# Patient Record
Sex: FEMALE | Race: WHITE | Employment: OTHER | URBAN - METROPOLITAN AREA
[De-identification: names, ages, dates, MRNs, and addresses within clinical notes are randomized per-mention and may not be internally consistent; named-entity substitution may affect disease eponyms.]

---

## 2020-12-21 ENCOUNTER — TRANSFERRED RECORDS (OUTPATIENT)
Dept: HEALTH INFORMATION MANAGEMENT | Facility: CLINIC | Age: 75
End: 2020-12-21

## 2020-12-27 ENCOUNTER — TRANSFERRED RECORDS (OUTPATIENT)
Dept: HEALTH INFORMATION MANAGEMENT | Facility: CLINIC | Age: 75
End: 2020-12-27

## 2020-12-28 LAB — EJECTION FRACTION: 61.2 %

## 2021-01-13 ENCOUNTER — TRANSFERRED RECORDS (OUTPATIENT)
Dept: HEALTH INFORMATION MANAGEMENT | Facility: CLINIC | Age: 76
End: 2021-01-13

## 2021-02-15 ENCOUNTER — OFFICE VISIT (OUTPATIENT)
Dept: FAMILY MEDICINE | Facility: CLINIC | Age: 76
End: 2021-02-15
Payer: COMMERCIAL

## 2021-02-15 VITALS
OXYGEN SATURATION: 97 % | WEIGHT: 138 LBS | SYSTOLIC BLOOD PRESSURE: 138 MMHG | TEMPERATURE: 97.6 F | HEIGHT: 64 IN | HEART RATE: 60 BPM | DIASTOLIC BLOOD PRESSURE: 62 MMHG | BODY MASS INDEX: 23.56 KG/M2 | RESPIRATION RATE: 14 BRPM

## 2021-02-15 DIAGNOSIS — E03.9 ACQUIRED HYPOTHYROIDISM: ICD-10-CM

## 2021-02-15 DIAGNOSIS — K21.00 GASTROESOPHAGEAL REFLUX DISEASE WITH ESOPHAGITIS WITHOUT HEMORRHAGE: ICD-10-CM

## 2021-02-15 DIAGNOSIS — H40.003 GLAUCOMA SUSPECT, BILATERAL: ICD-10-CM

## 2021-02-15 DIAGNOSIS — I21.4 NON-STEMI (NON-ST ELEVATED MYOCARDIAL INFARCTION) (H): ICD-10-CM

## 2021-02-15 DIAGNOSIS — E11.00 TYPE 2 DIABETES MELLITUS WITH HYPEROSMOLARITY WITHOUT COMA, WITHOUT LONG-TERM CURRENT USE OF INSULIN (H): ICD-10-CM

## 2021-02-15 DIAGNOSIS — Z76.89 ENCOUNTER TO ESTABLISH CARE: Primary | ICD-10-CM

## 2021-02-15 DIAGNOSIS — I10 HYPERTENSION, UNSPECIFIED TYPE: ICD-10-CM

## 2021-02-15 DIAGNOSIS — E78.5 HYPERLIPIDEMIA LDL GOAL <100: ICD-10-CM

## 2021-02-15 PROCEDURE — 99215 OFFICE O/P EST HI 40 MIN: CPT | Performed by: NURSE PRACTITIONER

## 2021-02-15 RX ORDER — OMEPRAZOLE 10 MG/1
20 CAPSULE, DELAYED RELEASE ORAL DAILY
Qty: 180 CAPSULE | Refills: 3 | Status: SHIPPED | OUTPATIENT
Start: 2021-02-15 | End: 2021-05-16

## 2021-02-15 RX ORDER — ETHACRYNIC ACID 25 MG/1
25 TABLET ORAL DAILY
Qty: 90 TABLET | Refills: 0 | Status: SHIPPED | OUTPATIENT
Start: 2021-02-15 | End: 2021-03-16

## 2021-02-15 RX ORDER — LANCETS
EACH MISCELLANEOUS
Qty: 1 EACH | Refills: 3 | Status: SHIPPED | OUTPATIENT
Start: 2021-02-15 | End: 2022-02-01

## 2021-02-15 RX ORDER — LATANOPROST 50 UG/ML
1 SOLUTION/ DROPS OPHTHALMIC DAILY
Status: CANCELLED | OUTPATIENT
Start: 2021-02-15

## 2021-02-15 RX ORDER — ETHACRYNIC ACID 25 MG/1
25 TABLET ORAL
COMMUNITY
End: 2021-02-15

## 2021-02-15 RX ORDER — CLOPIDOGREL BISULFATE 75 MG/1
75 TABLET ORAL DAILY
COMMUNITY
Start: 2021-02-09 | End: 2021-02-15

## 2021-02-15 RX ORDER — LATANOPROST 50 UG/ML
1 SOLUTION/ DROPS OPHTHALMIC DAILY
COMMUNITY

## 2021-02-15 RX ORDER — CARVEDILOL 6.25 MG/1
6.25 TABLET ORAL 2 TIMES DAILY
COMMUNITY
Start: 2021-02-09 | End: 2021-02-15

## 2021-02-15 RX ORDER — LEVOTHYROXINE SODIUM 75 UG/1
75 TABLET ORAL DAILY
COMMUNITY
End: 2021-02-15

## 2021-02-15 RX ORDER — ATORVASTATIN CALCIUM 80 MG/1
80 TABLET, FILM COATED ORAL DAILY
COMMUNITY
Start: 2021-02-09 | End: 2021-02-15

## 2021-02-15 RX ORDER — LOSARTAN POTASSIUM 100 MG/1
100 TABLET ORAL DAILY
COMMUNITY
End: 2021-02-15

## 2021-02-15 RX ORDER — CARVEDILOL 6.25 MG/1
6.25 TABLET ORAL 2 TIMES DAILY
Qty: 180 TABLET | Refills: 0 | Status: SHIPPED | OUTPATIENT
Start: 2021-02-15 | End: 2021-05-17

## 2021-02-15 RX ORDER — LEVOTHYROXINE SODIUM 75 UG/1
75 TABLET ORAL DAILY
Qty: 90 TABLET | Refills: 0 | Status: SHIPPED | OUTPATIENT
Start: 2021-02-15 | End: 2021-05-17

## 2021-02-15 RX ORDER — ATORVASTATIN CALCIUM 80 MG/1
80 TABLET, FILM COATED ORAL DAILY
Qty: 90 TABLET | Refills: 0 | Status: SHIPPED | OUTPATIENT
Start: 2021-02-15 | End: 2021-05-17

## 2021-02-15 RX ORDER — LOSARTAN POTASSIUM 100 MG/1
100 TABLET ORAL DAILY
Qty: 90 TABLET | Refills: 0 | Status: SHIPPED | OUTPATIENT
Start: 2021-02-15 | End: 2021-04-16

## 2021-02-15 RX ORDER — NITROGLYCERIN 0.4 MG/1
0.4 TABLET SUBLINGUAL PRN
COMMUNITY
Start: 2021-02-09

## 2021-02-15 RX ORDER — TIMOLOL 2.56 MG/ML
1 SOLUTION/ DROPS OPHTHALMIC DAILY
COMMUNITY

## 2021-02-15 RX ORDER — OMEPRAZOLE 10 MG/1
20 CAPSULE, DELAYED RELEASE ORAL DAILY
COMMUNITY
End: 2021-02-15

## 2021-02-15 RX ORDER — GLUCOSAMINE HCL/CHONDROITIN SU 500-400 MG
CAPSULE ORAL
Qty: 100 EACH | Refills: 3 | Status: SHIPPED | OUTPATIENT
Start: 2021-02-15

## 2021-02-15 RX ORDER — CLOPIDOGREL BISULFATE 75 MG/1
75 TABLET ORAL DAILY
Qty: 90 TABLET | Refills: 1 | Status: SHIPPED | OUTPATIENT
Start: 2021-02-15 | End: 2021-05-17

## 2021-02-15 SDOH — HEALTH STABILITY: MENTAL HEALTH: HOW OFTEN DO YOU HAVE 6 OR MORE DRINKS ON ONE OCCASION?: NEVER

## 2021-02-15 SDOH — HEALTH STABILITY: MENTAL HEALTH: HOW OFTEN DO YOU HAVE A DRINK CONTAINING ALCOHOL?: NEVER

## 2021-02-15 SDOH — HEALTH STABILITY: MENTAL HEALTH: HOW MANY STANDARD DRINKS CONTAINING ALCOHOL DO YOU HAVE ON A TYPICAL DAY?: NOT ASKED

## 2021-02-15 ASSESSMENT — PAIN SCALES - GENERAL: PAINLEVEL: MILD PAIN (3)

## 2021-02-15 ASSESSMENT — MIFFLIN-ST. JEOR: SCORE: 1110.96

## 2021-02-15 NOTE — PATIENT INSTRUCTIONS
- Today we will get your medication refilled.     - Cardiology referral has been placed.     - I will follow up with you in 2-3 months once we have all your records.     Jamie Howard CNP

## 2021-02-15 NOTE — PROGRESS NOTES
Assessment & Plan     Encounter to establish care  - Patient is new to the area. She moved to [place] from the [facility]. 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 All meds need to be refilled today. We discussed health history, health maintenance needs, health history, medication history, and priorities in terms of priorities of care.      Non-STEMI (non-ST elevated myocardial infarction) (H)  - Stable no new pain, fatigue, or SOB will continue with current plan of care and referral to cardiology was placed.   - ethacrynic acid (EDECRIN) 25 MG tablet; Take 1 tablet (25 mg) by mouth daily  - clopidogrel (PLAVIX) 75 MG tablet; Take 1 tablet (75 mg) by mouth daily  - carvedilol (COREG) 6.25 MG tablet; Take 1 tablet (6.25 mg) by mouth 2 times daily  - CARDIOLOGY EVAL ADULT REFERRAL; Future    Hyperlipidemia LDL goal <100  - No new symptoms of concerns, labs stable, reviewed.   - atorvastatin (LIPITOR) 80 MG tablet; Take 1 tablet (80 mg) by mouth daily    Hypertension, unspecified type  - Blood pressure in range, will continue with current plan of care.   - losartan (COZAAR) 100 MG tablet; Take 1 tablet (100 mg) by mouth daily    Type 2 diabetes mellitus with hyperosmolarity without coma, without long-term current use of insulin (H)  - Stable, will monitor with labs.   - blood glucose (NO BRAND SPECIFIED) test strip; Use to test blood sugar 1 times daily or as directed. To accompany: Blood Glucose Monitor Brands: per insurance.  - blood glucose calibration (NO BRAND SPECIFIED) solution; Use to calibrate blood glucose monitor as needed as directed. To accompany: Blood Glucose Monitor Brands: per insurance.  - thin (NO BRAND SPECIFIED) lancets; Use to test blood sugar 1 times daily or as directed. To accompany: Blood Glucose Monitor Brands: per insurance.  - alcohol swab  "prep pads; Use to swab area of injection/shana as directed  - metFORMIN (GLUCOPHAGE) 500 MG tablet; Take 1 tablet (500 mg) by mouth 2 times daily (with meals)    Gastroesophageal reflux disease with esophagitis without hemorrhage  - Stable, will continue with a current plan of care.   - omeprazole (PRILOSEC) 10 MG DR capsule; Take 2 capsules (20 mg) by mouth daily    Acquired hypothyroidism  - Labs stable, will continue with current plan of care.   - levothyroxine (SYNTHROID/LEVOTHROID) 75 MCG tablet; Take 1 tablet (75 mcg) by mouth daily  - TSH with free T4 reflex; Future    Glaucoma suspect, bilateral  - Follows regularly with Ophthalmologists.         45 minutes spent on the date of the encounter doing chart review, review of outside records, interpretation of tests, patient visit and documentation            Return in about 3 months (around 5/15/2021) for Routine Visit, Lab Work.    Jamie Howard NP  [facility] YUE Enciso is a 75 year old who presents for the following health issues     HPI       New Patient/Transfer of Care      Review of Systems   Constitutional, HEENT, cardiovascular, pulmonary, gi and gu systems are negative, except as otherwise noted.      Objective    /62   Pulse 60   Temp 97.6  F (36.4  C) (Temporal)   Resp 14   Ht 1.626 m (5' 4\")   Wt 62.6 kg (138 lb)   SpO2 97%   BMI 23.69 kg/m    Body mass index is 23.69 kg/m .  Physical Exam   GENERAL: healthy, alert and no distress  EYES: Eyes grossly normal to inspection, PERRL and conjunctivae and sclerae normal  NECK: no adenopathy, no asymmetry, masses, or scars and thyroid normal to palpation  RESP: lungs clear to auscultation - no rales, rhonchi or wheezes  CV: regular rate and rhythm, normal S1 S2, no S3 or S4, no murmur, click or rub, no peripheral edema and peripheral pulses strong  ABDOMEN: soft, nontender  MS: no gross musculoskeletal defects noted, no edema    Labs and imaging " reviewed in EPIC

## 2021-02-16 ENCOUNTER — HOSPITAL ENCOUNTER (OUTPATIENT)
Dept: CARDIAC REHAB | Facility: CLINIC | Age: 76
End: 2021-02-16
Payer: COMMERCIAL

## 2021-02-16 PROCEDURE — 93798 PHYS/QHP OP CAR RHAB W/ECG: CPT

## 2021-02-16 PROCEDURE — 93797 PHYS/QHP OP CAR RHAB WO ECG: CPT | Mod: 59

## 2021-02-19 ENCOUNTER — HOSPITAL ENCOUNTER (OUTPATIENT)
Dept: CARDIAC REHAB | Facility: CLINIC | Age: 76
End: 2021-02-19
Attending: INTERNAL MEDICINE
Payer: COMMERCIAL

## 2021-02-19 PROCEDURE — 93798 PHYS/QHP OP CAR RHAB W/ECG: CPT

## 2021-02-22 ENCOUNTER — HOSPITAL ENCOUNTER (OUTPATIENT)
Dept: CARDIAC REHAB | Facility: CLINIC | Age: 76
End: 2021-02-22
Attending: INTERNAL MEDICINE
Payer: COMMERCIAL

## 2021-02-22 PROCEDURE — 93798 PHYS/QHP OP CAR RHAB W/ECG: CPT | Performed by: REHABILITATION PRACTITIONER

## 2021-02-23 ENCOUNTER — OFFICE VISIT (OUTPATIENT)
Dept: CARDIOLOGY | Facility: CLINIC | Age: 76
End: 2021-02-23
Attending: NURSE PRACTITIONER
Payer: COMMERCIAL

## 2021-02-23 VITALS
OXYGEN SATURATION: 100 % | HEIGHT: 64 IN | WEIGHT: 139.9 LBS | SYSTOLIC BLOOD PRESSURE: 120 MMHG | BODY MASS INDEX: 23.88 KG/M2 | HEART RATE: 54 BPM | DIASTOLIC BLOOD PRESSURE: 66 MMHG

## 2021-02-23 DIAGNOSIS — I10 BENIGN ESSENTIAL HYPERTENSION: Primary | ICD-10-CM

## 2021-02-23 DIAGNOSIS — I21.02 ST ELEVATION MYOCARDIAL INFARCTION INVOLVING LEFT ANTERIOR DESCENDING (LAD) CORONARY ARTERY (H): ICD-10-CM

## 2021-02-23 DIAGNOSIS — E11.9 TYPE 2 DIABETES MELLITUS WITHOUT COMPLICATION, WITHOUT LONG-TERM CURRENT USE OF INSULIN (H): ICD-10-CM

## 2021-02-23 DIAGNOSIS — I25.10 CORONARY ARTERY DISEASE INVOLVING NATIVE CORONARY ARTERY OF NATIVE HEART WITHOUT ANGINA PECTORIS: ICD-10-CM

## 2021-02-23 DIAGNOSIS — E78.00 HYPERCHOLESTEROLEMIA: ICD-10-CM

## 2021-02-23 PROCEDURE — 99204 OFFICE O/P NEW MOD 45 MIN: CPT | Performed by: INTERNAL MEDICINE

## 2021-02-23 ASSESSMENT — MIFFLIN-ST. JEOR: SCORE: 1119.58

## 2021-02-23 NOTE — PROGRESS NOTES
Service Date: 02/23/2021      HISTORY OF PRESENT ILLNESS:  I had the opportunity to see Ms. Zaria Gaona in Cardiology Clinic today at [facility] in [place] for consultation regarding a recent myocardial infarction and stenting procedure done in [place].        Ms. Gaona is a 75-year-old woman who was living in [place], [place] with her  on 12/26 when she began experiencing indigestion type symptoms.  She took some Tums and went to bed and the discomfort seemed to improve.  However, she woke up shortly after that with more intense discomfort and went to the emergency room in [place], [place].  She was diagnosed with an acute anterior ST elevation myocardial infarction, but her chest discomfort seemed to resolve with nitroglycerin.  She was transferred by ambulance to [facility] in [place].  She tells me that she arrived in the early morning hours and waited until late in the day to see a doctor and then was taken rapidly to the Cardiac Catheterization Laboratory that evening.        She was found to have a thrombotic occlusion of the mid LAD immediately after a small diagonal vessel.  The diagonal had a 60%-70% stenosis.  The LAD was stented with a drug-eluting stent.  She also had a 90% stenosis in the proximal right coronary artery which was stented and 30% distal left main stenosis and 60% mid left circumflex stenosis.      Her echocardiogram the following day on 12/28/2020 demonstrated mild distal anteroseptal and apical hypokinesis with normal left ventricular ejection fraction of 61%.  She had no significant valvular disease.      She left the hospital feeling well and returned home to pack up her house and belongings and moved to [place].  She and her , who has dementia, moved here to be closer to family.  She saw Cardiology in [place] in January before moving here and she seemed to be stable and doing well.      She started cardiac rehab here in  [place] and is feeling well without chest pain or shortness of breath.  She has no lightheadedness, palpitations or syncope.      She seems to be tolerating her medications well.  She does have cardiac risk factors including hypertension, dyslipidemia and type 2 diabetes.  She wonders why her hypertension was not treated more aggressively before this event.      PHYSICAL EXAMINATION:  On examination today, her blood pressure was 120/66, heart rate 54 and weight 139 pounds.  Her lungs are clear.  Heart rhythm is regular.  She has no cardiac murmurs or carotid bruits.      IMPRESSIONS:  Ms. Zaria Gaona is a 75-year-old woman with hypertension, type 2 diabetes and dyslipidemia, who recently suffered an anterior wall ST elevation myocardial infarction, treated somewhat urgently in [place] with stenting of her mid LAD where she had a thrombotic occlusion.  Interestingly, the laboratory studies indicate a troponin of 12 prior to her stenting procedure.  Her right coronary artery was also stented at that time.      She is on excellent medication program now.  She is on ethacrynic acid rather than furosemide, probably because of sulfa allergy, although she tells me she never took any other diuretic.  She is doing well with [facility] so I do not see any reason to change that at this point.      Her last hemoglobin A1c was 7.9 in December.  She has a recheck in 3 months with her primary care physician here.  If her hemoglobin A1c is still elevated above 7, she would benefit from additional medication, like an SGLT2 inhibitor (Jardiance) that would help provide secondary prevention for future cardiac events.      I will plan to see her back again in Cardiology Clinic in 6 months to reevaluate her echocardiogram and symptoms.      cc:      Jamie Howard, CNP    [address] [address]         CANDIDA CLAIRE MD, FACC             D: 02/23/2021   T:  2021   MT: [id number]      Name:     CHANG WILKERSON   MRN:      [id number]        Account:      [id number]   :      1945           Service Date: 2021      Document: [id number]

## 2021-02-23 NOTE — LETTER
2/23/2021    Jamie Howard, NP  [address] [place] [address]    RE: Zaria Gaona       Dear Colleague,    I had the pleasure of seeing Zaria Gaona in the [facility].    HPI and Plan:   See dictation    Orders Placed This Encounter   Procedures     Basic metabolic panel     Lipid Profile     ALT     Hemoglobin A1c     Follow-Up with Cardiologist     Echocardiogram Complete       No orders of the defined types were placed in this encounter.      There are no discontinued medications.      Encounter Diagnoses   Name Primary?     Benign essential hypertension Yes     Hypercholesterolemia      Type 2 diabetes mellitus without complication, without long-term current use of insulin (H)      ST elevation myocardial infarction involving left anterior descending (LAD) coronary artery (H)      Coronary artery disease involving native coronary artery of native heart without angina pectoris        CURRENT MEDICATIONS:  Current Outpatient Medications   Medication Sig Dispense Refill     alcohol swab prep pads Use to swab area of injection/shana as directed 100 each 3     atorvastatin (LIPITOR) 80 MG tablet Take 1 tablet (80 mg) by mouth daily 90 tablet 0     blood glucose (NO BRAND SPECIFIED) test strip Use to test blood sugar 1 times daily or as directed. To accompany: Blood Glucose Monitor Brands: per insurance. 100 strip 6     blood glucose calibration (NO BRAND SPECIFIED) solution Use to calibrate blood glucose monitor as needed as directed. To accompany: Blood Glucose Monitor Brands: per insurance. 1 Bottle 3     carvedilol (COREG) 6.25 MG tablet Take 1 tablet (6.25 mg) by mouth 2 times daily 180 tablet 0     clopidogrel (PLAVIX) 75 MG tablet Take 1 tablet (75 mg) by mouth daily 90 tablet 1     ethacrynic acid (EDECRIN) 25 MG tablet Take 1 tablet (25 mg) by mouth daily 90 tablet 0     latanoprost (XALATAN) 0.005 % ophthalmic solution  Inject 1 drop into the eye daily       levothyroxine (SYNTHROID/LEVOTHROID) 75 MCG tablet Take 1 tablet (75 mcg) by mouth daily 90 tablet 0     losartan (COZAAR) 100 MG tablet Take 1 tablet (100 mg) by mouth daily 90 tablet 0     metFORMIN (GLUCOPHAGE) 500 MG tablet Take 1 tablet (500 mg) by mouth 2 times daily (with meals) 180 tablet 3     omeprazole (PRILOSEC) 10 MG DR capsule Take 2 capsules (20 mg) by mouth daily 180 capsule 3     thin (NO BRAND SPECIFIED) lancets Use to test blood sugar 1 times daily or as directed. To accompany: Blood Glucose Monitor Brands: per insurance. 1 each 3     timolol hemihydrate (BETIMOL) 0.25 % ophthalmic solution Inject 1 drop into the eye daily       nitroGLYcerin (NITROSTAT) 0.4 MG sublingual tablet Place 0.4 mg under the tongue as needed         ALLERGIES     Allergies   Allergen Reactions     Ampicillin Other (See Comments), Hives and Itching     Lisinopril Cough     Sulfamethoxazole W/Trimethoprim Other (See Comments), Hives and Itching       PAST MEDICAL HISTORY:  No past medical history on file.    PAST SURGICAL HISTORY:  No past surgical history on file.    FAMILY HISTORY:  No family history on file.    SOCIAL HISTORY:  Social History     Socioeconomic History     Marital status:      Spouse name: Not on file     Number of children: Not on file     Years of education: Not on file     Highest education level: Not on file   Occupational History     Not on file   Social Needs     Financial resource strain: Not on file     Food insecurity     Worry: Not on file     Inability: Not on file     Transportation needs     Medical: Not on file     Non-medical: Not on file   Tobacco Use     Smoking status: Never Smoker     Smokeless tobacco: Never Used   Substance and Sexual Activity     Alcohol use: Yes     Frequency: Never     Binge frequency: Never     Comment: occ     Drug use: Never     Sexual activity: Yes     Partners: Male     Birth control/protection: Female Surgical  "  Lifestyle     Physical activity     Days per week: Not on file     Minutes per session: Not on file     Stress: Not on file   Relationships     Social connections     Talks on phone: Not on file     Gets together: Not on file     Attends [employer] service: Not on file     Active member of club or organization: Not on file     Attends meetings of clubs or organizations: Not on file     Relationship status: Not on file     Intimate partner violence     Fear of current or ex partner: Not on file     Emotionally abused: Not on file     Physically abused: Not on file     Forced sexual activity: Not on file   Other Topics Concern     Not on file   Social History Narrative     Not on file       Review of Systems:  Skin:  Negative       Eyes:  Positive for glasses    ENT:  Negative      Respiratory:  Negative       Cardiovascular:  Negative for;palpitations;edema;lightheadedness;dizziness Positive for;chest pain    Gastroenterology:        Genitourinary:  Negative      Musculoskeletal:  Negative      Neurologic:  Negative      Psychiatric:  Positive for sleep disturbances    Heme/Lymph/Imm:  Positive for allergies    Endocrine:  Positive for diabetes      Physical Exam:  Vitals: /66 (BP Location: Right arm, Patient Position: Sitting, Cuff Size: Adult Regular)   Pulse 54   Ht 1.626 m (5' 4\")   Wt 63.5 kg (139 lb 14.4 oz)   SpO2 100%   BMI 24.01 kg/m      Constitutional:  cooperative, alert and oriented, well developed, well nourished, in no acute distress        Skin:  warm and dry to the touch, no apparent skin lesions or masses noted          Head:  normocephalic, no masses or lesions        Eyes:  pupils equal and round, conjunctivae and lids unremarkable, sclera white, no xanthalasma, EOMS intact, no nystagmus        Lymph:No Cervical lymphadenopathy present     ENT:  no pallor or cyanosis, dentition good        Neck:  carotid pulses are full and equal bilaterally, JVP normal, no carotid bruit    "     Respiratory:  normal breath sounds, clear to auscultation, normal A-P diameter, normal symmetry, normal respiratory excursion, no use of accessory muscles         Cardiac: regular rhythm, normal S1/S2, no S3 or S4, apical impulse not displaced, no murmurs, gallops or rubs                                                         GI:  abdomen soft;BS normoactive        Extremities and Muscular Skeletal:  no deformities, clubbing, cyanosis, erythema observed;no edema              Neurological:  no gross motor deficits;affect appropriate        Psych:  oriented to time, person and place        CC  Jamie Howard NP  [address] [place] [address]                  Thank you for allowing me to participate in the care of your patient.      Sincerely,     CANDIDA CLAIRE MD     [facility]  cc:   Jamie Howard NP  [address] [place] [address]

## 2021-02-23 NOTE — PROGRESS NOTES
HPI and Plan:   See dictation    Orders Placed This Encounter   Procedures     Basic metabolic panel     Lipid Profile     ALT     Hemoglobin A1c     Follow-Up with Cardiologist     Echocardiogram Complete       No orders of the defined types were placed in this encounter.      There are no discontinued medications.      Encounter Diagnoses   Name Primary?     Benign essential hypertension Yes     Hypercholesterolemia      Type 2 diabetes mellitus without complication, without long-term current use of insulin (H)      ST elevation myocardial infarction involving left anterior descending (LAD) coronary artery (H)      Coronary artery disease involving native coronary artery of native heart without angina pectoris        CURRENT MEDICATIONS:  Current Outpatient Medications   Medication Sig Dispense Refill     alcohol swab prep pads Use to swab area of injection/shana as directed 100 each 3     atorvastatin (LIPITOR) 80 MG tablet Take 1 tablet (80 mg) by mouth daily 90 tablet 0     blood glucose (NO BRAND SPECIFIED) test strip Use to test blood sugar 1 times daily or as directed. To accompany: Blood Glucose Monitor Brands: per insurance. 100 strip 6     blood glucose calibration (NO BRAND SPECIFIED) solution Use to calibrate blood glucose monitor as needed as directed. To accompany: Blood Glucose Monitor Brands: per insurance. 1 Bottle 3     carvedilol (COREG) 6.25 MG tablet Take 1 tablet (6.25 mg) by mouth 2 times daily 180 tablet 0     clopidogrel (PLAVIX) 75 MG tablet Take 1 tablet (75 mg) by mouth daily 90 tablet 1     ethacrynic acid (EDECRIN) 25 MG tablet Take 1 tablet (25 mg) by mouth daily 90 tablet 0     latanoprost (XALATAN) 0.005 % ophthalmic solution Inject 1 drop into the eye daily       levothyroxine (SYNTHROID/LEVOTHROID) 75 MCG tablet Take 1 tablet (75 mcg) by mouth daily 90 tablet 0     losartan (COZAAR) 100 MG tablet Take 1 tablet (100 mg) by mouth daily 90 tablet 0     metFORMIN (GLUCOPHAGE) 500 MG  tablet Take 1 tablet (500 mg) by mouth 2 times daily (with meals) 180 tablet 3     omeprazole (PRILOSEC) 10 MG DR capsule Take 2 capsules (20 mg) by mouth daily 180 capsule 3     thin (NO BRAND SPECIFIED) lancets Use to test blood sugar 1 times daily or as directed. To accompany: Blood Glucose Monitor Brands: per insurance. 1 each 3     timolol hemihydrate (BETIMOL) 0.25 % ophthalmic solution Inject 1 drop into the eye daily       nitroGLYcerin (NITROSTAT) 0.4 MG sublingual tablet Place 0.4 mg under the tongue as needed         ALLERGIES     Allergies   Allergen Reactions     Ampicillin Other (See Comments), Hives and Itching     Lisinopril Cough     Sulfamethoxazole W/Trimethoprim Other (See Comments), Hives and Itching       PAST MEDICAL HISTORY:  No past medical history on file.    PAST SURGICAL HISTORY:  No past surgical history on file.    FAMILY HISTORY:  No family history on file.    SOCIAL HISTORY:  Social History     Socioeconomic History     Marital status:      Spouse name: Not on file     Number of children: Not on file     Years of education: Not on file     Highest education level: Not on file   Occupational History     Not on file   Social Needs     Financial resource strain: Not on file     Food insecurity     Worry: Not on file     Inability: Not on file     Transportation needs     Medical: Not on file     Non-medical: Not on file   Tobacco Use     Smoking status: Never Smoker     Smokeless tobacco: Never Used   Substance and Sexual Activity     Alcohol use: Yes     Frequency: Never     Binge frequency: Never     Comment: occ     Drug use: Never     Sexual activity: Yes     Partners: Male     Birth control/protection: Female Surgical   Lifestyle     Physical activity     Days per week: Not on file     Minutes per session: Not on file     Stress: Not on file   Relationships     Social connections     Talks on phone: Not on file     Gets together: Not on file     Attends Religion service: Not  "on file     Active member of club or organization: Not on file     Attends meetings of clubs or organizations: Not on file     Relationship status: Not on file     Intimate partner violence     Fear of current or ex partner: Not on file     Emotionally abused: Not on file     Physically abused: Not on file     Forced sexual activity: Not on file   Other Topics Concern     Not on file   Social History Narrative     Not on file       Review of Systems:  Skin:  Negative       Eyes:  Positive for glasses    ENT:  Negative      Respiratory:  Negative       Cardiovascular:  Negative for;palpitations;edema;lightheadedness;dizziness Positive for;chest pain    Gastroenterology:        Genitourinary:  Negative      Musculoskeletal:  Negative      Neurologic:  Negative      Psychiatric:  Positive for sleep disturbances    Heme/Lymph/Imm:  Positive for allergies    Endocrine:  Positive for diabetes      Physical Exam:  Vitals: /66 (BP Location: Right arm, Patient Position: Sitting, Cuff Size: Adult Regular)   Pulse 54   Ht 1.626 m (5' 4\")   Wt 63.5 kg (139 lb 14.4 oz)   SpO2 100%   BMI 24.01 kg/m      Constitutional:  cooperative, alert and oriented, well developed, well nourished, in no acute distress        Skin:  warm and dry to the touch, no apparent skin lesions or masses noted          Head:  normocephalic, no masses or lesions        Eyes:  pupils equal and round, conjunctivae and lids unremarkable, sclera white, no xanthalasma, EOMS intact, no nystagmus        Lymph:No Cervical lymphadenopathy present     ENT:  no pallor or cyanosis, dentition good        Neck:  carotid pulses are full and equal bilaterally, JVP normal, no carotid bruit        Respiratory:  normal breath sounds, clear to auscultation, normal A-P diameter, normal symmetry, normal respiratory excursion, no use of accessory muscles         Cardiac: regular rhythm, normal S1/S2, no S3 or S4, apical impulse not displaced, no murmurs, gallops or " rubs                                                         GI:  abdomen soft;BS normoactive        Extremities and Muscular Skeletal:  no deformities, clubbing, cyanosis, erythema observed;no edema              Neurological:  no gross motor deficits;affect appropriate        Psych:  oriented to time, person and place          Jamie Howard, NP  [address] [place] [address]

## 2021-02-23 NOTE — LETTER
2/23/2021      Jamie Howard, GERMAN  [address] [place] [address]      RE: Zaria JOSEPH Jimenez       Dear Colleague,    I had the pleasure of seeing Zaria Gaona in the [facility].    Service Date: 02/23/2021      HISTORY OF PRESENT ILLNESS:  I had the opportunity to see Ms. Zaria Gaona in Cardiology Clinic today at [facility] in [place] for consultation regarding a recent myocardial infarction and stenting procedure done in [place].        Ms. Gaona is a 75-year-old woman who was living in [place], [place] with her  on 12/26 when she began experiencing indigestion type symptoms.  She took some Tums and went to bed and the discomfort seemed to improve.  However, she woke up shortly after that with more intense discomfort and went to the emergency room in [place], [place].  She was diagnosed with an acute anterior ST elevation myocardial infarction, but her chest discomfort seemed to resolve with nitroglycerin.  She was transferred by ambulance to [facility] in [place].  She tells me that she arrived in the early morning hours and waited until late in the day to see a doctor and then was taken rapidly to the Cardiac Catheterization Laboratory that evening.        She was found to have a thrombotic occlusion of the mid LAD immediately after a small diagonal vessel.  The diagonal had a 60%-70% stenosis.  The LAD was stented with a drug-eluting stent.  She also had a 90% stenosis in the proximal right coronary artery which was stented and 30% distal left main stenosis and 60% mid left circumflex stenosis.      Her echocardiogram the following day on 12/28/2020 demonstrated mild distal anteroseptal and apical hypokinesis with normal left ventricular ejection fraction of 61%.  She had no significant valvular disease.      She left the hospital feeling well and returned home to pack up her house and  belongings and moved to [place].  She and her , who has dementia, moved here to be closer to family.  She saw Cardiology in [place] in January before moving here and she seemed to be stable and doing well.      She started cardiac rehab here in [place] and is feeling well without chest pain or shortness of breath.  She has no lightheadedness, palpitations or syncope.      She seems to be tolerating her medications well.  She does have cardiac risk factors including hypertension, dyslipidemia and type 2 diabetes.  She wonders why her hypertension was not treated more aggressively before this event.      PHYSICAL EXAMINATION:  On examination today, her blood pressure was 120/66, heart rate 54 and weight 139 pounds.  Her lungs are clear.  Heart rhythm is regular.  She has no cardiac murmurs or carotid bruits.      IMPRESSIONS:  Ms. Zaria Gaona is a 75-year-old woman with hypertension, type 2 diabetes and dyslipidemia, who recently suffered an anterior wall ST elevation myocardial infarction, treated somewhat urgently in [place] with stenting of her mid LAD where she had a thrombotic occlusion.  Interestingly, the laboratory studies indicate a troponin of 12 prior to her stenting procedure.  Her right coronary artery was also stented at that time.      She is on excellent medication program now.  She is on ethacrynic acid rather than furosemide, probably because of sulfa allergy, although she tells me she never took any other diuretic.  She is doing well with [facility] so I do not see any reason to change that at this point.      Her last hemoglobin A1c was 7.9 in December.  She has a recheck in 3 months with her primary care physician here.  If her hemoglobin A1c is still elevated above 7, she would benefit from additional medication, like an SGLT2 inhibitor (Jardiance) that would help provide secondary prevention for future cardiac events.      I will plan to see her back again  in Cardiology Clinic in 6 months to reevaluate her echocardiogram and symptoms.      cc:      Jamie Howard, CNP    [address] [address]         CANDIDA CLAIRE MD, [facility]             D: 2021   T: 2021   MT: KOKO      Name:     CHANG WILKERSON   MRN:      [id number]        Account:      [id number]   :      1945           Service Date: 2021      Document: [id number]          Outpatient Encounter Medications as of 2021   Medication Sig Dispense Refill     alcohol swab prep pads Use to swab area of injection/shana as directed 100 each 3     atorvastatin (LIPITOR) 80 MG tablet Take 1 tablet (80 mg) by mouth daily 90 tablet 0     blood glucose (NO BRAND SPECIFIED) test strip Use to test blood sugar 1 times daily or as directed. To accompany: Blood Glucose Monitor Brands: per insurance. 100 strip 6     blood glucose calibration (NO BRAND SPECIFIED) solution Use to calibrate blood glucose monitor as needed as directed. To accompany: Blood Glucose Monitor Brands: per insurance. 1 Bottle 3     carvedilol (COREG) 6.25 MG tablet Take 1 tablet (6.25 mg) by mouth 2 times daily 180 tablet 0     clopidogrel (PLAVIX) 75 MG tablet Take 1 tablet (75 mg) by mouth daily 90 tablet 1     ethacrynic acid (EDECRIN) 25 MG tablet Take 1 tablet (25 mg) by mouth daily 90 tablet 0     latanoprost (XALATAN) 0.005 % ophthalmic solution Inject 1 drop into the eye daily       levothyroxine (SYNTHROID/LEVOTHROID) 75 MCG tablet Take 1 tablet (75 mcg) by mouth daily 90 tablet 0     losartan (COZAAR) 100 MG tablet Take 1 tablet (100 mg) by mouth daily 90 tablet 0     metFORMIN (GLUCOPHAGE) 500 MG tablet Take 1 tablet (500 mg) by mouth 2 times daily (with meals) 180 tablet 3     omeprazole (PRILOSEC) 10 MG DR capsule Take 2 capsules (20 mg) by mouth daily 180 capsule 3     thin (NO BRAND SPECIFIED) lancets Use to test blood sugar 1 times daily or as directed. To  accompany: Blood Glucose Monitor Brands: per insurance. 1 each 3     timolol hemihydrate (BETIMOL) 0.25 % ophthalmic solution Inject 1 drop into the eye daily       nitroGLYcerin (NITROSTAT) 0.4 MG sublingual tablet Place 0.4 mg under the tongue as needed       No facility-administered encounter medications on file as of 2/23/2021.        Again, thank you for allowing me to participate in the care of your patient.      Sincerely,    CANDIDA CLAIRE MD     [facility]

## 2021-02-24 ENCOUNTER — HOSPITAL ENCOUNTER (OUTPATIENT)
Dept: CARDIAC REHAB | Facility: CLINIC | Age: 76
End: 2021-02-24
Attending: INTERNAL MEDICINE
Payer: COMMERCIAL

## 2021-02-24 PROCEDURE — 93798 PHYS/QHP OP CAR RHAB W/ECG: CPT | Performed by: REHABILITATION PRACTITIONER

## 2021-02-26 ENCOUNTER — HOSPITAL ENCOUNTER (OUTPATIENT)
Dept: CARDIAC REHAB | Facility: CLINIC | Age: 76
End: 2021-02-26
Attending: INTERNAL MEDICINE
Payer: COMMERCIAL

## 2021-02-26 PROCEDURE — 93798 PHYS/QHP OP CAR RHAB W/ECG: CPT

## 2021-03-01 ENCOUNTER — HOSPITAL ENCOUNTER (OUTPATIENT)
Dept: CARDIAC REHAB | Facility: CLINIC | Age: 76
End: 2021-03-01
Attending: INTERNAL MEDICINE
Payer: COMMERCIAL

## 2021-03-01 PROCEDURE — 93798 PHYS/QHP OP CAR RHAB W/ECG: CPT | Performed by: REHABILITATION PRACTITIONER

## 2021-03-03 ENCOUNTER — HOSPITAL ENCOUNTER (OUTPATIENT)
Dept: CARDIAC REHAB | Facility: CLINIC | Age: 76
End: 2021-03-03
Attending: INTERNAL MEDICINE
Payer: COMMERCIAL

## 2021-03-03 PROCEDURE — 93798 PHYS/QHP OP CAR RHAB W/ECG: CPT | Performed by: REHABILITATION PRACTITIONER

## 2021-03-05 ENCOUNTER — HOSPITAL ENCOUNTER (OUTPATIENT)
Dept: CARDIAC REHAB | Facility: CLINIC | Age: 76
End: 2021-03-05
Attending: INTERNAL MEDICINE
Payer: COMMERCIAL

## 2021-03-05 PROCEDURE — 93798 PHYS/QHP OP CAR RHAB W/ECG: CPT

## 2021-03-08 ENCOUNTER — HOSPITAL ENCOUNTER (OUTPATIENT)
Dept: CARDIAC REHAB | Facility: CLINIC | Age: 76
End: 2021-03-08
Attending: INTERNAL MEDICINE
Payer: COMMERCIAL

## 2021-03-08 PROCEDURE — 93798 PHYS/QHP OP CAR RHAB W/ECG: CPT | Performed by: REHABILITATION PRACTITIONER

## 2021-03-10 ENCOUNTER — HOSPITAL ENCOUNTER (OUTPATIENT)
Dept: CARDIAC REHAB | Facility: CLINIC | Age: 76
End: 2021-03-10
Attending: INTERNAL MEDICINE
Payer: COMMERCIAL

## 2021-03-10 PROCEDURE — 93798 PHYS/QHP OP CAR RHAB W/ECG: CPT

## 2021-03-12 ENCOUNTER — TRANSFERRED RECORDS (OUTPATIENT)
Dept: HEALTH INFORMATION MANAGEMENT | Facility: CLINIC | Age: 76
End: 2021-03-12

## 2021-03-12 ENCOUNTER — HOSPITAL ENCOUNTER (OUTPATIENT)
Dept: CARDIAC REHAB | Facility: CLINIC | Age: 76
End: 2021-03-12
Attending: INTERNAL MEDICINE
Payer: COMMERCIAL

## 2021-03-12 LAB — RETINOPATHY: POSITIVE

## 2021-03-12 PROCEDURE — 999N000109 HC STATISTIC OP CR VISIT

## 2021-03-12 PROCEDURE — 93798 PHYS/QHP OP CAR RHAB W/ECG: CPT

## 2021-03-15 ENCOUNTER — HOSPITAL ENCOUNTER (OUTPATIENT)
Dept: CARDIAC REHAB | Facility: CLINIC | Age: 76
End: 2021-03-15
Attending: INTERNAL MEDICINE
Payer: COMMERCIAL

## 2021-03-15 ENCOUNTER — TELEPHONE (OUTPATIENT)
Dept: FAMILY MEDICINE | Facility: CLINIC | Age: 76
End: 2021-03-15

## 2021-03-15 DIAGNOSIS — I21.4 NON-STEMI (NON-ST ELEVATED MYOCARDIAL INFARCTION) (H): ICD-10-CM

## 2021-03-15 PROCEDURE — 93798 PHYS/QHP OP CAR RHAB W/ECG: CPT | Performed by: REHABILITATION PRACTITIONER

## 2021-03-15 NOTE — TELEPHONE ENCOUNTER
Ethacrynic      Last Written Prescription Date:  2/15/2021  Last Fill Quantity: 90,   # refills: 0  Last Office Visit: 2/15/2021  Future Office visit:    Next 5 appointments (look out 90 days)    Mar 26, 2021  2:20 PM  Office Visit with Jamie Howard NP  [facility] ([facility] ) [address] [place] [id number]  [phone]   May 17, 2021 10:00 AM  Office Visit with Jamie Howard NP  [facility] ([facility] ) [address] [place] [id number]  [phone]           Routing refill request to provider for review/approval because:  Drug not on the [facility], [facility] or [facility] refill protocol or controlled substance

## 2021-03-15 NOTE — TELEPHONE ENCOUNTER
Patient stating she recently completed a home colon cancer screening test that the results came back positive for blood and was instructed to see her provider for follow up on the results. Patient is scheduled with her provider on Friday, March 26th at 2:20 pm. Denise Wong LPN

## 2021-03-16 RX ORDER — ETHACRYNIC ACID 25 MG/1
TABLET ORAL
Qty: 90 TABLET | Refills: 0 | Status: SHIPPED | OUTPATIENT
Start: 2021-03-16 | End: 2021-05-17

## 2021-03-17 ENCOUNTER — HOSPITAL ENCOUNTER (OUTPATIENT)
Dept: CARDIAC REHAB | Facility: CLINIC | Age: 76
End: 2021-03-17
Attending: INTERNAL MEDICINE
Payer: COMMERCIAL

## 2021-03-17 PROCEDURE — 93797 PHYS/QHP OP CAR RHAB WO ECG: CPT | Performed by: REHABILITATION PRACTITIONER

## 2021-03-17 PROCEDURE — 93798 PHYS/QHP OP CAR RHAB W/ECG: CPT | Performed by: REHABILITATION PRACTITIONER

## 2021-03-19 ENCOUNTER — HOSPITAL ENCOUNTER (OUTPATIENT)
Dept: CARDIAC REHAB | Facility: CLINIC | Age: 76
End: 2021-03-19
Attending: INTERNAL MEDICINE
Payer: COMMERCIAL

## 2021-03-19 PROCEDURE — 93798 PHYS/QHP OP CAR RHAB W/ECG: CPT

## 2021-03-22 ENCOUNTER — HOSPITAL ENCOUNTER (OUTPATIENT)
Dept: CARDIAC REHAB | Facility: CLINIC | Age: 76
End: 2021-03-22
Attending: INTERNAL MEDICINE
Payer: COMMERCIAL

## 2021-03-22 PROCEDURE — 93798 PHYS/QHP OP CAR RHAB W/ECG: CPT | Performed by: REHABILITATION PRACTITIONER

## 2021-03-24 ENCOUNTER — HOSPITAL ENCOUNTER (OUTPATIENT)
Dept: CARDIAC REHAB | Facility: CLINIC | Age: 76
End: 2021-03-24
Attending: INTERNAL MEDICINE
Payer: COMMERCIAL

## 2021-03-24 PROCEDURE — 93798 PHYS/QHP OP CAR RHAB W/ECG: CPT

## 2021-03-26 ENCOUNTER — HOSPITAL ENCOUNTER (OUTPATIENT)
Dept: CARDIAC REHAB | Facility: CLINIC | Age: 76
End: 2021-03-26
Attending: INTERNAL MEDICINE
Payer: COMMERCIAL

## 2021-03-26 PROCEDURE — 93798 PHYS/QHP OP CAR RHAB W/ECG: CPT

## 2021-03-29 ENCOUNTER — HOSPITAL ENCOUNTER (OUTPATIENT)
Dept: CARDIAC REHAB | Facility: CLINIC | Age: 76
End: 2021-03-29
Attending: INTERNAL MEDICINE
Payer: COMMERCIAL

## 2021-03-29 PROCEDURE — 93798 PHYS/QHP OP CAR RHAB W/ECG: CPT | Performed by: REHABILITATION PRACTITIONER

## 2021-03-31 ENCOUNTER — HOSPITAL ENCOUNTER (OUTPATIENT)
Dept: CARDIAC REHAB | Facility: CLINIC | Age: 76
End: 2021-03-31
Attending: INTERNAL MEDICINE
Payer: COMMERCIAL

## 2021-03-31 PROCEDURE — 93798 PHYS/QHP OP CAR RHAB W/ECG: CPT | Performed by: REHABILITATION PRACTITIONER

## 2021-04-02 ENCOUNTER — HOSPITAL ENCOUNTER (OUTPATIENT)
Dept: CARDIAC REHAB | Facility: CLINIC | Age: 76
End: 2021-04-02
Attending: INTERNAL MEDICINE
Payer: COMMERCIAL

## 2021-04-02 PROCEDURE — 93798 PHYS/QHP OP CAR RHAB W/ECG: CPT

## 2021-04-05 ENCOUNTER — HOSPITAL ENCOUNTER (OUTPATIENT)
Dept: CARDIAC REHAB | Facility: CLINIC | Age: 76
End: 2021-04-05
Attending: INTERNAL MEDICINE
Payer: COMMERCIAL

## 2021-04-05 ENCOUNTER — OFFICE VISIT (OUTPATIENT)
Dept: FAMILY MEDICINE | Facility: CLINIC | Age: 76
End: 2021-04-05
Payer: COMMERCIAL

## 2021-04-05 VITALS
WEIGHT: 138.06 LBS | HEART RATE: 61 BPM | TEMPERATURE: 97.8 F | DIASTOLIC BLOOD PRESSURE: 80 MMHG | RESPIRATION RATE: 18 BRPM | OXYGEN SATURATION: 98 % | BODY MASS INDEX: 23.7 KG/M2 | SYSTOLIC BLOOD PRESSURE: 122 MMHG

## 2021-04-05 DIAGNOSIS — E78.5 HYPERLIPIDEMIA LDL GOAL <100: ICD-10-CM

## 2021-04-05 DIAGNOSIS — E08.42 DIABETES MELLITUS DUE TO UNDERLYING CONDITION WITH DIABETIC POLYNEUROPATHY, WITHOUT LONG-TERM CURRENT USE OF INSULIN (H): ICD-10-CM

## 2021-04-05 DIAGNOSIS — E11.00 TYPE 2 DIABETES MELLITUS WITH HYPEROSMOLARITY WITHOUT COMA, WITHOUT LONG-TERM CURRENT USE OF INSULIN (H): Primary | ICD-10-CM

## 2021-04-05 DIAGNOSIS — Z78.0 POST-MENOPAUSE: ICD-10-CM

## 2021-04-05 DIAGNOSIS — K92.1 BLOOD IN STOOL: ICD-10-CM

## 2021-04-05 DIAGNOSIS — R19.5 POSITIVE FIT (FECAL IMMUNOCHEMICAL TEST): ICD-10-CM

## 2021-04-05 PROCEDURE — 99207 ZZC ROUTINE FOOT CARE BY A PHYSICIAN OF A DIABETIC PATIENT WITH DIABETICC SENSORY: CPT | Performed by: NURSE PRACTITIONER

## 2021-04-05 PROCEDURE — 93798 PHYS/QHP OP CAR RHAB W/ECG: CPT | Performed by: REHABILITATION PRACTITIONER

## 2021-04-05 PROCEDURE — 99214 OFFICE O/P EST MOD 30 MIN: CPT | Performed by: NURSE PRACTITIONER

## 2021-04-05 RX ORDER — ACETAMINOPHEN 325 MG/1
650 TABLET ORAL
COMMUNITY
Start: 2020-12-30

## 2021-04-05 ASSESSMENT — PAIN SCALES - GENERAL: PAINLEVEL: NO PAIN (0)

## 2021-04-05 NOTE — PROGRESS NOTES
Assessment & Plan     (E11.00) Type 2 diabetes mellitus with hyperosmolarity without coma, without long-term current use of insulin (H)  (primary encounter diagnosis)  Comment: Food exam completed, labs scheduled for this week.   Plan: **A1C FUTURE anytime, Comprehensive metabolic         panel (BMP + Alb, Alk Phos, ALT, AST, Total.         Bili, TP), Albumin Random Urine Quantitative         with Creat Ratio, ROUTINE FOOT CARE BY A         PHYSICIAN OF A DIABETIC PATIENT WITH DIABETIC         SENSORY            (K92.1) Blood in stool  Comment: Positive FIT results  Plan: She is due for colonoscopy, now with FIT test that is positive    (R19.5) Positive FIT (fecal immunochemical test)  Comment:Orders placed.   Plan: GASTROENTEROLOGY ADULT REF PROCEDURE ONLY      (E78.5) Hyperlipidemia LDL goal <100  Comment: Fasting labs this week.   Plan: Lipid panel reflex to direct LDL Fasting      (Z78.0) Post-menopause  Comment: Scheduled today  Plan: DX Hip/Pelvis/Spine      (E08.42) Diabetes mellitus due to underlying condition with diabetic polyneuropathy, without long-term current use of insulin (H)   Comment Diabetic foot exam: normal DP and PT pulses, no trophic changes or ulcerative lesions and normal sensory exam  Plan: ROUTINE FOOT CARE BY A PHYSICIAN OF A DIABETIC         PATIENT WITH DIABETIC SENSORY    Jamie Suyapa Howard NP  [facility]    Boris Enciso is a 75 year old who presents for the following health issues     HPI     Patient is here today to discuss a most recent positive stool test. No new symptoms of any kind but she is due for her colonoscopy, she would like positive FIT test follow up with colonoscopy for peace of mind.     New Patient/Transfer of Care    Review of Systems   Constitutional, HEENT, cardiovascular, pulmonary, gi and gu systems are negative, except as otherwise noted. Eating well and weight is stable. Just trying to get all records up dated now that  she has moved from [place]. Mammogram was just last fall.       Objective    /80   Pulse 61   Temp 97.8  F (36.6  C) (Temporal)   Resp 18   Wt 62.6 kg (138 lb 1 oz)   SpO2 98%   Breastfeeding No   BMI 23.70 kg/m    Body mass index is 23.7 kg/m .  Physical Exam   GENERAL: healthy, alert and no distress  RESP: Normal effort  CV: Regular rate  MS: no gross musculoskeletal defects noted, no edema  SKIN: no suspicious lesions or rashes  Diabetic foot exam: normal DP and PT pulses, no trophic changes or ulcerative lesions and normal sensory exam  NEURO: Normal strength and tone, mentation intact and speech normal    Labs and imaging reviewed, reviewed lab information the patient brought in from outside resources.

## 2021-04-07 ENCOUNTER — HOSPITAL ENCOUNTER (OUTPATIENT)
Dept: BONE DENSITY | Facility: CLINIC | Age: 76
Discharge: HOME | End: 2021-04-07
Attending: NURSE PRACTITIONER | Admitting: NURSE PRACTITIONER
Payer: COMMERCIAL

## 2021-04-07 ENCOUNTER — HOSPITAL ENCOUNTER (OUTPATIENT)
Dept: CARDIAC REHAB | Facility: CLINIC | Age: 76
End: 2021-04-07
Attending: INTERNAL MEDICINE
Payer: COMMERCIAL

## 2021-04-07 DIAGNOSIS — Z78.0 POST-MENOPAUSE: ICD-10-CM

## 2021-04-07 PROCEDURE — 93798 PHYS/QHP OP CAR RHAB W/ECG: CPT

## 2021-04-07 PROCEDURE — 77080 DXA BONE DENSITY AXIAL: CPT

## 2021-04-09 ENCOUNTER — TELEPHONE (OUTPATIENT)
Dept: FAMILY MEDICINE | Facility: CLINIC | Age: 76
End: 2021-04-09

## 2021-04-09 ENCOUNTER — HOSPITAL ENCOUNTER (OUTPATIENT)
Facility: CLINIC | Age: 76
End: 2021-04-09
Attending: SURGERY | Admitting: SURGERY
Payer: COMMERCIAL

## 2021-04-09 ENCOUNTER — HOSPITAL ENCOUNTER (OUTPATIENT)
Dept: CARDIAC REHAB | Facility: CLINIC | Age: 76
End: 2021-04-09
Attending: INTERNAL MEDICINE
Payer: COMMERCIAL

## 2021-04-09 DIAGNOSIS — Z11.59 ENCOUNTER FOR SCREENING FOR OTHER VIRAL DISEASES: ICD-10-CM

## 2021-04-09 DIAGNOSIS — E78.5 HYPERLIPIDEMIA LDL GOAL <100: ICD-10-CM

## 2021-04-09 DIAGNOSIS — E11.00 TYPE 2 DIABETES MELLITUS WITH HYPEROSMOLARITY WITHOUT COMA, WITHOUT LONG-TERM CURRENT USE OF INSULIN (H): ICD-10-CM

## 2021-04-09 LAB
ALBUMIN SERPL-MCNC: 3.8 G/DL (ref 3.4–5)
ALP SERPL-CCNC: 75 U/L (ref 40–150)
ALT SERPL W P-5'-P-CCNC: 22 U/L (ref 0–50)
ANION GAP SERPL CALCULATED.3IONS-SCNC: 7 MMOL/L (ref 3–14)
AST SERPL W P-5'-P-CCNC: 15 U/L (ref 0–45)
BILIRUB SERPL-MCNC: 0.5 MG/DL (ref 0.2–1.3)
BUN SERPL-MCNC: 19 MG/DL (ref 7–30)
CALCIUM SERPL-MCNC: 8.9 MG/DL (ref 8.5–10.1)
CHLORIDE SERPL-SCNC: 106 MMOL/L (ref 94–109)
CHOLEST SERPL-MCNC: 111 MG/DL
CO2 SERPL-SCNC: 26 MMOL/L (ref 20–32)
CREAT SERPL-MCNC: 0.85 MG/DL (ref 0.52–1.04)
CREAT UR-MCNC: 58 MG/DL
GFR SERPL CREATININE-BSD FRML MDRD: 67 ML/MIN/{1.73_M2}
GLUCOSE SERPL-MCNC: 127 MG/DL (ref 70–99)
HBA1C MFR BLD: 7.1 % (ref 0–5.6)
HDLC SERPL-MCNC: 43 MG/DL
LDLC SERPL CALC-MCNC: 44 MG/DL
MICROALBUMIN UR-MCNC: 41 MG/L
MICROALBUMIN/CREAT UR: 69.76 MG/G CR (ref 0–25)
NONHDLC SERPL-MCNC: 68 MG/DL
POTASSIUM SERPL-SCNC: 4.6 MMOL/L (ref 3.4–5.3)
PROT SERPL-MCNC: 7.3 G/DL (ref 6.8–8.8)
SODIUM SERPL-SCNC: 139 MMOL/L (ref 133–144)
TRIGL SERPL-MCNC: 120 MG/DL

## 2021-04-09 PROCEDURE — 80061 LIPID PANEL: CPT | Performed by: NURSE PRACTITIONER

## 2021-04-09 PROCEDURE — 80053 COMPREHEN METABOLIC PANEL: CPT | Performed by: NURSE PRACTITIONER

## 2021-04-09 PROCEDURE — 82043 UR ALBUMIN QUANTITATIVE: CPT | Performed by: NURSE PRACTITIONER

## 2021-04-09 PROCEDURE — 83036 HEMOGLOBIN GLYCOSYLATED A1C: CPT | Performed by: NURSE PRACTITIONER

## 2021-04-09 PROCEDURE — 36415 COLL VENOUS BLD VENIPUNCTURE: CPT | Performed by: NURSE PRACTITIONER

## 2021-04-09 PROCEDURE — 93798 PHYS/QHP OP CAR RHAB W/ECG: CPT

## 2021-04-09 NOTE — TELEPHONE ENCOUNTER
Date of procedure: 4/23/21  Colonoscopy  Surgeon: Dr. Horta  Prep:Miralax  Packet:Colonoscopy/EGD instructions mailed to patient's home address.   Date: 4/9/2021      Surgery Scheduler

## 2021-04-09 NOTE — TELEPHONE ENCOUNTER
Sent request for Cardiology to review, I would like Dr. Covarrubias to direct the management of the Plavix.     Jamie Howard CNP

## 2021-04-09 NOTE — LETTER

## 2021-04-12 ENCOUNTER — HOSPITAL ENCOUNTER (OUTPATIENT)
Dept: CARDIAC REHAB | Facility: CLINIC | Age: 76
End: 2021-04-12
Attending: INTERNAL MEDICINE
Payer: COMMERCIAL

## 2021-04-12 ENCOUNTER — TELEPHONE (OUTPATIENT)
Dept: FAMILY MEDICINE | Facility: CLINIC | Age: 76
End: 2021-04-12

## 2021-04-12 ENCOUNTER — CARE COORDINATION (OUTPATIENT)
Dept: CARDIOLOGY | Facility: CLINIC | Age: 76
End: 2021-04-12

## 2021-04-12 DIAGNOSIS — E11.00 TYPE 2 DIABETES MELLITUS WITH HYPEROSMOLARITY WITHOUT COMA, WITHOUT LONG-TERM CURRENT USE OF INSULIN (H): Primary | ICD-10-CM

## 2021-04-12 PROCEDURE — 93798 PHYS/QHP OP CAR RHAB W/ECG: CPT | Performed by: REHABILITATION PRACTITIONER

## 2021-04-12 NOTE — PROGRESS NOTES
Routed to Dr. Covarrubias for recommendations. Stephenie Barlow, RN on 4/12/2021 at 9:53 AM        ----- Message -----   From: Gavin Quijano EP   Sent: 4/12/2021   7:41 AM CDT   To: Artem Covarrubias MD, [facility] Heart Core Nurse   Subject: Plavix Question                                   Dr Mayorga. I received this message from one of our providers. Wondering if you could address this question. Thank you         [4/9 1:07 PM] Jamie Howard      Please have Dr. Covarrubias review Zaria Gaona: MRN [id number], she had drug eluding stents placed this last December for LAD Throbotic occlusion, and stenosis of the proximal right coronary. She is on Plavix. She had a positive FIT test and is scheduled with Dr. Valadez on 4/23 for Upper GI and Colonoscopy on 4/23. I would like Dr. Covarrubias to direct the management of the Plavix.   ?[4/9 1:08 PM] Jamie Howard      Thank you for your help. Jamie Howard AGNP, CNP

## 2021-04-12 NOTE — TELEPHONE ENCOUNTER
----- Message from Jamie Howard NP sent at 4/12/2021  2:06 PM CDT -----  Please call and let her know there is no evidence of low bone density. This is a good thing.     No need for further follow up.     Jamie Howard CNP

## 2021-04-12 NOTE — TELEPHONE ENCOUNTER
----- Message from Jamie Howard NP sent at 4/12/2021  2:13 PM CDT -----  Please call and let her know there is some protein in your urine. I want you to stay well hydrated and we will recheck this in 3 months. The cholesterol is in normal levels and well controlled. The Kidney function looks good as well, and your A1c which measures the diabetes is down.     Plan to follow up on the Albumin in 3 months.     Jamie Howard CNP

## 2021-04-12 NOTE — TELEPHONE ENCOUNTER
Spoke with patient and informed of results below. Patient understood no further questions or concerns at this time. Appointment made.   Rahel Machado MA

## 2021-04-12 NOTE — TELEPHONE ENCOUNTER
Patient called to reschedule, due to wanting a morning appt.  Changed colonoscopy to 5/6/2021 at 10:00am

## 2021-04-14 ENCOUNTER — HOSPITAL ENCOUNTER (OUTPATIENT)
Dept: CARDIAC REHAB | Facility: CLINIC | Age: 76
End: 2021-04-14
Attending: INTERNAL MEDICINE
Payer: COMMERCIAL

## 2021-04-14 ENCOUNTER — TELEPHONE (OUTPATIENT)
Dept: FAMILY MEDICINE | Facility: CLINIC | Age: 76
End: 2021-04-14

## 2021-04-14 PROCEDURE — 93798 PHYS/QHP OP CAR RHAB W/ECG: CPT

## 2021-04-14 NOTE — TELEPHONE ENCOUNTER
Spoke with patient and informed of note below. Patient understood. No further questions or concerns at this time.   Rahel Machado MA

## 2021-04-15 DIAGNOSIS — I10 HYPERTENSION, UNSPECIFIED TYPE: ICD-10-CM

## 2021-04-15 NOTE — TELEPHONE ENCOUNTER
Patient is asking for this now because [facility] mail [facility] takes 15 days to get and she know it takes 3 business days to get it approved.

## 2021-04-16 ENCOUNTER — HOSPITAL ENCOUNTER (OUTPATIENT)
Dept: CARDIAC REHAB | Facility: CLINIC | Age: 76
End: 2021-04-16
Attending: INTERNAL MEDICINE
Payer: COMMERCIAL

## 2021-04-16 ENCOUNTER — TELEPHONE (OUTPATIENT)
Dept: FAMILY MEDICINE | Facility: CLINIC | Age: 76
End: 2021-04-16

## 2021-04-16 PROCEDURE — 93798 PHYS/QHP OP CAR RHAB W/ECG: CPT

## 2021-04-16 RX ORDER — LOSARTAN POTASSIUM 100 MG/1
100 TABLET ORAL DAILY
Qty: 90 TABLET | Refills: 0 | Status: SHIPPED | OUTPATIENT
Start: 2021-04-16 | End: 2021-07-01

## 2021-04-16 NOTE — TELEPHONE ENCOUNTER
Patient spoke with the cardiologist they stated that her doing the colonoscopy she would be at high risk and recommended waiting until July for this procedure.  This is since she is started on Plavix.  She was just on the plavix since December.    Please call the patient and let her know what you would like her to do.   Eri MACK

## 2021-04-16 NOTE — TELEPHONE ENCOUNTER
Please set her up for a virtual visit to discuss.     I think we need to follow recommendation of Dr. Covarrubias.     I also want to discuss how we can  Monitor her prior to the procedure due to the positive FIT test.     Thank you,    Jamie Howard CNP

## 2021-04-16 NOTE — PROGRESS NOTES
"Routed to pt's PCP Jamie Howard. I also called the patient and reviewed Dr. Covarrubias's thoughts. Patient states her endoscopy procedures got rescheduled until May 6. She's diabetic and didn't want a 2pm procedure which she was scheduled for on 4/23, so she has time to discuss things further with her PCP and GI providers.     Patient will call her PCP and GI teams to discuss whether they'd like to do a \"look and see\" scope while on DAPT or post pone the procedures until about July 1 or later. I told her she still would have some elevated risk of in-stent thrombotic occlusion after 6 months vs one full year on DAPT, but it would be less than right now. She has some questions on risk with postponing the endoscopies regarding her positive FIT. I told her she will need to discuss that with her other providers. Stephenie Barlow RN on 4/16/2021 at 2:43 PM      Artem Covarrubias MD Charles, Danielle, RN; [facility] Team 7             She cannot safely stop her antiplatelet therapy at this time without a high risk of thrombotic occlusion of her LAD and RCA stents that were placed on 12/27/20. She can have the EGD and colonoscopy procedures while on aspirin and plavix but probably would not be able to have biopsy (if needed). The risk would decrease somewhat in June after 6 months from the time of stent implantation.     Artem Covarrubias        "

## 2021-04-19 ENCOUNTER — HOSPITAL ENCOUNTER (OUTPATIENT)
Dept: CARDIAC REHAB | Facility: CLINIC | Age: 76
End: 2021-04-19
Attending: INTERNAL MEDICINE
Payer: COMMERCIAL

## 2021-04-19 ENCOUNTER — TELEPHONE (OUTPATIENT)
Dept: FAMILY MEDICINE | Facility: CLINIC | Age: 76
End: 2021-04-19

## 2021-04-19 PROCEDURE — 93798 PHYS/QHP OP CAR RHAB W/ECG: CPT | Performed by: REHABILITATION PRACTITIONER

## 2021-04-19 NOTE — TELEPHONE ENCOUNTER
Called and LM for patient to call back. Please relay note below. When patient calls back. Please schedule a virtual appointment to discuss per TS okay to double book this week (double book around physical )     Rahel Machado MA

## 2021-04-19 NOTE — TELEPHONE ENCOUNTER
Reviewed recommendations from cardiology and GI with patient.     No new symptoms of concern. No blood in stool or dark stools.     I will have her get a CBC to check the hemoglobin so we can monitor, the fit test was positive.     Plan will be to hold off until July for colonoscopy as long as she is not symptomatic.     Patient verbalized understanding of plan of care.     Jamie Howard CNP

## 2021-04-20 DIAGNOSIS — E03.9 ACQUIRED HYPOTHYROIDISM: ICD-10-CM

## 2021-04-20 DIAGNOSIS — E11.00 TYPE 2 DIABETES MELLITUS WITH HYPEROSMOLARITY WITHOUT COMA, WITHOUT LONG-TERM CURRENT USE OF INSULIN (H): ICD-10-CM

## 2021-04-20 LAB
CREAT UR-MCNC: 31 MG/DL
MICROALBUMIN UR-MCNC: 31 MG/L
MICROALBUMIN/CREAT UR: 100.97 MG/G CR (ref 0–25)
TSH SERPL DL<=0.005 MIU/L-ACNC: 1.97 MU/L (ref 0.4–4)

## 2021-04-20 PROCEDURE — 36415 COLL VENOUS BLD VENIPUNCTURE: CPT | Performed by: NURSE PRACTITIONER

## 2021-04-20 PROCEDURE — 84443 ASSAY THYROID STIM HORMONE: CPT | Performed by: NURSE PRACTITIONER

## 2021-04-20 PROCEDURE — 82043 UR ALBUMIN QUANTITATIVE: CPT | Performed by: NURSE PRACTITIONER

## 2021-04-21 ENCOUNTER — HOSPITAL ENCOUNTER (OUTPATIENT)
Dept: CARDIAC REHAB | Facility: CLINIC | Age: 76
End: 2021-04-21
Attending: INTERNAL MEDICINE
Payer: COMMERCIAL

## 2021-04-21 ENCOUNTER — VIRTUAL VISIT (OUTPATIENT)
Dept: FAMILY MEDICINE | Facility: CLINIC | Age: 76
End: 2021-04-21
Payer: COMMERCIAL

## 2021-04-21 DIAGNOSIS — E11.00 TYPE 2 DIABETES MELLITUS WITH HYPEROSMOLARITY WITHOUT COMA, WITHOUT LONG-TERM CURRENT USE OF INSULIN (H): Primary | ICD-10-CM

## 2021-04-21 DIAGNOSIS — R19.5 POSITIVE FIT (FECAL IMMUNOCHEMICAL TEST): ICD-10-CM

## 2021-04-21 PROBLEM — E11.9 DIABETES MELLITUS, TYPE 2 (H): Status: ACTIVE | Noted: 2021-04-21

## 2021-04-21 PROCEDURE — 93797 PHYS/QHP OP CAR RHAB WO ECG: CPT | Mod: XU | Performed by: REHABILITATION PRACTITIONER

## 2021-04-21 PROCEDURE — 99442 PR PHYSICIAN TELEPHONE EVALUATION 11-20 MIN: CPT | Performed by: NURSE PRACTITIONER

## 2021-04-21 PROCEDURE — 93798 PHYS/QHP OP CAR RHAB W/ECG: CPT | Performed by: REHABILITATION PRACTITIONER

## 2021-04-21 NOTE — PROGRESS NOTES
Zaira is a 75 year old who is being evaluated via a billable telephone visit.      What phone number would you like to be contacted at? [phone]  How would you like to obtain your AVS? Mail a copy    Assessment & Plan     Type 2 diabetes mellitus with hyperosmolarity without coma, without long-term current use of insulin (H)  -  Will recheck the A1c in 2 weeks.   - **A1C FUTURE anytime; Future    Positive FIT (fecal immunochemical test)  Reviewed the need to hold on the Colonoscopy until July if we can so she can come off the Plavix safely allowing for biopsy if need be.     I will monitor her and the CBC periodically to ensure there is no signs of bleeding that is gettiing worse.     Patient verbalized understanding of plan of care.   - **CBC with platelets FUTURE anytime; Future    Review of prior external note(s) from - Reviewed notes from Cardiology and from GI.   30  minutes spent on the date of the encounter doing chart review, history and exam, documentation and further activities per the note      Return in about 2 weeks (around 5/5/2021), or cbc  A1c, for Lab Work.    Jamie Howard NP  [facility]   Zaria is a 75 year old who presents for the following health issues     HPI     Discuss treatment, labs, mammo done 10/21/2020    No new symptoms of concern. Mood is good. No blood in stool she can see. No new weakness. No new fevers or chills. She understands need to hold on the FIT test.         Review of Systems   Constitutional, HEENT, cardiovascular, pulmonary, gi and gu systems are negative, except as otherwise noted.      Objective           Vitals:  No vitals were obtained today due to virtual visit.    Physical Exam   alert and no distress  PSYCH: Alert and oriented times 3; coherent speech, normal   rate and volume, able to articulate logical thoughts, able   to abstract reason, no tangential thoughts, no hallucinations   or delusions  Her affect  is normal and pleasant  RESP: No cough, no audible wheezing, able to talk in full sentences  Remainder of exam unable to be completed due to telephone visits    Labs reviewed in EPIC            Phone call duration: 15 minutes

## 2021-04-21 NOTE — PATIENT INSTRUCTIONS
Labs reviewed today.    We will recheck a CBC in 2  Weeks. No new symptoms of concern.     Reviewed the need to hold on the Colonoscopy until July if we can so she can come off the Plavix safely allowing for biopsy if need be.     I will monitor her and the CBC periodically to ensure there is no signs of bleeding that is gettiing worse.     Patient verbalized understanding of plan of care.

## 2021-04-23 ENCOUNTER — HOSPITAL ENCOUNTER (OUTPATIENT)
Dept: CARDIAC REHAB | Facility: CLINIC | Age: 76
End: 2021-04-23
Attending: INTERNAL MEDICINE
Payer: COMMERCIAL

## 2021-04-23 PROCEDURE — 93798 PHYS/QHP OP CAR RHAB W/ECG: CPT

## 2021-04-26 DIAGNOSIS — Z11.59 ENCOUNTER FOR SCREENING FOR OTHER VIRAL DISEASES: ICD-10-CM

## 2021-05-05 DIAGNOSIS — R19.5 POSITIVE FIT (FECAL IMMUNOCHEMICAL TEST): ICD-10-CM

## 2021-05-05 DIAGNOSIS — E11.00 TYPE 2 DIABETES MELLITUS WITH HYPEROSMOLARITY WITHOUT COMA, WITHOUT LONG-TERM CURRENT USE OF INSULIN (H): ICD-10-CM

## 2021-05-05 LAB
ERYTHROCYTE [DISTWIDTH] IN BLOOD BY AUTOMATED COUNT: 13.5 % (ref 10–15)
HBA1C MFR BLD: 7.1 % (ref 0–5.6)
HCT VFR BLD AUTO: 35.6 % (ref 35–47)
HGB BLD-MCNC: 11.3 G/DL (ref 11.7–15.7)
MCH RBC QN AUTO: 29 PG (ref 26.5–33)
MCHC RBC AUTO-ENTMCNC: 31.7 G/DL (ref 31.5–36.5)
MCV RBC AUTO: 92 FL (ref 78–100)
PLATELET # BLD AUTO: 155 10E9/L (ref 150–450)
RBC # BLD AUTO: 3.89 10E12/L (ref 3.8–5.2)
WBC # BLD AUTO: 5.4 10E9/L (ref 4–11)

## 2021-05-05 PROCEDURE — 83036 HEMOGLOBIN GLYCOSYLATED A1C: CPT | Performed by: NURSE PRACTITIONER

## 2021-05-05 PROCEDURE — 36415 COLL VENOUS BLD VENIPUNCTURE: CPT | Performed by: NURSE PRACTITIONER

## 2021-05-05 PROCEDURE — 85027 COMPLETE CBC AUTOMATED: CPT | Performed by: NURSE PRACTITIONER

## 2021-05-07 ENCOUNTER — TELEPHONE (OUTPATIENT)
Dept: FAMILY MEDICINE | Facility: CLINIC | Age: 76
End: 2021-05-07

## 2021-05-07 NOTE — TELEPHONE ENCOUNTER
Please call the patient and let her know the diabetic labs are stable and as  Expected so this is good.     Her hemoglobin is down bit. I do want to recheck this in 3 weeks. I will place  Orders.     If she notices blood in the stools, or the stools  Become tarry colored, or if she becomes weak with increased fatigue she should come in sooner for labs.     Thank you,    Jamie Howard CNP

## 2021-05-07 NOTE — TELEPHONE ENCOUNTER
Spoke with patient and informed of note below. Patient understood. Lab appointment made.   Rahel Machado MA

## 2021-05-17 ENCOUNTER — OFFICE VISIT (OUTPATIENT)
Dept: FAMILY MEDICINE | Facility: CLINIC | Age: 76
End: 2021-05-17
Payer: COMMERCIAL

## 2021-05-17 VITALS
TEMPERATURE: 97.1 F | DIASTOLIC BLOOD PRESSURE: 80 MMHG | WEIGHT: 135.5 LBS | SYSTOLIC BLOOD PRESSURE: 120 MMHG | HEART RATE: 53 BPM | OXYGEN SATURATION: 98 % | RESPIRATION RATE: 18 BRPM | BODY MASS INDEX: 23.26 KG/M2

## 2021-05-17 DIAGNOSIS — E03.9 ACQUIRED HYPOTHYROIDISM: ICD-10-CM

## 2021-05-17 DIAGNOSIS — E78.5 HYPERLIPIDEMIA LDL GOAL <100: ICD-10-CM

## 2021-05-17 DIAGNOSIS — Z00.00 HEALTHCARE MAINTENANCE: Primary | ICD-10-CM

## 2021-05-17 DIAGNOSIS — I21.4 NON-STEMI (NON-ST ELEVATED MYOCARDIAL INFARCTION) (H): ICD-10-CM

## 2021-05-17 DIAGNOSIS — E11.9 TYPE 2 DIABETES MELLITUS WITHOUT COMPLICATION, WITHOUT LONG-TERM CURRENT USE OF INSULIN (H): ICD-10-CM

## 2021-05-17 DIAGNOSIS — E11.00 TYPE 2 DIABETES MELLITUS WITH HYPEROSMOLARITY WITHOUT COMA, WITHOUT LONG-TERM CURRENT USE OF INSULIN (H): ICD-10-CM

## 2021-05-17 LAB — HBA1C MFR BLD: 7 % (ref 0–5.6)

## 2021-05-17 PROCEDURE — 99215 OFFICE O/P EST HI 40 MIN: CPT | Performed by: NURSE PRACTITIONER

## 2021-05-17 PROCEDURE — 36415 COLL VENOUS BLD VENIPUNCTURE: CPT | Performed by: INTERNAL MEDICINE

## 2021-05-17 PROCEDURE — 83036 HEMOGLOBIN GLYCOSYLATED A1C: CPT | Performed by: INTERNAL MEDICINE

## 2021-05-17 RX ORDER — ATORVASTATIN CALCIUM 80 MG/1
80 TABLET, FILM COATED ORAL DAILY
Qty: 90 TABLET | Refills: 0 | Status: SHIPPED | OUTPATIENT
Start: 2021-05-17 | End: 2021-08-17

## 2021-05-17 RX ORDER — ETHACRYNIC ACID 25 MG/1
25 TABLET ORAL DAILY
Qty: 90 TABLET | Refills: 3 | Status: SHIPPED | OUTPATIENT
Start: 2021-05-17 | End: 2021-08-17

## 2021-05-17 RX ORDER — LEVOTHYROXINE SODIUM 75 UG/1
75 TABLET ORAL DAILY
Qty: 90 TABLET | Refills: 0 | Status: SHIPPED | OUTPATIENT
Start: 2021-05-17 | End: 2021-08-17

## 2021-05-17 RX ORDER — CLOPIDOGREL BISULFATE 75 MG/1
75 TABLET ORAL DAILY
Qty: 90 TABLET | Refills: 1 | Status: SHIPPED | OUTPATIENT
Start: 2021-05-17 | End: 2021-12-21

## 2021-05-17 RX ORDER — CARVEDILOL 6.25 MG/1
6.25 TABLET ORAL 2 TIMES DAILY
Qty: 180 TABLET | Refills: 0 | Status: SHIPPED | OUTPATIENT
Start: 2021-05-17 | End: 2021-08-17

## 2021-05-17 ASSESSMENT — PAIN SCALES - GENERAL: PAINLEVEL: NO PAIN (0)

## 2021-05-17 NOTE — PATIENT INSTRUCTIONS
Patient Education     Patient Education    Diclofenac Epolamine Medicated topical patch    Diclofenac Potassium Oral capsule, liquid filled    Diclofenac Potassium Oral solution    Diclofenac Potassium Oral tablet    Diclofenac Sodium Gastro-resistant tablet    Diclofenac Sodium Ophthalmic drops, solution    Diclofenac Sodium Oral tablet, extended-release    Diclofenac Sodium Solution for injection    Diclofenac Sodium Topical gel    Diclofenac Sodium Topical solution  Diclofenac Sodium Topical gel  What is this medicine?  DICLOFENAC (dye YUAN fen ak) is a non-steroidal anti-inflammatory drug (NSAID). The 1% skin gel is used to treat osteoarthritis of the hands or knees. The 3% skin gel is used to treat actinic keratosis.  This medicine may be used for other purposes; ask your health care provider or pharmacist if you have questions.  What should I tell my health care provider before I take this medicine?  They need to know if you have any of these conditions:    asthma    bleeding problems    coronary artery bypass graft (CABG) surgery within the past 2 weeks    heart disease    high blood pressure    if you frequently drink alcohol containing drinks    kidney disease    liver disease    open or infected skin    stomach problems    an unusual or allergic reaction to diclofenac, aspirin, other NSAIDs, other medicines, benzyl alcohol (3% gel only), foods, dyes, or preservatives    pregnant or trying to get pregnant    breast-feeding  How should I use this medicine?  This medicine is for external use only. Follow the directions on the prescription label. Wash hands before and after use. Do not get this medicine in your eyes. If you do, rinse out with plenty of cool tap water. Use your doses at regular intervals. Do not use your medicine more often than directed.  A special MedGuide will be given to you by the pharmacist with each prescription and refill of the 1% gel. Be sure to read this information carefully each  time.  Talk to your pediatrician regarding the use of this medicine in children. Special care may be needed. The 3% gel is not approved for use in children.  Overdosage: If you think you have taken too much of this medicine contact a poison control center or emergency room at once.  NOTE: This medicine is only for you. Do not share this medicine with others.  What if I miss a dose?  If you miss a dose, use it as soon as you can. If it is almost time for your next dose, use only that dose. Do not use double or extra doses.  What may interact with this medicine?    aspirin    NSAIDs, medicines for pain and inflammation, like ibuprofen or naproxen  Do not use any other skin products without telling your doctor or health care professional.  This list may not describe all possible interactions. Give your health care provider a list of all the medicines, herbs, non-prescription drugs, or dietary supplements you use. Also tell them if you smoke, drink alcohol, or use illegal drugs. Some items may interact with your medicine.  What should I watch for while using this medicine?  Tell your doctor or healthcare professional if your symptoms do not start to get better or if they get worse. You will need to follow up with your health care provider to monitor your progress. You may need to be treated for up to 3 months if you are using the 3% gel, but the full effect may not occur until 1 month after stopping treatment. If you develop a severe skin reaction, contact your doctor or health care professional immediately.  This medicine can make you more sensitive to the sun. Keep out of the sun. If you cannot avoid being in the sun, wear protective clothing and use sunscreen. Do not use sun lamps or tanning beds/booths.  Do not take medicines such as ibuprofen and naproxen with this medicine. Side effects such as stomach upset, nausea, or ulcers may be more likely to occur. Many medicines available without a prescription should not  be taken with this medicine.  This medicine does not prevent heart attack or stroke. In fact, this medicine may increase the chance of a heart attack or stroke. The chance may increase with longer use of this medicine and in people who have heart disease. If you take aspirin to prevent heart attack or stroke, talk with your doctor or health care professional.  This medicine can cause ulcers and bleeding in the stomach and intestines at any time during treatment. Do not smoke cigarettes or drink alcohol. These increase irritation to your stomach and can make it more susceptible to damage from this medicine. Ulcers and bleeding can happen without warning symptoms and can cause death.  You may get drowsy or dizzy. Do not drive, use machinery, or do anything that needs mental alertness until you know how this medicine affects you. Do not stand or sit up quickly, especially if you are an older patient. This reduces the risk of dizzy or fainting spells.  This medicine can cause you to bleed more easily. Try to avoid damage to your teeth and gums when you brush or floss your teeth.  What side effects may I notice from receiving this medicine?  Side effects that you should report to your doctor or health care professional as soon as possible:    allergic reactions like skin rash, itching or hives, swelling of the face, lips, or tongue    black or bloody stools, blood in the urine or vomit    blurred vision    chest pain    difficulty breathing or wheezing    nausea or vomiting    redness, blistering, peeling or loosening of the skin, including inside the mouth    slurred speech or weakness on one side of the body    trouble passing urine or change in the amount of urine    unexplained weight gain or swelling    unusually weak or tired    yellowing of eyes or skin  Side effects that usually do not require medical attention (report to your doctor or health care professional if they continue or are  bothersome):    dizziness    dry skin    headache    heartburn    increased sensitivity to the sun    stomach pain    tingling at the application site  This list may not describe all possible side effects. Call your doctor for medical advice about side effects. You may report side effects to FDA at 7-020-FDA-7648.  Where should I keep my medicine?  Keep out of the reach of children.  Store the 1% gel at room temperature between 15 and 30 degrees C (59 and 86 degrees F). Store the 3% gel at room temperature between 20 and 25 degrees C (68 and 77 degrees F). Protect from light. Throw away any unused medicine after the expiration date.  NOTE:This sheet is a summary. It may not cover all possible information. If you have questions about this medicine, talk to your doctor, pharmacist, or health care provider. Copyright  2016 Gold Standard           Patient Education     Patient Education    Diclofenac Epolamine Medicated topical patch    Diclofenac Potassium Oral capsule, liquid filled    Diclofenac Potassium Oral solution    Diclofenac Potassium Oral tablet    Diclofenac Sodium Gastro-resistant tablet    Diclofenac Sodium Ophthalmic drops, solution    Diclofenac Sodium Oral tablet, extended-release    Diclofenac Sodium Solution for injection    Diclofenac Sodium Topical gel    Diclofenac Sodium Topical solution  Diclofenac Sodium Topical gel  What is this medicine?  DICLOFENAC (dye KLOE fen ak) is a non-steroidal anti-inflammatory drug (NSAID). The 1% skin gel is used to treat osteoarthritis of the hands or knees. The 3% skin gel is used to treat actinic keratosis.  This medicine may be used for other purposes; ask your health care provider or pharmacist if you have questions.  What should I tell my health care provider before I take this medicine?  They need to know if you have any of these conditions:    asthma    bleeding problems    coronary artery bypass graft (CABG) surgery within the past 2 weeks    heart  disease    high blood pressure    if you frequently drink alcohol containing drinks    kidney disease    liver disease    open or infected skin    stomach problems    an unusual or allergic reaction to diclofenac, aspirin, other NSAIDs, other medicines, benzyl alcohol (3% gel only), foods, dyes, or preservatives    pregnant or trying to get pregnant    breast-feeding  How should I use this medicine?  This medicine is for external use only. Follow the directions on the prescription label. Wash hands before and after use. Do not get this medicine in your eyes. If you do, rinse out with plenty of cool tap water. Use your doses at regular intervals. Do not use your medicine more often than directed.  A special MedGuide will be given to you by the pharmacist with each prescription and refill of the 1% gel. Be sure to read this information carefully each time.  Talk to your pediatrician regarding the use of this medicine in children. Special care may be needed. The 3% gel is not approved for use in children.  Overdosage: If you think you have taken too much of this medicine contact a poison control center or emergency room at once.  NOTE: This medicine is only for you. Do not share this medicine with others.  What if I miss a dose?  If you miss a dose, use it as soon as you can. If it is almost time for your next dose, use only that dose. Do not use double or extra doses.  What may interact with this medicine?    aspirin    NSAIDs, medicines for pain and inflammation, like ibuprofen or naproxen  Do not use any other skin products without telling your doctor or health care professional.  This list may not describe all possible interactions. Give your health care provider a list of all the medicines, herbs, non-prescription drugs, or dietary supplements you use. Also tell them if you smoke, drink alcohol, or use illegal drugs. Some items may interact with your medicine.  What should I watch for while using this  medicine?  Tell your doctor or healthcare professional if your symptoms do not start to get better or if they get worse. You will need to follow up with your health care provider to monitor your progress. You may need to be treated for up to 3 months if you are using the 3% gel, but the full effect may not occur until 1 month after stopping treatment. If you develop a severe skin reaction, contact your doctor or health care professional immediately.  This medicine can make you more sensitive to the sun. Keep out of the sun. If you cannot avoid being in the sun, wear protective clothing and use sunscreen. Do not use sun lamps or tanning beds/booths.  Do not take medicines such as ibuprofen and naproxen with this medicine. Side effects such as stomach upset, nausea, or ulcers may be more likely to occur. Many medicines available without a prescription should not be taken with this medicine.  This medicine does not prevent heart attack or stroke. In fact, this medicine may increase the chance of a heart attack or stroke. The chance may increase with longer use of this medicine and in people who have heart disease. If you take aspirin to prevent heart attack or stroke, talk with your doctor or health care professional.  This medicine can cause ulcers and bleeding in the stomach and intestines at any time during treatment. Do not smoke cigarettes or drink alcohol. These increase irritation to your stomach and can make it more susceptible to damage from this medicine. Ulcers and bleeding can happen without warning symptoms and can cause death.  You may get drowsy or dizzy. Do not drive, use machinery, or do anything that needs mental alertness until you know how this medicine affects you. Do not stand or sit up quickly, especially if you are an older patient. This reduces the risk of dizzy or fainting spells.  This medicine can cause you to bleed more easily. Try to avoid damage to your teeth and gums when you brush or  floss your teeth.  What side effects may I notice from receiving this medicine?  Side effects that you should report to your doctor or health care professional as soon as possible:    allergic reactions like skin rash, itching or hives, swelling of the face, lips, or tongue    black or bloody stools, blood in the urine or vomit    blurred vision    chest pain    difficulty breathing or wheezing    nausea or vomiting    redness, blistering, peeling or loosening of the skin, including inside the mouth    slurred speech or weakness on one side of the body    trouble passing urine or change in the amount of urine    unexplained weight gain or swelling    unusually weak or tired    yellowing of eyes or skin  Side effects that usually do not require medical attention (report to your doctor or health care professional if they continue or are bothersome):    dizziness    dry skin    headache    heartburn    increased sensitivity to the sun    stomach pain    tingling at the application site  This list may not describe all possible side effects. Call your doctor for medical advice about side effects. You may report side effects to FDA at 8-479-FDA-1045.  Where should I keep my medicine?  Keep out of the reach of children.  Store the 1% gel at room temperature between 15 and 30 degrees C (59 and 86 degrees F). Store the 3% gel at room temperature between 20 and 25 degrees C (68 and 77 degrees F). Protect from light. Throw away any unused medicine after the expiration date.  NOTE:This sheet is a summary. It may not cover all possible information. If you have questions about this medicine, talk to your doctor, pharmacist, or health care provider. Copyright  2016 Gold Standard    - Report any blood in stool, or any new fatigue or weakness.     - Continue with labs checking the Hemoglobin every 2-4 weeks.     - Plan for Colonoscopy this August when when we can bridge you off the blood thinner.     Call sooner with new or  concerning symptoms prior to your next follow up appointment.     Jamie Howard CNP

## 2021-05-17 NOTE — PROGRESS NOTES
Assessment & Plan     Type 2 diabetes mellitus with hyperosmolarity without coma, without long-term current use of insulin (H)  - A1c is stable and 7  - metFORMIN (GLUCOPHAGE) 500 MG tablet; Take 1 tablet (500 mg) by mouth 2 times daily (with meals)    Hyperlipidemia LDL goal <100  - Lipids in goal range, and she is tolerating the medication will continue with current plan of care   - atorvastatin (LIPITOR) 80 MG tablet; Take 1 tablet (80 mg) by mouth daily    Non-STEMI (non-ST elevated myocardial infarction) (H)  - Recently has seen Cardiology. Meds reviewed. No new symptoms of concern. She will stay on current plan of care and follow up with Dr. Covarrubias in 6 months.   - ethacrynic acid (EDECRIN) 25 MG tablet; Take 1 tablet (25 mg) by mouth daily  - carvedilol (COREG) 6.25 MG tablet; Take 1 tablet (6.25 mg) by mouth 2 times daily  - clopidogrel (PLAVIX) 75 MG tablet; Take 1 tablet (75 mg) by mouth daily    Acquired hypothyroidism  - TSH is in normal range, will continue with current plan of care. No new symptoms of concern.   - levothyroxine (SYNTHROID/LEVOTHROID) 75 MCG tablet; Take 1 tablet (75 mcg) by mouth daily    Review of prior external note(s) from - cardiology  40  minutes spent on the date of the encounter doing chart review, history and exam, documentation and further activities per the note       - Plan to establish with Internal Medicine in 3-4 months.   - She did have a positive FIT test had been scheduled for colonoscopy but on hold due to recent MI and STENTS and need for Plavix. We will monitor her hemoglobin, stool, and will reconsider the Colonoscopy this fall.     Jamie Howard NP  [facility]    Boris Enciso is a 75 year old who presents for the following health issues     HPI     Diabetes Follow-up    How often are you checking your blood sugar? One time daily  What time of day are you checking your blood sugars (select all that apply)?  Before  meals  Have you had any blood sugars above 200?  No  Have you had any blood sugars below 70?  No    What symptoms do you notice when your blood sugar is low?  None    What concerns do you have today about your diabetes? None     Do you have any of these symptoms? (Select all that apply)  Redness, sores, or blisters on feet              Hyperlipidemia Follow-Up      Are you regularly taking any medication or supplement to lower your cholesterol?   Yes- Atorvastatin    Are you having muscle aches or other side effects that you think could be caused by your cholesterol lowering medication?  No    Hypertension Follow-up      Do you check your blood pressure regularly outside of the clinic? No     Are you following a low salt diet? No    Are your blood pressures ever more than 140 on the top number (systolic) OR more   than 90 on the bottom number (diastolic), for example 140/90? No    BP Readings from Last 2 Encounters:   05/17/21 120/80   04/05/21 122/80     Hemoglobin A1C (%)   Date Value   05/17/2021 7.0 (H)   05/05/2021 7.1 (H)     LDL Cholesterol Calculated (mg/dL)   Date Value   04/09/2021 44         How many servings of fruits and vegetables do you eat daily?  2-3    On average, how many sweetened beverages do you drink each day (Examples: soda, juice, sweet tea, etc.  Do NOT count diet or artificially sweetened beverages)?   1    How many days per week do you exercise enough to make your heart beat faster? 7    How many minutes a day do you exercise enough to make your heart beat faster? 20 - 29  How many days per week do you miss taking your medication? 1    What makes it hard for you to take your medications?  remembering to take        Review of Systems   Constitutional, HEENT, cardiovascular, pulmonary, gi and gu systems are negative, except as otherwise noted.      Objective    /80   Pulse 53   Temp 97.1  F (36.2  C) (Temporal)   Resp 18   Wt 61.5 kg (135 lb 8 oz)   SpO2 98%   Breastfeeding No    BMI 23.26 kg/m    Body mass index is 23.26 kg/m .  Physical Exam   GENERAL: healthy, alert and no distress  EYES: Eyes grossly normal to inspection, PERRL and conjunctivae and sclerae normal  NECK: no adenopathy, no asymmetry, masses, or scars and thyroid normal to palpation  RESP: lungs clear to auscultation - no rales, rhonchi or wheezes  CV: regular rate and rhythm, normal S1 S2, no S3 or S4, no murmur, click or rub, no peripheral edema and peripheral pulses strong  ABDOMEN: soft, nontender, no hepatosplenomegaly, no masses and bowel sounds normal  MS: no gross musculoskeletal defects noted, no edema  SKIN: no suspicious lesions or rashes  NEURO: Normal strength and tone, mentation intact and speech normal    Labs reviewed in EPIC

## 2021-05-27 DIAGNOSIS — R19.5 POSITIVE FIT (FECAL IMMUNOCHEMICAL TEST): ICD-10-CM

## 2021-05-27 DIAGNOSIS — R19.5 POSITIVE FIT (FECAL IMMUNOCHEMICAL TEST): Primary | ICD-10-CM

## 2021-05-27 LAB
BASOPHILS # BLD AUTO: 0 10E9/L (ref 0–0.2)
BASOPHILS NFR BLD AUTO: 0.6 %
DIFFERENTIAL METHOD BLD: NORMAL
EOSINOPHIL # BLD AUTO: 0.1 10E9/L (ref 0–0.7)
EOSINOPHIL NFR BLD AUTO: 1.8 %
ERYTHROCYTE [DISTWIDTH] IN BLOOD BY AUTOMATED COUNT: 13.6 % (ref 10–15)
HCT VFR BLD AUTO: 37.4 % (ref 35–47)
HGB BLD-MCNC: 11.9 G/DL (ref 11.7–15.7)
IMM GRANULOCYTES # BLD: 0 10E9/L (ref 0–0.4)
IMM GRANULOCYTES NFR BLD: 0.2 %
LYMPHOCYTES # BLD AUTO: 1.1 10E9/L (ref 0.8–5.3)
LYMPHOCYTES NFR BLD AUTO: 21.5 %
MCH RBC QN AUTO: 28.5 PG (ref 26.5–33)
MCHC RBC AUTO-ENTMCNC: 31.8 G/DL (ref 31.5–36.5)
MCV RBC AUTO: 90 FL (ref 78–100)
MONOCYTES # BLD AUTO: 0.5 10E9/L (ref 0–1.3)
MONOCYTES NFR BLD AUTO: 9.6 %
NEUTROPHILS # BLD AUTO: 3.2 10E9/L (ref 1.6–8.3)
NEUTROPHILS NFR BLD AUTO: 66.3 %
NRBC # BLD AUTO: 0 10*3/UL
NRBC BLD AUTO-RTO: 0 /100
PLATELET # BLD AUTO: 162 10E9/L (ref 150–450)
RBC # BLD AUTO: 4.18 10E12/L (ref 3.8–5.2)
WBC # BLD AUTO: 4.9 10E9/L (ref 4–11)

## 2021-05-27 PROCEDURE — 36415 COLL VENOUS BLD VENIPUNCTURE: CPT | Performed by: NURSE PRACTITIONER

## 2021-05-27 PROCEDURE — 85025 COMPLETE CBC W/AUTO DIFF WBC: CPT | Performed by: NURSE PRACTITIONER

## 2021-07-01 DIAGNOSIS — I10 HYPERTENSION, UNSPECIFIED TYPE: ICD-10-CM

## 2021-07-01 RX ORDER — LOSARTAN POTASSIUM 100 MG/1
100 TABLET ORAL DAILY
Qty: 90 TABLET | Refills: 0 | Status: SHIPPED | OUTPATIENT
Start: 2021-07-01 | End: 2021-08-17

## 2021-07-01 NOTE — TELEPHONE ENCOUNTER
Pending Prescriptions:                       Disp   Refills    losartan (COZAAR) 100 MG tablet           90 tab*0            Sig: Take 1 tablet (100 mg) by mouth daily    Medication is being filled for 1 time stella refill only due to:  Patient is due for medication follow up    Please call and help schedule.  Thank you!      Yris Niño RN on 7/1/2021 at 4:55 PM

## 2021-07-02 ENCOUNTER — NURSE TRIAGE (OUTPATIENT)
Dept: NURSING | Facility: CLINIC | Age: 76
End: 2021-07-02

## 2021-07-02 DIAGNOSIS — E11.00 TYPE 2 DIABETES MELLITUS WITH HYPEROSMOLARITY WITHOUT COMA, WITHOUT LONG-TERM CURRENT USE OF INSULIN (H): ICD-10-CM

## 2021-07-02 NOTE — TELEPHONE ENCOUNTER
Patient was just seen on 5/2021 advised to return in Aug to establish care with Dr. Vargas. Patient already has an appointment set up.   Rahel Machado MA

## 2021-07-02 NOTE — TELEPHONE ENCOUNTER
Patient is calling in stating that she got a metformin 500 mg 1 tablet by mouth twice daily at meals patient was taking 2 tablets by mouth twice daily.     She stated when she was in [place] she was taking 2 pills twice daily with meals.     She is asking what she should be taking?  Mail order pharmacy ([facility]) did not not receive any refills on the may [id number] order. Patient needs refills and increased qty if she should take 2 pills twice daily as she has been taking them this way for 15 years.     Please call patient to clarify.     Daniella Leal RN, BSN [facility] Triage Nurse        Additional Information    Nursing judgment    Negative: Nursing judgment    Negative: Nursing judgment    Negative: Nursing judgment    Protocols used: INFORMATION ONLY CALL - NO TRIAGE-A-OH

## 2021-07-07 NOTE — TELEPHONE ENCOUNTER
Please call patient and let her know the metformin prescription was changed to 2- 500 mg tablets to be taken 2 times daily with meals.     Thank you,    Jamie Howard CNP

## 2021-07-12 ENCOUNTER — APPOINTMENT (OUTPATIENT)
Dept: LAB | Facility: CLINIC | Age: 76
End: 2021-07-12
Payer: COMMERCIAL

## 2021-08-17 ENCOUNTER — OFFICE VISIT (OUTPATIENT)
Dept: INTERNAL MEDICINE | Facility: CLINIC | Age: 76
End: 2021-08-17
Payer: COMMERCIAL

## 2021-08-17 VITALS
HEART RATE: 58 BPM | OXYGEN SATURATION: 97 % | WEIGHT: 138 LBS | RESPIRATION RATE: 16 BRPM | SYSTOLIC BLOOD PRESSURE: 124 MMHG | DIASTOLIC BLOOD PRESSURE: 66 MMHG | HEIGHT: 64 IN | TEMPERATURE: 97.7 F | BODY MASS INDEX: 23.56 KG/M2

## 2021-08-17 DIAGNOSIS — E11.9 TYPE 2 DIABETES MELLITUS WITHOUT COMPLICATION, WITHOUT LONG-TERM CURRENT USE OF INSULIN (H): Primary | ICD-10-CM

## 2021-08-17 DIAGNOSIS — I10 HYPERTENSION, UNSPECIFIED TYPE: ICD-10-CM

## 2021-08-17 DIAGNOSIS — E78.5 HYPERLIPIDEMIA LDL GOAL <100: ICD-10-CM

## 2021-08-17 DIAGNOSIS — E03.9 ACQUIRED HYPOTHYROIDISM: ICD-10-CM

## 2021-08-17 DIAGNOSIS — N81.10 VAGINAL PROLAPSE: ICD-10-CM

## 2021-08-17 DIAGNOSIS — I21.4 NON-STEMI (NON-ST ELEVATED MYOCARDIAL INFARCTION) (H): ICD-10-CM

## 2021-08-17 PROCEDURE — 99214 OFFICE O/P EST MOD 30 MIN: CPT | Performed by: INTERNAL MEDICINE

## 2021-08-17 RX ORDER — LEVOTHYROXINE SODIUM 75 UG/1
75 TABLET ORAL DAILY
Qty: 90 TABLET | Refills: 3 | Status: SHIPPED | OUTPATIENT
Start: 2021-08-17 | End: 2022-06-13

## 2021-08-17 RX ORDER — ETHACRYNIC ACID 25 MG/1
25 TABLET ORAL DAILY
Qty: 90 TABLET | Refills: 3 | Status: SHIPPED | OUTPATIENT
Start: 2021-08-17 | End: 2022-06-21

## 2021-08-17 RX ORDER — CARVEDILOL 6.25 MG/1
6.25 TABLET ORAL 2 TIMES DAILY
Qty: 180 TABLET | Refills: 3 | Status: SHIPPED | OUTPATIENT
Start: 2021-08-17 | End: 2022-08-16

## 2021-08-17 RX ORDER — LOSARTAN POTASSIUM 100 MG/1
100 TABLET ORAL DAILY
Qty: 90 TABLET | Refills: 3 | Status: SHIPPED | OUTPATIENT
Start: 2021-08-17 | End: 2022-10-05

## 2021-08-17 RX ORDER — ATORVASTATIN CALCIUM 80 MG/1
80 TABLET, FILM COATED ORAL DAILY
Qty: 90 TABLET | Refills: 3 | Status: SHIPPED | OUTPATIENT
Start: 2021-08-17 | End: 2022-08-16

## 2021-08-17 ASSESSMENT — PAIN SCALES - GENERAL: PAINLEVEL: NO PAIN (0)

## 2021-08-17 ASSESSMENT — MIFFLIN-ST. JEOR: SCORE: 1105.96

## 2021-08-17 NOTE — PROGRESS NOTES
Assessment & Plan     Type 2 diabetes mellitus without complication, without long-term current use of insulin (H)  Patient who is establishing care with me she did establish with a nurse practitioner here but that provider left the clinic.  She just moved to [place] in February.  She used to live in [facility] had a non-ST elevation MI up there in December 2020.  Diabetes is good with Metformin.  She is active exercising, weight is controlled, A1c was at 7 in the spring and will check again in 3 months.    Acquired hypothyroidism  Hypothyroidism on levothyroxine 75 mcg daily doing well TSH was stable within the last 6 months.  - levothyroxine (SYNTHROID/LEVOTHROID) 75 MCG tablet; Take 1 tablet (75 mcg) by mouth daily    Non-STEMI (non-ST elevated myocardial infarction) (H)  Non-ST elevation MI she is on Coreg, losartan, atorvastatin, ethacrynic acid and Plavix.  She will follow up with cardiology again in September with a repeat echocardiogram.  - ethacrynic acid (EDECRIN) 25 MG tablet; Take 1 tablet (25 mg) by mouth daily  - carvedilol (COREG) 6.25 MG tablet; Take 1 tablet (6.25 mg) by mouth 2 times daily    Hyperlipidemia LDL goal <100  Hyperlipidemia on high-dose statin after her MI and doing well.  - atorvastatin (LIPITOR) 80 MG tablet; Take 1 tablet (80 mg) by mouth daily    Hypertension, unspecified type  Pressures controlled with losartan and carvedilol.  - losartan (COZAAR) 100 MG tablet; Take 1 tablet (100 mg) by mouth daily    Vaginal prolapse  He has a history of vaginal prolapse and had a surgery on this in [place] a few years ago, feels this is recurring we will refer to GYN to discuss surgical complications and options.  - Ob/Gyn Referral; Future    Review of prior external note(s) from - cardiology, outside colonoscopy             Return in about 3 months (around 11/17/2021) for Routine Visit.    Wang Vargas MD  [facility]    Boris Enciso is a 76 year  "old who presents for the following health issues     HPI     Chief Complaint   Patient presents with     [facility]     Diabetes     Moved here from [facility] in February, bought a townhome.  , son lives here.      Had a heart attack in December 2020 and had 2 stents, then moved here and did cardiac rehab and saw Dr Covarrubias.     Checking blood sugars in the morning 120 in the AM.  Taking metformin, no side effects.   a1c has been at 7, no labs needed today.      Colonoscopy in [place] 2017.     Had vaginal prolapse surgery in [place], now recurring.     No past medical history on file.    Current Outpatient Medications   Medication     acetaminophen (TYLENOL) 325 MG tablet     alcohol swab prep pads     Aspirin Buf,CaCarb-MgCarb-MgO, 81 MG TABS     atorvastatin (LIPITOR) 80 MG tablet     blood glucose (NO BRAND SPECIFIED) test strip     blood glucose calibration (NO BRAND SPECIFIED) solution     carvedilol (COREG) 6.25 MG tablet     clopidogrel (PLAVIX) 75 MG tablet     ethacrynic acid (EDECRIN) 25 MG tablet     latanoprost (XALATAN) 0.005 % ophthalmic solution     levothyroxine (SYNTHROID/LEVOTHROID) 75 MCG tablet     losartan (COZAAR) 100 MG tablet     metFORMIN (GLUCOPHAGE) 500 MG tablet     timolol hemihydrate (BETIMOL) 0.25 % ophthalmic solution     nitroGLYcerin (NITROSTAT) 0.4 MG sublingual tablet     thin (NO BRAND SPECIFIED) lancets     No current facility-administered medications for this visit.     Social History     Tobacco Use     Smoking status: Never Smoker     Smokeless tobacco: Never Used   Substance Use Topics     Alcohol use: Yes     Comment: occ     Drug use: Never       Review of Systems   Constitutional, HEENT, cardiovascular, pulmonary, gi and gu systems are negative, except as otherwise noted.      Objective    /66   Pulse 58   Temp 97.7  F (36.5  C) (Temporal)   Resp 16   Ht 1.626 m (5' 4\")   Wt 62.6 kg (138 lb)   SpO2 97%   BMI 23.69 kg/m    Body mass index is 23.69 " kg/m .  Physical Exam   GENERAL: healthy, alert and no distress  NECK: no adenopathy, no asymmetry, masses, or scars and thyroid normal to palpation  RESP: lungs clear to auscultation - no rales, rhonchi or wheezes  CV: regular rate and rhythm, normal S1 S2, no S3 or S4, no murmur, click or rub, no peripheral edema and peripheral pulses strong  ABDOMEN: soft, nontender, no hepatosplenomegaly, no masses and bowel sounds normal  MS: no gross musculoskeletal defects noted, no edema  SKIN: no suspicious lesions or rashes  NEURO: Normal strength and tone, mentation intact and speech normal  PSYCH: mentation appears normal, affect normal/bright

## 2021-09-13 ENCOUNTER — OFFICE VISIT (OUTPATIENT)
Dept: OBGYN | Facility: OTHER | Age: 76
End: 2021-09-13
Payer: COMMERCIAL

## 2021-09-13 VITALS
HEART RATE: 68 BPM | OXYGEN SATURATION: 99 % | BODY MASS INDEX: 24.16 KG/M2 | WEIGHT: 140.75 LBS | DIASTOLIC BLOOD PRESSURE: 62 MMHG | SYSTOLIC BLOOD PRESSURE: 126 MMHG

## 2021-09-13 DIAGNOSIS — N81.11 CYSTOCELE, MIDLINE: Primary | ICD-10-CM

## 2021-09-13 PROCEDURE — 99204 OFFICE O/P NEW MOD 45 MIN: CPT | Performed by: OBSTETRICS & GYNECOLOGY

## 2021-09-13 NOTE — PATIENT INSTRUCTIONS
Please call if you any questions.    [address]   [address]  [phone]        Zhanna Mcdonald,

## 2021-09-13 NOTE — PROGRESS NOTES
Subjective  76 year old non-pregnant female presents today complaining of prolapse. Patient had a DaVinci assisted TLH/BSO/Sacrocolpopexy, rectocele repair on 1/10/2019 at [facility] in [place].  Patient has felt the prolapse come back over the last year.  No vaginal bleeding.  Patient feels pressure when she sits down.  She does not have to push anything back she says.  She had used a pessary for many years prior to the surgery in 2019.  She got to the point where she couldn't keep it in anymore it hurt so bad.  She had tried Estrogen cream along with the pessary and it did not help.  She has tried pelvic floor physical therapy in the past with little improvement.  No problems urinating.  Normal bowel movements.  Patient is not sexually active.  2 NSVDs.        I also reviewed notes from previous office visits by her surgeon in [place].    ROS: 10 point ROS neg other than the symptoms noted above in the HPI.  Past Medical History:   Diagnosis Date     Essential hypertension      NSTEMI (non-ST elevated myocardial infarction) (H) 12/2020    2 stents     Type 2 diabetes mellitus without complication (H) 2005     Past Surgical History:   Procedure Laterality Date     VAGINAL PROLAPSE REPAIR      done in [place]     History reviewed. No pertinent family history.  Social History     Tobacco Use     Smoking status: Never Smoker     Smokeless tobacco: Never Used   Substance Use Topics     Alcohol use: Yes     Comment: occ         Objective  Vitals: /62 (BP Location: Right arm, Cuff Size: Adult Regular)   Pulse 68   Wt 63.8 kg (140 lb 12 oz)   SpO2 99%   BMI 24.16 kg/m    BMI= Body mass index is 24.16 kg/m .    General appearance=well developed, well-nourished female  Gait=normal  Psych=mood is stable, alert and oriented x3  PELVIC:    External genitalia: normal without lesions or masses  Urethral meatus: no lesions or prolapse noted, normal size  Urethra: no masses, non tender  Bladder: non tender, no fullness,  grade 2 cystocele  Vagina: normal mucosa and rugae, no discharge.  Cervix: absent  Uterus: absent  Adnexa: non tender, without masses  Rectal: deffered  Ext=no clubbing or cyanosis, no swelling      Assessment  1.)  Cystocele      Plan  1.)  Follow-up with Urology      One undiagnosed new problem with uncertain prognosis and interpretation of ultrasound findings ordered by a different provider.  Nursing notes read and reviewed    Zhanna Mcdonald DO

## 2021-09-14 ENCOUNTER — LAB (OUTPATIENT)
Dept: LAB | Facility: CLINIC | Age: 76
End: 2021-09-14
Payer: COMMERCIAL

## 2021-09-14 ENCOUNTER — HOSPITAL ENCOUNTER (OUTPATIENT)
Dept: CARDIOLOGY | Facility: CLINIC | Age: 76
Discharge: HOME | End: 2021-09-14
Attending: INTERNAL MEDICINE | Admitting: INTERNAL MEDICINE
Payer: COMMERCIAL

## 2021-09-14 DIAGNOSIS — I10 BENIGN ESSENTIAL HYPERTENSION: ICD-10-CM

## 2021-09-14 DIAGNOSIS — I21.02 ST ELEVATION MYOCARDIAL INFARCTION INVOLVING LEFT ANTERIOR DESCENDING (LAD) CORONARY ARTERY (H): ICD-10-CM

## 2021-09-14 DIAGNOSIS — E78.00 HYPERCHOLESTEROLEMIA: ICD-10-CM

## 2021-09-14 LAB
ALT SERPL W P-5'-P-CCNC: 24 U/L (ref 0–50)
ANION GAP SERPL CALCULATED.3IONS-SCNC: 6 MMOL/L (ref 3–14)
BUN SERPL-MCNC: 19 MG/DL (ref 7–30)
CALCIUM SERPL-MCNC: 8.9 MG/DL (ref 8.5–10.1)
CHLORIDE BLD-SCNC: 106 MMOL/L (ref 94–109)
CHOLEST SERPL-MCNC: 146 MG/DL
CO2 SERPL-SCNC: 28 MMOL/L (ref 20–32)
CREAT SERPL-MCNC: 0.86 MG/DL (ref 0.52–1.04)
FASTING STATUS PATIENT QL REPORTED: YES
GFR SERPL CREATININE-BSD FRML MDRD: 66 ML/MIN/1.73M2
GLUCOSE BLD-MCNC: 145 MG/DL (ref 70–99)
HDLC SERPL-MCNC: 54 MG/DL
LDLC SERPL CALC-MCNC: 53 MG/DL
LVEF ECHO: NORMAL
NONHDLC SERPL-MCNC: 92 MG/DL
POTASSIUM BLD-SCNC: 4.8 MMOL/L (ref 3.4–5.3)
SODIUM SERPL-SCNC: 140 MMOL/L (ref 133–144)
TRIGL SERPL-MCNC: 193 MG/DL

## 2021-09-14 PROCEDURE — 84460 ALANINE AMINO (ALT) (SGPT): CPT | Performed by: INTERNAL MEDICINE

## 2021-09-14 PROCEDURE — 80048 BASIC METABOLIC PNL TOTAL CA: CPT | Performed by: INTERNAL MEDICINE

## 2021-09-14 PROCEDURE — 93306 TTE W/DOPPLER COMPLETE: CPT | Mod: 26 | Performed by: INTERNAL MEDICINE

## 2021-09-14 PROCEDURE — 36415 COLL VENOUS BLD VENIPUNCTURE: CPT

## 2021-09-14 PROCEDURE — 93306 TTE W/DOPPLER COMPLETE: CPT

## 2021-09-14 PROCEDURE — 80061 LIPID PANEL: CPT | Performed by: INTERNAL MEDICINE

## 2021-09-17 ENCOUNTER — TRANSFERRED RECORDS (OUTPATIENT)
Dept: HEALTH INFORMATION MANAGEMENT | Facility: CLINIC | Age: 76
End: 2021-09-17

## 2021-09-21 ENCOUNTER — OFFICE VISIT (OUTPATIENT)
Dept: CARDIOLOGY | Facility: CLINIC | Age: 76
End: 2021-09-21
Attending: INTERNAL MEDICINE
Payer: COMMERCIAL

## 2021-09-21 VITALS
DIASTOLIC BLOOD PRESSURE: 78 MMHG | OXYGEN SATURATION: 98 % | RESPIRATION RATE: 16 BRPM | HEART RATE: 51 BPM | BODY MASS INDEX: 24.29 KG/M2 | WEIGHT: 141.5 LBS | SYSTOLIC BLOOD PRESSURE: 142 MMHG

## 2021-09-21 DIAGNOSIS — E78.00 HYPERCHOLESTEROLEMIA: ICD-10-CM

## 2021-09-21 DIAGNOSIS — I21.02 ST ELEVATION MYOCARDIAL INFARCTION INVOLVING LEFT ANTERIOR DESCENDING (LAD) CORONARY ARTERY (H): ICD-10-CM

## 2021-09-21 DIAGNOSIS — I25.10 CORONARY ARTERY DISEASE INVOLVING NATIVE CORONARY ARTERY OF NATIVE HEART WITHOUT ANGINA PECTORIS: Primary | ICD-10-CM

## 2021-09-21 DIAGNOSIS — I10 BENIGN ESSENTIAL HYPERTENSION: ICD-10-CM

## 2021-09-21 DIAGNOSIS — E11.9 TYPE 2 DIABETES MELLITUS WITHOUT COMPLICATION, WITHOUT LONG-TERM CURRENT USE OF INSULIN (H): ICD-10-CM

## 2021-09-21 LAB
ALT SERPL W P-5'-P-CCNC: 24 U/L (ref 0–50)
ANION GAP SERPL CALCULATED.3IONS-SCNC: 5 MMOL/L (ref 3–14)
BUN SERPL-MCNC: 20 MG/DL (ref 7–30)
CALCIUM SERPL-MCNC: 8.9 MG/DL (ref 8.5–10.1)
CHLORIDE BLD-SCNC: 109 MMOL/L (ref 94–109)
CHOLEST SERPL-MCNC: 139 MG/DL
CO2 SERPL-SCNC: 28 MMOL/L (ref 20–32)
CREAT SERPL-MCNC: 0.91 MG/DL (ref 0.52–1.04)
FASTING STATUS PATIENT QL REPORTED: NO
GFR SERPL CREATININE-BSD FRML MDRD: 62 ML/MIN/1.73M2
GLUCOSE BLD-MCNC: 135 MG/DL (ref 70–99)
HDLC SERPL-MCNC: 52 MG/DL
LDLC SERPL CALC-MCNC: 40 MG/DL
NONHDLC SERPL-MCNC: 87 MG/DL
POTASSIUM BLD-SCNC: 4.6 MMOL/L (ref 3.4–5.3)
SODIUM SERPL-SCNC: 142 MMOL/L (ref 133–144)
TRIGL SERPL-MCNC: 236 MG/DL

## 2021-09-21 PROCEDURE — 36415 COLL VENOUS BLD VENIPUNCTURE: CPT | Performed by: INTERNAL MEDICINE

## 2021-09-21 PROCEDURE — 80061 LIPID PANEL: CPT | Performed by: INTERNAL MEDICINE

## 2021-09-21 PROCEDURE — 84460 ALANINE AMINO (ALT) (SGPT): CPT | Performed by: INTERNAL MEDICINE

## 2021-09-21 PROCEDURE — 99214 OFFICE O/P EST MOD 30 MIN: CPT | Performed by: INTERNAL MEDICINE

## 2021-09-21 PROCEDURE — 80048 BASIC METABOLIC PNL TOTAL CA: CPT | Performed by: INTERNAL MEDICINE

## 2021-09-21 RX ORDER — AMLODIPINE BESYLATE 2.5 MG/1
2.5 TABLET ORAL DAILY
Qty: 90 TABLET | Refills: 3 | Status: SHIPPED | OUTPATIENT
Start: 2021-09-21 | End: 2022-08-16

## 2021-09-21 ASSESSMENT — PAIN SCALES - GENERAL: PAINLEVEL: NO PAIN (0)

## 2021-09-21 NOTE — PROGRESS NOTES
Service Date: 2021    Wang Vargas MD   [facility]  [address] [place] [address]    RE:      Zaria Gaona  MRN:  [id number]  :       Dear Wang:      I had the opportunity to see Ms. Zaria Gaona in Cardiology Clinic today at [facility] in [place] for reevaluation of coronary artery disease and cardiac risk factors including hypertension, dyslipidemia and type 2 diabetes.    She is a 76-year-old woman who was living in [place], [place] when she suffered an acute anterior wall myocardial infarction on 2020.  She was transferred to [place] where she underwent emergency stenting of her LAD and right coronary arteries at [facility].  She had some residual coronary artery disease which was felt to be moderate including 60%-70% stenosis of a small diagonal, 60% mid left circumflex stenosis, and 30% distal left main disease.  Fortunately, her left ventricular function has remained normal.  On her echocardiogram done on 2021, she has a small area of distal anterior and apical hypokinesis with preserved left ventricular function with an ejection fraction of 55%-60%.    Her diabetes control looks pretty good with an A1c of 7.0 in May.  Her basic metabolic panel is normal.  Her cholesterol numbers are excellent aside from a mild elevation in triglycerides.  Her LDL is 40 and HDL is 52.    She is not experiencing any recurrent chest pain symptoms with exertion.  She occasionally has a brief episode of mild chest discomfort at rest or unpredictably.  She seems quite vague about these symptoms but they have been seemingly quite rare and quite brief and do not seem to concern her.  She walks regularly without any symptoms at all.    PHYSICAL EXAMINATION:    VITAL SIGNS:  Today, her blood pressure is 142/78, heart rate 51 and weight 141 pounds.  RESPIRATORY:  Lungs are clear.  CARDIAC:  Rhythm is regular.  She has no cardiac murmurs or  carotid bruits.    IMPRESSIONS:  Ms. Chang Wilkerson is a 76-year-old woman with a history of anterior ST-elevation myocardial infarction in 2020.  She remains on aspirin and Plavix for prevention of stent thrombosis.  Her cholesterol numbers are excellent on her current dose of atorvastatin.  Her blood pressure is running a little high today but her heart rates are also a little high and therefore, I do not think I can increase the carvedilol any further at this point.  I will add a small dose of amlodipine 2.5 mg daily.  That can be increased if her blood pressure remains elevated.  Her diabetes control seems adequate but she would probably benefit from the addition of an SGLT2 inhibitor or GLP-1 receptor agonist for coronary disease prevention.    I will plan to have her follow up with me again in 1 year for reevaluation of these issues.    I spent 25 minutes with her today, mostly reviewing her laboratory studies.    Sincerely,    Artem Covarrubias MD, [facility]        D: 2021   T: 2021   MT: [email or web address]    Name:     CHANG WILKERSON  MRN:      [id number]        Account:      [id number]   :      1945           Service Date: 2021       Document: [id number]

## 2021-09-21 NOTE — PROGRESS NOTES
HPI and Plan:   See dictation    Orders Placed This Encounter   Procedures     Basic metabolic panel     Lipid Profile     ALT     Follow-Up with Cardiologist       Orders Placed This Encounter   Medications     amLODIPine (NORVASC) 2.5 MG tablet     Sig: Take 1 tablet (2.5 mg) by mouth daily     Dispense:  90 tablet     Refill:  3       There are no discontinued medications.      Encounter Diagnoses   Name Primary?     ST elevation myocardial infarction involving left anterior descending (LAD) coronary artery (H)      Benign essential hypertension      Hypercholesterolemia      Type 2 diabetes mellitus without complication, without long-term current use of insulin (H)      Coronary artery disease involving native coronary artery of native heart without angina pectoris Yes       CURRENT MEDICATIONS:  Current Outpatient Medications   Medication Sig Dispense Refill     acetaminophen (TYLENOL) 325 MG tablet Take 650 mg by mouth       alcohol swab prep pads Use to swab area of injection/shana as directed 100 each 3     amLODIPine (NORVASC) 2.5 MG tablet Take 1 tablet (2.5 mg) by mouth daily 90 tablet 3     Aspirin Buf,CaCarb-MgCarb-MgO, 81 MG TABS Take 81 mg by mouth       atorvastatin (LIPITOR) 80 MG tablet Take 1 tablet (80 mg) by mouth daily 90 tablet 3     blood glucose (NO BRAND SPECIFIED) test strip Use to test blood sugar 1 times daily or as directed. To accompany: Blood Glucose Monitor Brands: per insurance. 100 strip 6     blood glucose calibration (NO BRAND SPECIFIED) solution Use to calibrate blood glucose monitor as needed as directed. To accompany: Blood Glucose Monitor Brands: per insurance. 1 Bottle 3     carvedilol (COREG) 6.25 MG tablet Take 1 tablet (6.25 mg) by mouth 2 times daily 180 tablet 3     clopidogrel (PLAVIX) 75 MG tablet Take 1 tablet (75 mg) by mouth daily 90 tablet 1     ethacrynic acid (EDECRIN) 25 MG tablet Take 1 tablet (25 mg) by mouth daily 90 tablet 3     latanoprost (XALATAN) 0.005  % ophthalmic solution Inject 1 drop into the eye daily       levothyroxine (SYNTHROID/LEVOTHROID) 75 MCG tablet Take 1 tablet (75 mcg) by mouth daily 90 tablet 3     losartan (COZAAR) 100 MG tablet Take 1 tablet (100 mg) by mouth daily 90 tablet 3     metFORMIN (GLUCOPHAGE) 500 MG tablet Take 2 tablets (1,000 mg) by mouth 2 times daily (with meals) 360 tablet 1     nitroGLYcerin (NITROSTAT) 0.4 MG sublingual tablet Place 0.4 mg under the tongue as needed        omeprazole (PRILOSEC) 20 MG DR capsule Take 20 mg by mouth daily       thin (NO BRAND SPECIFIED) lancets Use to test blood sugar 1 times daily or as directed. To accompany: Blood Glucose Monitor Brands: per insurance. 1 each 3     timolol hemihydrate (BETIMOL) 0.25 % ophthalmic solution Inject 1 drop into the eye daily         ALLERGIES     Allergies   Allergen Reactions     Ampicillin Other (See Comments), Hives and Itching     Lisinopril Cough     Sulfamethoxazole W/Trimethoprim Other (See Comments), Hives and Itching       PAST MEDICAL HISTORY:  Past Medical History:   Diagnosis Date     Essential hypertension      NSTEMI (non-ST elevated myocardial infarction) (H) 12/2020    2 stents     Type 2 diabetes mellitus without complication (H) 2005       PAST SURGICAL HISTORY:  Past Surgical History:   Procedure Laterality Date     VAGINAL PROLAPSE REPAIR      done in [place]       FAMILY HISTORY:  No family history on file.    SOCIAL HISTORY:  Social History     Socioeconomic History     Marital status:      Spouse name: None     Number of children: None     Years of education: None     Highest education level: None   Occupational History     None   Tobacco Use     Smoking status: Never Smoker     Smokeless tobacco: Never Used   Vaping Use     Vaping Use: Never used   Substance and Sexual Activity     Alcohol use: Yes     Comment: occ     Drug use: Never     Sexual activity: Yes     Partners: Male     Birth control/protection: Female Surgical   Other  Topics Concern     None   Social History Narrative     None     Social Determinants of Health     Financial Resource Strain:      Difficulty of Paying Living Expenses:    Food Insecurity:      Worried About Running Out of Food in the Last Year:      Ran Out of Food in the Last Year:    Transportation Needs:      Lack of Transportation (Medical):      Lack of Transportation (Non-Medical):    Physical Activity:      Days of Exercise per Week:      Minutes of Exercise per Session:    Stress:      Feeling of Stress :    Social Connections:      Frequency of Communication with Friends and Family:      Frequency of Social Gatherings with Friends and Family:      Attends [employer] Services:      Active Member of Clubs or Organizations:      Attends Club or Organization Meetings:      Marital Status:    Intimate Partner Violence:      Fear of Current or Ex-Partner:      Emotionally Abused:      Physically Abused:      Sexually Abused:        Review of Systems:  Skin:  Negative       Eyes:  Positive for glasses;glaucoma    ENT:  Negative      Respiratory:  Negative       Cardiovascular:  Negative      Gastroenterology: Positive for reflux Acid reflux controlled  Genitourinary:         Musculoskeletal:  Positive for back pain;arthritis    Neurologic:  Positive for numbness or tingling of hands    Psychiatric:         Heme/Lymph/Imm:  Negative      Endocrine:  Positive for thyroid disorder;diabetes      Physical Exam:  Vitals: BP (!) 142/78   Pulse 51   Resp 16   Wt 64.2 kg (141 lb 8 oz)   SpO2 98%   BMI 24.29 kg/m      Constitutional:           Skin:             Head:           Eyes:           Lymph:      ENT:           Neck:           Respiratory:            Cardiac:                                                           GI:           Extremities and Muscular Skeletal:                 Neurological:           Psych:           CC  Artem Covarrubias MD  [address]  [place] [id number]

## 2021-10-04 ENCOUNTER — OFFICE VISIT (OUTPATIENT)
Dept: INTERNAL MEDICINE | Facility: CLINIC | Age: 76
End: 2021-10-04
Payer: COMMERCIAL

## 2021-10-04 VITALS
BODY MASS INDEX: 23.69 KG/M2 | DIASTOLIC BLOOD PRESSURE: 56 MMHG | RESPIRATION RATE: 16 BRPM | TEMPERATURE: 97.2 F | HEART RATE: 54 BPM | SYSTOLIC BLOOD PRESSURE: 112 MMHG | OXYGEN SATURATION: 97 % | WEIGHT: 138 LBS

## 2021-10-04 DIAGNOSIS — I10 HYPERTENSION, UNSPECIFIED TYPE: ICD-10-CM

## 2021-10-04 DIAGNOSIS — Z12.31 ENCOUNTER FOR SCREENING MAMMOGRAM FOR BREAST CANCER: ICD-10-CM

## 2021-10-04 DIAGNOSIS — Z00.00 MEDICARE ANNUAL WELLNESS VISIT, SUBSEQUENT: Primary | ICD-10-CM

## 2021-10-04 DIAGNOSIS — I25.10 ASCVD (ARTERIOSCLEROTIC CARDIOVASCULAR DISEASE): ICD-10-CM

## 2021-10-04 DIAGNOSIS — E78.5 HYPERLIPIDEMIA LDL GOAL <100: ICD-10-CM

## 2021-10-04 DIAGNOSIS — E03.9 ACQUIRED HYPOTHYROIDISM: ICD-10-CM

## 2021-10-04 DIAGNOSIS — E11.9 TYPE 2 DIABETES MELLITUS WITHOUT COMPLICATION, WITHOUT LONG-TERM CURRENT USE OF INSULIN (H): ICD-10-CM

## 2021-10-04 PROCEDURE — 99397 PER PM REEVAL EST PAT 65+ YR: CPT | Performed by: INTERNAL MEDICINE

## 2021-10-04 RX ORDER — DAPAGLIFLOZIN 5 MG/1
5 TABLET, FILM COATED ORAL DAILY
Qty: 30 TABLET | Refills: 3 | Status: SHIPPED | OUTPATIENT
Start: 2021-10-04 | End: 2022-02-01

## 2021-10-04 ASSESSMENT — PAIN SCALES - GENERAL: PAINLEVEL: NO PAIN (0)

## 2021-10-04 ASSESSMENT — ACTIVITIES OF DAILY LIVING (ADL): CURRENT_FUNCTION: NO ASSISTANCE NEEDED

## 2021-10-04 NOTE — PROGRESS NOTES
"SUBJECTIVE:   Zaria Gaona is a 76 year old female who presents for Preventive Visit.    Patient has been advised of split billing requirements and indicates understanding: Yes   Are you in the first 12 months of your Medicare coverage?  No    Healthy Habits:     In general, how would you rate your overall health?  Very good    Frequency of exercise:  4-5 days/week    Duration of exercise:  30-45 minutes    Do you usually eat at least 4 servings of fruit and vegetables a day, include whole grains    & fiber and avoid regularly eating high fat or \"junk\" foods?  Yes    Taking medications regularly:  Yes    Barriers to taking medications:  None    Medication side effects:  None    Ability to successfully perform activities of daily living:  No assistance needed    Home Safety:  No safety concerns identified    Hearing Impairment:  No hearing concerns    In the past 6 months, have you been bothered by leaking of urine? Yes    In general, how would you rate your overall mental or emotional health?  Excellent      PHQ-2 Total Score: 0    Additional concerns today:  No    Had covid vaccine in the spring, had flu shot already.    Exercise, walks almost daily.    No chest pains, not needing nitroglycerin.    Cardiology added amlodipine and bp is better.      Checking sugars daily in the AM, 100-130 range.     Colonoscopy was delayed due to being on Plavix.  Cardiology wanted her to wait.      Do you feel safe in your environment? Yes    Have you ever done Advance Care Planning? (For example, a Health Directive, POLST, or a discussion with a medical provider or your loved ones about your wishes): No, advance care planning information given to patient to review.  Advanced care planning was discussed at today's visit.    Fall risk  Fallen 2 or more times in the past year?: No  Any fall with injury in the past year?: No    Cognitive Screening   1) Repeat 3 items (Leader, Season, Table)    2) Clock draw: NORMAL  3) 3 item " recall: Recalls 3 objects  Results: 3 items recalled: COGNITIVE IMPAIRMENT LESS LIKELY    Mini-CogTM Copyright HEATH Mata. Licensed by the author for use in [facility]; reprinted with permission (sid@[facility]). All rights reserved.      Do you have sleep apnea, excessive snoring or daytime drowsiness?: no    Reviewed and updated as needed this visit by clinical staff  Tobacco  Allergies  Meds              Reviewed and updated as needed this visit by Provider                Social History     Tobacco Use     Smoking status: Never Smoker     Smokeless tobacco: Never Used   Substance Use Topics     Alcohol use: Yes     Comment: occ     If you drink alcohol do you typically have >3 drinks per day or >7 drinks per week? No    No flowsheet data found.      Current providers sharing in care for this patient include:   Patient Care Team:  Wang Vargas MD as PCP - General (Internal Medicine)  Artem Covarrubias MD as Assigned Heart and Vascular Provider  Wang Vargas MD as Assigned PCP  Zhanna Mcdonald DO as Assigned OBGYN Provider    The following health maintenance items are reviewed in Epic and correct as of today:  Health Maintenance Due   Topic Date Due     ADVANCE CARE PLANNING  Never done     MAMMO SCREENING  Never done     COLORECTAL CANCER SCREENING  Never done     HEPATITIS C SCREENING  Never done     ZOSTER IMMUNIZATION (1 of 2) Never done     A1C  08/17/2021     Labs reviewed in EPIC  Pneumonia Vaccine: up to date.    Mammogram Screening - Patient over age 75, has elected to continue with screening.  Pertinent mammograms are reviewed under the imaging tab.    Review of Systems  CONSTITUTIONAL: NEGATIVE for fever, chills, change in weight  INTEGUMENTARY/SKIN: NEGATIVE for worrisome rashes, moles or lesions  EYES: NEGATIVE for vision changes or irritation  ENT/MOUTH: NEGATIVE for ear, mouth and throat problems  RESP: NEGATIVE for significant cough or SOB  CV: NEGATIVE for chest pain,  "palpitations or peripheral edema  GI: NEGATIVE for nausea, abdominal pain, heartburn, or change in bowel habits  : NEGATIVE for frequency, dysuria, or hematuria  MUSCULOSKELETAL: NEGATIVE for significant arthralgias or myalgia  NEURO: NEGATIVE for weakness, dizziness or paresthesias  ENDOCRINE: NEGATIVE for temperature intolerance, skin/hair changes  HEME: NEGATIVE for bleeding problems  PSYCHIATRIC: NEGATIVE for changes in mood or affect    OBJECTIVE:   /56   Pulse 54   Temp 97.2  F (36.2  C) (Temporal)   Resp 16   Wt 62.6 kg (138 lb)   SpO2 97%   BMI 23.69 kg/m   Estimated body mass index is 23.69 kg/m  as calculated from the following:    Height as of 8/17/21: 1.626 m (5' 4\").    Weight as of this encounter: 62.6 kg (138 lb).  Physical Exam  GENERAL: healthy, alert and no distress  EYES: Eyes grossly normal to inspection, PERRL and conjunctivae and sclerae normal  HENT: ear canals and TM's normal, nose and mouth without ulcers or lesions  NECK: no adenopathy, no asymmetry, masses, or scars and thyroid normal to palpation  RESP: lungs clear to auscultation - no rales, rhonchi or wheezes  CV: regular rate and rhythm, normal S1 S2, no S3 or S4, no murmur, click or rub, no peripheral edema and peripheral pulses strong  ABDOMEN: soft, nontender, no hepatosplenomegaly, no masses and bowel sounds normal  MS: no gross musculoskeletal defects noted, no edema  SKIN: no suspicious lesions or rashes  NEURO: Normal strength and tone, mentation intact and speech normal  PSYCH: mentation appears normal, affect normal/bright        ASSESSMENT / PLAN:       ICD-10-CM    1. Medicare annual wellness visit, subsequent  Z00.00    2. Type 2 diabetes mellitus without complication, without long-term current use of insulin (H)  E11.9 dapagliflozin (FARXIGA) 5 MG TABS tablet   3. Encounter for screening mammogram for breast cancer  Z12.31 *MA Screening Digital Bilateral   4. ASCVD (arteriosclerotic cardiovascular disease)  " "I25.10    5. Acquired hypothyroidism  E03.9    6. Hypertension, unspecified type  I10    7. Hyperlipidemia LDL goal <100  E78.5      Patient who is on Medicare, she is here for a wellness check.  Overall she is doing well, she needs a mammogram later in the year which is ordered, colonoscopy will be done when she gets off Plavix in December or January.  She is up-to-date on her Covid vaccine which is [place] and her flu vaccine from the other day.  Her heart disease is stable she is seeing cardiology her blood pressure was a little elevated so they added amlodipine.,  Blood pressure is much better today.  Diabetes is doing well with Metformin for cardioprotection we will add Farxiga 5 mg she [facility] sure [facility] is [facility].  Follow-up in 3 to 6 months for repeat A1c.        Patient has been advised of split billing requirements and indicates understanding: Yes  COUNSELING:  Reviewed preventive health counseling, as reflected in patient instructions       Regular exercise       Healthy diet/nutrition    Estimated body mass index is 23.69 kg/m  as calculated from the following:    Height as of 8/17/21: 1.626 m (5' 4\").    Weight as of this encounter: 62.6 kg (138 lb).        She reports that she has never smoked. She has never used smokeless tobacco.      Appropriate preventive services were discussed with this patient, including applicable screening as appropriate for cardiovascular disease, diabetes, osteopenia/osteoporosis, and glaucoma.  As appropriate for age/gender, discussed screening for colorectal cancer, prostate cancer, breast cancer, and cervical cancer. Checklist reviewing preventive services available has been given to the patient.    Reviewed patients plan of care and provided an AVS. The Basic Care Plan (routine screening as documented in Health Maintenance) for Zaria meets the Care Plan requirement. This Care Plan has been established and reviewed with the " Patient.    Counseling Resources:  ATP IV Guidelines  Pooled Cohorts Equation Calculator  Breast Cancer Risk Calculator  Breast Cancer: Medication to Reduce Risk  FRAX Risk Assessment  ICSI Preventive Guidelines  Dietary Guidelines for Americans, 2010  USDA's MyPlate  ASA Prophylaxis  Lung CA Screening    Wang Vargas MD  [facility]    Identified Health Risks:

## 2021-11-08 ENCOUNTER — HOSPITAL ENCOUNTER (OUTPATIENT)
Dept: MAMMOGRAPHY | Facility: CLINIC | Age: 76
Discharge: HOME | End: 2021-11-08
Attending: INTERNAL MEDICINE | Admitting: INTERNAL MEDICINE
Payer: COMMERCIAL

## 2021-11-08 DIAGNOSIS — Z12.31 ENCOUNTER FOR SCREENING MAMMOGRAM FOR BREAST CANCER: ICD-10-CM

## 2021-11-08 PROCEDURE — 77063 BREAST TOMOSYNTHESIS BI: CPT

## 2021-11-10 ENCOUNTER — OFFICE VISIT (OUTPATIENT)
Dept: UROLOGY | Facility: CLINIC | Age: 76
End: 2021-11-10
Payer: COMMERCIAL

## 2021-11-10 VITALS
HEART RATE: 50 BPM | OXYGEN SATURATION: 98 % | RESPIRATION RATE: 16 BRPM | WEIGHT: 140.7 LBS | SYSTOLIC BLOOD PRESSURE: 138 MMHG | DIASTOLIC BLOOD PRESSURE: 66 MMHG | BODY MASS INDEX: 24.15 KG/M2

## 2021-11-10 DIAGNOSIS — N81.11 CYSTOCELE, MIDLINE: ICD-10-CM

## 2021-11-10 PROCEDURE — 99204 OFFICE O/P NEW MOD 45 MIN: CPT | Performed by: UROLOGY

## 2021-11-10 ASSESSMENT — PAIN SCALES - GENERAL: PAINLEVEL: NO PAIN (0)

## 2021-11-10 NOTE — PROGRESS NOTES
S: Patient is a pleasant 76-year-old  female who was requested to be seen by Dr. Mayra Mcdonald  for a consultation with regard to patient's vaginal prolapse.  She is status post da Kelsie hysterectomy and sacrocolpopexy with rectocele repair in January 2019.  For last year or so patient has noticed increased vaginal bulging on sitting.  He denies any problems with urination.  She does not have to push anything back in yet.  She has used pessary in the past prior to her to surgery but had some pain with it.  Estrogen did not help along with pessary.  She denies any bowel issues.  She is not sexually active.  She has 2 normal vaginal delivery.  Current Outpatient Medications   Medication Sig Dispense Refill     acetaminophen (TYLENOL) 325 MG tablet Take 650 mg by mouth       alcohol swab prep pads Use to swab area of injection/shana as directed 100 each 3     amLODIPine (NORVASC) 2.5 MG tablet Take 1 tablet (2.5 mg) by mouth daily 90 tablet 3     Aspirin Buf,CaCarb-MgCarb-MgO, 81 MG TABS Take 81 mg by mouth       atorvastatin (LIPITOR) 80 MG tablet Take 1 tablet (80 mg) by mouth daily 90 tablet 3     blood glucose (NO BRAND SPECIFIED) test strip Use to test blood sugar 1 times daily or as directed. To accompany: Blood Glucose Monitor Brands: per insurance. 100 strip 6     blood glucose calibration (NO BRAND SPECIFIED) solution Use to calibrate blood glucose monitor as needed as directed. To accompany: Blood Glucose Monitor Brands: per insurance. 1 Bottle 3     carvedilol (COREG) 6.25 MG tablet Take 1 tablet (6.25 mg) by mouth 2 times daily 180 tablet 3     clopidogrel (PLAVIX) 75 MG tablet Take 1 tablet (75 mg) by mouth daily 90 tablet 1     dapagliflozin (FARXIGA) 5 MG TABS tablet Take 1 tablet (5 mg) by mouth daily 30 tablet 3     ethacrynic acid (EDECRIN) 25 MG tablet Take 1 tablet (25 mg) by mouth daily 90 tablet 3     latanoprost (XALATAN) 0.005 % ophthalmic solution Inject 1 drop into the eye daily        levothyroxine (SYNTHROID/LEVOTHROID) 75 MCG tablet Take 1 tablet (75 mcg) by mouth daily 90 tablet 3     losartan (COZAAR) 100 MG tablet Take 1 tablet (100 mg) by mouth daily 90 tablet 3     metFORMIN (GLUCOPHAGE) 500 MG tablet Take 2 tablets (1,000 mg) by mouth 2 times daily (with meals) 360 tablet 1     omeprazole (PRILOSEC) 20 MG DR capsule Take 20 mg by mouth daily       thin (NO BRAND SPECIFIED) lancets Use to test blood sugar 1 times daily or as directed. To accompany: Blood Glucose Monitor Brands: per insurance. 1 each 3     timolol hemihydrate (BETIMOL) 0.25 % ophthalmic solution Inject 1 drop into the eye daily       nitroGLYcerin (NITROSTAT) 0.4 MG sublingual tablet Place 0.4 mg under the tongue as needed  (Patient not taking: Reported on 10/4/2021)       Allergies   Allergen Reactions     Ampicillin Other (See Comments), Hives and Itching     Lisinopril Cough     Sulfamethoxazole W/Trimethoprim Other (See Comments), Hives and Itching     Past Medical History:   Diagnosis Date     Essential hypertension      NSTEMI (non-ST elevated myocardial infarction) (H) 12/2020    2 stents     Type 2 diabetes mellitus without complication (H) 2005     Past Surgical History:   Procedure Laterality Date     VAGINAL PROLAPSE REPAIR      done in [place]      No family history on file.  Social History     Socioeconomic History     Marital status:      Spouse name: None     Number of children: None     Years of education: None     Highest education level: None   Occupational History     None   Tobacco Use     Smoking status: Never Smoker     Smokeless tobacco: Never Used   Vaping Use     Vaping Use: Never used   Substance and Sexual Activity     Alcohol use: Yes     Comment: occ     Drug use: Never     Sexual activity: Yes     Partners: Male     Birth control/protection: Female Surgical   Other Topics Concern     None   Social History Narrative     None     Social Determinants of Health     Financial Resource  Strain: Not on file   Food Insecurity: Not on file   Transportation Needs: Not on file   Physical Activity: Not on file   Stress: Not on file   Social Connections: Not on file   Intimate Partner Violence: Not on file   Housing Stability: Not on file       REVIEW OF SYSTEMS  =================  C: NEGATIVE for fever, chills, change in weight  I: NEGATIVE for worrisome rashes, moles or lesions  E/M: NEGATIVE for ear, mouth and throat problems  R: NEGATIVE for significant cough or SHORTNESS OF BREATH  CV:  NEGATIVE for chest pain, palpitations or peripheral edema  GI: NEGATIVE for nausea, abdominal pain, heartburn, or change in bowel habits  NEURO: NEGATIVE numbness/weakness  : see HPI  PSYCH: NEGATIVE depression/anxiety  LYmph: no new enlarged lymph nodes  Ortho: no new trauma/movements      Physical Exam:  /66 (BP Location: Right arm, Patient Position: Sitting, Cuff Size: Adult Regular)   Pulse 50   Resp 16   Wt 63.8 kg (140 lb 11.2 oz)   SpO2 98%   BMI 24.15 kg/m     Patient is pleasant, in no acute distress, good general condition.  Heart:  negative, PMI normal  Lung: no evidence of respiratory distress    Abdomen: Soft, nondistended, non tender. No masses. No rebound or guarding.   Exam: Grade 2 through 3 cystocele.  Normal urethra.  Mild atrophic vaginitis.  Skin: Warm and dry.  No redness.  Neuro: grossly normal  Musculaskeletal: moving all extremities  Psych normal mood and affect  Musculoskeletal  moving all extremities  Hematologic/Lymphatic/Immunologic: normal ant/post cervical, axillary, supraclavicular and inguinal nodes    Assessment/Plan:   Pleasant 76-year-old  female with cystocele status post da Kelsie sacrocolpopexy in the past.  Treatment options discussed with patient today.  We discussed observation, pessary usage, repeat surgery.  Pros and cons discussed at length.  Patient elects observation at this time.  She will come back to see me if her symptoms worsen.

## 2021-12-20 ENCOUNTER — TELEPHONE (OUTPATIENT)
Dept: INTERNAL MEDICINE | Facility: CLINIC | Age: 76
End: 2021-12-20
Payer: COMMERCIAL

## 2021-12-20 NOTE — TELEPHONE ENCOUNTER
Really need more information.  Recommend she do an E-visit are signed up for virtual visit tomorrow.  Need to know how her diabetes is any recent antibiotics or why she thinks she has this infection.

## 2021-12-20 NOTE — TELEPHONE ENCOUNTER
Reason for Call:  Other appointment, call back and prescription    Detailed comments: Patient called stating she thinks she may have a vaginal infection and wondering if she can get something for it.     Phone Number Patient can be reached at: Home number on file [phone] (home)    Best Time: Any    Can we leave a detailed message on this number? YES    Call taken on 12/20/2021 at 4:27 PM by Piedad Anton

## 2021-12-21 ENCOUNTER — VIRTUAL VISIT (OUTPATIENT)
Dept: INTERNAL MEDICINE | Facility: CLINIC | Age: 76
End: 2021-12-21
Payer: OTHER GOVERNMENT

## 2021-12-21 DIAGNOSIS — E11.9 TYPE 2 DIABETES MELLITUS WITHOUT COMPLICATION, WITHOUT LONG-TERM CURRENT USE OF INSULIN (H): ICD-10-CM

## 2021-12-21 DIAGNOSIS — I25.10 ASCVD (ARTERIOSCLEROTIC CARDIOVASCULAR DISEASE): ICD-10-CM

## 2021-12-21 DIAGNOSIS — B37.31 YEAST INFECTION OF THE VAGINA: Primary | ICD-10-CM

## 2021-12-21 PROCEDURE — 99441 PR PHYSICIAN TELEPHONE EVALUATION 5-10 MIN: CPT | Performed by: INTERNAL MEDICINE

## 2021-12-21 RX ORDER — FLUCONAZOLE 150 MG/1
150 TABLET ORAL ONCE
Qty: 1 TABLET | Refills: 0 | Status: SHIPPED | OUTPATIENT
Start: 2021-12-21 | End: 2021-12-21

## 2021-12-21 NOTE — PROGRESS NOTES
Zaria is a 76 year old who is being evaluated via a billable telephone visit.      What phone number would you like to be contacted at? [phone]  How would you like to obtain your AVS? Mail a copy    Assessment & Plan     Yeast infection of the vagina  Yeast infection probably from her Farxiga could be from diabetes.  We will treat her with Diflucan 150 mg once.  - fluconazole (DIFLUCAN) 150 MG tablet; Take 1 tablet (150 mg) by mouth once for 1 dose    Type 2 diabetes mellitus without complication, without long-term current use of insulin (H)  Diabetes is treated with Metformin and Farxiga.  She is doing well with this other than the yeast infection feels her sugars are little better.  If she has multiple yeast infections or recurrent we will then have to stop the Farxiga.    ASCVD (arteriosclerotic cardiovascular disease)  Coronary artery disease is doing well she had stents a year ago she can stop her Plavix.  Her Coreg and other medications are stable.                   No follow-ups on file.    Wang Vargas MD  [facility]   Zaria is a 76 year old who presents for the following health issues     HPI     Chief Complaint   Patient presents with     Telephone     vaginal itiching for about a week, no recent antibiotics but did start farxiga about two months ago     Vaginal itching for the last week. Years ago had yeast infection.      Started Farxiga a few months.     Asks about Plavix and if she can stop it after a year. Has pills to the end of December, stent was last December in [place].     Diabetes is doing ok, maybe helped sugars a little slight weight loss.       Past Medical History:   Diagnosis Date     Essential hypertension      NSTEMI (non-ST elevated myocardial infarction) (H) 12/2020    2 stents     Type 2 diabetes mellitus without complication (H) 2005     Current Outpatient Medications   Medication     acetaminophen (TYLENOL) 325 MG tablet      alcohol swab prep pads     amLODIPine (NORVASC) 2.5 MG tablet     Aspirin Buf,CaCarb-MgCarb-MgO, 81 MG TABS     atorvastatin (LIPITOR) 80 MG tablet     blood glucose (NO BRAND SPECIFIED) test strip     blood glucose calibration (NO BRAND SPECIFIED) solution     carvedilol (COREG) 6.25 MG tablet     clopidogrel (PLAVIX) 75 MG tablet     dapagliflozin (FARXIGA) 5 MG TABS tablet     ethacrynic acid (EDECRIN) 25 MG tablet     latanoprost (XALATAN) 0.005 % ophthalmic solution     levothyroxine (SYNTHROID/LEVOTHROID) 75 MCG tablet     losartan (COZAAR) 100 MG tablet     omeprazole (PRILOSEC) 20 MG DR capsule     thin (NO BRAND SPECIFIED) lancets     timolol hemihydrate (BETIMOL) 0.25 % ophthalmic solution     metFORMIN (GLUCOPHAGE) 500 MG tablet     nitroGLYcerin (NITROSTAT) 0.4 MG sublingual tablet     No current facility-administered medications for this visit.     Social History     Tobacco Use     Smoking status: Never Smoker     Smokeless tobacco: Never Used   Vaping Use     Vaping Use: Never used   Substance Use Topics     Alcohol use: Yes     Comment: occ     Drug use: Never         Review of Systems         Objective           Vitals:  No vitals were obtained today due to virtual visit.    Physical Exam   healthy, alert and no distress  PSYCH: Alert and oriented times 3; coherent speech, normal   rate and volume, able to articulate logical thoughts, able   to abstract reason, no tangential thoughts, no hallucinations   or delusions  Her affect is normal  RESP: No cough, no audible wheezing, able to talk in full sentences  Remainder of exam unable to be completed due to telephone visits                Phone call duration: 6 minutes

## 2022-01-31 DIAGNOSIS — E11.00 TYPE 2 DIABETES MELLITUS WITH HYPEROSMOLARITY WITHOUT COMA, WITHOUT LONG-TERM CURRENT USE OF INSULIN (H): ICD-10-CM

## 2022-01-31 DIAGNOSIS — E11.9 TYPE 2 DIABETES MELLITUS WITHOUT COMPLICATION, WITHOUT LONG-TERM CURRENT USE OF INSULIN (H): ICD-10-CM

## 2022-01-31 NOTE — TELEPHONE ENCOUNTER
Reason for Call:  Medication or medication refill:    Do you use a [facility]?  Name of the pharmacy and phone number for the current request:  [facility]    Name of the medication requested: Farxiga, Metformin, Omeprazole, Thin Lancets    Other request:     Can we leave a detailed message on this number? YES    Phone number patient can be reached at: Home number on file [phone] (home)    Best Time: any    Call taken on 1/31/2022 at 1:19 PM by Piedad Anton

## 2022-02-01 RX ORDER — LANCETS
EACH MISCELLANEOUS
Qty: 1 EACH | Refills: 3 | Status: SHIPPED | OUTPATIENT
Start: 2022-02-01 | End: 2023-01-09

## 2022-02-01 RX ORDER — DAPAGLIFLOZIN 5 MG/1
5 TABLET, FILM COATED ORAL DAILY
Qty: 30 TABLET | Refills: 3 | Status: SHIPPED | OUTPATIENT
Start: 2022-02-01 | End: 2022-02-25

## 2022-02-01 NOTE — TELEPHONE ENCOUNTER
Pending Prescriptions:                       Disp   Refills    dapagliflozin (FARXIGA) 5 MG TABS tablet   30 tab*3        Sig: Take 1 tablet (5 mg) by mouth daily    metFORMIN (GLUCOPHAGE) 500 MG tablet       360 ta*1        Sig: Take 2 tablets (1,000 mg) by mouth 2 times daily           (with meals)    Signed Prescriptions:                        Disp   Refills    omeprazole (PRILOSEC) 20 MG DR capsule     90 cap*1        Sig: Take 1 capsule (20 mg) by mouth daily  Authorizing Provider: DAWSON OLIVAS  Ordering User: YO BERRY    thin (NO BRAND SPECIFIED) lancets          1 each 3        Sig: Use to test blood sugar 1 times daily or as directed.           To accompany: Blood Glucose Monitor Brands: per           insurance.  Authorizing Provider: DAWSON OLIVAS  Ordering User: YO BERRY    Routing refill request to provider for review/approval because:  Labs not current:  A1C > 6 months   Hemoglobin A1C POCT   Date Value Ref Range Status   05/17/2021 7.0 (H) 0 - 5.6 % Final     Comment:     Normal <5.7% Prediabetes 5.7-6.4%  Diabetes 6.5% or higher - adopted from ADA   consensus guidelines.

## 2022-02-06 ENCOUNTER — APPOINTMENT (OUTPATIENT)
Dept: CT IMAGING | Facility: CLINIC | Age: 77
End: 2022-02-06
Attending: EMERGENCY MEDICINE
Payer: COMMERCIAL

## 2022-02-06 ENCOUNTER — HOSPITAL ENCOUNTER (EMERGENCY)
Facility: CLINIC | Age: 77
Discharge: HOME | End: 2022-02-06
Attending: EMERGENCY MEDICINE | Admitting: EMERGENCY MEDICINE
Payer: COMMERCIAL

## 2022-02-06 VITALS
BODY MASS INDEX: 24.36 KG/M2 | RESPIRATION RATE: 20 BRPM | SYSTOLIC BLOOD PRESSURE: 161 MMHG | DIASTOLIC BLOOD PRESSURE: 72 MMHG | OXYGEN SATURATION: 97 % | HEART RATE: 55 BPM | WEIGHT: 141.9 LBS | TEMPERATURE: 98 F

## 2022-02-06 DIAGNOSIS — M54.16 LUMBAR RADICULOPATHY: ICD-10-CM

## 2022-02-06 PROCEDURE — 72192 CT PELVIS W/O DYE: CPT

## 2022-02-06 PROCEDURE — 72131 CT LUMBAR SPINE W/O DYE: CPT

## 2022-02-06 PROCEDURE — 99284 EMERGENCY DEPT VISIT MOD MDM: CPT | Performed by: EMERGENCY MEDICINE

## 2022-02-06 PROCEDURE — 99284 EMERGENCY DEPT VISIT MOD MDM: CPT | Mod: 25 | Performed by: EMERGENCY MEDICINE

## 2022-02-06 RX ORDER — METHYLPREDNISOLONE 4 MG
TABLET, DOSE PACK ORAL
Qty: 21 TABLET | Refills: 0 | Status: SHIPPED | OUTPATIENT
Start: 2022-02-06 | End: 2022-09-08

## 2022-02-06 RX ORDER — HYDROCODONE BITARTRATE AND ACETAMINOPHEN 5; 325 MG/1; MG/1
1 TABLET ORAL EVERY 6 HOURS PRN
Qty: 18 TABLET | Refills: 0 | Status: SHIPPED | OUTPATIENT
Start: 2022-02-06 | End: 2022-02-09

## 2022-02-06 NOTE — DISCHARGE INSTRUCTIONS
The CT scan did not show any acute fractures but does show some bulging disks especially at the L4-5 level and I suspect that is what is causing your leg pain.  Going to put you on a strong steroid pack to see if that will take down inflammation around that area.  You can take ibuprofen or Vicodin for pain until this is effective.  If you get no relief from this you should follow-up with your doctor this coming week for recheck and referral for epidural steroid.  You can return to the ER if you have new concerning problems.  These medicines can cause upset stomach so take them with food.  The Vicodin is also constipating so much for this.

## 2022-02-06 NOTE — ED PROVIDER NOTES
History     Chief Complaint   Patient presents with     Back Pain     The history is provided by the patient.     Zaria Gaona is a 76 year old female who presents to the ED for left lower back pain. She states this episode of pain became more intense over the past week but has been intermittent before then. She localizes her pain to the left lower back SI areas radiating down to her left knee. Patient says that the pain intensifies when she stands and has been somewhat impacting ambulation. She denies any injury or fall causing prior to onset of symptoms. She further denies numbness or tingling in her left foot, bowel/bladder issues, fever, chills, cold, leg swelling, weakness in her left leg. Patient underwent L4-5 microlumbar discectomy for herniated L4-5 disc in 2004 (see below).     Operative report from 11/09/2004  PREOPERATIVE DIAGNOSIS:  Left L5 radiculopathy due to herniated L4-5 disc with inferiorly extruded  fragment.    POSTOPERATIVE DIAGNOSIS:  Same.    OPERATIVE PROCEDURE:  1. Left L4-5 microlumbar discectomy.  2. Use of intraoperative microscope.    SURGEON: Albert Amaro M.D.        Allergies:  Allergies   Allergen Reactions     Ampicillin Other (See Comments), Hives and Itching     Lisinopril Cough     Sulfamethoxazole W/Trimethoprim Other (See Comments), Hives and Itching       Problem List:    Patient Active Problem List    Diagnosis Date Noted     Cystocele, midline 09/13/2021     Priority: Medium     Diabetes mellitus, type 2 (H) 04/21/2021     Priority: Medium        Past Medical History:    Past Medical History:   Diagnosis Date     Essential hypertension      NSTEMI (non-ST elevated myocardial infarction) (H) 12/2020     Type 2 diabetes mellitus without complication (H) 2005       Past Surgical History:    Past Surgical History:   Procedure Laterality Date     VAGINAL PROLAPSE REPAIR      done in [place]       Family History:    History reviewed. No pertinent family  history.    Social History:  Marital Status:   [2]  Social History     Tobacco Use     Smoking status: Never Smoker     Smokeless tobacco: Never Used   Vaping Use     Vaping Use: Never used   Substance Use Topics     Alcohol use: Yes     Comment: occ     Drug use: Never        Medications:    acetaminophen (TYLENOL) 325 MG tablet  HYDROcodone-acetaminophen (NORCO) 5-325 MG tablet  methylPREDNISolone (MEDROL DOSEPAK) 4 MG tablet therapy pack  alcohol swab prep pads  amLODIPine (NORVASC) 2.5 MG tablet  Aspirin Buf,CaCarb-MgCarb-MgO, 81 MG TABS  atorvastatin (LIPITOR) 80 MG tablet  blood glucose (NO BRAND SPECIFIED) test strip  blood glucose calibration (NO BRAND SPECIFIED) solution  carvedilol (COREG) 6.25 MG tablet  dapagliflozin (FARXIGA) 5 MG TABS tablet  ethacrynic acid (EDECRIN) 25 MG tablet  latanoprost (XALATAN) 0.005 % ophthalmic solution  levothyroxine (SYNTHROID/LEVOTHROID) 75 MCG tablet  losartan (COZAAR) 100 MG tablet  metFORMIN (GLUCOPHAGE) 500 MG tablet  nitroGLYcerin (NITROSTAT) 0.4 MG sublingual tablet  omeprazole (PRILOSEC) 20 MG DR capsule  thin (NO BRAND SPECIFIED) lancets  timolol hemihydrate (BETIMOL) 0.25 % ophthalmic solution          Review of Systems   All other systems reviewed and are negative.      Physical Exam   BP: (!) 161/72  Pulse: 55  Temp: 98  F (36.7  C)  Resp: 20  Weight: 64.4 kg (141 lb 14.4 oz)  SpO2: 97 %      Physical Exam  Vitals and nursing note reviewed.     On exam patient is comfortable lying in the cart but with attempts to sit up she has increased left buttock and back pain.  Examination of bony spine reveals no midline tenderness.  There is no significant muscle spasm.  No rashes over the area.  Does have some mild discomfort in the buttock.  She has intact strength and sensation for the left lower extremity.  Does not have any radicular pain past her knee with straight leg raising.  DTRs equal.    ED Course                 Procedures                Results for  orders placed or performed during the hospital encounter of 02/06/22 (from the past 24 hour(s))   CT Lumbar Spine w/o Contrast    Narrative    EXAM: CT LUMBAR SPINE W/O CONTRAST  LOCATION: [facility]  DATE/TIME: 2/6/2022 2:58 PM    INDICATION: Lumbar back pain with left-sided radiculopathy.  COMPARISON: None.  TECHNIQUE: Routine CT Lumbar Spine without IV contrast. Multiplanar reformats. Dose reduction techniques were used.     FINDINGS:  No evidence of acute fracture or posttraumatic subluxation. Alignment is significant for levoconvex curvature of the thoracolumbar spine and multilevel grade I spondylolisthesis. Multilevel mild to moderate asymmetrical degenerative disc disease and   facet arthropathy. Multiple areas of disc bulging contributing to mild spinal canal stenoses, worst at L3-L4. Central disc extrusion at L1-L2. Shallow central protrusion at T12-L1. Severe foraminal stenosis on the left at L4-L5.    Foraminal stenosis on the left at L3-L4 and L5-S1. Multiple mild foraminal stenoses.    Bilateral sacroiliac joint degenerative change. Scattered vascular calcifications. Possible pancreatic calcifications. Possible cholelithiasis.      Impression    IMPRESSION:  1.  No evidence of acute fracture or posttraumatic subluxation.  2.  Degenerative change with multiple stenoses.  3.  Foraminal stenosis is worst on the left at L4-L5.  4.  Central disc extrusion at L1-L2.   CT Pelvis Bone wo Contrast    Narrative    EXAM: CT PELVIS BONE WO CONTRAST  LOCATION: [facility]  DATE/TIME: 2/6/2022 2:58 PM    INDICATION: Left hip and buttocks pain.  COMPARISON: None.  TECHNIQUE: CT scan of the pelvis was performed without IV contrast. Multiplanar reformats were obtained. Dose reduction techniques were used.  CONTRAST: None.    FINDINGS:    PELVIS ORGANS: Negative.    MUSCULOSKELETAL:     BONES AND JOINTS: Moderate degenerative changes in the visualized lower  lumbar spine. Mild osteoarthrosis in the hips, pubic symphysis and SI joints. There is no evidence of fracture or osteonecrosis.    MUSCLES AND TENDONS: No gross muscle or tendon pathology.    OTHER SOFT TISSUES: No edema. No hematoma, cyst or mass. No free fluid in the pelvis.      Impression    IMPRESSION:  1.  Degenerative changes in the visualized lumbar spine, both SI joints, both hips and the pubic symphysis.  2.  No evidence of fracture or osteonecrosis.       Medications - No data to display  CT of the lumbar spine and pelvis ordered looking for compression fracture or occult fracture.  Assessments & Plan (with Medical Decision Making)   Zaria Gaona is a 76 year old female who presents to the ED for left lower back pain. She states this episode of pain became more intense over the past week but has been intermittent before then. She localizes her pain to the left lower back SI areas radiating down to her left knee. Patient says that the pain intensifies when she stands and has been somewhat impacting ambulation. She denies any injury or fall causing prior to onset of symptoms. She further denies numbness or tingling in her left foot, bowel/bladder issues, fever, chills, cold, leg swelling, weakness in her left leg. Patient underwent L4-5 microlumbar discectomy for herniated L4-5 disc in 2004 (see below).  On exam patient did have some tenderness in the left buttock area.  No midline bony tenderness of the spine or pain with pelvic rocking.  No radicular pain past her leg with straight leg raising.  Intact strength and sensation.  DTRs equal.  CTs did not show any acute fracture.  Does show disc disease at multiple levels with most prominent at the L4-5 level on the left I suspect that is causing her symptoms.  Medrol for its anti-inflammatory properties.  Ibuprofen or Vicodin for pain.  Follow-up with primary doctor this week if no improvement and consider an epidural steroid.  Reasons to return to  the ER discussed.  I have reviewed the nursing notes.    I have reviewed the findings, diagnosis, plan and need for follow up with the patient.       New Prescriptions    HYDROCODONE-ACETAMINOPHEN (NORCO) 5-325 MG TABLET    Take 1 tablet by mouth every 6 hours as needed for pain    METHYLPREDNISOLONE (MEDROL DOSEPAK) 4 MG TABLET THERAPY PACK    Follow Package Directions       Final diagnoses:   Lumbar radiculopathy   This document serves as a record of services personally performed by Angelo Mcdaniel MD. It was created on their behalf by Lucio Asencio, a trained medical scribe. The creation of this record is based on the provider's personal observations and the statements of the patient. This document has been checked and approved by the attending provider.     Note: Chart documentation done in part with Dragon Voice Recognition software. Although reviewed after completion, some word and grammatical errors may remain.      2/6/2022   [facility] EMERGENCY DEPT     Angelo Mcdaniel MD  02/06/22 [id number]

## 2022-02-25 ENCOUNTER — OFFICE VISIT (OUTPATIENT)
Dept: INTERNAL MEDICINE | Facility: CLINIC | Age: 77
End: 2022-02-25
Payer: COMMERCIAL

## 2022-02-25 VITALS
DIASTOLIC BLOOD PRESSURE: 66 MMHG | SYSTOLIC BLOOD PRESSURE: 134 MMHG | RESPIRATION RATE: 14 BRPM | BODY MASS INDEX: 24.1 KG/M2 | HEART RATE: 66 BPM | WEIGHT: 140.4 LBS | TEMPERATURE: 97.8 F | OXYGEN SATURATION: 96 %

## 2022-02-25 DIAGNOSIS — M54.16 LEFT LUMBAR RADICULOPATHY: Primary | ICD-10-CM

## 2022-02-25 DIAGNOSIS — B37.31 YEAST INFECTION OF THE VAGINA: ICD-10-CM

## 2022-02-25 DIAGNOSIS — E11.9 TYPE 2 DIABETES MELLITUS WITHOUT COMPLICATION, WITHOUT LONG-TERM CURRENT USE OF INSULIN (H): ICD-10-CM

## 2022-02-25 DIAGNOSIS — E03.9 ACQUIRED HYPOTHYROIDISM: ICD-10-CM

## 2022-02-25 DIAGNOSIS — I10 HYPERTENSION, UNSPECIFIED TYPE: ICD-10-CM

## 2022-02-25 LAB
ALBUMIN SERPL-MCNC: 3.9 G/DL (ref 3.4–5)
ALP SERPL-CCNC: 62 U/L (ref 40–150)
ALT SERPL W P-5'-P-CCNC: 25 U/L (ref 0–50)
ANION GAP SERPL CALCULATED.3IONS-SCNC: 6 MMOL/L (ref 3–14)
AST SERPL W P-5'-P-CCNC: 13 U/L (ref 0–45)
BILIRUB SERPL-MCNC: 0.3 MG/DL (ref 0.2–1.3)
BUN SERPL-MCNC: 22 MG/DL (ref 7–30)
CALCIUM SERPL-MCNC: 9.7 MG/DL (ref 8.5–10.1)
CHLORIDE BLD-SCNC: 106 MMOL/L (ref 94–109)
CO2 SERPL-SCNC: 29 MMOL/L (ref 20–32)
CREAT SERPL-MCNC: 0.84 MG/DL (ref 0.52–1.04)
GFR SERPL CREATININE-BSD FRML MDRD: 72 ML/MIN/1.73M2
GLUCOSE BLD-MCNC: 174 MG/DL (ref 70–99)
HBA1C MFR BLD: 7.6 % (ref 0–5.6)
POTASSIUM BLD-SCNC: 4.9 MMOL/L (ref 3.4–5.3)
PROT SERPL-MCNC: 7.2 G/DL (ref 6.8–8.8)
SODIUM SERPL-SCNC: 141 MMOL/L (ref 133–144)
TSH SERPL DL<=0.005 MIU/L-ACNC: 1.35 MU/L (ref 0.4–4)

## 2022-02-25 PROCEDURE — 36415 COLL VENOUS BLD VENIPUNCTURE: CPT | Performed by: INTERNAL MEDICINE

## 2022-02-25 PROCEDURE — 80053 COMPREHEN METABOLIC PANEL: CPT | Performed by: INTERNAL MEDICINE

## 2022-02-25 PROCEDURE — 83036 HEMOGLOBIN GLYCOSYLATED A1C: CPT | Performed by: INTERNAL MEDICINE

## 2022-02-25 PROCEDURE — 84443 ASSAY THYROID STIM HORMONE: CPT | Performed by: INTERNAL MEDICINE

## 2022-02-25 PROCEDURE — 99214 OFFICE O/P EST MOD 30 MIN: CPT | Performed by: INTERNAL MEDICINE

## 2022-02-25 RX ORDER — FLUCONAZOLE 150 MG/1
150 TABLET ORAL EVERY OTHER DAY
Qty: 3 TABLET | Refills: 0 | Status: SHIPPED | OUTPATIENT
Start: 2022-02-25 | End: 2022-02-28

## 2022-02-25 RX ORDER — PREDNISONE 20 MG/1
20 TABLET ORAL DAILY
Qty: 10 TABLET | Refills: 0 | Status: SHIPPED | OUTPATIENT
Start: 2022-02-25 | End: 2022-09-08

## 2022-02-25 ASSESSMENT — PAIN SCALES - GENERAL: PAINLEVEL: NO PAIN (0)

## 2022-02-25 NOTE — PROGRESS NOTES
Assessment & Plan     Left lumbar radiculopathy  Patient has left lumbar radiculopathy, CT scan shows a foraminal stenosis at the L4 lesion on the left side.  Patient does not want an injection or surgery we will try her on more prednisone 20 mg a day for antiinflammation and refer to physical therapy.  - predniSONE (DELTASONE) 20 MG tablet; Take 1 tablet (20 mg) by mouth daily  - Physical Therapy Referral; Future    Yeast infection of the vagina  Yeast infection of the vaginal area we will treat with Diflucan.  We will also stop her Farxiga as it seems to be causing the problems.  I worry that the steroids will make this worse but we will extend the Diflucan to 3 doses every 3 days  - fluconazole (DIFLUCAN) 150 MG tablet; Take 1 tablet (150 mg) by mouth every other day for 3 days    Type 2 diabetes mellitus without complication, without long-term current use of insulin (H)  Diabetes mellitus.   -Diabetic diet  -Continue home diabetes regimen  -Start sliding-scale insulin we will check A1c  - Hemoglobin A1c; Future    Acquired hypothyroidism  Chronic hypothyroidism we will check her TSH.  - TSH with free T4 reflex; Future    Hypertension, unspecified type  Blood pressures controlled continue her regular medications.  - Comprehensive metabolic panel (BMP + Alb, Alk Phos, ALT, AST, Total. Bili, TP); Future                 No follow-ups on file.    Wang Vargas MD  [facility]    Boris Enciso is a 76 year old who presents for the following health issues     History of Present Illness       Back Pain:  She presents for follow up of back pain. Patient's back pain is a new problem.    Original cause of back pain: other  First noticed back pain: 1-4 weeks ago  Patient feels back pain: comes and goesLocation of back pain:  Left lower back  Description of back pain: other  Back pain spreads: nowhere    Since patient first noticed back pain, pain is: always present, but gets better and  worse  Does back pain interfere with her job:  Not applicable  On a scale of 1-10 (10 being the worst), patient describes pain as:  7  What makes back pain worse: bending and sitting  Acupuncture: not tried  Acetaminophen: helpful  Heat: helpful  Massage: not tried  Muscle relaxants: not tried  NSAIDS: helpful  Opioids: not tried  Physical Therapy: not tried  Rest: helpful  Topical pain relievers: helpful  Other healthcare providers patient is seeing for back pain: Chiropractor    Diabetes:   She presents for follow up of diabetes.  She is checking home blood glucose one time daily. She checks blood glucose before meals.  Blood glucose is never over 200 and never under 70. When her blood glucose is low, the patient is asymptomatic for confusion, blurred vision, lethargy and reports not feeling dizzy, shaky, or weak.  She has no concerns regarding her diabetes at this time.  She is not experiencing numbness or burning in feet, excessive thirst, blurry vision, weight changes or redness, sores or blisters on feet. The patient has had a diabetic eye exam in the last 12 months. Eye exam performed on 2021. Location of last eye exam [facility]        Hyperlipidemia:  She presents for follow up of hyperlipidemia.  She is taking medication to lower cholesterol. She is not having myalgia or other side effects to statin medications.    Hypertension: She presents for follow up of hypertension.  She does not check blood pressure  regularly outside of the clinic. Outpatient blood pressures have not been over 140/90. She does not follow a low salt diet.     She eats 2-3 servings of fruits and vegetables daily.She exercises with enough effort to increase her heart rate 9 or less minutes per day.    She is taking medications regularly.     Back pain in the left L4 area, no known injury, can be stiff if sits too long.  Medrol dose pack helped. Radiates down the leg  But not as much. Back surgery 20 years ago.      CT scan shows foraminal  stenosis at Left L4-5. Doesn't want surgery.    Has had yeast infection vaginally and maybe from Farxiga.  Itching starting up.     Sugars daily are 120-140 range.         Past Medical History:   Diagnosis Date     Essential hypertension      NSTEMI (non-ST elevated myocardial infarction) (H) 12/2020    2 stents     Type 2 diabetes mellitus without complication (H) 2005     Current Outpatient Medications   Medication     acetaminophen (TYLENOL) 325 MG tablet     alcohol swab prep pads     amLODIPine (NORVASC) 2.5 MG tablet     Aspirin Buf,CaCarb-MgCarb-MgO, 81 MG TABS     atorvastatin (LIPITOR) 80 MG tablet     blood glucose (NO BRAND SPECIFIED) test strip     blood glucose calibration (NO BRAND SPECIFIED) solution     carvedilol (COREG) 6.25 MG tablet     ethacrynic acid (EDECRIN) 25 MG tablet     fluconazole (DIFLUCAN) 150 MG tablet     latanoprost (XALATAN) 0.005 % ophthalmic solution     levothyroxine (SYNTHROID/LEVOTHROID) 75 MCG tablet     losartan (COZAAR) 100 MG tablet     metFORMIN (GLUCOPHAGE) 500 MG tablet     methylPREDNISolone (MEDROL DOSEPAK) 4 MG tablet therapy pack     nitroGLYcerin (NITROSTAT) 0.4 MG sublingual tablet     omeprazole (PRILOSEC) 20 MG DR capsule     predniSONE (DELTASONE) 20 MG tablet     thin (NO BRAND SPECIFIED) lancets     timolol hemihydrate (BETIMOL) 0.25 % ophthalmic solution     No current facility-administered medications for this visit.     Social History     Tobacco Use     Smoking status: Never Smoker     Smokeless tobacco: Never Used   Vaping Use     Vaping Use: Never used   Substance Use Topics     Alcohol use: Yes     Comment: occ     Drug use: Never       Review of Systems         Objective    /66 (BP Location: Left arm, Patient Position: Sitting, Cuff Size: Adult Large)   Pulse 66   Temp 97.8  F (36.6  C) (Temporal)   Resp 14   Wt 63.7 kg (140 lb 6.4 oz)   SpO2 96%   BMI 24.10 kg/m    Body mass index is 24.1 kg/m .  Physical Exam   GENERAL: healthy, alert  and no distress  RESP: lungs clear to auscultation - no rales, rhonchi or wheezes  CV: regular rate and rhythm, normal S1 S2, no S3 or S4, no murmur, click or rub, no peripheral edema and peripheral pulses strong  MS: no gross musculoskeletal defects noted, no edema  SKIN: no suspicious lesions or rashes  NEURO: Normal strength and tone, mentation intact and speech normal  PSYCH: mentation appears normal, affect normal/bright

## 2022-03-02 ENCOUNTER — HOSPITAL ENCOUNTER (OUTPATIENT)
Dept: PHYSICAL THERAPY | Facility: CLINIC | Age: 77
Setting detail: THERAPIES SERIES
End: 2022-03-02
Attending: INTERNAL MEDICINE
Payer: COMMERCIAL

## 2022-03-02 DIAGNOSIS — M54.16 LEFT LUMBAR RADICULOPATHY: ICD-10-CM

## 2022-03-02 PROCEDURE — 97014 ELECTRIC STIMULATION THERAPY: CPT | Mod: GP | Performed by: PHYSICAL THERAPIST

## 2022-03-02 PROCEDURE — 97161 PT EVAL LOW COMPLEX 20 MIN: CPT | Mod: GP | Performed by: PHYSICAL THERAPIST

## 2022-03-02 PROCEDURE — 97110 THERAPEUTIC EXERCISES: CPT | Mod: GP | Performed by: PHYSICAL THERAPIST

## 2022-03-02 PROCEDURE — 97010 HOT OR COLD PACKS THERAPY: CPT | Mod: GP | Performed by: PHYSICAL THERAPIST

## 2022-03-02 NOTE — PROGRESS NOTES
03/02/22 1428   General Information   Type of Visit Initial OP Ortho PT Evaluation   Start of Care Date 03/02/22   Referring Physician jose manuel puga MD   Patient/Family Goals Statement back and left leg pain    Orders Evaluate and Treat   Date of Order 02/25/22   Certification Required? Yes   Medical Diagnosis Left lumbar radiculopathy M54.16    Surgical/Medical history reviewed Yes   Precautions/Limitations no known precautions/limitations   Body Part(s)   Body Part(s) Lumbar Spine/SI   Presentation and Etiology   Pertinent history of current problem (include personal factors and/or comorbidities that impact the POC) patient reports that about 2 months ago started to have pain in her back and in left laterial thigh . had trouble getting out of car due to pain in her back . if she sits for more than 10 minutes . denies numb or tingling in B LE . denies incottenence PMH hysterectomy 3 years L4-5 20 years ago     Impairments A. Pain;H. Impaired gait   Functional Limitations perform activities of daily living;perform desired leisure / sports activities   Symptom Location lleft low back and leg    Onset date of current episode/exacerbation 01/02/22   Chronicity New   Pain rating (0-10 point scale) Best (/10);Worst (/10)   Best (/10) 0/10   Worst (/10) 6/10   Pain quality A. Sharp;B. Dull;C. Aching   Frequency of pain/symptoms C. With activity   Pain/symptoms exacerbated by   (sitting more than 10 minutes )   Pain/symptoms eased by B. Walking;E. Changing positions   Progression of symptoms since onset: Unchanged   Current / Previous Interventions   Diagnostic Tests: CT scan   Prior Level of Function   Functional Level Prior Comment likes to walk   Current Level of Function   Current Community Support Family/friend caregiver   Patient role/employment history F. Retired   Fall Risk Screen   Fall screen completed by PT   Have you fallen 2 or more times in the past year? No   Have you fallen and had an injury in the past  year? No   Is patient a fall risk? No   Abuse Screen (yes response referral indicated)   Feels Unsafe at Home or Work/School no   Feels Threatened by Someone no   Does Anyone Try to Keep You From Having Contact with Others or Doing Things Outside Your Home? no   Physical Signs of Abuse Present no   Lumbar Spine/SI Objective Findings   Flexion ROM mod and increase pain    Extension ROM mod no change in pain    Right Side Bending ROM full no pain    Left Side Bending ROM full no pain    Hip Screen neg   Lumbar/Hip/Knee/Foot Strength Comments able to stand on heels and toes    SLR sitting tight on left compared to right    Sensation Testing denies numb or tingling    Planned Therapy Interventions   Planned Therapy Interventions ADL retraining;balance training;ROM;strengthening;stretching   Planned Modality Interventions   Planned Modality Interventions Comments hot pack and IFC , modalities as needed    Clinical Impression   Criteria for Skilled Therapeutic Interventions Met yes, treatment indicated   PT Diagnosis left low back pain and left leg pain    Functional limitations due to impairments gallo med DAKOTAH 26.67   Clinical Presentation Stable/Uncomplicated   Clinical Presentation Rationale patient seen due to left low back and leg pain , back surgery 20 years ago , has bias to extension , Rx is skilled PT instruction in exercises and HEP and use of modalities in clinic    Clinical Decision Making (Complexity) Low complexity   Predicted Duration of Therapy Intervention (days/wks) 1x week x 1-3 months    Risk & Benefits of therapy have been explained Yes   Patient, Family & other staff in agreement with plan of care Yes   Education Assessment   Preferred Learning Style Listening;Reading;Demonstration   Barriers to Learning No barriers   ORTHO GOALS   PT Ortho Eval Goals 1;2;3   Ortho Goal 1   Goal Identifier 1   Goal Description instruction in HEP and compliant with it 5 of 7 days to improve quality of life    Target  Date 06/02/22   Ortho Goal 2   Goal Identifier 2   Goal Description patient to have reduction in pain level currently 0-6/10 goal is 0-2/10    Target Date 06/02/22   Ortho Goal 3   Goal Identifier 3   Goal Description patient to have improved tolerance to activity currently DAKOTAH 26.67 goal is less than 20    Target Date 06/02/22   Total Evaluation Time   PT Jose Low Complexity Minutes (19066) 15   Therapy Certification   Certification date from 03/02/22   Certification date to 06/02/22   Medical Diagnosis Left lumbar radiculopathy M54.16

## 2022-03-14 ENCOUNTER — HOSPITAL ENCOUNTER (OUTPATIENT)
Dept: PHYSICAL THERAPY | Facility: CLINIC | Age: 77
Setting detail: THERAPIES SERIES
Discharge: HOME | End: 2022-03-14
Attending: INTERNAL MEDICINE
Payer: COMMERCIAL

## 2022-03-14 PROCEDURE — 97110 THERAPEUTIC EXERCISES: CPT | Mod: GP | Performed by: PHYSICAL THERAPIST

## 2022-03-14 PROCEDURE — 97014 ELECTRIC STIMULATION THERAPY: CPT | Mod: GP | Performed by: PHYSICAL THERAPIST

## 2022-03-14 PROCEDURE — 97010 HOT OR COLD PACKS THERAPY: CPT | Mod: GP | Performed by: PHYSICAL THERAPIST

## 2022-03-21 ENCOUNTER — HOSPITAL ENCOUNTER (OUTPATIENT)
Dept: PHYSICAL THERAPY | Facility: CLINIC | Age: 77
Setting detail: THERAPIES SERIES
Discharge: HOME | End: 2022-03-21
Attending: INTERNAL MEDICINE
Payer: COMMERCIAL

## 2022-03-21 PROCEDURE — 97010 HOT OR COLD PACKS THERAPY: CPT | Mod: GP | Performed by: PHYSICAL THERAPIST

## 2022-03-21 PROCEDURE — 97014 ELECTRIC STIMULATION THERAPY: CPT | Mod: GP | Performed by: PHYSICAL THERAPIST

## 2022-03-21 PROCEDURE — 97110 THERAPEUTIC EXERCISES: CPT | Mod: GP | Performed by: PHYSICAL THERAPIST

## 2022-03-28 ENCOUNTER — HOSPITAL ENCOUNTER (OUTPATIENT)
Dept: PHYSICAL THERAPY | Facility: CLINIC | Age: 77
Setting detail: THERAPIES SERIES
Discharge: HOME | End: 2022-03-28
Attending: INTERNAL MEDICINE
Payer: COMMERCIAL

## 2022-03-28 PROCEDURE — 97014 ELECTRIC STIMULATION THERAPY: CPT | Mod: GP | Performed by: PHYSICAL THERAPIST

## 2022-03-28 PROCEDURE — 97110 THERAPEUTIC EXERCISES: CPT | Mod: GP | Performed by: PHYSICAL THERAPIST

## 2022-03-28 PROCEDURE — 97010 HOT OR COLD PACKS THERAPY: CPT | Mod: GP | Performed by: PHYSICAL THERAPIST

## 2022-04-07 ENCOUNTER — HOSPITAL ENCOUNTER (OUTPATIENT)
Dept: PHYSICAL THERAPY | Facility: CLINIC | Age: 77
Setting detail: THERAPIES SERIES
Discharge: HOME | End: 2022-04-07
Attending: INTERNAL MEDICINE
Payer: COMMERCIAL

## 2022-04-07 ENCOUNTER — TELEPHONE (OUTPATIENT)
Dept: INTERNAL MEDICINE | Facility: CLINIC | Age: 77
End: 2022-04-07
Payer: COMMERCIAL

## 2022-04-07 DIAGNOSIS — M54.16 LEFT LUMBAR RADICULOPATHY: Primary | ICD-10-CM

## 2022-04-07 PROCEDURE — 97014 ELECTRIC STIMULATION THERAPY: CPT | Mod: GP | Performed by: PHYSICAL THERAPIST

## 2022-04-07 PROCEDURE — 97010 HOT OR COLD PACKS THERAPY: CPT | Mod: GP | Performed by: PHYSICAL THERAPIST

## 2022-04-07 PROCEDURE — 97110 THERAPEUTIC EXERCISES: CPT | Mod: GP | Performed by: PHYSICAL THERAPIST

## 2022-04-07 NOTE — TELEPHONE ENCOUNTER
Reason for Call:  Other call back    Detailed comments: Patient called and stated that she has gone to physical therapy 5 times and it is not working. It works for a little while and then it doesn't work any more so she would like to talk to Dr. Vargas a little more.     Phone Number Patient can be reached at: Home number on file [phone] (home)    Best Time: any    Can we leave a detailed message on this number? YES    Call taken on 4/7/2022 at 3:17 PM by Piedad Anotn

## 2022-04-08 NOTE — TELEPHONE ENCOUNTER
I did a referral to neurosurgery to discuss other options with her. Continue PT as it can take a while to help.

## 2022-04-13 ENCOUNTER — HOSPITAL ENCOUNTER (OUTPATIENT)
Dept: PHYSICAL THERAPY | Facility: CLINIC | Age: 77
Setting detail: THERAPIES SERIES
Discharge: HOME | End: 2022-04-13
Attending: INTERNAL MEDICINE
Payer: COMMERCIAL

## 2022-04-13 PROCEDURE — 97010 HOT OR COLD PACKS THERAPY: CPT | Mod: GP | Performed by: PHYSICAL THERAPIST

## 2022-04-13 PROCEDURE — 97110 THERAPEUTIC EXERCISES: CPT | Mod: GP | Performed by: PHYSICAL THERAPIST

## 2022-04-13 PROCEDURE — 97014 ELECTRIC STIMULATION THERAPY: CPT | Mod: GP | Performed by: PHYSICAL THERAPIST

## 2022-04-18 ENCOUNTER — OFFICE VISIT (OUTPATIENT)
Dept: NEUROSURGERY | Facility: CLINIC | Age: 77
End: 2022-04-18
Attending: INTERNAL MEDICINE
Payer: COMMERCIAL

## 2022-04-18 VITALS — DIASTOLIC BLOOD PRESSURE: 74 MMHG | BODY MASS INDEX: 24.32 KG/M2 | SYSTOLIC BLOOD PRESSURE: 130 MMHG | WEIGHT: 141.7 LBS

## 2022-04-18 DIAGNOSIS — M54.16 LEFT LUMBAR RADICULOPATHY: ICD-10-CM

## 2022-04-18 PROCEDURE — 99203 OFFICE O/P NEW LOW 30 MIN: CPT | Performed by: PHYSICIAN ASSISTANT

## 2022-04-18 ASSESSMENT — PAIN SCALES - GENERAL: PAINLEVEL: MODERATE PAIN (5)

## 2022-04-18 NOTE — PROGRESS NOTES
Dr. Devan Flores  [facility]  Neurosurgery Clinic Visit      CC: back pain, left hip/thigh pain    Primary care Provider: Wang Vargas    Referring Provider:  Wang Vargas MD      Reason For Visit:   I was asked to consult on the patient for back and leg pain.      HPI: Zaria Gaona is a 76 year old female who presents for evaluation of her chief complaint of low back and left hip and thigh pain.  Symptoms have been present for several months, since early January, without any specific event or injury.  She notes axial low back pain, with pain that radiates into the left buttock and into the lateral aspect of her left thigh, to about midway down her thigh.  She states that her symptoms are worse when she is sitting for a while, and do seem to alleviate with standing, but then again are aggravated when she is walking or moving around.  She is working extensively with physical therapy at present, having completed 7 sessions recently.  She intends to keep going to therapy, but so far it has not been helpful.  She has not had any epidural injections.  She has a history of a lumbar surgery about 20 years ago for similar type symptoms.  No bowel or bladder changes, or any problems with balance or coordination.    Past Medical History:   Diagnosis Date     Essential hypertension      NSTEMI (non-ST elevated myocardial infarction) (H) 12/2020    2 stents     Type 2 diabetes mellitus without complication (H) 2005       Past Medical History reviewed with patient during visit.    Past Surgical History:   Procedure Laterality Date     VAGINAL PROLAPSE REPAIR      done in [place]     Past Surgical History reviewed with patient during visit.    Current Outpatient Medications   Medication     acetaminophen (TYLENOL) 325 MG tablet     alcohol swab prep pads     amLODIPine (NORVASC) 2.5 MG tablet     Aspirin Buf,CaCarb-MgCarb-MgO, 81 MG TABS     atorvastatin (LIPITOR) 80 MG tablet     blood glucose (NO  BRAND SPECIFIED) test strip     blood glucose calibration (NO BRAND SPECIFIED) solution     carvedilol (COREG) 6.25 MG tablet     ethacrynic acid (EDECRIN) 25 MG tablet     latanoprost (XALATAN) 0.005 % ophthalmic solution     levothyroxine (SYNTHROID/LEVOTHROID) 75 MCG tablet     losartan (COZAAR) 100 MG tablet     metFORMIN (GLUCOPHAGE) 500 MG tablet     methylPREDNISolone (MEDROL DOSEPAK) 4 MG tablet therapy pack     nitroGLYcerin (NITROSTAT) 0.4 MG sublingual tablet     omeprazole (PRILOSEC) 20 MG DR capsule     predniSONE (DELTASONE) 20 MG tablet     thin (NO BRAND SPECIFIED) lancets     timolol hemihydrate (BETIMOL) 0.25 % ophthalmic solution     No current facility-administered medications for this visit.       Allergies   Allergen Reactions     Ampicillin Other (See Comments), Hives and Itching     Lisinopril Cough     Sulfamethoxazole W/Trimethoprim Other (See Comments), Hives and Itching       Social History     Socioeconomic History     Marital status:    Tobacco Use     Smoking status: Never Smoker     Smokeless tobacco: Never Used   Vaping Use     Vaping Use: Never used   Substance and Sexual Activity     Alcohol use: Yes     Comment: occ     Drug use: Never     Sexual activity: Yes     Partners: Male     Birth control/protection: Female Surgical       No family history on file.       ROS: 10 point ROS neg other than the symptoms noted above in the HPI.    Vital Signs: There were no vitals taken for this visit.    Examination:  Constitutional:  Alert, well nourished, NAD.  HEENT: Normocephalic, atraumatic.   Pulmonary:  Without shortness of breath, normal effort.   Lymph: no lymphadenopathy to low back or LE.   Integumentary: Skin is free of rashes or lesions.   Cardiovascular:  No pitting edema of BLE.    Psych: Normal affect, no apparent distress    Neurological:  Awake  Alert  Oriented x 3  Speech clear  Cranial nerves II - XII grossly intact  Motor exam   Hip Flexor:                Right: 5/5   Left:  5/5  Hip Adductor:             Right:  5/5  Left:  5/5  Hip Abductor:             Right:  5/5  Left:  5/5  Gastroc Soleus:        Right:  5/5  Left:  5/5  Tib/Ant:                      Right:  5/5  Left:  5/5  EHL:                          Right:  5/5  Left:  5/5       Sensation normal to bilateral upper and lower extremities.    Reflexes are 2+ in the patellar and Achilles. There is no clonus. Downgoing Babinski.      Musculoskeletal:  Gait: Able to stand from a seated position. Normal non-antalgic, non-myelopathic gait.  Able to heel/toe walk without loss of balance    Lumbar examination reveals no tenderness of the spine or paraspinous muscles.  Hip height is symmetrical. Negative SI joint, sciatic notch or greater trochanteric tenderness to palpation bilaterally.  Straight leg raise is negative bilaterally.      Imaging:   INDICATION: Lumbar back pain with left-sided radiculopathy.  COMPARISON: None.  TECHNIQUE: Routine CT Lumbar Spine without IV contrast. Multiplanar reformats. Dose reduction techniques were used.      FINDINGS:  No evidence of acute fracture or posttraumatic subluxation. Alignment is significant for levoconvex curvature of the thoracolumbar spine and multilevel grade I spondylolisthesis. Multilevel mild to moderate asymmetrical degenerative disc disease and   facet arthropathy. Multiple areas of disc bulging contributing to mild spinal canal stenoses, worst at L3-L4. Central disc extrusion at L1-L2. Shallow central protrusion at T12-L1. Severe foraminal stenosis on the left at L4-L5.     Foraminal stenosis on the left at L3-L4 and L5-S1. Multiple mild foraminal stenoses.     Bilateral sacroiliac joint degenerative change. Scattered vascular calcifications. Possible pancreatic calcifications. Possible cholelithiasis.                                                                      IMPRESSION:  1.  No evidence of acute fracture or posttraumatic subluxation.  2.  Degenerative change  with multiple stenoses.  3.  Foraminal stenosis is worst on the left at L4-L5.  4.  Central disc extrusion at L1-L2.    Assessment/Plan:     Low back pain  Lumbar radiculopathy    Zaria Gaona is a 76 year old female.  I did have a discussion the patient regarding her symptoms.  She has had several months of gradually worsening low back and now left leg pain.  Her lumbar CT did show some disc degeneration as well as some foraminal narrowing on the left at L4-5.  She has been having left leg pain.  She has been working extensively with physical therapy, having completed 7 sessions so far.  So far, this has not been helpful.  We discussed potential benefits of an epidural injection, and she did wish to proceed with that option.  However, it would be good to have an MRI of her lumbar spine for further evaluation.  We will go ahead and get a lumbar spine MRI without contrast.  She would like to be contacted once results are available.  She voiced agreement and understanding.          John Cruz PA-C  [facility] Neurosurgery  [address] [address]    Tel [phone]  Pager [phone]      The use of Dragon/HumanAPI dictation services may have been used to construct the content in this note; any grammatical or spelling errors are non-intentional. Please contact the author of this note directly if you are in need of any clarification.

## 2022-04-18 NOTE — PROGRESS NOTES
"Zaria Gaona is a 76 year old female who presents for:  Chief Complaint   Patient presents with     Consult     Lumbar Radiculopathy : left        Initial Vitals:  /74 (BP Location: Right arm, Patient Position: Sitting, Cuff Size: Adult Regular)   Wt 141 lb 11.2 oz (64.3 kg)   BMI 24.32 kg/m   Estimated body mass index is 24.32 kg/m  as calculated from the following:    Height as of 8/17/21: 5' 4\" (1.626 m).    Weight as of this encounter: 141 lb 11.2 oz (64.3 kg).. Body surface area is 1.7 meters squared. BP completed using cuff size: regular  Moderate Pain (5)    Nursing Comments:     Lisseth Sanchez    "

## 2022-04-18 NOTE — LETTER
4/18/2022         RE: Zaria Gaona  [address] [facility] [address]        Dear Colleague,    Thank you for referring your patient, Zaria Gaona, to the [facility] [place]. Please see a copy of my visit note below.    Dr. Devan Flores  [facility]  Neurosurgery Clinic Visit      CC: back pain, left hip/thigh pain    Primary care Provider: Wang Vargas    Referring Provider:  Wang Vargas MD      Reason For Visit:   I was asked to consult on the patient for back and leg pain.      HPI: Zaria Gaona is a 76 year old female who presents for evaluation of her chief complaint of low back and left hip and thigh pain.  Symptoms have been present for several months, since early January, without any specific event or injury.  She notes axial low back pain, with pain that radiates into the left buttock and into the lateral aspect of her left thigh, to about midway down her thigh.  She states that her symptoms are worse when she is sitting for a while, and do seem to alleviate with standing, but then again are aggravated when she is walking or moving around.  She is working extensively with physical therapy at present, having completed 7 sessions recently.  She intends to keep going to therapy, but so far it has not been helpful.  She has not had any epidural injections.  She has a history of a lumbar surgery about 20 years ago for similar type symptoms.  No bowel or bladder changes, or any problems with balance or coordination.    Past Medical History:   Diagnosis Date     Essential hypertension      NSTEMI (non-ST elevated myocardial infarction) (H) 12/2020    2 stents     Type 2 diabetes mellitus without complication (H) 2005       Past Medical History reviewed with patient during visit.    Past Surgical History:   Procedure Laterality Date     VAGINAL PROLAPSE REPAIR      done in [place]     Past Surgical History reviewed with patient during  visit.    Current Outpatient Medications   Medication     acetaminophen (TYLENOL) 325 MG tablet     alcohol swab prep pads     amLODIPine (NORVASC) 2.5 MG tablet     Aspirin Buf,CaCarb-MgCarb-MgO, 81 MG TABS     atorvastatin (LIPITOR) 80 MG tablet     blood glucose (NO BRAND SPECIFIED) test strip     blood glucose calibration (NO BRAND SPECIFIED) solution     carvedilol (COREG) 6.25 MG tablet     ethacrynic acid (EDECRIN) 25 MG tablet     latanoprost (XALATAN) 0.005 % ophthalmic solution     levothyroxine (SYNTHROID/LEVOTHROID) 75 MCG tablet     losartan (COZAAR) 100 MG tablet     metFORMIN (GLUCOPHAGE) 500 MG tablet     methylPREDNISolone (MEDROL DOSEPAK) 4 MG tablet therapy pack     nitroGLYcerin (NITROSTAT) 0.4 MG sublingual tablet     omeprazole (PRILOSEC) 20 MG DR capsule     predniSONE (DELTASONE) 20 MG tablet     thin (NO BRAND SPECIFIED) lancets     timolol hemihydrate (BETIMOL) 0.25 % ophthalmic solution     No current facility-administered medications for this visit.       Allergies   Allergen Reactions     Ampicillin Other (See Comments), Hives and Itching     Lisinopril Cough     Sulfamethoxazole W/Trimethoprim Other (See Comments), Hives and Itching       Social History     Socioeconomic History     Marital status:    Tobacco Use     Smoking status: Never Smoker     Smokeless tobacco: Never Used   Vaping Use     Vaping Use: Never used   Substance and Sexual Activity     Alcohol use: Yes     Comment: occ     Drug use: Never     Sexual activity: Yes     Partners: Male     Birth control/protection: Female Surgical       No family history on file.       ROS: 10 point ROS neg other than the symptoms noted above in the HPI.    Vital Signs: There were no vitals taken for this visit.    Examination:  Constitutional:  Alert, well nourished, NAD.  HEENT: Normocephalic, atraumatic.   Pulmonary:  Without shortness of breath, normal effort.   Lymph: no lymphadenopathy to low back or LE.   Integumentary: Skin  is free of rashes or lesions.   Cardiovascular:  No pitting edema of BLE.    Psych: Normal affect, no apparent distress    Neurological:  Awake  Alert  Oriented x 3  Speech clear  Cranial nerves II - XII grossly intact  Motor exam   Hip Flexor:                Right: 5/5  Left:  5/5  Hip Adductor:             Right:  5/5  Left:  5/5  Hip Abductor:             Right:  5/5  Left:  5/5  Gastroc Soleus:        Right:  5/5  Left:  5/5  Tib/Ant:                      Right:  5/5  Left:  5/5  EHL:                          Right:  5/5  Left:  5/5       Sensation normal to bilateral upper and lower extremities.    Reflexes are 2+ in the patellar and Achilles. There is no clonus. Downgoing Babinski.      Musculoskeletal:  Gait: Able to stand from a seated position. Normal non-antalgic, non-myelopathic gait.  Able to heel/toe walk without loss of balance    Lumbar examination reveals no tenderness of the spine or paraspinous muscles.  Hip height is symmetrical. Negative SI joint, sciatic notch or greater trochanteric tenderness to palpation bilaterally.  Straight leg raise is negative bilaterally.      Imaging:   INDICATION: Lumbar back pain with left-sided radiculopathy.  COMPARISON: None.  TECHNIQUE: Routine CT Lumbar Spine without IV contrast. Multiplanar reformats. Dose reduction techniques were used.      FINDINGS:  No evidence of acute fracture or posttraumatic subluxation. Alignment is significant for levoconvex curvature of the thoracolumbar spine and multilevel grade I spondylolisthesis. Multilevel mild to moderate asymmetrical degenerative disc disease and   facet arthropathy. Multiple areas of disc bulging contributing to mild spinal canal stenoses, worst at L3-L4. Central disc extrusion at L1-L2. Shallow central protrusion at T12-L1. Severe foraminal stenosis on the left at L4-L5.     Foraminal stenosis on the left at L3-L4 and L5-S1. Multiple mild foraminal stenoses.     Bilateral sacroiliac joint degenerative  change. Scattered vascular calcifications. Possible pancreatic calcifications. Possible cholelithiasis.                                                                      IMPRESSION:  1.  No evidence of acute fracture or posttraumatic subluxation.  2.  Degenerative change with multiple stenoses.  3.  Foraminal stenosis is worst on the left at L4-L5.  4.  Central disc extrusion at L1-L2.    Assessment/Plan:     Low back pain  Lumbar radiculopathy    Zaria Gaona is a 76 year old female.  I did have a discussion the patient regarding her symptoms.  She has had several months of gradually worsening low back and now left leg pain.  Her lumbar CT did show some disc degeneration as well as some foraminal narrowing on the left at L4-5.  She has been having left leg pain.  She has been working extensively with physical therapy, having completed 7 sessions so far.  So far, this has not been helpful.  We discussed potential benefits of an epidural injection, and she did wish to proceed with that option.  However, it would be good to have an MRI of her lumbar spine for further evaluation.  We will go ahead and get a lumbar spine MRI without contrast.  She would like to be contacted once results are available.  She voiced agreement and understanding.          John Cruz PA-C  [facility] [place] [address]    Tel [phone]  Pager [phone]      The use of Dragon/Declaraation services may have been used to construct the content in this note; any grammatical or spelling errors are non-intentional. Please contact the author of this note directly if you are in need of any clarification.        Again, thank you for allowing me to participate in the care of your patient.        Sincerely,        John Cruz PA-C

## 2022-04-21 ENCOUNTER — HOSPITAL ENCOUNTER (OUTPATIENT)
Dept: PHYSICAL THERAPY | Facility: CLINIC | Age: 77
Setting detail: THERAPIES SERIES
Discharge: HOME | End: 2022-04-21
Attending: INTERNAL MEDICINE
Payer: COMMERCIAL

## 2022-04-21 PROCEDURE — 97110 THERAPEUTIC EXERCISES: CPT | Mod: GP | Performed by: PHYSICAL THERAPIST

## 2022-04-21 PROCEDURE — 97014 ELECTRIC STIMULATION THERAPY: CPT | Mod: GP | Performed by: PHYSICAL THERAPIST

## 2022-04-21 PROCEDURE — 97010 HOT OR COLD PACKS THERAPY: CPT | Mod: GP | Performed by: PHYSICAL THERAPIST

## 2022-04-25 ENCOUNTER — HOSPITAL ENCOUNTER (OUTPATIENT)
Dept: MRI IMAGING | Facility: CLINIC | Age: 77
Discharge: HOME | End: 2022-04-25
Attending: PHYSICIAN ASSISTANT | Admitting: PHYSICIAN ASSISTANT
Payer: COMMERCIAL

## 2022-04-25 DIAGNOSIS — M54.16 LEFT LUMBAR RADICULOPATHY: ICD-10-CM

## 2022-04-25 PROCEDURE — 72148 MRI LUMBAR SPINE W/O DYE: CPT

## 2022-04-27 ENCOUNTER — TELEPHONE (OUTPATIENT)
Dept: NEUROSURGERY | Facility: CLINIC | Age: 77
End: 2022-04-27
Payer: COMMERCIAL

## 2022-04-27 DIAGNOSIS — M54.16 LEFT LUMBAR RADICULOPATHY: Primary | ICD-10-CM

## 2022-04-27 NOTE — TELEPHONE ENCOUNTER
Patient was scheduled for a new consult appt with demetra tomorrow. Per last visit with you on 04/18/2022, you were going to call patient to discuss MRI results and discuss rather or not she should move forward with injection. I did cancel patients appointment for tomorrow with demetra, patient is awaiting your call to go over MRI results.

## 2022-04-28 ENCOUNTER — TELEPHONE (OUTPATIENT)
Dept: PALLIATIVE MEDICINE | Facility: CLINIC | Age: 77
End: 2022-04-28

## 2022-04-28 ENCOUNTER — HOSPITAL ENCOUNTER (OUTPATIENT)
Dept: PHYSICAL THERAPY | Facility: CLINIC | Age: 77
Setting detail: THERAPIES SERIES
Discharge: HOME | End: 2022-04-28
Attending: INTERNAL MEDICINE
Payer: COMMERCIAL

## 2022-04-28 PROCEDURE — 97110 THERAPEUTIC EXERCISES: CPT | Mod: GP | Performed by: PHYSICAL THERAPIST

## 2022-04-28 PROCEDURE — 97010 HOT OR COLD PACKS THERAPY: CPT | Mod: GP | Performed by: PHYSICAL THERAPIST

## 2022-04-28 PROCEDURE — 97014 ELECTRIC STIMULATION THERAPY: CPT | Mod: GP | Performed by: PHYSICAL THERAPIST

## 2022-04-28 NOTE — TELEPHONE ENCOUNTER
Called patient to discuss MRI. We will order an REJI, and follow up afterwards to see how she is doing with it.     John Cruz PA-C

## 2022-04-29 DIAGNOSIS — Z11.59 ENCOUNTER FOR SCREENING FOR OTHER VIRAL DISEASES: Primary | ICD-10-CM

## 2022-05-05 ENCOUNTER — HOSPITAL ENCOUNTER (OUTPATIENT)
Dept: PHYSICAL THERAPY | Facility: CLINIC | Age: 77
Setting detail: THERAPIES SERIES
Discharge: HOME | End: 2022-05-05
Attending: INTERNAL MEDICINE
Payer: COMMERCIAL

## 2022-05-05 PROCEDURE — 97010 HOT OR COLD PACKS THERAPY: CPT | Mod: GP | Performed by: PHYSICAL THERAPIST

## 2022-05-05 PROCEDURE — 97014 ELECTRIC STIMULATION THERAPY: CPT | Mod: GP | Performed by: PHYSICAL THERAPIST

## 2022-05-05 PROCEDURE — 97110 THERAPEUTIC EXERCISES: CPT | Mod: GP | Performed by: PHYSICAL THERAPIST

## 2022-05-11 ENCOUNTER — HOSPITAL ENCOUNTER (OUTPATIENT)
Dept: PHYSICAL THERAPY | Facility: CLINIC | Age: 77
Setting detail: THERAPIES SERIES
Discharge: HOME | End: 2022-05-11
Attending: INTERNAL MEDICINE
Payer: COMMERCIAL

## 2022-05-11 PROCEDURE — 97010 HOT OR COLD PACKS THERAPY: CPT | Mod: GP | Performed by: PHYSICAL THERAPIST

## 2022-05-11 PROCEDURE — 97110 THERAPEUTIC EXERCISES: CPT | Mod: GP | Performed by: PHYSICAL THERAPIST

## 2022-05-11 PROCEDURE — 97014 ELECTRIC STIMULATION THERAPY: CPT | Mod: GP | Performed by: PHYSICAL THERAPIST

## 2022-05-11 NOTE — PROGRESS NOTES
[facility]    Outpatient Physical Therapy Progress Note  Patient: Zaria Gaona  : 1945    Beginning/End Dates of Reporting Period:  3/2/2022 to 2022    Referring Provider: jose manuel puga MD       Therapy Diagnosis: back and left leg pain      Client Self Report: today is a better day , she is set up for injection next week    Objective Measurements:  Objective Measure: pain 0-6/10 gallo med DAKOTAH 26.67 at [facility]  Details: currently pain 0-6/10 pain is about the same, DAKOTAH 31.33      patient seen for 10 Rx sessions for exercises in clinic and instruction in HEP at last Rx she was completing the following   1 avoid flexion , 2 prone 2 pillow extension every 2 hours , 3 body mechanics , use pillow behind her back when sitting , 4 supine bent knee rotation , bent knee hip abduction , prone knee flexion 10x , SLR x 10  Also in clinic complete use of hot pack with IFC to low back and every time after Rx notes decrease in pain or resolved pain         Goals:  Goal Identifier 1   Goal Description instruction in HEP and compliant with it 5 of 7 days to improve quality of life    Target Date 22   Date Met      Progress (detail required for progress note):       Goal Identifier 2   Goal Description patient to have reduction in pain level currently 0-6/10 goal is 0-2/10    Target Date 22   Date Met      Progress (detail required for progress note):       Goal Identifier 3   Goal Description patient to have improved tolerance to activity currently DAKOTAH 26.67 goal is less than 20    Target Date 22   Date Met      Progress (detail required for progress note):       Plan:  Continue therapy per current plan of care.    Discharge:  No

## 2022-05-19 ENCOUNTER — HOSPITAL ENCOUNTER (OUTPATIENT)
Dept: PHYSICAL THERAPY | Facility: CLINIC | Age: 77
Setting detail: THERAPIES SERIES
Discharge: HOME | End: 2022-05-19
Attending: INTERNAL MEDICINE
Payer: COMMERCIAL

## 2022-05-19 PROCEDURE — 97014 ELECTRIC STIMULATION THERAPY: CPT | Mod: GP | Performed by: PHYSICAL THERAPIST

## 2022-05-19 PROCEDURE — 97110 THERAPEUTIC EXERCISES: CPT | Mod: GP | Performed by: PHYSICAL THERAPIST

## 2022-05-19 PROCEDURE — 97010 HOT OR COLD PACKS THERAPY: CPT | Mod: GP | Performed by: PHYSICAL THERAPIST

## 2022-05-20 ENCOUNTER — HOSPITAL ENCOUNTER (OUTPATIENT)
Dept: GENERAL RADIOLOGY | Facility: CLINIC | Age: 77
Discharge: HOME | End: 2022-05-20
Attending: ANESTHESIOLOGY | Admitting: ANESTHESIOLOGY
Payer: COMMERCIAL

## 2022-05-20 ENCOUNTER — HOSPITAL ENCOUNTER (OUTPATIENT)
Facility: CLINIC | Age: 77
Discharge: HOME | End: 2022-05-20
Attending: ANESTHESIOLOGY | Admitting: ANESTHESIOLOGY
Payer: COMMERCIAL

## 2022-05-20 VITALS
DIASTOLIC BLOOD PRESSURE: 87 MMHG | HEART RATE: 55 BPM | BODY MASS INDEX: 24.19 KG/M2 | SYSTOLIC BLOOD PRESSURE: 132 MMHG | OXYGEN SATURATION: 97 % | RESPIRATION RATE: 20 BRPM | HEIGHT: 64 IN | WEIGHT: 141.7 LBS | TEMPERATURE: 98.3 F

## 2022-05-20 DIAGNOSIS — M54.16 LUMBAR RADICULOPATHY: ICD-10-CM

## 2022-05-20 PROCEDURE — 62323 NJX INTERLAMINAR LMBR/SAC: CPT | Performed by: ANESTHESIOLOGY

## 2022-05-20 PROCEDURE — 999N000179 XR SURGERY CARM FLUORO LESS THAN 5 MIN W STILLS: Mod: TC

## 2022-05-20 NOTE — DISCHARGE INSTRUCTIONS

## 2022-05-20 NOTE — OP NOTE
CHIEF COMPLAINT: Low back and buttock pain  PROCEDURE:  L4-5 Interlaminar epidural steroid injection using fluoroscopic guidance with contrast dye.   PROCEDURE DETAILS: After written informed consent was obtained from the patient, the patient was escorted to the procedure room.  The patient was placed in the prone position.  A  time out  was conducted to verify patient identity, procedure to be performed, side, site, allergies and any special requirements.  The skin over the neck and upper back region were prepped and draped in normal sterile fashion with chloraprep. Fluoroscopy was used to identify the interspace in an AP view and the skin was anesthetized with 2 mL of 1% lidocaine with bicarbonate buffer.  A 20-gauge 3-1/2 inch Tuohy needle was advanced using the loss of resistance technique with preservative free normal saline with fluoroscopic guidance. After negative aspiration for CSF and blood, 1.5 cc of Omnipaque contrast dye was injected revealing the appropriate epidurogram without evidence of intrathecal or intravascular spread. Following this, a 3-mL solution of 40 mg of Depo-Medrol with 2 cc preservative-free normal saline was slowly injected.  There is good washout covering the L4-5 and L5-S1 segments.  After injection of the medication, as the needle tip was withdrawn, it was flushed with local anesthetic.  The patient was monitored with blood pressure and pulse oximetry machines with the assistance of an RN throughout the procedure.  The patient was alert and responsive to questions throughout the procedure.   The patient tolerated the procedure well and was observed in the post-procedural area.  The patient was dismissed without apparent complications.     BLOOD LOSS: <5 cc    DIAGNOSIS:  1.  Disc degeneration with foraminal stenosis at the left L4-5 level causing back pain and left buttock pain  PLAN:  1. Performed a L4-5 interlaminar epidural steroid injection.   2. The patient was instructed to  follow-up at the [facility] if today's procedure is not helpful.  If is not helpful I recommend considering either medial branch blocks or an SI joint injection on the left side.  Malick Gomez MD  Diplomate of the American Board of Anesthesiology, Pain Medicine

## 2022-05-26 ENCOUNTER — HOSPITAL ENCOUNTER (OUTPATIENT)
Dept: PHYSICAL THERAPY | Facility: CLINIC | Age: 77
Setting detail: THERAPIES SERIES
Discharge: HOME | End: 2022-05-26
Attending: INTERNAL MEDICINE
Payer: COMMERCIAL

## 2022-05-26 PROCEDURE — 97014 ELECTRIC STIMULATION THERAPY: CPT | Mod: GP | Performed by: PHYSICAL THERAPIST

## 2022-05-26 PROCEDURE — 97110 THERAPEUTIC EXERCISES: CPT | Mod: GP | Performed by: PHYSICAL THERAPIST

## 2022-05-26 PROCEDURE — 97010 HOT OR COLD PACKS THERAPY: CPT | Mod: GP | Performed by: PHYSICAL THERAPIST

## 2022-06-02 ENCOUNTER — HOSPITAL ENCOUNTER (OUTPATIENT)
Dept: PHYSICAL THERAPY | Facility: CLINIC | Age: 77
Setting detail: THERAPIES SERIES
Discharge: HOME | End: 2022-06-02
Attending: INTERNAL MEDICINE
Payer: COMMERCIAL

## 2022-06-02 ENCOUNTER — TELEPHONE (OUTPATIENT)
Dept: NEUROSURGERY | Facility: CLINIC | Age: 77
End: 2022-06-02
Payer: COMMERCIAL

## 2022-06-02 DIAGNOSIS — M54.16 LEFT LUMBAR RADICULOPATHY: Primary | ICD-10-CM

## 2022-06-02 DIAGNOSIS — Z79.01 LONG TERM (CURRENT) USE OF ANTICOAGULANTS: ICD-10-CM

## 2022-06-02 DIAGNOSIS — M79.605 LEFT LEG PAIN: ICD-10-CM

## 2022-06-02 PROCEDURE — 97014 ELECTRIC STIMULATION THERAPY: CPT | Mod: GP | Performed by: PHYSICAL THERAPIST

## 2022-06-02 PROCEDURE — 97010 HOT OR COLD PACKS THERAPY: CPT | Mod: GP | Performed by: PHYSICAL THERAPIST

## 2022-06-02 PROCEDURE — 97110 THERAPEUTIC EXERCISES: CPT | Mod: GP | Performed by: PHYSICAL THERAPIST

## 2022-06-02 NOTE — TELEPHONE ENCOUNTER
"Last Visit: 4/18/22    Next Visit: None scheduled at this time    Name of Provider:  John Cruz PA-C    Assessment: Patient had left lumbar 4-5 interlaminar epidural injection on 5/20/22 with Dr. Gomez.     Per note from John Cruz PA-C on 4/27 plan to try REJI then follow-up to see how patient is feeling.    Writer called patient to assess. Patient reports she had no relief after injection. Patient reports that pain is in lateral aspect of left leg from hip to knee and sometimes in the front of the left thigh from hip to knee. Patient rates this pain 6/10. Pain is worse with sitting. When patient goes from sitting to standing, she reports she is unable to put pressure on left foot due to pain.     Patient also reports pain in low back and rates it 8/10. Sitting and laying make the pain worse. Standing makes the pain better.     Patient reports she tried physical therapy for 3 weeks with no relief. Patient has also tried Tylenol (500-650 mg BID), topical Aspercreme, and Biofreeze. These only provide short term relief.     Patient is open to another injection if indicated and if insurance covers.     Per Dr. Gomez's op note after injection, \"If is not helpful I recommend considering either medial branch blocks or an SI joint injection on the left side.\"     Recommendation given: Will route to care team for review and recommendations.     Action needed from provider: Do you want to try a different injection? Other recommendations?          "

## 2022-06-02 NOTE — TELEPHONE ENCOUNTER
Reason for Call:  Other call back    Detailed comments: Pt had injection 5/20 and pt states it did not help/work. Pt would like to know what to do next. Please call today 6/2 or next week of 6/6. Pt will be gone this weekend      Phone Number Patient can be reached at: Home number on file [phone] (home)    Best Time: today or week of 6/6    Can we leave a detailed message on this number? YES    Call taken on 6/2/2022 at 1:18 PM by Kimberly Castro

## 2022-06-02 NOTE — PROGRESS NOTES
[facility]    Outpatient Physical Therapy Discharge Note  Patient: Zaria Gaona  : 1945    Beginning/End Dates of Reporting Period:  3/2/2022 to 2022     Referring Provider: jose manuel puga MD     Therapy Diagnosis: left back and leg pain      Client Self Report: feels like the injection didnt really help , feels like the pain is overall worse has pain been in the laterial and anterior left thigh almost to the knee , sitting alittle hurts but when she gets up its the worst pain    Objective Measurements:  Objective Measure: pain 0-6/10 gallo med DAKOTAH 26.67 at [facility]  Details: currently pain 0-6/10 pain is about the same, DAKOTAH 31.33  Objective Measure: morning is good but by evening its more painful and feels like she has trouble stepping on it ,  Details: always feels good after her exercises and the modalities in clinic feel good      patient seen for 13 Rx sessions for exercises in clinic and instruction in HEP at last Rx she was completing the following   1 avoid flexion , 2 prone 2 pillow extension every 2 hours , 3 body mechanics , use pillow behind her back when sitting , 4 supine bent knee rotation , bent knee hip abduction , prone knee flexion 10x , SLR x 10 , hip adduction isometric 10x nustep level 4  x 10    Also completed in clinic use of hot pack and IFC and every time has had resolved or reduce and centralized pain      Goals:  Goal Identifier 1   Goal Description instruction in HEP and compliant with it 5 of 7 days to improve quality of life    Target Date 22   Date Met      Progress (detail required for progress note):       Goal Identifier 2   Goal Description patient to have reduction in pain level currently 0-6/10 goal is 0-2/10    Target Date 22   Date Met      Progress (detail required for progress note):       Goal Identifier 3   Goal Description patient to have  improved tolerance to activity currently DAKOTAH 26.67 goal is less than 20    Target Date 06/02/22   Date Met      Progress (detail required for progress note):       Plan:  Discharge from therapy.    Discharge:    Reason for Discharge: No further expectation of progress.  Patient chooses to discontinue therapy.    Equipment Issued: none    Discharge Plan: Patient to continue home program.

## 2022-06-03 ENCOUNTER — TELEPHONE (OUTPATIENT)
Dept: PALLIATIVE MEDICINE | Facility: CLINIC | Age: 77
End: 2022-06-03
Payer: COMMERCIAL

## 2022-06-03 NOTE — TELEPHONE ENCOUNTER
Order placed for LEFT SI joint injection per John Cruz PA-C.    Attempted to reach out to patient, no answer. Left voice message for patient to call clinic back to further discuss.     Message sent to Dr. Gomez's scheduling team.

## 2022-06-08 DIAGNOSIS — E03.9 ACQUIRED HYPOTHYROIDISM: ICD-10-CM

## 2022-06-08 DIAGNOSIS — E11.00 TYPE 2 DIABETES MELLITUS WITH HYPEROSMOLARITY WITHOUT COMA, WITHOUT LONG-TERM CURRENT USE OF INSULIN (H): ICD-10-CM

## 2022-06-13 RX ORDER — LEVOTHYROXINE SODIUM 75 UG/1
75 TABLET ORAL DAILY
Qty: 90 TABLET | Refills: 2 | Status: SHIPPED | OUTPATIENT
Start: 2022-06-13 | End: 2022-10-05

## 2022-06-13 NOTE — TELEPHONE ENCOUNTER
Levothyroxine  Omeprazole  Prescription approved per [facility] Refill Protocol.    Anna Aparicio RN

## 2022-06-21 DIAGNOSIS — I21.4 NON-STEMI (NON-ST ELEVATED MYOCARDIAL INFARCTION) (H): ICD-10-CM

## 2022-06-21 DIAGNOSIS — E11.00 TYPE 2 DIABETES MELLITUS WITH HYPEROSMOLARITY WITHOUT COMA, WITHOUT LONG-TERM CURRENT USE OF INSULIN (H): ICD-10-CM

## 2022-06-21 RX ORDER — ETHACRYNIC ACID 25 MG/1
25 TABLET ORAL DAILY
Qty: 90 TABLET | Refills: 3 | Status: SHIPPED | OUTPATIENT
Start: 2022-06-21 | End: 2022-09-08

## 2022-06-21 NOTE — TELEPHONE ENCOUNTER
Patient calling in regards to refills on some of her medications.     Will send through RN protocol.    CASIMIRO Lopez, RN  [facility]  June 21, 2022

## 2022-06-21 NOTE — TELEPHONE ENCOUNTER
Pending Prescriptions:                       Disp   Refills    blood glucose (NO BRAND SPECIFIED) test st*100 st*6        Sig: Use to test blood sugar 1 times daily or as directed.           To accompany: Blood Glucose Monitor Brands: per           insurance.    metFORMIN (GLUCOPHAGE) 500 MG tablet       360 ta*1        Sig: Take 2 tablets (1,000 mg) by mouth 2 times daily           (with meals)    ethacrynic acid (EDECRIN) 25 MG tablet     90 tab*3        Sig: Take 1 tablet (25 mg) by mouth daily    Routing refill request to provider for review/approval because:  Drug not on the [facility] refill protocol  (routing to provider to fill all at once)

## 2022-07-01 ENCOUNTER — HOSPITAL ENCOUNTER (OUTPATIENT)
Facility: CLINIC | Age: 77
Discharge: HOME | End: 2022-07-01
Attending: ANESTHESIOLOGY | Admitting: ANESTHESIOLOGY
Payer: COMMERCIAL

## 2022-07-01 ENCOUNTER — HOSPITAL ENCOUNTER (OUTPATIENT)
Dept: GENERAL RADIOLOGY | Facility: CLINIC | Age: 77
Discharge: HOME | End: 2022-07-01
Attending: ANESTHESIOLOGY | Admitting: ANESTHESIOLOGY
Payer: COMMERCIAL

## 2022-07-01 VITALS
SYSTOLIC BLOOD PRESSURE: 139 MMHG | HEART RATE: 56 BPM | DIASTOLIC BLOOD PRESSURE: 73 MMHG | OXYGEN SATURATION: 96 % | RESPIRATION RATE: 16 BRPM | TEMPERATURE: 98.8 F

## 2022-07-01 DIAGNOSIS — M54.16 LEFT LUMBAR RADICULOPATHY: ICD-10-CM

## 2022-07-01 PROCEDURE — 27096 INJECT SACROILIAC JOINT: CPT | Performed by: ANESTHESIOLOGY

## 2022-07-01 PROCEDURE — 250N000011 HC RX IP 250 OP 636: Performed by: ANESTHESIOLOGY

## 2022-07-01 PROCEDURE — 250N000009 HC RX 250: Performed by: ANESTHESIOLOGY

## 2022-07-01 PROCEDURE — 27096 INJECT SACROILIAC JOINT: CPT | Mod: LT | Performed by: ANESTHESIOLOGY

## 2022-07-01 PROCEDURE — 999N000179 XR SURGERY CARM FLUORO LESS THAN 5 MIN W STILLS: Mod: TC

## 2022-07-01 RX ORDER — BUPIVACAINE HYDROCHLORIDE 5 MG/ML
INJECTION, SOLUTION PERINEURAL PRN
Status: DISCONTINUED | OUTPATIENT
Start: 2022-07-01 | End: 2022-07-01 | Stop reason: HOSPADM

## 2022-07-01 RX ORDER — IOPAMIDOL 612 MG/ML
INJECTION, SOLUTION INTRATHECAL PRN
Status: DISCONTINUED | OUTPATIENT
Start: 2022-07-01 | End: 2022-07-01 | Stop reason: HOSPADM

## 2022-07-01 RX ORDER — METHYLPREDNISOLONE ACETATE 80 MG/ML
INJECTION, SUSPENSION INTRA-ARTICULAR; INTRALESIONAL; INTRAMUSCULAR; SOFT TISSUE PRN
Status: DISCONTINUED | OUTPATIENT
Start: 2022-07-01 | End: 2022-07-01 | Stop reason: HOSPADM

## 2022-07-01 NOTE — OP NOTE
CHIEF COMPLAINT:    1.SI joint dysfunction (Sacroilitis) and pain (724.6)   2 Sacral enthesopathy (720.1)    PROCEDURE:   Fluoroscopically-guided injection of the Left  sacroiliac joint with Left dimitrios Sacroiliac joint ligaments infiltration over the sacrum.    PROCEDURE DETAILS: After written informed consent was obtained from the patient, the patient was escorted to the procedure room.  The patient was placed in the prone position.   A time out was conducted to verify patient identity, procedure to be performed, side, site, allergies and any special requirements.  The skin over the lumbosacral region was prepped and draped in normal sterile fashion with ChloraPrep. Fluoroscopy was used to identify the posteroinferior region of the sacroiliac joint.  The skin was anesthetized with 2 mL of 1% lidocaine with bicarbonate buffer.  Using fluoroscopic guidance, a 22 gauge, 3.5  Quincke spinal needle was advanced into the joint from an inferior approach.  After negative aspiration, 0.5 ml of Omnipaque contrast was injected showing intra-articular spread of contrast without evidence of intravascular spread.  2.0 mL of solution consisting of 40 mg of Depo-Medrol and 1.5 cc of 0.5% marcaine was injected slowly into the joint.   A second injection at the sacroiliac joint ligaments was then performed.  The middle third of the SI Joint was identified with fluoroscopy. After advancing a 22 gauge, 3.5  Quincke spinal needle to these ligaments with intermittent fluoroscopic guidance,  3 mL of solution consisting of  20 mg of DepoMedrol and 2.75 mL of 0.5 Marcaine was injected periligamentous and intramuscular over the sacroiliac joint ligaments.    The patient was monitored with blood pressure and pulse oximetry machines with the assistance of an RN throughout the procedure.  The patient was alert and responsive to questions throughout the procedure.   The patient tolerated the procedure well and was observed in the post-procedural  area.  The patient was dismissed without apparent complications.     DIAGNOSIS:  1.  Left sacroilitis  2.  Left sacral enthesopathy  3.  No benefit from a lumbar epidural injection implying a nondiscogenic source of back pain    BLOOD LOSS: minimal. Less than 5 ml    PLAN:  1. Performed a Left  Sacroiliac joint injection.  2. Left SI ligament injections over the sacrum   3.  Typically I would have patient's fill out a pain relief form however she is not starting with a significant amount of pain today.  Today is one of her better days.  Therefore, for diagnostic purposes today's injection is not as useful as it could be compared to if she was starting with a lot of pain and then we anesthetized her SI joint and the pain went away.  Therefore, we will have to utilize the corticosteroid for diagnostic and therapeutic purposes and see if that provides any longer-term pain relief for her.    Malick Gomez MD

## 2022-07-01 NOTE — DISCHARGE INSTRUCTIONS

## 2022-07-13 ENCOUNTER — TELEPHONE (OUTPATIENT)
Dept: NEUROSURGERY | Facility: CLINIC | Age: 77
End: 2022-07-13

## 2022-07-13 DIAGNOSIS — M79.605 LEFT LEG PAIN: ICD-10-CM

## 2022-07-13 DIAGNOSIS — M47.819 FACET ARTHROPATHY: ICD-10-CM

## 2022-07-13 DIAGNOSIS — M47.896 OTHER SPONDYLOSIS, LUMBAR REGION: ICD-10-CM

## 2022-07-13 DIAGNOSIS — M54.16 LEFT LUMBAR RADICULOPATHY: Primary | ICD-10-CM

## 2022-07-13 NOTE — TELEPHONE ENCOUNTER
Reason for Call:  Other call back    Detailed comments: patient calling saying she has had 2 back injections and neither have worked. She would like a call back to see what her next options would be. Thank you    Phone Number Patient can be reached at: Cell number on file:  [phone]             Best Time: any    Can we leave a detailed message on this number? YES    Call taken on 7/13/2022 at 10:40 AM by Yani Burdick

## 2022-07-13 NOTE — TELEPHONE ENCOUNTER
Last Visit: 4/18/22    Next Visit: None    Name of Provider:  John Cruz PA-C    Assessment: Patient called with continued pain following 2 injections.     7/1/22 - Left SI joint injection  5/20/22 - Left L4-5 REJI    Current symptoms - Patient rates pain 6-7/10. Pain is located on left side of low back. Pain used to go down left leg to the knee, this is not happening anymore since the injection. Pain is ok in the morning but get worse throughout the day. Pain is worse with sitting and bending. Patient reports she is walking ok, sometimes limping due to pain after sitting for long periods.    Injection helped with the radiating pain. Did not help with low back pain.    Medications - Tylenol 650 mg QID. Biofreeze spray. These temporarily relive pain.     Patient states she is looking for next steps. She would try another injection if indicated but it is not her top choice as she has not had great relief from the other two injections.     Reviewed red flag symptoms. Patient denies new numbness, tingling, weakness, foot drag/drop, saddle anesthesia, incontinence, intractable pain, or shortness of breath. Informed patient to seek emergency care should these symptoms arise.    Recommendation given: Will route to care team for review and recommendations. Someone will contact you with recommendations and next steps.     Action needed from provider: Please review note and provide recommendations.

## 2022-07-14 ENCOUNTER — TELEPHONE (OUTPATIENT)
Dept: PALLIATIVE MEDICINE | Facility: CLINIC | Age: 77
End: 2022-07-14

## 2022-07-14 NOTE — TELEPHONE ENCOUNTER
Pt scheduled for MBB   Date: 8/11/22  Time: 930  Dr. Gomez    Instructed pt to have a  for procedure.  Patient informed of COVID testing process.

## 2022-07-14 NOTE — TELEPHONE ENCOUNTER
Patient called back stating she needs someone on John Cruz's team to call her insurance to authorize the injections. The number for her insurance is [phone]

## 2022-07-14 NOTE — TELEPHONE ENCOUNTER
"Attempted to call insurance number in note below. Number was \"non-working number with [employer].\"    Writer called patient to see if she had other phone numbers available.    Writer called [employer] provider number [phone]  (prompt 'medical department'). [employer] representative states they is no case open at this time and in order to start a case, a CPT code is needed. Awaiting specific MBB order as level and laterality TBD by performing provider.       "

## 2022-07-14 NOTE — TELEPHONE ENCOUNTER
Per Sapna Lozada, NP it looks like patient has facet arthropathy as well. Since patient had resolution of leg pain and continued back pain, Sapna would recommend MBB workup for RFA and PT as well if patient has not tried. Called patient to update.    Patient states she tried 3 weeks of PT and did not have any relief so she owuld like to pass on PT for now.     Writer educated patient on MBB/RFA process. Patient voiced agreement and understanding. Patient would like to check with insurance prior to scheduling. Patient will call with further questions or concerns.     Order placed for MBB/RFA with Dr. Gomez. Staff message sent to Dr. Gomez's scheduling team.

## 2022-08-11 ENCOUNTER — TELEPHONE (OUTPATIENT)
Dept: NEUROSURGERY | Facility: CLINIC | Age: 77
End: 2022-08-11

## 2022-08-11 ENCOUNTER — HOSPITAL ENCOUNTER (OUTPATIENT)
Facility: CLINIC | Age: 77
Discharge: HOME | End: 2022-08-11
Attending: ANESTHESIOLOGY | Admitting: ANESTHESIOLOGY
Payer: COMMERCIAL

## 2022-08-11 ENCOUNTER — HOSPITAL ENCOUNTER (OUTPATIENT)
Dept: GENERAL RADIOLOGY | Facility: CLINIC | Age: 77
Discharge: HOME | End: 2022-08-11
Attending: ANESTHESIOLOGY | Admitting: ANESTHESIOLOGY
Payer: COMMERCIAL

## 2022-08-11 VITALS
OXYGEN SATURATION: 98 % | HEART RATE: 52 BPM | SYSTOLIC BLOOD PRESSURE: 139 MMHG | RESPIRATION RATE: 16 BRPM | DIASTOLIC BLOOD PRESSURE: 73 MMHG | TEMPERATURE: 98.2 F

## 2022-08-11 DIAGNOSIS — E78.5 HYPERLIPIDEMIA LDL GOAL <100: ICD-10-CM

## 2022-08-11 DIAGNOSIS — I10 BENIGN ESSENTIAL HYPERTENSION: ICD-10-CM

## 2022-08-11 DIAGNOSIS — I21.4 NON-STEMI (NON-ST ELEVATED MYOCARDIAL INFARCTION) (H): ICD-10-CM

## 2022-08-11 DIAGNOSIS — M54.16 LEFT LUMBAR RADICULOPATHY: ICD-10-CM

## 2022-08-11 PROCEDURE — 64494 INJ PARAVERT F JNT L/S 2 LEV: CPT | Mod: LT | Performed by: ANESTHESIOLOGY

## 2022-08-11 PROCEDURE — 250N000011 HC RX IP 250 OP 636: Performed by: ANESTHESIOLOGY

## 2022-08-11 PROCEDURE — 77003 FLUOROGUIDE FOR SPINE INJECT: CPT

## 2022-08-11 PROCEDURE — 64495 INJ PARAVERT F JNT L/S 3 LEV: CPT | Performed by: ANESTHESIOLOGY

## 2022-08-11 PROCEDURE — 64493 INJ PARAVERT F JNT L/S 1 LEV: CPT | Mod: LT | Performed by: ANESTHESIOLOGY

## 2022-08-11 PROCEDURE — 64493 INJ PARAVERT F JNT L/S 1 LEV: CPT | Mod: KX,LT | Performed by: ANESTHESIOLOGY

## 2022-08-11 RX ORDER — IOPAMIDOL 612 MG/ML
INJECTION, SOLUTION INTRATHECAL PRN
Status: DISCONTINUED | OUTPATIENT
Start: 2022-08-11 | End: 2022-08-11 | Stop reason: HOSPADM

## 2022-08-11 RX ORDER — BUPIVACAINE HYDROCHLORIDE 7.5 MG/ML
INJECTION, SOLUTION EPIDURAL; RETROBULBAR PRN
Status: DISCONTINUED | OUTPATIENT
Start: 2022-08-11 | End: 2022-08-11 | Stop reason: HOSPADM

## 2022-08-11 ASSESSMENT — ACTIVITIES OF DAILY LIVING (ADL): ADLS_ACUITY_SCORE: 35

## 2022-08-11 NOTE — OP NOTE
PRE-OP DIAGNOSIS: Left-sided Low back pain secondary to lumbar spondylosis    POST-OP DIAGNOSIS:  same    PROCEDURE: Left L3, Left L4, and Left L5 medial branch nerve blocks using fluoroscopic guidance and contrast control.     ANESTHESIA:  Local    PROCEDURE DETAILS: After discussing the risks, benefits, and alternatives to the procedure, the patient expressed understanding and wished to proceed. Written consent was obtained.  The patient was escorted to the fluoroscopy suite and placed in prone position on the procedure table.  A procedural pause was conducted to verify the correct: patient identity, procedure to be performed, side, site, patient position, and any special requirements. The skin was aseptically prepped and draped in the usual fashion utilizing ChloraPrep. The skin and subcutaneous tissue were anesthetized with 1% lidocaine.   Using intermittent fluoroscopic guidance, a 25 gauge 3    spinal needle was advanced to the junction of the pedicle, superior articular process and the transverse process in oblique view.  After negative aspiration 0.25cc of Omnipaque contrast was injected, showing perifacet spread of contrast without any evidence of intravascular or intrathecal spread. Then a 0.5 ml solution 0.75 % bupivacaine was then injected slowly at each segment.     The patient did not receive sedation during the procedure. The patient was monitored with blood pressure and pulse oximetry machines with the assistance of an RN throughout the procedure.  The patient was alert and responsive to questions throughout the procedure.   The patient tolerated the procedure well and was observed in the post-procedural area.  The patient was dismissed without apparent complications.     Blood loss: < 5 cc    PLAN:  1. Performed a Left L3, Left L4, and Left L5    medial branch nerve blocks using bupivacaine 0.75% for treatment of the   Her left L4-L5 and L5-S1 facet joints.  If there is relief from the medial branch  blocks, will repeat with Lidocaine 4% for two comparative diagnostic blocks. If benefit with appropriate time duration of relief for both blocks, will recommend proceeding with longer term relief utilizing radiofrequency neurolysis.  Her today's injection to be diagnostically positive the patient will need to receive at least 2 hours of pain relief in the % range.  If they receive a smaller percentage of pain relief from the injection or the pain relief does not last for at least 2 hours this will be considered a negative diagnostic block and other pain generators should be considered.    2. The patient was instructed to fax a pain relief sheet back to the [facility] via fax or mail.  If it is diagnostically negative and not sure what else I can offer her given that she has had a nondiagnostic work-up for SI joint dysfunction as well as no benefit from the epidural injection.  Typically in that situation you start thinking disc pain or myofascial pain.    Malick Gomez MD  Diplomate of the American Board of Anesthesiology, Pain Medicine

## 2022-08-11 NOTE — DISCHARGE INSTRUCTIONS
Home Care Instructions     Please fax or mail this form to the location that is selected at the bottom of your pain relief form.  Do not try to send the form back to Dr. Gomez's clinic in [place].  If you are unsure of which location to send this form back to, please send it back to the [facility] same-day surgery department.  Dr. Gomez will then evaluate your form and determine the next step of your care.            Procedure:  Diagnostic Block (which includes medial branch blocks, SI joint injection blocks, and nerve root injections)    Activity:  The injection was used to identify the cause of your pain:  Resume normal activity  You are to engage in activity that would have normally caused you discomfort to determine the effectiveness of the block/injection.  It is imperative to evaluate and rate your pain the first 2 hours after the injection.     Pain:  You may experience soreness at the injection site for one or two days  You may use an ice pack for 20 minutes every 2 hours for the first 24 hours  You may use a heating pad after the first 24 hours  You may use Tylenol  (acetaminophen) every 4 hours or other pain medicines as directed by your physician after your block wears off.    Safety  You may experience numbness radiating into your legs or arms, (depending on the procedure location)  This numbness may last several hours.  Until the numb sensation returns to normal please use caution in walking, climbing stairs, stepping out of your vehicle, etc.    Please contact us if you have:  Severe pain   Fever more than 101.5 degrees Fahrenheit  Signs of infection (redness, swelling or drainage)       If you need immediate attention we recommend that you go to a hospital emergency room or dial 911.    _____ Please contact our office one week after your injection to report your percent of pain relief.   See attached One Week Procedure Injection Report  __X__ Please return your Nerve Block Pain Relief  Form the day after your injection.     See attached Nerve Block Pain Relief Form             ## PLEASE SEND NERVE BLOCK PAIN RELIEF REPORT WITH PATIENT ##

## 2022-08-12 NOTE — TELEPHONE ENCOUNTER
Called patient after MBB to review return of Pain Journal that she completed. She was not given an envelope and would [facility].     Lisseth will watch for this, then review with John JACOBS.    Jennifer Hammond RN on 8/11/2022 at 2:04 PM    
Patient dropped off pain form , reviewed by LG.     Per Kevin he would like to consult with Dr Gomez next week. Pain form is placed in  Neurosurgery bin for review next week.       Lisseth ALFONSO on 8/12/2022 at 11:13 AM    
24-Oct-2018 01:51

## 2022-08-16 RX ORDER — ATORVASTATIN CALCIUM 80 MG/1
80 TABLET, FILM COATED ORAL DAILY
Qty: 90 TABLET | Refills: 3 | Status: SHIPPED | OUTPATIENT
Start: 2022-08-16 | End: 2023-10-11

## 2022-08-16 RX ORDER — AMLODIPINE BESYLATE 2.5 MG/1
2.5 TABLET ORAL DAILY
Qty: 90 TABLET | Refills: 3 | Status: SHIPPED | OUTPATIENT
Start: 2022-08-16 | End: 2023-06-13

## 2022-08-16 RX ORDER — CARVEDILOL 6.25 MG/1
6.25 TABLET ORAL 2 TIMES DAILY
Qty: 180 TABLET | Refills: 3 | Status: SHIPPED | OUTPATIENT
Start: 2022-08-16 | End: 2023-06-13

## 2022-08-17 ENCOUNTER — TELEPHONE (OUTPATIENT)
Dept: NEUROSURGERY | Facility: CLINIC | Age: 77
End: 2022-08-17

## 2022-08-17 DIAGNOSIS — M54.16 LEFT LUMBAR RADICULOPATHY: Primary | ICD-10-CM

## 2022-08-17 DIAGNOSIS — M47.896 OTHER SPONDYLOSIS, LUMBAR REGION: ICD-10-CM

## 2022-08-17 DIAGNOSIS — M79.605 LEFT LEG PAIN: ICD-10-CM

## 2022-08-17 NOTE — TELEPHONE ENCOUNTER
Reason for Call:  Other call back    Detailed comments: patient calling stating she got an injection on 8/11 and is now having b/l leg pain and is still having lower back pain. She also stated that it hurts so bad to the point where she strugles with getting up out of bed in the morning. Please call her at [phone]    Phone Number Patient can be reached at: Home number on file [phone] (home)    Best Time: any    Can we leave a detailed message on this number? YES    Call taken on 8/17/2022 at 11:12 AM by Yessenia Vargas

## 2022-08-17 NOTE — TELEPHONE ENCOUNTER
Spoke to patient. She had Left L3-5 MBB 8/11 with Dr. Gomez. She is reporting continued left sided low back pain and now is reporting bilateral buttock and leg pain that started 3 days ago. Patient completed and dropped off her pain journal for John Cruz PA-C to review. Per chart note from 8/12 he wanted to consult with Dr. Gomez.    Patient reports her pain at current is 3-4/10.     Says pain increases when sitting and ok when standing.     Describes buttock and leg pain as dull ache that comes and goes. Pain is worse in the mornings and reports muscle stiffness. Tylenol helps with pain.     Reviewed red flag symptoms. Patient denies new numbness, tingling, weakness, foot drag/drop, saddle anesthesia, incontinence, intractable pain, or shortness of breath. Informed patient to seek emergency care should these symptoms arise.    Routed message to John Cruz PA-C for review and to see if he has consulted with Dr. Gomez.

## 2022-08-19 ENCOUNTER — HOSPITAL ENCOUNTER (OUTPATIENT)
Facility: CLINIC | Age: 77
End: 2022-08-19
Attending: ANESTHESIOLOGY | Admitting: ANESTHESIOLOGY
Payer: COMMERCIAL

## 2022-08-19 ENCOUNTER — TELEPHONE (OUTPATIENT)
Dept: PALLIATIVE MEDICINE | Facility: CLINIC | Age: 77
End: 2022-08-19

## 2022-08-19 NOTE — TELEPHONE ENCOUNTER
Per John Cruz PA-C ok to order next MBB. Writer called patient to provide update. Patient wants to speak with insurance first. Patient will schedule after speaking with insurance. Patient voiced agreement and understanding.    Order placed for Left L3-5 MBB with Dr. Gomez.    Message sent to Dr. Gomez's scheduling team.

## 2022-08-19 NOTE — TELEPHONE ENCOUNTER
Pt scheduled for MBB  Date:9/16/22  Time:1pm  Dr. Gomez    Instructed pt to have a  for procedure.

## 2022-08-22 ENCOUNTER — NURSE TRIAGE (OUTPATIENT)
Dept: NURSING | Facility: CLINIC | Age: 77
End: 2022-08-22

## 2022-08-22 NOTE — TELEPHONE ENCOUNTER
RN called patient to confirm their request. Patient got an appointment for 9/8 and would like to get in to be seen sooner for body aches. Patient denies that this is related to her back pain. She states body aches are all day, but seem to get better during the day some time.    JANKI LeivaN, RN

## 2022-08-22 NOTE — TELEPHONE ENCOUNTER
Patient states for the past three weeks is having body aches.  Patient is currently receiving injections for back pain and went to therapy and that did not help so she is getting injections.  Next date is September 16 th.  In am arms and legs ache and feet ache.  Patient takes tylenol and this helps a little.  Patient denies too weak to stand and denies disoriented.  Denies dark or red-colored urine.  Patient is drinking and is hydrated.  Patient is able to do activities.  Patient denies that pain is currently severe currently but pain does increase as the day goes on.  Patient denies fever cough and shortness of breath.  Patient is on a cholesterol lowering drug and patient has diabetes.  Patient is requesting to speak with PCP/Clinic.    Nurse Triage SBAR    Is this a 2nd Level Triage? YES, LICENSED PRACTITIONER REVIEW IS REQUIRED    Situation:   Body aches    Background:   Patient is currently receiving injections for back pain.  Today patient is calling with body aches.    Assessment:   Patient states that over the last 3 to 4 weeks have noticed that she is body aches and are becoming more painful.  Patient has an upcoming injection due in September.    Protocol Recommended Disposition:   Callback by PCP Today    Recommendation:   Call back by PCP Today.  Patient is requesting to speak with her PCP/Clinic.  Clinic please phone patient back.    Routed to provider    Does the patient meet one of the following criteria for ADS visit consideration? 16+ years old, with an [facility] PCP     TIP  Providers, please consider if this condition is appropriate for management at one of our Acute and Diagnostic Services sites.     If patient is a good candidate, please use dotphrase <dot>triageresponse and select Refer to ADS to document.    Reason for Disposition    Diabetes mellitus or weak immune system (e.g., HIV positive, cancer chemo, splenectomy, organ transplant, chronic steroids)    Additional Information    Negative:  Shock suspected (e.g., cold/pale/clammy skin, too weak to stand, low BP, rapid pulse)    Negative: Difficult to awaken or acting confused (e.g., disoriented, slurred speech)    Negative: Sounds like a life-threatening emergency to the triager    Negative: Dark (cola colored) or red-colored urine    Negative: Drinking very little and dehydration suspected (e.g., no urine > 12 hours, very dry mouth, very lightheaded)    Negative: Patient sounds very sick or weak to the triager    Negative: SEVERE pain (e.g., excruciating, unable to do any normal activities) and not improved 2 hours after pain medicine    Negative: SEVERE pain and taking a statin medicine (a lipid or cholesterol lowering drug)    Negative: Fever > 104 F (40 C)    Negative: Fever > 101 F (38.3 C) and over 60 years of age    Negative: Fever > 100.0 F  (37.8 C) and bedridden (e.g., nursing home patient, CVA, chronic illness, recovering from surgery)    Negative: Fever > 100.0 F (37.8 C) and indwelling urinary catheter (e.g., Sanchez, coude)    Negative: Fever > 100.0 F (37.8 C) and diabetes mellitus or weak immune system (e.g., HIV positive, cancer chemo, splenectomy, organ transplant, chronic steroids)    Negative: Fever present > 3 days (72 hours)    Negative: Muscle aches are unexplained and occur within 1 month of a tick bite    Protocols used: MUSCLE ACHES AND BODY PAIN-A-[place]

## 2022-08-22 NOTE — TELEPHONE ENCOUNTER
Patient is called and informed of this message.  Patient understands and agrees to this plan.  Closing this encounter.    JANKI LeivaN, RN

## 2022-08-22 NOTE — TELEPHONE ENCOUNTER
I am full until the eighth.  Have her reduce her atorvastatin to half a pill 40 mg every other day and see if that helps her body aches and we can see her on September 8.

## 2022-09-08 ENCOUNTER — TELEPHONE (OUTPATIENT)
Dept: NEUROSURGERY | Facility: CLINIC | Age: 77
End: 2022-09-08

## 2022-09-08 ENCOUNTER — TELEPHONE (OUTPATIENT)
Dept: INTERNAL MEDICINE | Facility: CLINIC | Age: 77
End: 2022-09-08

## 2022-09-08 ENCOUNTER — OFFICE VISIT (OUTPATIENT)
Dept: FAMILY MEDICINE | Facility: CLINIC | Age: 77
End: 2022-09-08
Payer: COMMERCIAL

## 2022-09-08 VITALS
SYSTOLIC BLOOD PRESSURE: 130 MMHG | TEMPERATURE: 97.4 F | HEART RATE: 61 BPM | BODY MASS INDEX: 23.77 KG/M2 | OXYGEN SATURATION: 98 % | WEIGHT: 138.5 LBS | DIASTOLIC BLOOD PRESSURE: 66 MMHG

## 2022-09-08 DIAGNOSIS — E55.9 VITAMIN D DEFICIENCY: ICD-10-CM

## 2022-09-08 DIAGNOSIS — E11.9 TYPE 2 DIABETES MELLITUS WITHOUT COMPLICATION, WITHOUT LONG-TERM CURRENT USE OF INSULIN (H): ICD-10-CM

## 2022-09-08 DIAGNOSIS — Z12.11 SCREEN FOR COLON CANCER: ICD-10-CM

## 2022-09-08 DIAGNOSIS — R53.83 OTHER FATIGUE: ICD-10-CM

## 2022-09-08 DIAGNOSIS — R52 GENERALIZED BODY ACHES: Primary | ICD-10-CM

## 2022-09-08 DIAGNOSIS — E11.00 TYPE 2 DIABETES MELLITUS WITH HYPEROSMOLARITY WITHOUT COMA, WITHOUT LONG-TERM CURRENT USE OF INSULIN (H): ICD-10-CM

## 2022-09-08 LAB
ALBUMIN SERPL-MCNC: 3.7 G/DL (ref 3.4–5)
ALP SERPL-CCNC: 90 U/L (ref 40–150)
ALT SERPL W P-5'-P-CCNC: 15 U/L (ref 0–50)
ANION GAP SERPL CALCULATED.3IONS-SCNC: 5 MMOL/L (ref 3–14)
AST SERPL W P-5'-P-CCNC: 9 U/L (ref 0–45)
BILIRUB SERPL-MCNC: 0.3 MG/DL (ref 0.2–1.3)
BUN SERPL-MCNC: 27 MG/DL (ref 7–30)
CALCIUM SERPL-MCNC: 9.5 MG/DL (ref 8.5–10.1)
CHLORIDE BLD-SCNC: 107 MMOL/L (ref 94–109)
CHOLEST SERPL-MCNC: 142 MG/DL
CO2 SERPL-SCNC: 26 MMOL/L (ref 20–32)
CREAT SERPL-MCNC: 0.86 MG/DL (ref 0.52–1.04)
CREAT UR-MCNC: 42 MG/DL
CRP SERPL-MCNC: 35.1 MG/L (ref 0–8)
DEPRECATED CALCIDIOL+CALCIFEROL SERPL-MC: 47 UG/L (ref 20–75)
ERYTHROCYTE [DISTWIDTH] IN BLOOD BY AUTOMATED COUNT: 13 % (ref 10–15)
ERYTHROCYTE [SEDIMENTATION RATE] IN BLOOD BY WESTERGREN METHOD: 45 MM/HR (ref 0–30)
FASTING STATUS PATIENT QL REPORTED: YES
GFR SERPL CREATININE-BSD FRML MDRD: 70 ML/MIN/1.73M2
GLUCOSE BLD-MCNC: 139 MG/DL (ref 70–99)
HBA1C MFR BLD: 7.5 % (ref 0–5.6)
HCT VFR BLD AUTO: 34 % (ref 35–47)
HDLC SERPL-MCNC: 45 MG/DL
HGB BLD-MCNC: 10.9 G/DL (ref 11.7–15.7)
LDLC SERPL CALC-MCNC: 45 MG/DL
MCH RBC QN AUTO: 29.5 PG (ref 26.5–33)
MCHC RBC AUTO-ENTMCNC: 32.1 G/DL (ref 31.5–36.5)
MCV RBC AUTO: 92 FL (ref 78–100)
MICROALBUMIN UR-MCNC: 22 MG/L
MICROALBUMIN/CREAT UR: 52.38 MG/G CR (ref 0–25)
NONHDLC SERPL-MCNC: 97 MG/DL
PLATELET # BLD AUTO: 277 10E3/UL (ref 150–450)
POTASSIUM BLD-SCNC: 5.3 MMOL/L (ref 3.4–5.3)
PROT SERPL-MCNC: 8 G/DL (ref 6.8–8.8)
RBC # BLD AUTO: 3.7 10E6/UL (ref 3.8–5.2)
SODIUM SERPL-SCNC: 138 MMOL/L (ref 133–144)
TRIGL SERPL-MCNC: 258 MG/DL
VIT B12 SERPL-MCNC: 648 PG/ML (ref 232–1245)
WBC # BLD AUTO: 9.8 10E3/UL (ref 4–11)

## 2022-09-08 PROCEDURE — 80061 LIPID PANEL: CPT | Performed by: PHYSICIAN ASSISTANT

## 2022-09-08 PROCEDURE — 85652 RBC SED RATE AUTOMATED: CPT | Performed by: PHYSICIAN ASSISTANT

## 2022-09-08 PROCEDURE — 86140 C-REACTIVE PROTEIN: CPT | Performed by: PHYSICIAN ASSISTANT

## 2022-09-08 PROCEDURE — 83036 HEMOGLOBIN GLYCOSYLATED A1C: CPT | Performed by: PHYSICIAN ASSISTANT

## 2022-09-08 PROCEDURE — 36415 COLL VENOUS BLD VENIPUNCTURE: CPT | Performed by: PHYSICIAN ASSISTANT

## 2022-09-08 PROCEDURE — 99214 OFFICE O/P EST MOD 30 MIN: CPT | Performed by: PHYSICIAN ASSISTANT

## 2022-09-08 PROCEDURE — 85027 COMPLETE CBC AUTOMATED: CPT | Performed by: PHYSICIAN ASSISTANT

## 2022-09-08 PROCEDURE — 80053 COMPREHEN METABOLIC PANEL: CPT | Performed by: PHYSICIAN ASSISTANT

## 2022-09-08 PROCEDURE — 82306 VITAMIN D 25 HYDROXY: CPT | Performed by: PHYSICIAN ASSISTANT

## 2022-09-08 PROCEDURE — 82607 VITAMIN B-12: CPT | Performed by: PHYSICIAN ASSISTANT

## 2022-09-08 PROCEDURE — 86618 LYME DISEASE ANTIBODY: CPT | Performed by: PHYSICIAN ASSISTANT

## 2022-09-08 PROCEDURE — 82043 UR ALBUMIN QUANTITATIVE: CPT | Performed by: PHYSICIAN ASSISTANT

## 2022-09-08 ASSESSMENT — PAIN SCALES - GENERAL: PAINLEVEL: EXTREME PAIN (8)

## 2022-09-08 ASSESSMENT — ENCOUNTER SYMPTOMS: BACK PAIN: 1

## 2022-09-08 NOTE — TELEPHONE ENCOUNTER
Left message with spouse to call back to schedule appointment with Kevin to discuss failed MBB and next steps per message from Annie

## 2022-09-08 NOTE — PROGRESS NOTES
Subjective   Zaria is a 77 year old, presenting for the following health issues:  Back Pain      Back Pain     History of Present Illness       Back Pain:  She presents for follow up of back pain. Patient's back pain is a new problem.    Original cause of back pain: other  First noticed back pain: more than 1 month ago  Patient feels back pain: comes and goesLocation of back pain:  Left lower back  Description of back pain: dull ache and sharp  Back pain spreads: nowhere    Since patient first noticed back pain, pain is: gradually worsening  Does back pain interfere with her job:  Not applicable  On a scale of 1-10 (10 being the worst), patient describes pain as:  6  What makes back pain worse: lying down and sitting  Acupuncture: not tried  Acetaminophen: helpful  Activity or exercise: not helpful  Chiropractor:  Not helpful  Cold: helpful  Heat: helpful  Massage: not tried  Muscle relaxants: not tried  NSAIDS: not tried  Opioids: not tried  Physical Therapy: helpful  Rest: helpful  Steroid Injection: helpful  Stretching: not helpful  Surgery: not tried  TENS unit: not tried  Topical pain relievers: helpful  Other healthcare providers patient is seeing for back pain: Physical Therapist    She eats 2-3 servings of fruits and vegetables daily.She consumes 1 sweetened beverage(s) daily.She exercises with enough effort to increase her heart rate 9 or less minutes per day.  She exercises with enough effort to increase her heart rate 3 or less days per week.   She is taking medications regularly.    Patient presents today for evaluation of muscle aches that have been occurring over the last 3 weeks. She has chronic back pain but feels this is different. Feels pain in her arms, butt and legs. Worse with doing any sort of activity. She reports not sore to the touch but as soon as she starts to use her body she feels sore and muscles feel fatigued. She also reports feeling more tired than normal. Denies any joint  swelling or redness. No fevers. No headaches or lightheadedness. She denies any new rashes. She does have Type 2 diabetes - reports blood sugars have been stable in the 120's fasting. Primary recommended cutting her dose of Atorvastatin from 80 mg to 40 mg. She has been doing this for 2 weeks with no change in her symptoms. Feels she has been sleeping pretty good. History of NSTEMI.     Review of Systems   Musculoskeletal: Positive for back pain.      Constitutional, HEENT, cardiovascular, pulmonary, GI, , musculoskeletal, neuro, skin, endocrine and psych systems are negative, except as otherwise noted.      Objective    /66   Pulse 61   Temp 97.4  F (36.3  C) (Temporal)   Wt 62.8 kg (138 lb 8 oz)   SpO2 98%   BMI 23.77 kg/m    Body mass index is 23.77 kg/m .  Physical Exam   GENERAL: healthy, alert and no distress  HENT: ear canals and TM's normal, nose and mouth without ulcers or lesions  NECK: no adenopathy, no asymmetry, masses, or scars and thyroid normal to palpation  RESP: lungs clear to auscultation - no rales, rhonchi or wheezes  CV: regular rate and rhythm, normal S1 S2, no S3 or S4, no murmur, click or rub, no peripheral edema and peripheral pulses strong  ABDOMEN: soft, nontender, no hepatosplenomegaly, no masses and bowel sounds normal  MS: no gross musculoskeletal defects noted, no edema  SKIN: no suspicious lesions or rashes  NEURO: Normal strength and tone, sensory exam grossly normal and mentation intact  PSYCH: mentation appears normal, affect normal/bright        Assessment & Plan     Generalized body aches  Patient with a 3 week history of increased body aches and fatigue. This has persisted despite cutting dose of statin in half. She denies any other concerning symptoms. Will start with lab evaluation as below. Further follow-up pending these results. Reviewed red flag symptoms that should prompt more emergent follow-up.   - CBC with platelets; Future  - Comprehensive metabolic  panel (BMP + Alb, Alk Phos, ALT, AST, Total. Bili, TP); Future  - ESR: Erythrocyte sedimentation rate; Future  - CRP, inflammation; Future  - Lyme Disease Total Abs Bld with Reflex to Confirm CLIA; Future  - CBC with platelets  - Comprehensive metabolic panel (BMP + Alb, Alk Phos, ALT, AST, Total. Bili, TP)  - ESR: Erythrocyte sedimentation rate  - CRP, inflammation  - Lyme Disease Total Abs Bld with Reflex to Confirm CLIA    Other fatigue  See above.   - CBC with platelets; Future  - Comprehensive metabolic panel (BMP + Alb, Alk Phos, ALT, AST, Total. Bili, TP); Future  - CBC with platelets  - Comprehensive metabolic panel (BMP + Alb, Alk Phos, ALT, AST, Total. Bili, TP)    Type 2 diabetes mellitus without complication, without long-term current use of insulin (H)  Due for follow-up of labs. Blood sugars have been in good control per patient report.   - Albumin Random Urine Quantitative with Creat Ratio; Future  - HEMOGLOBIN A1C; Future  - Lipid panel reflex to direct LDL Non-fasting; Future  - Comprehensive metabolic panel (BMP + Alb, Alk Phos, ALT, AST, Total. Bili, TP); Future  - Orthopedic  Referral; Future  - Albumin Random Urine Quantitative with Creat Ratio  - HEMOGLOBIN A1C  - Lipid panel reflex to direct LDL Non-fasting  - Comprehensive metabolic panel (BMP + Alb, Alk Phos, ALT, AST, Total. Bili, TP)    Vitamin D deficiency  - Vitamin D Deficiency; Future  - Vitamin D Deficiency    Screen for colon cancer  - Adult GI  Referral - Procedure Only; Future    The patient indicates understanding of these issues and agrees with the plan.    Piedad Leach PA-C  [facility]

## 2022-09-09 ENCOUNTER — TELEPHONE (OUTPATIENT)
Dept: INTERNAL MEDICINE | Facility: CLINIC | Age: 77
End: 2022-09-09

## 2022-09-09 DIAGNOSIS — D64.9 ANEMIA, UNSPECIFIED TYPE: ICD-10-CM

## 2022-09-09 DIAGNOSIS — M25.50 MULTIPLE JOINT PAIN: Primary | ICD-10-CM

## 2022-09-09 LAB — B BURGDOR IGG+IGM SER QL: 0.05

## 2022-09-09 RX ORDER — PREDNISONE 20 MG/1
TABLET ORAL
Qty: 20 TABLET | Refills: 0 | Status: SHIPPED | OUTPATIENT
Start: 2022-09-09 | End: 2022-10-05

## 2022-09-09 NOTE — TELEPHONE ENCOUNTER
Reviewed lab results with patient. Will have her start a course of oral Prednisone over the weekend. She was also scheduled to have additional lab testing on Monday. No history of COVID to her knowledge.     Piedad Leach PA-C

## 2022-09-09 NOTE — TELEPHONE ENCOUNTER
Test Results    Contacts       Type Contact Phone/Fax    09/09/2022 01:21 PM CDT Phone (Incoming) Zaria Gaona (Self) [phone] ()          Who ordered the test:  Piedad Leach    Type of test: Lab    Date of test:  9/08/22    Where was the test performed:  [facility]     What are your questions/concerns?:  Would like to get results please call     Okay to leave a detailed message?: No at Home number on file [phone] ([place])

## 2022-09-12 ENCOUNTER — OFFICE VISIT (OUTPATIENT)
Dept: NEUROSURGERY | Facility: CLINIC | Age: 77
End: 2022-09-12
Payer: COMMERCIAL

## 2022-09-12 ENCOUNTER — LAB (OUTPATIENT)
Dept: LAB | Facility: CLINIC | Age: 77
End: 2022-09-12
Payer: COMMERCIAL

## 2022-09-12 VITALS
WEIGHT: 140 LBS | SYSTOLIC BLOOD PRESSURE: 128 MMHG | BODY MASS INDEX: 23.9 KG/M2 | HEIGHT: 64 IN | DIASTOLIC BLOOD PRESSURE: 58 MMHG

## 2022-09-12 DIAGNOSIS — M47.896 OTHER SPONDYLOSIS, LUMBAR REGION: Primary | ICD-10-CM

## 2022-09-12 DIAGNOSIS — D64.9 ANEMIA, UNSPECIFIED TYPE: ICD-10-CM

## 2022-09-12 DIAGNOSIS — M25.50 MULTIPLE JOINT PAIN: ICD-10-CM

## 2022-09-12 DIAGNOSIS — Z11.59 NEED FOR HEPATITIS C SCREENING TEST: Primary | ICD-10-CM

## 2022-09-12 LAB
CRP SERPL-MCNC: 23.7 MG/L (ref 0–8)
ERYTHROCYTE [SEDIMENTATION RATE] IN BLOOD BY WESTERGREN METHOD: 35 MM/HR (ref 0–30)
FERRITIN SERPL-MCNC: 68 NG/ML (ref 8–252)
IRON SATN MFR SERPL: 20 % (ref 15–46)
IRON SERPL-MCNC: 65 UG/DL (ref 35–180)
TIBC SERPL-MCNC: 325 UG/DL (ref 240–430)

## 2022-09-12 PROCEDURE — 83550 IRON BINDING TEST: CPT

## 2022-09-12 PROCEDURE — 36415 COLL VENOUS BLD VENIPUNCTURE: CPT

## 2022-09-12 PROCEDURE — 99213 OFFICE O/P EST LOW 20 MIN: CPT | Performed by: PHYSICIAN ASSISTANT

## 2022-09-12 PROCEDURE — 85652 RBC SED RATE AUTOMATED: CPT

## 2022-09-12 PROCEDURE — 86431 RHEUMATOID FACTOR QUANT: CPT

## 2022-09-12 PROCEDURE — 86140 C-REACTIVE PROTEIN: CPT

## 2022-09-12 PROCEDURE — 82728 ASSAY OF FERRITIN: CPT

## 2022-09-12 PROCEDURE — 86803 HEPATITIS C AB TEST: CPT

## 2022-09-12 ASSESSMENT — PAIN SCALES - GENERAL: PAINLEVEL: MODERATE PAIN (4)

## 2022-09-12 NOTE — LETTER
9/12/2022         RE: Zaria Gaona  [address] [facility] [address]        Dear Colleague,    Thank you for referring your patient, Zaria Gaona, to the [facility] [place]. Please see a copy of my visit note below.    Neurosurgery Clinic  Neurosurgery followup:    HPI: 77-year-old female following up today after failed medial branch blocks.  She continues with left-sided low back pain.  We initially tried an SI joint injection earlier this year, but she had no symptomatic improvement.  We then set her up for medial branch blocks on the left in the lumbar spine, also without any significant improvement.  Her MRI does show multilevel disc degeneration and mild facet arthropathy.  No areas of significant disc collapse or stenosis are seen.      Exam:  Constitutional:  Alert, well nourished, NAD.  HEENT: Normocephalic, atraumatic.   Pulm:  Without shortness of breath   CV:  No pitting edema of BLE.      Neurological:  Awake  Alert  Oriented x 3  Motor exam:        IP Q DF PF EHL  R   5  5   5   5    5  L   5  5   5   5    5     Reflexes are 2+ in the patellar and Achilles. There is no clonus. Downgoing Babinski.      Able to spontaneously move L/E bilaterally  Sensation intact throughout all L/E dermatomes      Imaging:     A/P:    Low back pain on the left  Lumbar disc degeneration  Mild lumbar facet arthropathy    I did have a discussion with the patient regarding her symptoms.  She does have an MRI showing multilevel disc degeneration and some mild facet arthropathy bilaterally.  We previously tried an SI injection on the left, without significant improvement.  We then tried medial branch blocks on the left in the lumbar spine, also without any meaningful improvement.  At this point, we discussed referring her to a pain clinic for comprehensive evaluation.  She did wish to proceed with that option.  We will forward a referral to Center for pain management in  "[place] PA-C  [facility] [place] [address]    Tel [phone]  Pager [phone]      The use of Dragon/[employer] dictation services may have been used to construct the content in this note; any grammatical or spelling errors are non-intentional. Please contact the author of this note directly if you are in need of any clarification.      Zaria Gaona is a 77 year old female who presents for:  Chief Complaint   Patient presents with     Neurologic Problem     Failed MBB- next steps        Initial Vitals:  /58   Ht 5' 4\" (1.626 m)   Wt 140 lb (63.5 kg)   BMI 24.03 kg/m   Estimated body mass index is 24.03 kg/m  as calculated from the following:    Height as of this encounter: 5' 4\" (1.626 m).    Weight as of this encounter: 140 lb (63.5 kg).. Body surface area is 1.69 meters squared. BP completed using cuff size: regular  Moderate Pain (4)    Nursing Comments:     Lisseth Sanchez        Again, thank you for allowing me to participate in the care of your patient.        Sincerely,        John Cruz PA-C    "

## 2022-09-12 NOTE — PROGRESS NOTES
Neurosurgery Clinic  Neurosurgery followup:    HPI: 77-year-old female following up today after failed medial branch blocks.  She continues with left-sided low back pain.  We initially tried an SI joint injection earlier this year, but she had no symptomatic improvement.  We then set her up for medial branch blocks on the left in the lumbar spine, also without any significant improvement.  Her MRI does show multilevel disc degeneration and mild facet arthropathy.  No areas of significant disc collapse or stenosis are seen.      Exam:  Constitutional:  Alert, well nourished, NAD.  HEENT: Normocephalic, atraumatic.   Pulm:  Without shortness of breath   CV:  No pitting edema of BLE.      Neurological:  Awake  Alert  Oriented x 3  Motor exam:        IP Q DF PF EHL  R   5  5   5   5    5  L   5  5   5   5    5     Reflexes are 2+ in the patellar and Achilles. There is no clonus. Downgoing Babinski.      Able to spontaneously move L/E bilaterally  Sensation intact throughout all L/E dermatomes      Imaging:     A/P:    Low back pain on the left  Lumbar disc degeneration  Mild lumbar facet arthropathy    I did have a discussion with the patient regarding her symptoms.  She does have an MRI showing multilevel disc degeneration and some mild facet arthropathy bilaterally.  We previously tried an SI injection on the left, without significant improvement.  We then tried medial branch blocks on the left in the lumbar spine, also without any meaningful improvement.  At this point, we discussed referring her to a pain clinic for comprehensive evaluation.  She did wish to proceed with that option.  We will forward a referral to Center for pain management in [place].        John Cruz PA-C  [facility] Neurosurgery  [address]  [address] [place] [id number]    Tel [phone]  Pager [phone]      The use of Dragon/[employer] dictation services may have been used to construct the  content in this note; any grammatical or spelling errors are non-intentional. Please contact the author of this note directly if you are in need of any clarification.

## 2022-09-12 NOTE — PROGRESS NOTES
"Zaria Gaona is a 77 year old female who presents for:  Chief Complaint   Patient presents with     Neurologic Problem     Failed MBB- next steps        Initial Vitals:  /58   Ht 5' 4\" (1.626 m)   Wt 140 lb (63.5 kg)   BMI 24.03 kg/m   Estimated body mass index is 24.03 kg/m  as calculated from the following:    Height as of this encounter: 5' 4\" (1.626 m).    Weight as of this encounter: 140 lb (63.5 kg).. Body surface area is 1.69 meters squared. BP completed using cuff size: regular  Moderate Pain (4)    Nursing Comments:     Lisseth Sanchez    "

## 2022-09-13 ENCOUNTER — TELEPHONE (OUTPATIENT)
Dept: FAMILY MEDICINE | Facility: CLINIC | Age: 77
End: 2022-09-13

## 2022-09-13 LAB
HCV AB SERPL QL IA: NONREACTIVE
RHEUMATOID FACT SER NEPH-ACNC: <6 IU/ML

## 2022-09-13 NOTE — TELEPHONE ENCOUNTER
I have attempted to call the pt with the following message. Unable to leave a message. I will call back another time.    Please notify patient /parent that her labs have improved compared to last week and additional testing was negative. I do question if she possibly had COVID and this triggered her symptoms as well as elevation in inflammation markers. I would like her to have labs rechecked at her visit on October 5th with Dr. Vargas. Please have patient let me know if symptoms change/worsen.    JIMMY Monteiro, CMA

## 2022-09-13 NOTE — TELEPHONE ENCOUNTER
Patient called back for test results. Updated her with results. She reports she is feeling much better. She will request labs with 10/5 appointment.    JANKI GastelumN, RN

## 2022-09-13 NOTE — RESULT ENCOUNTER NOTE
Please notify patient /parent that her labs have improved compared to last week and additional testing was negative. I do question if she possibly had COVID and this triggered her symptoms as well as elevation in inflammation markers. I would like her to have labs rechecked at her visit on October 5th with Dr. Vargas. Please have patient let me know if symptoms change/worsen.    Piedad Leach PA-C

## 2022-09-19 ENCOUNTER — OFFICE VISIT (OUTPATIENT)
Dept: PODIATRY | Facility: CLINIC | Age: 77
End: 2022-09-19
Payer: COMMERCIAL

## 2022-09-19 VITALS
DIASTOLIC BLOOD PRESSURE: 62 MMHG | SYSTOLIC BLOOD PRESSURE: 128 MMHG | TEMPERATURE: 97.7 F | WEIGHT: 140 LBS | HEIGHT: 64 IN | BODY MASS INDEX: 23.9 KG/M2

## 2022-09-19 DIAGNOSIS — E11.9 TYPE 2 DIABETES MELLITUS WITHOUT COMPLICATION, WITHOUT LONG-TERM CURRENT USE OF INSULIN (H): ICD-10-CM

## 2022-09-19 PROCEDURE — 99203 OFFICE O/P NEW LOW 30 MIN: CPT | Mod: 25 | Performed by: PODIATRIST

## 2022-09-19 PROCEDURE — 11055 PARING/CUTG B9 HYPRKER LES 1: CPT | Performed by: PODIATRIST

## 2022-09-19 NOTE — PATIENT INSTRUCTIONS
"DIABETES AND YOUR FEET    What effect does diabetes have on the feet?  Diabetes can result in several problems in the feet including contractures of the tendons leading to deformities and reduced function of the bones, skin ulcers or open sores on pressure points or prominent deformities, reduced sensation, reduced blood flow and thus reduced oxygen and immune cells to the tiny vessels in our feet. This all leads to higher risk of hospitalization, infections, and amputations.     What is neuropathy?  Neuropathy is a term used to describe a loss of nerve function.  Patients with diabetes are at risk of developing neuropathy if their sugars continue to run high and are above the normal value of 140.  The elevated blood sugar in the body enters the nerves causing it to swell and impair nerve function.  The higher the blood sugar and the longer it is elevated, the more damage is done to nerves.  This damage is permanent and irreversible.  These damaged sensory nerves can then cause reduced feeling or cause pain.  Damaged motor nerves can reduce blood flow and white blood cells into into your foot, skin and bones reducing your ability to heal a small problem. And neuropathy can cause tendons to become unbalanced and contribute to the formation of deformity and contractures in our feet. Often times, neuropathy can be prevented by controlling your blood sugar.  Your risk of developing neuropathy goes up dramatically as your hemoglobin A1C raises above 7.5.      How do I know if I have neuropathy?  When a person develops neuropathy, they usually begin to feel numbness or tingling in their feet and sometimes in their legs.  Other symptoms may include painful burning or hot feet, tingling, electrical sensations or feeling like insects or ants are crawling on your feet or legs.  If blood sugar remains above 140  for long periods of time, neuropathy can also occur in the hands.  When a person loses their \"protective threshold\" " or ability to detect a 5.07 Jansen Rashad monofilament is when they have elevated risk for developing foot deformity, contractures, foot infections, amputations, Charcot arthropathy, or other complications. Keep your hemoglobin A1C below 7.5 to reduce this risk.    What is vascular disease?  Peripheral vascular disease is a term used to describe a loss or decrease in circulation (blood flow).  There is a problem in getting blood, immune cells, and oxygen to areas that need it.  Similar to neuropathy, sugars can build up in the walls of the arteries (blood vessels) and cause them to become swollen, thickened and hardened.  This decreases the amount of blood that can go to an area that needs it.  Though this is common in the legs of diabetic patients, it can also affect other arteries (blood vessels) in the body such as in the heart, kidney, eyes, and the blood flow into bones.  It is often seen first in the small vessels of her body notably our feet and toes.    How do I know if I have vascular disease?  In the legs, vascular disease usually results in cramping.  Patients who develop leg cramps after walking the same distance every time (i.e. One block, half a mile, ect.) need to let their doctors know so that their circulation may be checked.  Cramps causing severe pain in the feet and/or legs while sleeping and the cramps go away when you stand or hang your legs off the side of the bed, may also be a sign of poor blood circulation.  Occasional cramping in cold weather or on rare occasions with activity may not be due to poor circulation, but you should inform your doctor.    How can these problems be prevented?  The key to prevention is good blood sugar control all day every day.  Inadequate blood sugar control is the most common way patients experience these problems. Reducing, controlling and measuring your daily consumption of sugar or carbohydrates is essential to understanding and managing diabetes.   Physical activity (exercise) is a very good way to help decrease your blood sugars.  Exercise can lower your blood sugar, blood pressure, and cholesterol.  It also reduces your risk for heart disease and stroke, relieves stress, and strengthens your heart, muscles and bones. Physical activity also increases your balance and reduces development of contractures and foot deformities over time. In addition, regular activity helps insulin work better, improves your blood circulation, and keeps your joints flexible.  If you're trying to lose weight, a combination of exercise and wise food choices can help you reach your target weight and maintain it.  Activity and exercise alone can not make up for poor diet choices, eating too much, or eating too many sugars or carbohydrates.  Ask your doctor for help when you are not meeting your blood sugar goals. Changes or increases in medication are powerful tools in reducing your blood sugar.    Know your blood sugar and hemoglobin A1C trend.  Upon first diagnosis or during acute illness, checking your blood sugar 4 times a day can help you understand how your diet, activity, and lifestyle affect your blood sugar.  Monitoring your hemoglobin A1C can help you understand how well you are managing blood sugar over the long run.  Your hemoglobin A1C tells you what your blood sugar averages all day, every day, over the past 90 days.       To experience the lowest risk of complications associated with diabetes such as neuropathy, loss of blood flow, bone or joint infection, charcot arthropathy, or amputation, the American Diabetes Association recommends a target hemoglobin A1C of less than 7.0%, while the American Association of Clinical Endocrinologists' recommendation is 6.5% or less.  Both organizations advise that the goals be individualized based on patient factors such as other health conditions, history of hypoglycemia, education, and life expectancy.  A patients risk of  experiencing complications associated with diabetes is only slightly elevated with a hemoglobin A1C above 6.0.  However, this risk goes up exponentially when the hemoglobin A1C is above 7.5.  The longer the hemoglobin A1C is elevated, the more risk that patient will experience in their lifetime. The damage that occurs to nerves, blood vessels, tendons, bones and body organs, while their hemoglobin A1C is elevated is mostly irreversible and worsens with each additional time period of elevated hemoglobin A1C.     You must understand and manage your disease.  Your health insurance or medical team cannot manage this disease for you.  When you take responsibility for understanding and managing your disease, you can expect to experience fewer problems associated with diabetes in your lifetime.  You will  Also experience a higher quality of life and health and reduced cost of health care.              Diabetic Foot Care Recommendations  The following are recommendations for avoiding serious foot problems or injury    DO'S  1. Be aware of your hemoglobin A1C and continue to follow up with your medical team for adjustments in your lifestyle and medication until your reach your A1C goal.  Keep this below 7.5 to reduce your risk of developing complications associated with diabetes.    2.  Wash your feet with lukewarm water and a mild soap and then dry them thoroughly, especially between the toes.  Gently floss your towel or washcloth between each toe at every bath.  Soaking your feet in water cannot clean dead skin, debris, and bacteria from your feet and is not necessary.   3. Examine your feet daily looking for cuts, corns, blisters, cracks, ect..., especially after wearing new shoes or increased or changed activities.  Make sure to look between your toes.  If you cannot see the bottom of your feet, set a mirror on the floor and hold your foot over it, or ask a family member to examine your feet for you daily.  Contact your  doctor immediately if new problems are noted or if sores are not healing.  4.  Immediately apply moisturizer cream such as Cetaphil to the tops and bottoms of your feet, avoiding areas between the toes.  Apply sunscreen or cover your feet if they will be exposed to extended sunlight.  5.Use clean comfortable shoes.  Socks should not have thick seams or cut off the circulation around the leg.  Break in new shoes slowly and rotate with older shoes until broken in.  Check the inside of your shoes with your hand to look for areas of irritation or objects that may have fallen into your shoes.    6. Keep slippers by the side of your bed for use during the night.  7. Shoes should be fitted by a professional and should not cause areas of irritation.  Check your feet regularly when wearing a new pair of shoes and replace them as needed.  8.  Talk to your doctor about proper exercise.  Exercise and stretching stimulate blood flow to your feet and maintain proper glucose levels.  Use it or lose it!  9.  Monitor your blood glucose level and your hemoglobin A1C.  Notify your doctor immediately if your blood sugar is abnormally high or low.  10.  Cut your nails straight across, but then gently round any sharp edges with a nail file.  If you have neuropathy, peripheral vascular disease or cannot see that well to trim your own toenails, see a medical professional for care.    DONT'S  1.  Do not soak your feet if you have an open sore or your provider has informed you that you have neuropathy or loss of protective threshold.  Use only lukewarm water and always check the temperature with your hand as hot water can easily burn your feet.    2.  Never use a hot water bottle or heating pad on your feet.  Also do not apply hot or cold compresses to your feet.  With decreased sensation, you could burn or freeze your feet.  Do not rest your feet near a heat source such as a heater or heat register.    3.  Do not apply any of these to your  "feet:    - over the counter medicine for corns or warts    -  Harsh chemicals like boric acid    -  Do not self-treat corns, cuts, blisters or infections.  Always consult your doctor.   4.  Do not wear sandals, slippers or walk barefoot, especially on harsh surfaces.  5.  If you smoke, stop!!!      Nail Debridement    A high quality instrument makes trimming toenails MUCH easier.  Search eb[employer]back for any 5\" nail nipper manufactured by reliable brands such as Miltex, Integra or Jarit as these quality instruments will help manage difficult nails more effectively and comfortably. We use Miltex -SS.  A physician is not necessary to trim nails even if you are taking blood thinners or are diabetic.  Your family or care givers may help manage your toenails.      Trim or sand the nails once weekly.  Do not wait until they are long and painful or trimming will become too difficult and painful and will increase your risk of complications or infection.  A course file or 120 grit sandpaper on a sanding block can be helpful.  For very thick nails many people prefer battery operated terry such as an Amope', Personal Pedi and Emjoi for regular use or heavy painful callouses or thick toenails.    Trim or skive any portion of nail that is thick, loose, crumbling, or not well attached. Do not tear the nail away, but rather cut them with a nail nipperor sand or sand them down.  You may follow up with your Podiatric Physician if you have pain, bleeding, infection, questions or other concerns.      You may also contact the following Registered Nurses for further help with nail debridement and minor hygiene concnerns.  They may come to your home or meet them at their clinic to trim your toenails and soak your feet, as well as monitor for any complications that would require evaluation by a Physician.      [facility]  Rose Zamorano RN  Phone & text [phone]    [facility]  Carol Ayala RN  Office " [phone]    [facility]  [phone]  [email or web address] - [facility]    For up to date list and to find foot care nurses in other communities visit American Foot [employer] Nurses Association website:  afcna.org.     Calluses, Corns, IPKs, Porokeratosis    When there is excessive friction or pressure on the skin, the body responds by making the skin thicker.  While this may protect the deeper structures, the thickened skin can take up more space and thus increase pressure over a bony prominence or become an open sore or skin ulcer as this skin becomes less flexible.    Flat, diffuse thickening are simple calluses and they are usually caused by friction.  Often these are the result of rubbing on a shoe or going barefoot.    Calluses with a central core between the toes are called corns.  These often result from prominent joints on adjacent toes rubbing together.  Theses are often a symptom of bone malalignment and will usually recur unless the underlying bones are addressed.    Many of these lesions can be kept comfortable with routine maintenance. This consists of filing them with a Ped Egg, callus file, or 120 grit sandpaper on a block, every day during your bath or shower.  Most people prefer battery operated terry such as an Amope', Personal Pedi and Emjoi for regular use or heavy painful callouses.  Heavy creams or ointments can be applied 1-2 times every day to keep them soft. Toe spacers can be used for corns, gel pads can be used for other lesions on the bottom of the foot. If there is a deformity noted, such as a prominent bone, often this can be addressed to minimize recurrence. However, sometimes the pressure and lesion simply migrates to another spot after surgery, so it is not a guaranteed cure.     If you have severe callouses and cracking, you may apply heavy ointments that you scoop up such as Cetaphil cream, Eucerin, Aquaphor or Vaseline.  Be sure to obtain cream or  ointment in these brands and not lotion (lotion is water based and not durable enough for feet). For more aggressive help apply heavy creams or ointment under occlusive dressings such as Saran Wrap or Jelly Feet while sleeping.   Jelly Feet can be obtained at [email or web address].     To be successful with managing hyperkeratotic skin, you must manage hygiene daily.  Apply the cream once or twice EVERY day.  At your bath or shower time is the easiest time to work on this when skin is most soft.  There is no medical or surgical treatment that will absolutely eliminate many of these symptoms.      Ped[employer]ix is a reliable source for all sorts of foot pads, cushions, or interdigital spacers and foot appliances. Go to www.[email or web address] or request a catalog at 2-147-Paystik.        Please call with any additional questions.

## 2022-09-19 NOTE — PROGRESS NOTES
HPI:    Painful callous under right great toe, hard  Pain and limping.  Also requesting DM shoes and notes no hx of ulcers.  Patient does have a history of hyperkeratosis.    Retired from sterilization work in hospital.     Review of Systems:  Patient denies fever, chills, rash, wound, stiffness, limping, numbness, weakness, heart burn, blood in stool, chest pain with activity, calf pain when walking, shortness of breath with activity, chronic cough, easy bleeding/bruising, swelling of ankles, excessive thirst, fatigue, depression, anxiety.  Patient admits only to symptoms noted in history.     Patient Active Problem List   Diagnosis     Diabetes mellitus, type 2 (H)     Cystocele, midline     PAST MEDICAL HISTORY:   Past Medical History:   Diagnosis Date     Essential hypertension      NSTEMI (non-ST elevated myocardial infarction) (H) 12/2020    2 stents     Type 2 diabetes mellitus without complication (H) 2005     PAST SURGICAL HISTORY:   Past Surgical History:   Procedure Laterality Date     INJECT BLOCK MEDIAL BRANCH CERVICAL/THORACIC/LUMBAR Left 8/11/2022    Procedure: Left Lumbar 3, Left Lumbar 4, and Left Lumbar 5 medial branch nerve blocks using fluoroscopic guidance and contrast control;  Surgeon: Malick Gomez MD;  Location: [facility] OR     INJECT EPIDURAL LUMBAR Left 5/20/2022    Procedure: Left lumbar 4-5 interlaminar epidural injection ;  Surgeon: Malick Gomez MD;  Location: [facility] OR     INJECT JOINT SACROILIAC Left 7/1/2022    Procedure: Fluoroscopically-guided injection of the Left sacroiliac joint with Left dimitrios Sacroiliac joint ligaments infiltration over the sacrum.;  Surgeon: Malick Gomez MD;  Location: [facility] OR     VAGINAL PROLAPSE REPAIR      done in [place]     MEDICATIONS:   Current Outpatient Medications:      acetaminophen (TYLENOL) 325 MG tablet, Take 650 mg by mouth, Disp: , Rfl:      alcohol swab prep pads, Use to swab area of injection/shana as directed, Disp: 100 each, Rfl: 3      amLODIPine (NORVASC) 2.5 MG tablet, Take 1 tablet (2.5 mg) by mouth daily, Disp: 90 tablet, Rfl: 3     Aspirin Buf,CaCarb-MgCarb-MgO, 81 MG TABS, Take 81 mg by mouth, Disp: , Rfl:      atorvastatin (LIPITOR) 80 MG tablet, Take 1 tablet (80 mg) by mouth daily, Disp: 90 tablet, Rfl: 3     blood glucose (NO BRAND SPECIFIED) test strip, Use to test blood sugar 1 times daily or as directed. To accompany: Blood Glucose Monitor Brands: per insurance., Disp: 100 strip, Rfl: 6     blood glucose calibration (NO BRAND SPECIFIED) solution, Use to calibrate blood glucose monitor as needed as directed. To accompany: Blood Glucose Monitor Brands: per insurance., Disp: 1 Bottle, Rfl: 3     carvedilol (COREG) 6.25 MG tablet, Take 1 tablet (6.25 mg) by mouth 2 times daily, Disp: 180 tablet, Rfl: 3     latanoprost (XALATAN) 0.005 % ophthalmic solution, Inject 1 drop into the eye daily, Disp: , Rfl:      levothyroxine (SYNTHROID/LEVOTHROID) 75 MCG tablet, Take 1 tablet (75 mcg) by mouth daily, Disp: 90 tablet, Rfl: 2     losartan (COZAAR) 100 MG tablet, Take 1 tablet (100 mg) by mouth daily, Disp: 90 tablet, Rfl: 3     metFORMIN (GLUCOPHAGE) 500 MG tablet, Take 2 tablets (1,000 mg) by mouth 2 times daily (with meals), Disp: 360 tablet, Rfl: 1     nitroGLYcerin (NITROSTAT) 0.4 MG sublingual tablet, Place 0.4 mg under the tongue as needed , Disp: , Rfl:      omeprazole (PRILOSEC) 20 MG DR capsule, Take 1 capsule (20 mg) by mouth daily, Disp: 90 capsule, Rfl: 2     predniSONE (DELTASONE) 20 MG tablet, Take 3 tabs by mouth daily x 3 days, then 2 tabs daily x 3 days, then 1 tab daily x 3 days, then 1/2 tab daily x 3 days., Disp: 20 tablet, Rfl: 0     thin (NO BRAND SPECIFIED) lancets, Use to test blood sugar 1 times daily or as directed. To accompany: Blood Glucose Monitor Brands: per insurance., Disp: 1 each, Rfl: 3     timolol hemihydrate (BETIMOL) 0.25 % ophthalmic solution, Inject 1 drop into the eye daily, Disp: , Rfl:   ALLERGIES:   "  Allergies   Allergen Reactions     Ampicillin Other (See Comments), Hives and Itching     \"not that bad\"     Lisinopril Cough     Sulfamethoxazole W/Trimethoprim Other (See Comments), Hives and Itching     \"not that bad\"     SOCIAL HISTORY:   Social History     Socioeconomic History     Marital status:      Spouse name: Not on file     Number of children: Not on file     Years of education: Not on file     Highest education level: Not on file   Occupational History     Not on file   Tobacco Use     Smoking status: Never Smoker     Smokeless tobacco: Never Used   Vaping Use     Vaping Use: Never used   Substance and Sexual Activity     Alcohol use: Yes     Comment: occ     Drug use: Never     Sexual activity: Yes     Partners: Male     Birth control/protection: Female Surgical   Other Topics Concern     Not on file   Social History Narrative     Not on file     Social Determinants of Health     Financial Resource Strain: Not on file   Food Insecurity: Not on file   Transportation Needs: Not on file   Physical Activity: Not on file   Stress: Not on file   Social Connections: Not on file   Intimate Partner Violence: Not on file   Housing Stability: Not on file     FAMILY HISTORY: No family history on file.  EXAM:Vitals: /62   Temp 97.7  F (36.5  C) (Temporal)   Ht 1.626 m (5' 4\")   Wt 63.5 kg (140 lb)   BMI 24.03 kg/m    BMI= Body mass index is 24.03 kg/m .    General appearance: Patient is alert and fully cooperative with history & exam.  No sign of distress is noted during the visit.     Psychiatric: Affect is pleasant & appropriate.  Patient appears motivated to improve health.     Respiratory: Breathing is regular & unlabored while sitting.     HEENT: Hearing is intact to spoken word.  Speech is clear.  No gross evidence of visual impairment that would impact ambulation.     Vascular: DP 1/4 & PT 1/4 left & right.  CFT delayed with dependent rubor noted about the digits.  Diminished hair growth " distal to mid tibia and no hair about the foot and toes.  Temperature changes noted, warm to cool proximal to distal.  Hemosiderin pigmentation noted with multiple varicosities legs and feet bilateral. Generalized edema bilateral legs and feet.  Pt denies claudication history.     Neurologic: Normal plantar response bilateral.  Intact protective threshold plus 10/10 applications of a 5.07 monofilament.  Pt admits some burning and paraesthesias about the feet and toes with palpation.  As well as number 1-2 toes on the right foot      Dermatologic: All 10 nails are thickened and deformed but in adequate repair without pain.  Diminished texture turgor and tone about the integument.  Skin is thin & shiny.   Pre ulcerative hyperkeratosis noted plantar right first met head.  No abscess or full thickness ulcerations noted.     Musculoskeletal: Patient is ambulatory without assistive device or brace.  There is semi reducible contracture of the lesser digits.    Hemoglobin A1C (%)   Date Value   09/08/2022 7.5 (H)   02/25/2022 7.6 (H)   05/17/2021 7.0 (H)   05/05/2021 7.1 (H)   04/09/2021 7.1 (H)     Creatinine (mg/dL)   Date Value   09/08/2022 0.86   02/25/2022 0.84   09/21/2021 0.91   09/14/2021 0.86   04/09/2021 0.85     ASSESSMENT:       ICD-10-CM    1. Type 2 diabetes mellitus without complication, without long-term current use of insulin (H)  E11.9 Orthopedic  Referral     [facility] Prost[facility] DME Orthotic; Diabetic Shoe(s)/Insert(s); 3 pair; Progress note must support the need for shoes/orthotics. Use .diabeticfootexam or diabetic foot exam picklist in [facility].        PLAN:    9/19/2022  I sharply debrided one pre ulcerative hyperkeratotic lesions to the level of the dermis as noted above with a 15 blade.    We discussed risk factors and preventive measures.    We discussed appropriate hygiene, shoe gear, daily foot exam, and reinforced management of weight, diet, activity goals and HA1C goal for diabetic  patients.    Dispensed written foot care instructions.    All questions were answered to their satisfaction.    RTC once yearly and as needed with questions or concerns.      Clarence Hernandez DPM

## 2022-09-19 NOTE — LETTER
9/19/2022         RE: Zaria Gaona  [address] [facility] [address]        Dear Colleague,    Thank you for referring your patient, Zaria Gaona, to the [facility]. Please see a copy of my visit note below.    HPI:    Painful callous under right great toe, hard  Pain and limping.  Also requesting DM shoes and notes no hx of ulcers.  Patient does have a history of hyperkeratosis.    Retired from sterilization work in hospital.     Review of Systems:  Patient denies fever, chills, rash, wound, stiffness, limping, numbness, weakness, heart burn, blood in stool, chest pain with activity, calf pain when walking, shortness of breath with activity, chronic cough, easy bleeding/bruising, swelling of ankles, excessive thirst, fatigue, depression, anxiety.  Patient admits only to symptoms noted in history.     Patient Active Problem List   Diagnosis     Diabetes mellitus, type 2 (H)     Cystocele, midline     PAST MEDICAL HISTORY:   Past Medical History:   Diagnosis Date     Essential hypertension      NSTEMI (non-ST elevated myocardial infarction) (H) 12/2020    2 stents     Type 2 diabetes mellitus without complication (H) 2005     PAST SURGICAL HISTORY:   Past Surgical History:   Procedure Laterality Date     INJECT BLOCK MEDIAL BRANCH CERVICAL/THORACIC/LUMBAR Left 8/11/2022    Procedure: Left Lumbar 3, Left Lumbar 4, and Left Lumbar 5 medial branch nerve blocks using fluoroscopic guidance and contrast control;  Surgeon: Malick Gomez MD;  Location: [facility]     INJECT EPIDURAL LUMBAR Left 5/20/2022    Procedure: Left lumbar 4-5 interlaminar epidural injection ;  Surgeon: Malick Gomez MD;  Location: [facility]     INJECT JOINT SACROILIAC Left 7/1/2022    Procedure: Fluoroscopically-guided injection of the Left sacroiliac joint with Left dimitrios Sacroiliac joint ligaments infiltration over the sacrum.;  Surgeon: Malick Gomez MD;  Location: [facility]     VAGINAL PROLAPSE REPAIR       done in [place]     MEDICATIONS:   Current Outpatient Medications:      acetaminophen (TYLENOL) 325 MG tablet, Take 650 mg by mouth, Disp: , Rfl:      alcohol swab prep pads, Use to swab area of injection/shana as directed, Disp: 100 each, Rfl: 3     amLODIPine (NORVASC) 2.5 MG tablet, Take 1 tablet (2.5 mg) by mouth daily, Disp: 90 tablet, Rfl: 3     Aspirin Buf,CaCarb-MgCarb-MgO, 81 MG TABS, Take 81 mg by mouth, Disp: , Rfl:      atorvastatin (LIPITOR) 80 MG tablet, Take 1 tablet (80 mg) by mouth daily, Disp: 90 tablet, Rfl: 3     blood glucose (NO BRAND SPECIFIED) test strip, Use to test blood sugar 1 times daily or as directed. To accompany: Blood Glucose Monitor Brands: per insurance., Disp: 100 strip, Rfl: 6     blood glucose calibration (NO BRAND SPECIFIED) solution, Use to calibrate blood glucose monitor as needed as directed. To accompany: Blood Glucose Monitor Brands: per insurance., Disp: 1 Bottle, Rfl: 3     carvedilol (COREG) 6.25 MG tablet, Take 1 tablet (6.25 mg) by mouth 2 times daily, Disp: 180 tablet, Rfl: 3     latanoprost (XALATAN) 0.005 % ophthalmic solution, Inject 1 drop into the eye daily, Disp: , Rfl:      levothyroxine (SYNTHROID/LEVOTHROID) 75 MCG tablet, Take 1 tablet (75 mcg) by mouth daily, Disp: 90 tablet, Rfl: 2     losartan (COZAAR) 100 MG tablet, Take 1 tablet (100 mg) by mouth daily, Disp: 90 tablet, Rfl: 3     metFORMIN (GLUCOPHAGE) 500 MG tablet, Take 2 tablets (1,000 mg) by mouth 2 times daily (with meals), Disp: 360 tablet, Rfl: 1     nitroGLYcerin (NITROSTAT) 0.4 MG sublingual tablet, Place 0.4 mg under the tongue as needed , Disp: , Rfl:      omeprazole (PRILOSEC) 20 MG DR capsule, Take 1 capsule (20 mg) by mouth daily, Disp: 90 capsule, Rfl: 2     predniSONE (DELTASONE) 20 MG tablet, Take 3 tabs by mouth daily x 3 days, then 2 tabs daily x 3 days, then 1 tab daily x 3 days, then 1/2 tab daily x 3 days., Disp: 20 tablet, Rfl: 0     thin (NO BRAND SPECIFIED) lancets, Use to  "test blood sugar 1 times daily or as directed. To accompany: Blood Glucose Monitor Brands: per insurance., Disp: 1 each, Rfl: 3     timolol hemihydrate (BETIMOL) 0.25 % ophthalmic solution, Inject 1 drop into the eye daily, Disp: , Rfl:   ALLERGIES:    Allergies   Allergen Reactions     Ampicillin Other (See Comments), Hives and Itching     \"not that bad\"     Lisinopril Cough     Sulfamethoxazole W/Trimethoprim Other (See Comments), Hives and Itching     \"not that bad\"     SOCIAL HISTORY:   Social History     Socioeconomic History     Marital status:      Spouse name: Not on file     Number of children: Not on file     Years of education: Not on file     Highest education level: Not on file   Occupational History     Not on file   Tobacco Use     Smoking status: Never Smoker     Smokeless tobacco: Never Used   Vaping Use     Vaping Use: Never used   Substance and Sexual Activity     Alcohol use: Yes     Comment: occ     Drug use: Never     Sexual activity: Yes     Partners: Male     Birth control/protection: Female Surgical   Other Topics Concern     Not on file   Social History Narrative     Not on file     Social Determinants of Health     Financial Resource Strain: Not on file   Food Insecurity: Not on file   Transportation Needs: Not on file   Physical Activity: Not on file   Stress: Not on file   Social Connections: Not on file   Intimate Partner Violence: Not on file   Housing Stability: Not on file     FAMILY HISTORY: No family history on file.  EXAM:Vitals: /62   Temp 97.7  F (36.5  C) (Temporal)   Ht 1.626 m (5' 4\")   Wt 63.5 kg (140 lb)   BMI 24.03 kg/m    BMI= Body mass index is 24.03 kg/m .    General appearance: Patient is alert and fully cooperative with history & exam.  No sign of distress is noted during the visit.     Psychiatric: Affect is pleasant & appropriate.  Patient appears motivated to improve health.     Respiratory: Breathing is regular & unlabored while sitting.   "   HEENT: Hearing is intact to spoken word.  Speech is clear.  No gross evidence of visual impairment that would impact ambulation.     Vascular: DP 1/4 & PT 1/4 left & right.  CFT delayed with dependent rubor noted about the digits.  Diminished hair growth distal to mid tibia and no hair about the foot and toes.  Temperature changes noted, warm to cool proximal to distal.  Hemosiderin pigmentation noted with multiple varicosities legs and feet bilateral. Generalized edema bilateral legs and feet.  Pt denies claudication history.     Neurologic: Normal plantar response bilateral.  Intact protective threshold plus 10/10 applications of a 5.07 monofilament.  Pt admits some burning and paraesthesias about the feet and toes with palpation.  As well as number 1-2 toes on the right foot      Dermatologic: All 10 nails are thickened and deformed but in adequate repair without pain.  Diminished texture turgor and tone about the integument.  Skin is thin & shiny.   Pre ulcerative hyperkeratosis noted plantar right first met head.  No abscess or full thickness ulcerations noted.     Musculoskeletal: Patient is ambulatory without assistive device or brace.  There is semi reducible contracture of the lesser digits.    Hemoglobin A1C (%)   Date Value   09/08/2022 7.5 (H)   02/25/2022 7.6 (H)   05/17/2021 7.0 (H)   05/05/2021 7.1 (H)   04/09/2021 7.1 (H)     Creatinine (mg/dL)   Date Value   09/08/2022 0.86   02/25/2022 0.84   09/21/2021 0.91   09/14/2021 0.86   04/09/2021 0.85     ASSESSMENT:       ICD-10-CM    1. Type 2 diabetes mellitus without complication, without long-term current use of insulin (H)  E11.9 Orthopedic  Referral     Orthotics and Prosthetics DME Orthotic; Diabetic Shoe(s)/Insert(s); 3 pair; Progress note must support the need for shoes/orthotics. Use .diabeticfootexam or diabetic foot exam picklist in [facility].        PLAN:    9/19/2022  I sharply debrided one pre ulcerative hyperkeratotic lesions to the  level of the dermis as noted above with a 15 blade.    We discussed risk factors and preventive measures.    We discussed appropriate hygiene, shoe gear, daily foot exam, and reinforced management of weight, diet, activity goals and HA1C goal for diabetic patients.    Dispensed written foot care instructions.    All questions were answered to their satisfaction.    RTC once yearly and as needed with questions or concerns.      Clarence Hernandez DPM        Again, thank you for allowing me to participate in the care of your patient.        Sincerely,        Clarnece Hernandez DPM

## 2022-09-25 ENCOUNTER — HOSPITAL ENCOUNTER (EMERGENCY)
Facility: CLINIC | Age: 77
Discharge: HOME | End: 2022-09-25
Attending: EMERGENCY MEDICINE | Admitting: EMERGENCY MEDICINE
Payer: COMMERCIAL

## 2022-09-25 VITALS
HEART RATE: 66 BPM | SYSTOLIC BLOOD PRESSURE: 125 MMHG | TEMPERATURE: 97.8 F | WEIGHT: 138.4 LBS | RESPIRATION RATE: 20 BRPM | DIASTOLIC BLOOD PRESSURE: 61 MMHG | BODY MASS INDEX: 23.76 KG/M2 | OXYGEN SATURATION: 96 %

## 2022-09-25 DIAGNOSIS — M35.3 POLYMYALGIA RHEUMATICA (H): ICD-10-CM

## 2022-09-25 LAB
ANION GAP SERPL CALCULATED.3IONS-SCNC: 6 MMOL/L (ref 3–14)
BASOPHILS # BLD AUTO: 0 10E3/UL (ref 0–0.2)
BASOPHILS NFR BLD AUTO: 0 %
BUN SERPL-MCNC: 21 MG/DL (ref 7–30)
CALCIUM SERPL-MCNC: 9.2 MG/DL (ref 8.5–10.1)
CHLORIDE BLD-SCNC: 104 MMOL/L (ref 94–109)
CK SERPL-CCNC: 36 U/L (ref 30–225)
CO2 SERPL-SCNC: 28 MMOL/L (ref 20–32)
CREAT SERPL-MCNC: 0.84 MG/DL (ref 0.52–1.04)
CRP SERPL-MCNC: 66.1 MG/L (ref 0–8)
EOSINOPHIL # BLD AUTO: 0 10E3/UL (ref 0–0.7)
EOSINOPHIL NFR BLD AUTO: 0 %
ERYTHROCYTE [DISTWIDTH] IN BLOOD BY AUTOMATED COUNT: 14 % (ref 10–15)
ERYTHROCYTE [SEDIMENTATION RATE] IN BLOOD BY WESTERGREN METHOD: 23 MM/HR (ref 0–30)
GFR SERPL CREATININE-BSD FRML MDRD: 72 ML/MIN/1.73M2
GLUCOSE BLD-MCNC: 216 MG/DL (ref 70–99)
HCT VFR BLD AUTO: 33.8 % (ref 35–47)
HGB BLD-MCNC: 10.9 G/DL (ref 11.7–15.7)
IMM GRANULOCYTES # BLD: 0.1 10E3/UL
IMM GRANULOCYTES NFR BLD: 1 %
LYMPHOCYTES # BLD AUTO: 1.2 10E3/UL (ref 0.8–5.3)
LYMPHOCYTES NFR BLD AUTO: 11 %
MAGNESIUM SERPL-MCNC: 1.8 MG/DL (ref 1.6–2.3)
MCH RBC QN AUTO: 29.1 PG (ref 26.5–33)
MCHC RBC AUTO-ENTMCNC: 32.2 G/DL (ref 31.5–36.5)
MCV RBC AUTO: 90 FL (ref 78–100)
MONOCYTES # BLD AUTO: 1.1 10E3/UL (ref 0–1.3)
MONOCYTES NFR BLD AUTO: 10 %
NEUTROPHILS # BLD AUTO: 8.7 10E3/UL (ref 1.6–8.3)
NEUTROPHILS NFR BLD AUTO: 78 %
NRBC # BLD AUTO: 0 10E3/UL
NRBC BLD AUTO-RTO: 0 /100
PLATELET # BLD AUTO: 163 10E3/UL (ref 150–450)
POTASSIUM BLD-SCNC: 4.2 MMOL/L (ref 3.4–5.3)
RBC # BLD AUTO: 3.75 10E6/UL (ref 3.8–5.2)
SARS-COV-2 RNA RESP QL NAA+PROBE: NEGATIVE
SODIUM SERPL-SCNC: 138 MMOL/L (ref 133–144)
TSH SERPL DL<=0.005 MIU/L-ACNC: 1.32 MU/L (ref 0.4–4)
WBC # BLD AUTO: 11.1 10E3/UL (ref 4–11)

## 2022-09-25 PROCEDURE — 87635 SARS-COV-2 COVID-19 AMP PRB: CPT | Performed by: EMERGENCY MEDICINE

## 2022-09-25 PROCEDURE — 99283 EMERGENCY DEPT VISIT LOW MDM: CPT | Mod: CS | Performed by: EMERGENCY MEDICINE

## 2022-09-25 PROCEDURE — C9803 HOPD COVID-19 SPEC COLLECT: HCPCS | Performed by: EMERGENCY MEDICINE

## 2022-09-25 PROCEDURE — 85025 COMPLETE CBC W/AUTO DIFF WBC: CPT | Performed by: EMERGENCY MEDICINE

## 2022-09-25 PROCEDURE — 99284 EMERGENCY DEPT VISIT MOD MDM: CPT | Mod: CS | Performed by: EMERGENCY MEDICINE

## 2022-09-25 PROCEDURE — 82550 ASSAY OF CK (CPK): CPT | Performed by: EMERGENCY MEDICINE

## 2022-09-25 PROCEDURE — 86140 C-REACTIVE PROTEIN: CPT | Performed by: EMERGENCY MEDICINE

## 2022-09-25 PROCEDURE — 85652 RBC SED RATE AUTOMATED: CPT | Performed by: EMERGENCY MEDICINE

## 2022-09-25 PROCEDURE — 80048 BASIC METABOLIC PNL TOTAL CA: CPT | Performed by: EMERGENCY MEDICINE

## 2022-09-25 PROCEDURE — 83735 ASSAY OF MAGNESIUM: CPT | Performed by: EMERGENCY MEDICINE

## 2022-09-25 PROCEDURE — 84443 ASSAY THYROID STIM HORMONE: CPT | Performed by: EMERGENCY MEDICINE

## 2022-09-25 PROCEDURE — 36415 COLL VENOUS BLD VENIPUNCTURE: CPT | Performed by: EMERGENCY MEDICINE

## 2022-09-25 ASSESSMENT — ACTIVITIES OF DAILY LIVING (ADL): ADLS_ACUITY_SCORE: 35

## 2022-09-25 NOTE — ED PROVIDER NOTES
"  History     Chief Complaint   Patient presents with     Generalized Body Aches     HPI  Zaria Gaona is a 77 year old female who presents to the emergency room for ongoing generalized body aches.  She states that this has been present for the last few weeks.  Was seen in clinic and had some lab work done, \"and some of the numbers were elevated\".  Was placed on steroids, and said they initially helped but as she started to taper down to lower doses they stopped working and she had the soreness return.  She finished the course on Wednesday and still is complaining of soreness.  She says she cannot lift her hands over her head.  She feels like her back, hips, and legs are sore, but also has pain in her hands.  Has not been running a fever.  Does not recall any injury or fall that caused this pain.  It is worse in the morning, but does linger throughout the day.  Has not been vomiting, no rashes or lesions.  No new medications other than the steroids that were mentioned.    Per chart review, see her primary provider had checked multiple labs, including CBC, CMP, CRP, sed rate, Lyme's panel, vitamin B12, and vitamin D.  Lyme's test was negative, vitamin B12 and vitamin D were normal.  She did have elevation of CRP and sed rate    Allergies:  Allergies   Allergen Reactions     Ampicillin Other (See Comments), Hives and Itching     \"not that bad\"     Lisinopril Cough     Sulfamethoxazole W/Trimethoprim Other (See Comments), Hives and Itching     \"not that bad\"       Problem List:    Patient Active Problem List    Diagnosis Date Noted     Cystocele, midline 09/13/2021     Priority: Medium     Diabetes mellitus, type 2 (H) 04/21/2021     Priority: Medium        Past Medical History:    Past Medical History:   Diagnosis Date     Essential hypertension      NSTEMI (non-ST elevated myocardial infarction) (H) 12/2020     Type 2 diabetes mellitus without complication (H) 2005       Past Surgical History:    Past " Surgical History:   Procedure Laterality Date     INJECT BLOCK MEDIAL BRANCH CERVICAL/THORACIC/LUMBAR Left 8/11/2022    Procedure: Left Lumbar 3, Left Lumbar 4, and Left Lumbar 5 medial branch nerve blocks using fluoroscopic guidance and contrast control;  Surgeon: Malick Gomez MD;  [facility]     INJECT EPIDURAL LUMBAR Left 5/20/2022    Procedure: Left lumbar 4-5 interlaminar epidural injection ;  Surgeon: Malick Gomez MD;  [facility]     INJECT JOINT SACROILIAC Left 7/1/2022    Procedure: Fluoroscopically-guided injection of the Left sacroiliac joint with Left dimitrios Sacroiliac joint ligaments infiltration over the sacrum.;  Surgeon: Malick Gomez MD;  Location: [facility]     VAGINAL PROLAPSE REPAIR      done in [place]       Family History:    History reviewed. No pertinent family history.    Social History:  Marital Status:   [2]  Social History     Tobacco Use     Smoking status: Never Smoker     Smokeless tobacco: Never Used   Vaping Use     Vaping Use: Never used   Substance Use Topics     Alcohol use: Yes     Comment: occ     Drug use: Never        Medications:    acetaminophen (TYLENOL) 325 MG tablet  alcohol swab prep pads  amLODIPine (NORVASC) 2.5 MG tablet  Aspirin Buf,CaCarb-MgCarb-MgO, 81 MG TABS  atorvastatin (LIPITOR) 80 MG tablet  blood glucose (NO BRAND SPECIFIED) test strip  blood glucose calibration (NO BRAND SPECIFIED) solution  carvedilol (COREG) 6.25 MG tablet  latanoprost (XALATAN) 0.005 % ophthalmic solution  levothyroxine (SYNTHROID/LEVOTHROID) 75 MCG tablet  losartan (COZAAR) 100 MG tablet  metFORMIN (GLUCOPHAGE) 500 MG tablet  nitroGLYcerin (NITROSTAT) 0.4 MG sublingual tablet  omeprazole (PRILOSEC) 20 MG DR capsule  predniSONE (DELTASONE) 20 MG tablet  thin (NO BRAND SPECIFIED) lancets  timolol hemihydrate (BETIMOL) 0.25 % ophthalmic solution          Review of Systems   All other systems reviewed and are negative.      Physical Exam   BP: (!) 144/72  Pulse: 66  Temp:  "97.8  F (36.6  C)  Resp: 20  Weight: 62.8 kg (138 lb 6.4 oz)  SpO2: 98 %      Physical Exam  Vitals and nursing note reviewed.   Constitutional:       General: She is not in acute distress.     Appearance: She is not diaphoretic.   HENT:      Head: Atraumatic.      Mouth/Throat:      Pharynx: No oropharyngeal exudate.   Eyes:      General: No scleral icterus.     Pupils: Pupils are equal, round, and reactive to light.   Cardiovascular:      Heart sounds: Normal heart sounds.   Pulmonary:      Effort: Pulmonary effort is normal. No respiratory distress.      Breath sounds: Normal breath sounds.   Abdominal:      General: Bowel sounds are normal.      Palpations: Abdomen is soft.      Tenderness: There is no abdominal tenderness.   Musculoskeletal:         General: No swelling, tenderness or deformity.      Comments: Diminished abduction at the shoulders due to \"soreness\"   Skin:     General: Skin is warm.      Findings: No rash.   Neurological:      General: No focal deficit present.      Mental Status: She is alert.         ED Course                 Procedures              Critical Care time:  none               Results for orders placed or performed during the hospital encounter of 09/25/22 (from the past 24 hour(s))   CBC with platelets differential    Narrative    The following orders were created for panel order CBC with platelets differential.  Procedure                               Abnormality         Status                     ---------                               -----------         ------                     CBC with platelets and d...[[id number]]  Abnormal            Final result                 Please view results for these tests on the individual orders.   Basic metabolic panel   Result Value Ref Range    Sodium 138 133 - 144 mmol/L    Potassium 4.2 3.4 - 5.3 mmol/L    Chloride 104 94 - 109 mmol/L    Carbon Dioxide (CO2) 28 20 - 32 mmol/L    Anion Gap 6 3 - 14 mmol/L    Urea Nitrogen 21 7 - 30 mg/dL    " Creatinine 0.84 0.52 - 1.04 mg/dL    Calcium 9.2 8.5 - 10.1 mg/dL    Glucose 216 (H) 70 - 99 mg/dL    GFR Estimate 72 >60 mL/min/1.73m2   Magnesium   Result Value Ref Range    Magnesium 1.8 1.6 - 2.3 mg/dL   CRP inflammation   Result Value Ref Range    CRP Inflammation 66.1 (H) 0.0 - 8.0 mg/L   Erythrocyte sedimentation rate auto   Result Value Ref Range    Erythrocyte Sedimentation Rate 23 0 - 30 mm/hr   Asymptomatic COVID-19 Virus (Coronavirus) by PCR Nasopharyngeal    Specimen: Nasopharyngeal; Swab   Result Value Ref Range    SARS CoV2 PCR Negative Negative    Narrative    Testing was performed using the leonela  SARS-CoV-2 & Influenza A/B Assay on the leonela  Lynette  System.  This test should be ordered for the detection of SARS-COV-2 in individuals who meet SARS-CoV-2 clinical and/or epidemiological criteria. Test performance is unknown in asymptomatic patients.  This test is for in vitro diagnostic use under the FDA EUA for laboratories certified under CLIA to perform moderate and/or high complexity testing. This test has not been FDA cleared or approved.  A negative test does not rule out the presence of PCR inhibitors in the specimen or target RNA in concentration below the limit of detection for the assay. The possibility of a false negative should be considered if the patient's recent exposure or clinical presentation suggests COVID-19.  [facility] are certified under the Clinical Laboratory Improvement Amendments of 1988 (CLIA-88) as qualified to perform moderate and/or high complexity laboratory testing.   CK total   Result Value Ref Range    CK 36 30 - 225 U/L   TSH with free T4 reflex   Result Value Ref Range    TSH 1.32 0.40 - 4.00 mU/L   CBC with platelets and differential   Result Value Ref Range    WBC Count 11.1 (H) 4.0 - 11.0 10e3/uL    RBC Count 3.75 (L) 3.80 - 5.20 10e6/uL    Hemoglobin 10.9 (L) 11.7 - 15.7 g/dL    Hematocrit 33.8 (L) 35.0 - 47.0 %    MCV 90 78 - 100 fL    MCH  29.1 26.5 - 33.0 pg    MCHC 32.2 31.5 - 36.5 g/dL    RDW 14.0 10.0 - 15.0 %    Platelet Count 163 150 - 450 10e3/uL    % Neutrophils 78 %    % Lymphocytes 11 %    % Monocytes 10 %    % Eosinophils 0 %    % Basophils 0 %    % Immature Granulocytes 1 %    NRBCs per 100 WBC 0 <1 /100    Absolute Neutrophils 8.7 (H) 1.6 - 8.3 10e3/uL    Absolute Lymphocytes 1.2 0.8 - 5.3 10e3/uL    Absolute Monocytes 1.1 0.0 - 1.3 10e3/uL    Absolute Eosinophils 0.0 0.0 - 0.7 10e3/uL    Absolute Basophils 0.0 0.0 - 0.2 10e3/uL    Absolute Immature Granulocytes 0.1 <=0.4 10e3/uL    Absolute NRBCs 0.0 10e3/uL       Medications - No data to display    Assessments & Plan (with Medical Decision Making)  Zaria is a 77-year-old female presenting to the emergency room with several weeks of generalized body aches that are worse in the morning but continued despite treatment with oral steroid course.  See history and focused physical exam as above  Pleasant 77-year-old female in no acute distress, vitally stable and afebrile.  Does not have any sign of rash or other skin lesions.  Generally unremarkable physical exam.  She is moving all extremities spontaneously against gravity, but says that she is so weak she can hardly move.  She does have difficulty with abduction at the shoulders above her head.  He did ambulate in the department without signs of ataxia or weakness.  Had a discussion with patient regarding ability of the ER to work-up this condition that has already been evaluated in clinic.  We can repeat some lab work, but do have a suspicion that her symptoms are related to polymyalgia rheumatica versus other autoimmune or rheumatologic cause.  We would not be able to test for everything here today and she would still need to follow-up in the clinic.  She would like to proceed with the testing that was offered today.  Lab results as above.  She does have an elevated CRP, this is more elevated than her initial draw at the beginning of  the month, but sed rate is within normal limits.  Remainder of her labs are unremarkable.  Still have a high suspicion for polymyalgia rheumatica.  Though treatment would be continued steroids, I would be hesitant to start this patient on prolonged steroid dose without first being evaluated by her primary provider or seeing if they would like to refer her to rheumatology.  This does not seem to be limiting her functional status at this time.  I recommended she keep her follow-up appointment upcoming with her primary doctor and discuss the appropriateness of being placed on a longer course of oral steroids and appropriate monitoring versus referral for rheumatology or other testing.  If she develops a fever, is following and unable to function due to weakness or body aches, or has any new concerns, do not hesitate to return to the emergency room for evaluation.  This plan was discussed with her family members at the bedside.  They are agreeable to this plan.  Was provided with discharge instructions.  Left in no acute distress       I have reviewed the nursing notes.    I have reviewed the findings, diagnosis, plan and need for follow up with the patient.       Discharge Medication List as of 9/25/2022 11:23 AM          Final diagnoses:   Polymyalgia rheumatica (H)       9/25/2022   [facility] EMERGENCY DEPT     Marilee Valencia,   09/25/22 0636

## 2022-09-25 NOTE — ED TRIAGE NOTES
Pt reports pain all over for the past couple weeks was seen in clinic and started on prednisone, last dose was on Wednesday and pt states she continues to have pain. Pt reports her right shoulder pain is the worse.      Triage Assessment     Row Name 09/25/22 4103       Triage Assessment (Adult)    Airway WDL WDL       Respiratory WDL    Respiratory WDL WDL

## 2022-09-25 NOTE — DISCHARGE INSTRUCTIONS
Your lab work today still showed an elevation of your inflammatory marker, CRP.  Your other inflammatory marker (ESR) and the remainder of your lab work did not show any acute worrisome findings    Your symptoms, the pattern, improvement with some steroid, and elevated inflammatory markers, all fit with a diagnosis of polymyalgia rheumatica.  You may refer to the handouts provided for more understanding.  You may need to be placed on steroids for a longer amount of time, but there are definite risks that come with steroid use.  Since you are going to be seeing Dr. Vargas in clinic soon, you should discuss the option of continued steroid medication with him and determine if it is a good idea for treatment.    There may be other causes for your body aches and symptoms.  You could have an autoimmune disease, such as rheumatoid arthritis.  Talk to Dr. Vargas about other possible causes, and he may recommend further evaluation by a rheumatology specialist.    I also see that Dr. Vargas was asking you to decrease your Lipitor from 80 mg down to 40 mg.  Statin medications like Lipitor can cause muscle aches, even if you have been on them for a while, and you may wish to discuss stopping Lipitor or changing to a different medication at your next clinic appointment to see if this helps with your symptoms

## 2022-09-26 ENCOUNTER — TELEPHONE (OUTPATIENT)
Dept: INTERNAL MEDICINE | Facility: CLINIC | Age: 77
End: 2022-09-26

## 2022-09-26 DIAGNOSIS — M25.50 MULTIPLE JOINT PAIN: Primary | ICD-10-CM

## 2022-09-26 RX ORDER — PREDNISONE 20 MG/1
20 TABLET ORAL DAILY
Qty: 14 TABLET | Refills: 0 | Status: SHIPPED | OUTPATIENT
Start: 2022-09-26 | End: 2022-10-05

## 2022-09-26 NOTE — TELEPHONE ENCOUNTER
Please call her and let her know I have sent prednisone 20 mg a day she can take until she sees me on October 5.  Then we will discuss a proper weaning trial.

## 2022-09-26 NOTE — TELEPHONE ENCOUNTER
"Patient states she was in the hospital yesterday, 9/25/2022 and was given the diagnosis of polymyalgia.  She stated they did not send her home with any medication to help with the inflammation / pain.  Labs indicated yesterday as follows:    CRP Inflammation 66.1 (H) 0.0 - 8.0 mg/L         Patient previously had encounter of pain as stated below that was treated with prednisone.  She stated the prednisone helped with the pain right away, but as the dosing decreased she started feeling the pain back and it \"just got worse and worse\" until she ended up in the hospital yesterday.  Patient has tested negative for COVID.  She verbalized that she would like to try prednisone again for the pain, \"or whatever would be helpful.\"     Wang Vargas MD    Internal Medicine  Multiple joint pain +1 more    Dx  Results    Reason for call     Conversation: Results  (Newest Message First  Piedad Leach PA-C   AN    9/9/22 2:40 PM  Note  Reviewed lab results with patient. Will have her start a course of oral Prednisone over the weekend. She was also scheduled to have additional lab testing on Monday. No history of COVID to her knowledge.      Piedad Leach PA-C        Patient stated please let phone ring until she can get to the , she does not have an answering machine.    Will forward to PCP for review.      Anna Aparicio RN    "

## 2022-10-01 ENCOUNTER — TRANSFERRED RECORDS (OUTPATIENT)
Dept: MULTI SPECIALTY CLINIC | Facility: CLINIC | Age: 77
End: 2022-10-01

## 2022-10-01 LAB — RETINOPATHY: NORMAL

## 2022-10-05 ENCOUNTER — OFFICE VISIT (OUTPATIENT)
Dept: INTERNAL MEDICINE | Facility: CLINIC | Age: 77
End: 2022-10-05
Payer: COMMERCIAL

## 2022-10-05 VITALS
WEIGHT: 137.6 LBS | RESPIRATION RATE: 10 BRPM | HEIGHT: 64 IN | BODY MASS INDEX: 23.49 KG/M2 | TEMPERATURE: 97.1 F | DIASTOLIC BLOOD PRESSURE: 62 MMHG | SYSTOLIC BLOOD PRESSURE: 138 MMHG | OXYGEN SATURATION: 98 % | HEART RATE: 62 BPM

## 2022-10-05 DIAGNOSIS — D64.9 ANEMIA, UNSPECIFIED TYPE: ICD-10-CM

## 2022-10-05 DIAGNOSIS — E11.00 TYPE 2 DIABETES MELLITUS WITH HYPEROSMOLARITY WITHOUT COMA, WITHOUT LONG-TERM CURRENT USE OF INSULIN (H): ICD-10-CM

## 2022-10-05 DIAGNOSIS — Z00.00 MEDICARE ANNUAL WELLNESS VISIT, SUBSEQUENT: Primary | ICD-10-CM

## 2022-10-05 DIAGNOSIS — I10 HYPERTENSION, UNSPECIFIED TYPE: ICD-10-CM

## 2022-10-05 DIAGNOSIS — M35.3 PMR (POLYMYALGIA RHEUMATICA) (H): ICD-10-CM

## 2022-10-05 DIAGNOSIS — E78.5 HYPERLIPIDEMIA LDL GOAL <100: ICD-10-CM

## 2022-10-05 DIAGNOSIS — Z12.11 COLON CANCER SCREENING: ICD-10-CM

## 2022-10-05 DIAGNOSIS — Z23 HIGH PRIORITY FOR 2019-NCOV VACCINE: ICD-10-CM

## 2022-10-05 DIAGNOSIS — E03.9 ACQUIRED HYPOTHYROIDISM: ICD-10-CM

## 2022-10-05 LAB
CRP SERPL-MCNC: 6.3 MG/L (ref 0–8)
ERYTHROCYTE [DISTWIDTH] IN BLOOD BY AUTOMATED COUNT: 14.3 % (ref 10–15)
HCT VFR BLD AUTO: 34.3 % (ref 35–47)
HGB BLD-MCNC: 10.9 G/DL (ref 11.7–15.7)
IRON SATN MFR SERPL: 20 % (ref 15–46)
IRON SERPL-MCNC: 70 UG/DL (ref 35–180)
MCH RBC QN AUTO: 29.1 PG (ref 26.5–33)
MCHC RBC AUTO-ENTMCNC: 31.8 G/DL (ref 31.5–36.5)
MCV RBC AUTO: 92 FL (ref 78–100)
PLATELET # BLD AUTO: 248 10E3/UL (ref 150–450)
RBC # BLD AUTO: 3.75 10E6/UL (ref 3.8–5.2)
TIBC SERPL-MCNC: 350 UG/DL (ref 240–430)
WBC # BLD AUTO: 10 10E3/UL (ref 4–11)

## 2022-10-05 PROCEDURE — 99214 OFFICE O/P EST MOD 30 MIN: CPT | Mod: 25 | Performed by: INTERNAL MEDICINE

## 2022-10-05 PROCEDURE — 85027 COMPLETE CBC AUTOMATED: CPT | Performed by: INTERNAL MEDICINE

## 2022-10-05 PROCEDURE — 91312 COVID-19,PF,PFIZER BOOSTER BIVALENT: CPT | Performed by: INTERNAL MEDICINE

## 2022-10-05 PROCEDURE — 0124A COVID-19,PF,PFIZER BOOSTER BIVALENT: CPT | Performed by: INTERNAL MEDICINE

## 2022-10-05 PROCEDURE — 86140 C-REACTIVE PROTEIN: CPT | Performed by: INTERNAL MEDICINE

## 2022-10-05 PROCEDURE — G0439 PPPS, SUBSEQ VISIT: HCPCS | Performed by: INTERNAL MEDICINE

## 2022-10-05 PROCEDURE — 36415 COLL VENOUS BLD VENIPUNCTURE: CPT | Performed by: INTERNAL MEDICINE

## 2022-10-05 PROCEDURE — 83550 IRON BINDING TEST: CPT | Performed by: INTERNAL MEDICINE

## 2022-10-05 RX ORDER — LEVOTHYROXINE SODIUM 75 UG/1
75 TABLET ORAL DAILY
Qty: 90 TABLET | Refills: 2 | Status: CANCELLED | OUTPATIENT
Start: 2022-10-05

## 2022-10-05 RX ORDER — LEVOTHYROXINE SODIUM 75 UG/1
75 TABLET ORAL DAILY
Qty: 90 TABLET | Refills: 3 | Status: SHIPPED | OUTPATIENT
Start: 2022-10-05 | End: 2023-10-11

## 2022-10-05 RX ORDER — LOSARTAN POTASSIUM 100 MG/1
100 TABLET ORAL DAILY
Qty: 90 TABLET | Refills: 3 | Status: CANCELLED | OUTPATIENT
Start: 2022-10-05

## 2022-10-05 RX ORDER — PREDNISONE 10 MG/1
TABLET ORAL
Qty: 64 TABLET | Refills: 0 | Status: SHIPPED | OUTPATIENT
Start: 2022-10-05 | End: 2022-11-16

## 2022-10-05 RX ORDER — LOSARTAN POTASSIUM 100 MG/1
100 TABLET ORAL DAILY
Qty: 90 TABLET | Refills: 3 | Status: SHIPPED | OUTPATIENT
Start: 2022-10-05 | End: 2023-10-11

## 2022-10-05 ASSESSMENT — ENCOUNTER SYMPTOMS
HEMATURIA: 0
CHILLS: 0
HEMATOCHEZIA: 0

## 2022-10-05 ASSESSMENT — ACTIVITIES OF DAILY LIVING (ADL): CURRENT_FUNCTION: NO ASSISTANCE NEEDED

## 2022-10-05 NOTE — PROGRESS NOTES
"SUBJECTIVE:   Zaria is a 77 year old who presents for Preventive Visit.    Patient has been advised of split billing requirements and indicates understanding: Yes  Are you in the first 12 months of your Medicare coverage?  No    Healthy Habits:     In general, how would you rate your overall health?  Fair    Frequency of exercise:  None    Do you usually eat at least 4 servings of fruit and vegetables a day, include whole grains    & fiber and avoid regularly eating high fat or \"junk\" foods?  Yes    Taking medications regularly:  Yes    Barriers to taking medications:  None    Medication side effects:  None    Ability to successfully perform activities of daily living:  No assistance needed    Home Safety:  No safety concerns identified    Hearing Impairment:  No hearing concerns    In the past 6 months, have you been bothered by leaking of urine? Yes    In general, how would you rate your overall mental or emotional health?  Fair      PHQ-2 Total Score: 0    Additional concerns today:  Yes (Muscle/joint pain. )    Left lower back pain and had PT and injections, no better.     ER last week thought could be PM&R. Had shoulder and hip pains and stiffness. crp was up to 66,     She had gotten prednisone earlier in September, now I gave her 20mg prednisone 10 days ago. Some better, back still hurts.  Was better on 60mg of prednisone.     Sugars are better now 110 range with the prednisone.  hgba1c was 7.5 in September.       Pain clinic next week for her back.    Do you feel safe in your environment? Yes    Have you ever done Advance Care Planning? (For example, a Health Directive, POLST, or a discussion with a medical provider or your loved ones about your wishes): No, advance care planning information given to patient to review.  Patient plans to discuss their wishes with loved ones or provider.         Fall risk    Cognitive Screening   1) Repeat 3 items (Leader, Season, Table)    2) Clock draw: NORMAL  3) 3 item " recall: Recalls 2 objects   Results: NORMAL clock, 1-2 items recalled: COGNITIVE IMPAIRMENT LESS LIKELY    Mini-CogTM Copyright HEATH Mata. Licensed by the author for use in [facility]; reprinted with permission (sid@[facility]). All rights reserved.      Do you have sleep apnea, excessive snoring or daytime drowsiness?: no  Only sleeps 4 hours and then wakes up.     Reviewed and updated as needed this visit by clinical staff                    Reviewed and updated as needed this visit by Provider                   Social History     Tobacco Use     Smoking status: Never Smoker     Smokeless tobacco: Never Used   Substance Use Topics     Alcohol use: Yes     Comment: occ     Alcohol Use 10/5/2022   Prescreen: >3 drinks/day or >7 drinks/week? No   Prescreen: >3 drinks/day or >7 drinks/week? -     Current providers sharing in care for this patient include:   Patient Care Team:  Wang Vargas MD as PCP - General (Internal Medicine)  Artem Covarrubias MD as Assigned Heart and Vascular Provider  Wang Vargas MD as Assigned PCP  Zhanna Mcdonald DO as Assigned OBGYN Provider  Med Flores MD as Assigned Surgical Provider  John Cruz PA-C as Assigned Musculoskeletal Provider    The following health maintenance items are reviewed in Epic and correct as of today:  Health Maintenance   Topic Date Due     EYE EXAM  03/12/2022     DIABETIC FOOT EXAM  04/05/2022     COVID-19 Vaccine (5 - Booster for Moderna series) 08/05/2022     INFLUENZA VACCINE (1) 09/01/2022     MEDICARE ANNUAL WELLNESS VISIT  10/04/2022     A1C  12/08/2022     DTAP/TDAP/TD IMMUNIZATION (2 - Td or Tdap) 08/15/2023     LIPID  09/08/2023     MICROALBUMIN  09/08/2023     ANNUAL REVIEW OF HM ORDERS  09/08/2023     BMP  09/25/2023     FALL RISK ASSESSMENT  10/05/2023     ADVANCE CARE PLANNING  10/04/2026     DEXA  04/07/2036     HEPATITIS C SCREENING  Completed     PHQ-2 (once per calendar year)  Completed     Pneumococcal Vaccine:  "65+ Years  Completed     ZOSTER IMMUNIZATION  Completed     IPV IMMUNIZATION  Aged Out     MENINGITIS IMMUNIZATION  Aged Out     Lab work is in process  Labs reviewed in EPIC  Pneumonia Vaccine: up to date    Mammogram Screening - Patient over age 75, has elected to continue with screening.  Pertinent mammograms are reviewed under the imaging tab.    Review of Systems   Constitutional: Negative for chills.   HENT: Negative for congestion.    Cardiovascular: Negative for chest pain.   Gastrointestinal: Negative for hematochezia.   Genitourinary: Negative for hematuria.   bladder has fallen and had surgery, some incontinence.     OBJECTIVE:   /62   Pulse 62   Temp 97.1  F (36.2  C)   Resp 10   Ht 1.62 m (5' 3.78\")   Wt 62.4 kg (137 lb 9.6 oz)   SpO2 98%   BMI 23.78 kg/m   Estimated body mass index is 23.76 kg/m  as calculated from the following:    Height as of 9/19/22: 1.626 m (5' 4\").    Weight as of 9/25/22: 62.8 kg (138 lb 6.4 oz).  Physical Exam  GENERAL: healthy, alert and no distress  EYES: Eyes grossly normal to inspection, PERRL and conjunctivae and sclerae normal  HENT: ear canals and TM's normal, nose and mouth without ulcers or lesions  NECK: no adenopathy, no asymmetry, masses, or scars and thyroid normal to palpation  RESP: lungs clear to auscultation - no rales, rhonchi or wheezes  CV: regular rate and rhythm, normal S1 S2, no S3 or S4, no murmur, click or rub, no peripheral edema and peripheral pulses strong  ABDOMEN: soft, nontender, no hepatosplenomegaly, no masses and bowel sounds normal  MS: no gross musculoskeletal defects noted, no edema  SKIN: no suspicious lesions or rashes  NEURO: Normal strength and tone, mentation intact and speech normal  PSYCH: mentation appears normal, affect normal/bright    ASSESSMENT / PLAN:       ICD-10-CM    1. Medicare annual wellness visit, subsequent  Z00.00    2. Acquired hypothyroidism  E03.9 levothyroxine (SYNTHROID/LEVOTHROID) 75 MCG tablet   3. " Hypertension, unspecified type  I10 losartan (COZAAR) 100 MG tablet   4. PMR (polymyalgia rheumatica) (H)  M35.3 CRP, inflammation     predniSONE (DELTASONE) 10 MG tablet   5. Type 2 diabetes mellitus with hyperosmolarity without coma, without long-term current use of insulin (H)  E11.00    6. Hyperlipidemia LDL goal <100  E78.5    7. Anemia, unspecified type  D64.9 CBC with platelets     Iron and iron binding capacity   8. Colon cancer screening  Z12.11 Adult GI  Referral - Procedure Only     Patient in for Medicare wellness check she is doing okay having a lot of back pain which has been treated and will see the pain clinic.  She also is having some joint pain which may be polymyalgia rheumatica.    For screening she needs a colonoscopy and that is ordered for the future.  She will continue to mammograms every year.  She will get a COVID booster today even though she is on steroids because is her any chronic steroids for her.  Her memory, fall risk are stable.    Other issues today addressed at this visit include polymyalgia rheumatica Long discussion about her joint pains in her shoulders hips that are better after her steroids.  We will continue her on prednisone 20 mg going down to 15-10 and see her back in 6 weeks.  I will check a CRP to make sure it is improving as well.  Diabetes A1c was doing okay she is on treatment and will continue that we will watch her sugars closely when she is on the prednisone.    There was some new anemia on her labs in the emergency room we will followed up with a repeat hemoglobin and iron studies she will be getting a colonoscopy in the upcoming future.    Hypothyroidism on treatment TSH was stable and we will refill her levothyroxine.                    Patient has been advised of split billing requirements and indicates understanding: Yes    COUNSELING:  Reviewed preventive health counseling, as reflected in patient instructions       Regular exercise       Healthy  "diet/nutrition       Immunizations    Vaccinated for: Influenza and Covid.    Estimated body mass index is 23.76 kg/m  as calculated from the following:    Height as of 9/19/22: 1.626 m (5' 4\").    Weight as of 9/25/22: 62.8 kg (138 lb 6.4 oz).    She reports that she has never smoked. She has never used smokeless tobacco.    Appropriate preventive services were discussed with this patient, including applicable screening as appropriate for cardiovascular disease, diabetes, osteopenia/osteoporosis, and glaucoma.  As appropriate for age/gender, discussed screening for colorectal cancer, prostate cancer, breast cancer, and cervical cancer. Checklist reviewing preventive services available has been given to the patient.    Reviewed patients plan of care and provided an AVS. The Basic Care Plan (routine screening as documented in Health Maintenance) for Zaria meets the Care Plan requirement. This Care Plan has been established and reviewed with the Patient.    Counseling Resources:  ATP IV Guidelines  Pooled Cohorts Equation Calculator  Breast Cancer Risk Calculator  Breast Cancer: Medication to Reduce Risk  FRAX Risk Assessment  ICSI Preventive Guidelines  Dietary Guidelines for Americans, 2010  USDA's MyPlate  ASA Prophylaxis  Lung CA Screening    Wang Vargas MD  [facility]    Identified Health Risks:  "

## 2022-10-06 ENCOUNTER — TELEPHONE (OUTPATIENT)
Dept: GASTROENTEROLOGY | Facility: CLINIC | Age: 77
End: 2022-10-06

## 2022-10-06 DIAGNOSIS — Z20.822 SUSPECTED COVID-19 VIRUS INFECTION: Primary | ICD-10-CM

## 2022-10-06 NOTE — TELEPHONE ENCOUNTER
Screening Questions  BLUE  KIND OF PREP RED  LOCATION [review exclusion criteria] GREEN  SEDATION TYPE        N Are you active on mychart?       Wang Vargas MD in  INTERNAL MED Ordering/Referring Provider?        [facility] What type of coverage do you have?      N Have you had a positive covid test in the last 90 days?     24.8 1. BMI  [BMI 40+ - review exclusion criteria]    Y  2. Are you able to give consent for your medical care? [IF NO,RN REVIEW]        N  3. Are you taking any prescription pain medications on a routine schedule?        3a. EXTENDED PREP What kind of prescription?     N 4. Do you have any chemical dependencies such as alcohol, street drugs, or methadone?    N 5. Do you have any history of post-traumatic stress syndrome, severe anxiety or history of psychosis?      **If yes 3- 5 , please schedule with MAC sedation.**          IF YES TO ANY 6 - 10 - HOSPITAL SETTING ONLY.     N 6.   Do you need assistance transferring?     N 7.   Have you had a heart or lung transplant?    N 8.   Are you currently on dialysis?   N 9.   Do you use daily home oxygen?   N 10. Do you take nitroglycerin?   10a.  If yes, how often?     11. [FEMALES]  N Are you currently pregnant?    11a.  If yes, how many weeks? [ Greater than 12 weeks, OR NEEDED]    N 12. Do you have Pulmonary Hypertension? *NEED PAC APPT AT [facility]*     N 13. [review exclusion criteria]  Do you have any implantable devices in your body (pacemaker, defib, LVAD)?    N 14. In the past 6 months, have you had any heart related issues including cardiomyopathy or heart attack?     14a.  If yes, did it require cardiac stenting if so when?     N 15. Have you had a stroke or Transient ischemic attack (TIA - aka  mini stroke ) within 6 months?      N 16. Do you have mod to severe Obstructive Sleep Apnea?  [Hospital only - Ok at [facility]]    N 17. Do you have SEVERE AND UNCONTROLLED asthma? *NEED PAC APPT AT [facility]*     N 18. Are you currently taking  "any blood thinners?     18a. If yes, inform patient to \"follow up w/ ordering provider for bridging instructions.\"    N 19. Do you take the medication Phentermine?    19a. If yes, \"Hold for 7 days before procedure.  Please consult your prescribing provider if you have questions about holding this medication.\"     N  20. Do you have chronic kidney disease?      Y  21. Do you have a diagnosis of diabetes?     N  22. On a regular basis do you go 3-5 days between bowel movements?      23. Preferred LOCAL Pharmacy for Pre Prescription    [ LIST ONLY ONE PHARMACY]        [id number] - [place], [place] - [address]        - CLOSING REMINDERS -    Informed patient they will need an adult    Cannot take any type of public or medical transportation alone    Conscious Sedation- Needs  for 6 hours after the procedure       MAC/General-Needs  for 24 hours after procedure    Pre-Procedure Covid test to be completed [[facility] PCR Testing Required]    Confirmed Nurse will call to complete assessment       - SCHEDULING DETAILS -     Ronnie  Surgeon    12/15/22  Date of Procedure  Lower Endoscopy [Colonoscopy]  Type of Procedure Scheduled    Location  [facility] PREP-If you answer yes to questions #8, #20, #21Which Colonoscopy Prep was Sent?     MAC Sedation Type     N PAC / Pre-op Required         Additional comments:            "

## 2022-10-11 ENCOUNTER — OFFICE VISIT (OUTPATIENT)
Dept: CARDIOLOGY | Facility: CLINIC | Age: 77
End: 2022-10-11
Attending: INTERNAL MEDICINE
Payer: COMMERCIAL

## 2022-10-11 VITALS
HEART RATE: 58 BPM | WEIGHT: 137.6 LBS | BODY MASS INDEX: 23.49 KG/M2 | OXYGEN SATURATION: 97 % | HEIGHT: 64 IN | DIASTOLIC BLOOD PRESSURE: 68 MMHG | SYSTOLIC BLOOD PRESSURE: 126 MMHG

## 2022-10-11 DIAGNOSIS — I21.02 ST ELEVATION MYOCARDIAL INFARCTION INVOLVING LEFT ANTERIOR DESCENDING (LAD) CORONARY ARTERY (H): ICD-10-CM

## 2022-10-11 DIAGNOSIS — I25.10 CORONARY ARTERY DISEASE INVOLVING NATIVE CORONARY ARTERY OF NATIVE HEART WITHOUT ANGINA PECTORIS: Primary | ICD-10-CM

## 2022-10-11 DIAGNOSIS — E78.00 HYPERCHOLESTEROLEMIA: ICD-10-CM

## 2022-10-11 DIAGNOSIS — E11.9 TYPE 2 DIABETES MELLITUS WITHOUT COMPLICATION, WITHOUT LONG-TERM CURRENT USE OF INSULIN (H): ICD-10-CM

## 2022-10-11 DIAGNOSIS — I10 BENIGN ESSENTIAL HYPERTENSION: ICD-10-CM

## 2022-10-11 PROCEDURE — 99214 OFFICE O/P EST MOD 30 MIN: CPT | Performed by: INTERNAL MEDICINE

## 2022-10-11 NOTE — PROGRESS NOTES
Service Date: 10/11/2022    HISTORY OF PRESENT ILLNESS:  I had the opportunity to see Ms. Zaria Gaona in Cardiology Clinic today at [facility] in [place] for reevaluation of coronary artery disease and cardiac risk factors including hypertension, type 2 diabetes and dyslipidemia.    She was living in [place], [place] when she suffered an acute anterior wall myocardial infarction on 12/26/2020.  She was treated in [place] with emergency stenting of her LAD and right coronary arteries at [facility].  She had some residual moderate disease within a small diagonal artery, mid left circumflex and 30% distal left main stenosis.  Fortunately, her left ventricular function normalized after that procedure and she has not had any recurrent anginal symptoms.  She had a very small area of distal anterior and apical hypokinesis by echocardiogram with an ejection fraction of 55%-60% in 09/2021.    Her cholesterol numbers and blood pressure have been well controlled.  Her diabetes control has not quite been optimal.  She has been on prednisone for quite a bit recently for treatment of severe low back pain, which is not responding well to oral medications or injections.  She is scheduled to see the Pain Clinic next.    She is somewhat limited in her ability to walk and do other activity because of her low back pain, but she is not having any chest discomfort symptoms, shortness of breath, palpitations, lightheadedness or syncope.    PHYSICAL EXAMINATION:  Her blood pressure is 126/68, heart rate 58 and weight 137 pounds.  Her lungs are clear.  Heart rhythm is regular.  She has no cardiac murmurs or carotid bruits.    IMPRESSIONS:  Ms. Zaria Gaona is a 77-year-old woman with hypertension, dyslipidemia and type 2 diabetes, who suffered an anterior wall ST-elevation myocardial infarction in 12/2020, treated with emergency stenting of her LAD.  Her right coronary artery was also stented at  that time.  She had no other significant residual disease and we have been treating her medically since then.  She has normal left ventricular function and no recurrent anginal symptoms recently.  Her risk factors are under reasonably good control except possibly for her diabetes with a hemoglobin A1c most recently of 7.5.  She was on Jardiance briefly, but developed yeast infections, which made her have to stop that medication.  Now with additional prednisone use for back pain, I suspect her diabetes control will be worse.    I have not made any changes to her cardiac medications today.  I will plan to have her follow up with me again in 1 year.  I am hopeful that her back pain will improve.    cc:   [place] [address]     Artem Covarrubias MD, [facility]        D: 10/11/2022   T: 10/11/2022   MT: [id number]    Name:     CHANG WILKERSON  MRN:      [id number]        Account:      [id number]   :      1945           Service Date: 10/11/2022       Document: [id number]

## 2022-10-11 NOTE — PROGRESS NOTES
HPI and Plan:   See dictation    Today's clinic visit entailed:  {[facility] 2021 Documentation (Optional):628578}  {2021 E&M time:624429}  Provider  Link to [facility] Help Grid     {[facility] Level:173679}      No orders of the defined types were placed in this encounter.      No orders of the defined types were placed in this encounter.      There are no discontinued medications.      Encounter Diagnoses   Name Primary?     Coronary artery disease involving native coronary artery of native heart without angina pectoris      ST elevation myocardial infarction involving left anterior descending (LAD) coronary artery (H) Yes     Benign essential hypertension      Hypercholesterolemia      Type 2 diabetes mellitus without complication, without long-term current use of insulin (H)        CURRENT MEDICATIONS:  Current Outpatient Medications   Medication Sig Dispense Refill     acetaminophen (TYLENOL) 325 MG tablet Take 650 mg by mouth       alcohol swab prep pads Use to swab area of injection/shana as directed 100 each 3     amLODIPine (NORVASC) 2.5 MG tablet Take 1 tablet (2.5 mg) by mouth daily 90 tablet 3     Aspirin Buf,CaCarb-MgCarb-MgO, 81 MG TABS Take 81 mg by mouth       atorvastatin (LIPITOR) 80 MG tablet Take 1 tablet (80 mg) by mouth daily 90 tablet 3     blood glucose (NO BRAND SPECIFIED) test strip Use to test blood sugar 1 times daily or as directed. To accompany: Blood Glucose Monitor Brands: per insurance. 100 strip 6     blood glucose calibration (NO BRAND SPECIFIED) solution Use to calibrate blood glucose monitor as needed as directed. To accompany: Blood Glucose Monitor Brands: per insurance. 1 Bottle 3     carvedilol (COREG) 6.25 MG tablet Take 1 tablet (6.25 mg) by mouth 2 times daily 180 tablet 3     latanoprost (XALATAN) 0.005 % ophthalmic solution Inject 1 drop into the eye daily       levothyroxine (SYNTHROID/LEVOTHROID) 75 MCG tablet Take 1 tablet (75 mcg) by mouth daily 90 tablet 3     losartan (COZAAR) 100 MG  "tablet Take 1 tablet (100 mg) by mouth daily 90 tablet 3     metFORMIN (GLUCOPHAGE) 500 MG tablet Take 2 tablets (1,000 mg) by mouth 2 times daily (with meals) 360 tablet 1     omeprazole (PRILOSEC) 20 MG DR capsule Take 1 capsule (20 mg) by mouth daily 90 capsule 2     predniSONE (DELTASONE) 10 MG tablet 2 a day for two weeks, then 1.5 a day for two weeks, then 1 a day for two weeks. 64 tablet 0     thin (NO BRAND SPECIFIED) lancets Use to test blood sugar 1 times daily or as directed. To accompany: Blood Glucose Monitor Brands: per insurance. 1 each 3     timolol hemihydrate (BETIMOL) 0.25 % ophthalmic solution Inject 1 drop into the eye daily       nitroGLYcerin (NITROSTAT) 0.4 MG sublingual tablet Place 0.4 mg under the tongue as needed  (Patient not taking: Reported on 10/11/2022)         ALLERGIES     Allergies   Allergen Reactions     Ampicillin Other (See Comments), Hives and Itching     \"not that bad\"     Lisinopril Cough     Sulfamethoxazole W/Trimethoprim Other (See Comments), Hives and Itching     \"not that bad\"       PAST MEDICAL HISTORY:  Past Medical History:   Diagnosis Date     Essential hypertension      NSTEMI (non-ST elevated myocardial infarction) (H) 12/2020    2 stents     Type 2 diabetes mellitus without complication (H) 2005       PAST SURGICAL HISTORY:  Past Surgical History:   Procedure Laterality Date     INJECT BLOCK MEDIAL BRANCH CERVICAL/THORACIC/LUMBAR Left 8/11/2022    Procedure: Left Lumbar 3, Left Lumbar 4, and Left Lumbar 5 medial branch nerve blocks using fluoroscopic guidance and contrast control;  Surgeon: Malick Gomez MD;  Location: [facility] OR     INJECT EPIDURAL LUMBAR Left 5/20/2022    Procedure: Left lumbar 4-5 interlaminar epidural injection ;  Surgeon: Malick Gomez MD;  Location: [facility] OR     INJECT JOINT SACROILIAC Left 7/1/2022    Procedure: Fluoroscopically-guided injection of the Left sacroiliac joint with Left dimitrios Sacroiliac joint ligaments infiltration over the " "sacrum.;  Surgeon: Malick Gomez MD;  Location: [facility] OR     VAGINAL PROLAPSE REPAIR      done in [place]       FAMILY HISTORY:  No family history on file.    SOCIAL HISTORY:  Social History     Socioeconomic History     Marital status:      Spouse name: None     Number of children: None     Years of education: None     Highest education level: None   Tobacco Use     Smoking status: Never     Smokeless tobacco: Never   Vaping Use     Vaping Use: Never used   Substance and Sexual Activity     Alcohol use: Yes     Comment: occ     Drug use: Never     Sexual activity: Yes     Partners: Male     Birth control/protection: Female Surgical       Review of Systems:  Skin:          Eyes:  Positive for glasses    ENT:         Respiratory:  Negative shortness of breath;cough;wheezing     Cardiovascular:  Negative;palpitations;chest pain;lightheadedness;dizziness;syncope or near-syncope Positive for;edema    Gastroenterology:        Genitourinary:         Musculoskeletal:         Neurologic:  Negative numbness or tingling of hands;numbness or tingling of feet    Psychiatric:         Heme/Lymph/Imm:  Positive for allergies    Endocrine:           Physical Exam:  Vitals: /68 (BP Location: Right arm, Patient Position: Sitting, Cuff Size: Adult Regular)   Pulse 58   Ht 1.62 m (5' 3.78\")   Wt 62.4 kg (137 lb 9.6 oz)   SpO2 97%   BMI 23.78 kg/m      Constitutional:           Skin:             Head:           Eyes:           Lymph:      ENT:           Neck:           Respiratory:            Cardiac:                                                           GI:           Extremities and Muscular Skeletal:                 Neurological:           Psych:           CC  Artem Covarrubias MD  [address]  [place] [address]              "

## 2022-10-11 NOTE — PROGRESS NOTES
HPI and Plan:   See dictation    Today's clinic visit entailed:  Review of the result(s) of each unique test - bmp, flp. alt, echo  Ordering of each unique test  Prescription drug management  30 minutes spent on the date of the encounter doing chart review, review of test results, patient visit and documentation   Provider  Link to [facility] Help Grid     The level of medical decision making during this visit was of moderate complexity.      Orders Placed This Encounter   Procedures     Basic metabolic panel     Lipid Profile     ALT     Follow-Up with Cardiology       No orders of the defined types were placed in this encounter.      There are no discontinued medications.      Encounter Diagnoses   Name Primary?     Coronary artery disease involving native coronary artery of native heart without angina pectoris Yes     ST elevation myocardial infarction involving left anterior descending (LAD) coronary artery (H)      Benign essential hypertension      Hypercholesterolemia      Type 2 diabetes mellitus without complication, without long-term current use of insulin (H)        CURRENT MEDICATIONS:  Current Outpatient Medications   Medication Sig Dispense Refill     acetaminophen (TYLENOL) 325 MG tablet Take 650 mg by mouth       alcohol swab prep pads Use to swab area of injection/shana as directed 100 each 3     amLODIPine (NORVASC) 2.5 MG tablet Take 1 tablet (2.5 mg) by mouth daily 90 tablet 3     Aspirin Buf,CaCarb-MgCarb-MgO, 81 MG TABS Take 81 mg by mouth       atorvastatin (LIPITOR) 80 MG tablet Take 1 tablet (80 mg) by mouth daily 90 tablet 3     blood glucose (NO BRAND SPECIFIED) test strip Use to test blood sugar 1 times daily or as directed. To accompany: Blood Glucose Monitor Brands: per insurance. 100 strip 6     blood glucose calibration (NO BRAND SPECIFIED) solution Use to calibrate blood glucose monitor as needed as directed. To accompany: Blood Glucose Monitor Brands: per insurance. 1 Bottle 3      "carvedilol (COREG) 6.25 MG tablet Take 1 tablet (6.25 mg) by mouth 2 times daily 180 tablet 3     latanoprost (XALATAN) 0.005 % ophthalmic solution Inject 1 drop into the eye daily       levothyroxine (SYNTHROID/LEVOTHROID) 75 MCG tablet Take 1 tablet (75 mcg) by mouth daily 90 tablet 3     losartan (COZAAR) 100 MG tablet Take 1 tablet (100 mg) by mouth daily 90 tablet 3     metFORMIN (GLUCOPHAGE) 500 MG tablet Take 2 tablets (1,000 mg) by mouth 2 times daily (with meals) 360 tablet 1     omeprazole (PRILOSEC) 20 MG DR capsule Take 1 capsule (20 mg) by mouth daily 90 capsule 2     predniSONE (DELTASONE) 10 MG tablet 2 a day for two weeks, then 1.5 a day for two weeks, then 1 a day for two weeks. 64 tablet 0     thin (NO BRAND SPECIFIED) lancets Use to test blood sugar 1 times daily or as directed. To accompany: Blood Glucose Monitor Brands: per insurance. 1 each 3     timolol hemihydrate (BETIMOL) 0.25 % ophthalmic solution Inject 1 drop into the eye daily       nitroGLYcerin (NITROSTAT) 0.4 MG sublingual tablet Place 0.4 mg under the tongue as needed  (Patient not taking: Reported on 10/11/2022)         ALLERGIES     Allergies   Allergen Reactions     Ampicillin Other (See Comments), Hives and Itching     \"not that bad\"     Lisinopril Cough     Sulfamethoxazole W/Trimethoprim Other (See Comments), Hives and Itching     \"not that bad\"       PAST MEDICAL HISTORY:  Past Medical History:   Diagnosis Date     Essential hypertension      NSTEMI (non-ST elevated myocardial infarction) (H) 12/2020    2 stents     Type 2 diabetes mellitus without complication (H) 2005       PAST SURGICAL HISTORY:  Past Surgical History:   Procedure Laterality Date     INJECT BLOCK MEDIAL BRANCH CERVICAL/THORACIC/LUMBAR Left 8/11/2022    Procedure: Left Lumbar 3, Left Lumbar 4, and Left Lumbar 5 medial branch nerve blocks using fluoroscopic guidance and contrast control;  Surgeon: Malick Gomez MD;  Location: [facility]     INJECT EPIDURAL " "LUMBAR Left 5/20/2022    Procedure: Left lumbar 4-5 interlaminar epidural injection ;  Surgeon: Malick Gomez MD;  Location[facility]     INJECT JOINT SACROILIAC Left 7/1/2022    Procedure: Fluoroscopically-guided injection of the Left sacroiliac joint with Left dimitrios Sacroiliac joint ligaments infiltration over the sacrum.;  Surgeon: Malick Gomez MD;  Location: [facility]     VAGINAL PROLAPSE REPAIR      done in [place]       FAMILY HISTORY:  No family history on file.    SOCIAL HISTORY:  Social History     Socioeconomic History     Marital status:      Spouse name: None     Number of children: None     Years of education: None     Highest education level: None   Tobacco Use     Smoking status: Never     Smokeless tobacco: Never   Vaping Use     Vaping Use: Never used   Substance and Sexual Activity     Alcohol use: Yes     Comment: occ     Drug use: Never     Sexual activity: Yes     Partners: Male     Birth control/protection: Female Surgical       Review of Systems:  Skin:          Eyes:  Positive for glasses    ENT:         Respiratory:  Negative shortness of breath;cough;wheezing     Cardiovascular:  Negative;palpitations;chest pain;lightheadedness;dizziness;syncope or near-syncope Positive for;edema    Gastroenterology:        Genitourinary:         Musculoskeletal:         Neurologic:  Negative numbness or tingling of hands;numbness or tingling of feet    Psychiatric:         Heme/Lymph/Imm:  Positive for allergies    Endocrine:           Physical Exam:  Vitals: /68 (BP Location: Right arm, Patient Position: Sitting, Cuff Size: Adult Regular)   Pulse 58   Ht 1.62 m (5' 3.78\")   Wt 62.4 kg (137 lb 9.6 oz)   SpO2 97%   BMI 23.78 kg/m      Constitutional:           Skin:             Head:           Eyes:           Lymph:      ENT:           Neck:           Respiratory:            Cardiac:                                                           GI:           Extremities and Muscular Skeletal:      "            Neurological:           Psych:           CC  Artem Covarrubias MD  [address]  [place] [address]

## 2022-10-11 NOTE — LETTER
10/11/2022    Wang Vargas MD  [address] [facility] [address]    RE: Zaria Gaona       Dear Colleague,     I had the pleasure of seeing Zaria Gaona in the [facility].  HPI and Plan:   See dictation    Today's clinic visit entailed:  Review of the result(s) of each unique test - bmp, flp. alt, echo  Ordering of each unique test  Prescription drug management  30 minutes spent on the date of the encounter doing chart review, review of test results, patient visit and documentation   Provider  Link to [facility] Help Grid     The level of medical decision making during this visit was of moderate complexity.      Orders Placed This Encounter   Procedures     Basic metabolic panel     Lipid Profile     ALT     Follow-Up with Cardiology       No orders of the defined types were placed in this encounter.      There are no discontinued medications.      Encounter Diagnoses   Name Primary?     Coronary artery disease involving native coronary artery of native heart without angina pectoris Yes     ST elevation myocardial infarction involving left anterior descending (LAD) coronary artery (H)      Benign essential hypertension      Hypercholesterolemia      Type 2 diabetes mellitus without complication, without long-term current use of insulin (H)        CURRENT MEDICATIONS:  Current Outpatient Medications   Medication Sig Dispense Refill     acetaminophen (TYLENOL) 325 MG tablet Take 650 mg by mouth       alcohol swab prep pads Use to swab area of injection/shana as directed 100 each 3     amLODIPine (NORVASC) 2.5 MG tablet Take 1 tablet (2.5 mg) by mouth daily 90 tablet 3     Aspirin Buf,CaCarb-MgCarb-MgO, 81 MG TABS Take 81 mg by mouth       atorvastatin (LIPITOR) 80 MG tablet Take 1 tablet (80 mg) by mouth daily 90 tablet 3     blood glucose (NO BRAND SPECIFIED) test strip Use to test blood sugar 1 times daily or as directed. To accompany: Blood Glucose Monitor Brands: per  "insurance. 100 strip 6     blood glucose calibration (NO BRAND SPECIFIED) solution Use to calibrate blood glucose monitor as needed as directed. To accompany: Blood Glucose Monitor Brands: per insurance. 1 Bottle 3     carvedilol (COREG) 6.25 MG tablet Take 1 tablet (6.25 mg) by mouth 2 times daily 180 tablet 3     latanoprost (XALATAN) 0.005 % ophthalmic solution Inject 1 drop into the eye daily       levothyroxine (SYNTHROID/LEVOTHROID) 75 MCG tablet Take 1 tablet (75 mcg) by mouth daily 90 tablet 3     losartan (COZAAR) 100 MG tablet Take 1 tablet (100 mg) by mouth daily 90 tablet 3     metFORMIN (GLUCOPHAGE) 500 MG tablet Take 2 tablets (1,000 mg) by mouth 2 times daily (with meals) 360 tablet 1     omeprazole (PRILOSEC) 20 MG DR capsule Take 1 capsule (20 mg) by mouth daily 90 capsule 2     predniSONE (DELTASONE) 10 MG tablet 2 a day for two weeks, then 1.5 a day for two weeks, then 1 a day for two weeks. 64 tablet 0     thin (NO BRAND SPECIFIED) lancets Use to test blood sugar 1 times daily or as directed. To accompany: Blood Glucose Monitor Brands: per insurance. 1 each 3     timolol hemihydrate (BETIMOL) 0.25 % ophthalmic solution Inject 1 drop into the eye daily       nitroGLYcerin (NITROSTAT) 0.4 MG sublingual tablet Place 0.4 mg under the tongue as needed  (Patient not taking: Reported on 10/11/2022)         ALLERGIES     Allergies   Allergen Reactions     Ampicillin Other (See Comments), Hives and Itching     \"not that bad\"     Lisinopril Cough     Sulfamethoxazole W/Trimethoprim Other (See Comments), Hives and Itching     \"not that bad\"       PAST MEDICAL HISTORY:  Past Medical History:   Diagnosis Date     Essential hypertension      NSTEMI (non-ST elevated myocardial infarction) (H) 12/2020    2 stents     Type 2 diabetes mellitus without complication (H) 2005       PAST SURGICAL HISTORY:  Past Surgical History:   Procedure Laterality Date     INJECT BLOCK MEDIAL BRANCH CERVICAL/THORACIC/LUMBAR Left " "8/11/2022    Procedure: Left Lumbar 3, Left Lumbar 4, and Left Lumbar 5 medial branch nerve blocks using fluoroscopic guidance and contrast control;  Surgeon: Malick Gomez MD;  [facility]     INJECT EPIDURAL LUMBAR Left 5/20/2022    Procedure: Left lumbar 4-5 interlaminar epidural injection ;  Surgeon: Malick Gomez MD;  [facility]     INJECT JOINT SACROILIAC Left 7/1/2022    Procedure: Fluoroscopically-guided injection of the Left sacroiliac joint with Left dimitrios Sacroiliac joint ligaments infiltration over the sacrum.;  Surgeon: Malick Gomez MD;  [facility]     VAGINAL PROLAPSE REPAIR      done in [place]       FAMILY HISTORY:  No family history on file.    SOCIAL HISTORY:  Social History     Socioeconomic History     Marital status:      Spouse name: None     Number of children: None     Years of education: None     Highest education level: None   Tobacco Use     Smoking status: Never     Smokeless tobacco: Never   Vaping Use     Vaping Use: Never used   Substance and Sexual Activity     Alcohol use: Yes     Comment: occ     Drug use: Never     Sexual activity: Yes     Partners: Male     Birth control/protection: Female Surgical       Review of Systems:  Skin:          Eyes:  Positive for glasses    ENT:         Respiratory:  Negative shortness of breath;cough;wheezing     Cardiovascular:  Negative;palpitations;chest pain;lightheadedness;dizziness;syncope or near-syncope Positive for;edema    Gastroenterology:        Genitourinary:         Musculoskeletal:         Neurologic:  Negative numbness or tingling of hands;numbness or tingling of feet    Psychiatric:         Heme/Lymph/Imm:  Positive for allergies    Endocrine:           Physical Exam:  Vitals: /68 (BP Location: Right arm, Patient Position: Sitting, Cuff Size: Adult Regular)   Pulse 58   Ht 1.62 m (5' 3.78\")   Wt 62.4 kg (137 lb 9.6 oz)   SpO2 97%   BMI 23.78 kg/m      Constitutional:           Skin:         "     Head:           Eyes:           Lymph:      ENT:           Neck:           Respiratory:            Cardiac:                                                           GI:           Extremities and Muscular Skeletal:                 Neurological:           Psych:           CC  Artem Covarrubias MD  [address]  [place] [address]                Service Date: 10/11/2022    HISTORY OF PRESENT ILLNESS:  I had the opportunity to see Ms. Zaria Gaona in Cardiology Clinic today at [facility] in [place] for reevaluation of coronary artery disease and cardiac risk factors including hypertension, type 2 diabetes and dyslipidemia.    She was living in [place], [place] when she suffered an acute anterior wall myocardial infarction on 12/26/2020.  She was treated in [place] with emergency stenting of her LAD and right coronary arteries at [facility].  She had some residual moderate disease within a small diagonal artery, mid left circumflex and 30% distal left main stenosis.  Fortunately, her left ventricular function normalized after that procedure and she has not had any recurrent anginal symptoms.  She had a very small area of distal anterior and apical hypokinesis by echocardiogram with an ejection fraction of 55%-60% in 09/2021.    Her cholesterol numbers and blood pressure have been well controlled.  Her diabetes control has not quite been optimal.  She has been on prednisone for quite a bit recently for treatment of severe low back pain, which is not responding well to oral medications or injections.  She is scheduled to see the Pain Clinic next.    She is somewhat limited in her ability to walk and do other activity because of her low back pain, but she is not having any chest discomfort symptoms, shortness of breath, palpitations, lightheadedness or syncope.    PHYSICAL EXAMINATION:  Her blood pressure is 126/68, heart rate 58 and weight 137 pounds.  Her lungs are clear.   Heart rhythm is regular.  She has no cardiac murmurs or carotid bruits.    IMPRESSIONS:  Ms. Zaria Wilkerson is a 77-year-old woman with hypertension, dyslipidemia and type 2 diabetes, who suffered an anterior wall ST-elevation myocardial infarction in 2020, treated with emergency stenting of her LAD.  Her right coronary artery was also stented at that time.  She had no other significant residual disease and we have been treating her medically since then.  She has normal left ventricular function and no recurrent anginal symptoms recently.  Her risk factors are under reasonably good control except possibly for her diabetes with a hemoglobin A1c most recently of 7.5.  She was on Jardiance briefly, but developed yeast infections, which made her have to stop that medication.  Now with additional prednisone use for back pain, I suspect her diabetes control will be worse.    I have not made any changes to her cardiac medications today.  I will plan to have her follow up with me again in 1 year.  I am hopeful that her back pain will improve.    cc:   Wang Vargas MD  [place] [phone]     Candida Claire MD, [facility]    D: 10/11/2022   T: 10/11/2022   MT:     Name:     ZARIA WILKERSON  MRN:      [id number]        Account:      [id number]   :      1945           Service Date: 10/11/2022       Document: [id number]      Thank you for allowing me to participate in the care of your patient.      Sincerely,     CANDIDA CLAIRE MD     [facility]  cc:   Candida Claire MD  [address] [address]

## 2022-10-13 ENCOUNTER — OFFICE VISIT (OUTPATIENT)
Dept: PALLIATIVE MEDICINE | Facility: CLINIC | Age: 77
End: 2022-10-13
Attending: PHYSICIAN ASSISTANT
Payer: COMMERCIAL

## 2022-10-13 VITALS — DIASTOLIC BLOOD PRESSURE: 77 MMHG | HEART RATE: 57 BPM | SYSTOLIC BLOOD PRESSURE: 128 MMHG

## 2022-10-13 DIAGNOSIS — M35.3 POLYMYALGIA RHEUMATICA (H): Primary | ICD-10-CM

## 2022-10-13 DIAGNOSIS — M62.838 MUSCLE SPASM: ICD-10-CM

## 2022-10-13 DIAGNOSIS — M79.18 MYOFASCIAL PAIN: ICD-10-CM

## 2022-10-13 DIAGNOSIS — M47.896 OTHER SPONDYLOSIS, LUMBAR REGION: ICD-10-CM

## 2022-10-13 PROCEDURE — 99204 OFFICE O/P NEW MOD 45 MIN: CPT

## 2022-10-13 RX ORDER — METHOCARBAMOL 500 MG/1
500 TABLET, FILM COATED ORAL 4 TIMES DAILY PRN
Qty: 120 TABLET | Refills: 1 | Status: SHIPPED | OUTPATIENT
Start: 2022-10-13 | End: 2023-01-12

## 2022-10-13 ASSESSMENT — PAIN SCALES - GENERAL: PAINLEVEL: MODERATE PAIN (4)

## 2022-10-13 NOTE — PROGRESS NOTES
[facility] Pain Management     Date of visit: 10/13/2022    Assessment:  Zaria Gaona is a 77 year old female with a past medical history significant for HTN, NSTEMI (12/2020), DM 2, polymyalgia rheumatica, who presents with complaints of widespread pain affecting multiple sites, low back pain.     1. Widespread myofascial pain - She experienced onset of diffuse myofascial pain in February 2022, with progressive worsening of symptoms that were severely debilitating. This lead to an ED visit on 9/25/22, when she was diagnosed with polymyalgia rheumatica. Of note, her PCP has been prescribing oral steroids for pain control (reports she is currently taking 3rd course), which she has found very helpful for pain. We discussed possible strategies to target inflammatory pain, specifically NSAIDS, in the long-term versus referral to rheumatology for further evaluation and possible treatments to target polymyalgia rheumatica.   2. Low back pain - I suspect etiology of pain is associated with multiple factors, including underlying degenerative changes of the lumbar spine, significant myofascial component, for which polymyalgia rheumatica is likely contributing factor to some extent.   3. Mental health - I suspect she has some underlying depression/anxiety component to a certain extent that may be contributing to her chronic pain experience. I think it is important to support mental health as part of the chronic pain treatment plan and may consider referring to pain psychology in the future if indicated.       Visit diagnoses:   1. Polymyalgia rheumatica (H)    2. Other spondylosis, lumbar region    3. Muscle spasm    4. Myofascial pain        Plan:  The following recommendations were given to the patient. Diagnosis, treatment options, risks, benefits, and alternatives were discussed, and all questions were answered. The patient expressed understanding of the plan for management.     I am recommending a  multidisciplinary treatment plan to help this patient better manage her pain.  This includes:      1.  Pain Physical Therapy:  YES   - I am referring for stretching, strengthening, and home exercise plan.    2.  Pain Psychologist to address relaxation, behavioral change, coping style, and other factors important to improvement.  NO - We may consider referring to Anum Castillo in the future if needed.    3.  Diagnostic Studies:  I reviewed lumbar MRI from 4/25/22.    4.  Medication Management:   1. I am prescribing methocarbamol 500 mg up to four times daily as needed for muscle pain and spasms.  2. I will message your primary care provider about adding Celebrex (NSAID) to your treatment regimen once steroid course is completed.    5.  Potential procedures: None     6.  Referrals:   7.  Follow up with BEVERLY Lanier CNP in 4-6 weeks, via video or in person     Other - I sent an Epic message to her PCP, Dr. Vargas, regarding NSAID use after steroids tapered off and possible rheumatology referral, and I am awaiting response. Vilma will follow up with me in 4-6 weeks, at which time we will further discuss pain plan.     Review of Electronic Chart: Today I have also reviewed available medical information in the patient's medical record at [facility] ([facility]) and Care Everywhere (if available), including relevant provider notes, laboratory work, and imaging.     Piedad Bourgeois DNP, BEVERLY, AGNP-C  [facility] Pain Management         -------------------------------------------------------------------    Subjective     Reason for consultation:    Zaria Gaona is a 77 year old female who is seen in consultation today at the request of John Cruz (neurosurgery) for evaluation of her pain issues and recommendations for management, with specific emphasis on  Diagnoses: Other spondylosis, lumbar region    Please see the [facility] health questionnaire which the patient completed and reviewed  "with me in detail.    Review of [place] Prescription Monitoring Program (): No concern for abuse or misuse of controlled medications based on this report.     Review of Electronic Chart: Today I have also reviewed available medical information in the patient's medical record at [facility] ([facility]), including relevant provider notes, laboratory work, and imaging.     Pain medications are being prescribed by N/A.     Chief Complaint:    Chief Complaint   Patient presents with     Pain         HPI:     Zaria Gaona is a 77 year old female presents with a chief complaint of widespread myalgias, mid to low back pain, LLE pain (mostly in thigh).   -Goes by \"Vilma\"  The current pain episode has been present since february.    The pain is Moderate Pain (4) in severity reported at today's visit.    Severity/Intensity: 2/10 at best, 9/10 at worst, 4/10 on average  The pain is described as aching, dull, radiating, worse at night.   The pain is alleviated by lying down/rest.    It is exacerbated by siting, bending.    Modalities that have been utilized in the past which were helpful include PT, injections.    Things that were not helpful, but tried ,include PT, injections, chiropractor (1 session).    The patient has never tried TENS, pain PT.    -She has been dealing with widespread muscle pain for 8-9 months,   -She reports she has taken 3 courses of steroids to help with pain and inflammation.   -She went to ED on 9/25/22 due to progressively worsening pain and symptoms.  -She was diagnosed with polymyalgia rheumatica based on ED workup/findings.     The patient otherwise denies bowel or bladder incontinence, parasthesias, weakness, saddle anesthesia, unintentional weight loss, or fever/chills/sweats.     Zaria Gaona has been seen at a pain clinic in the past.  She is seeing Dr. Gomez in [place] for procedures: left L4-5 REJI on 5/20/22 and not helpful, left LMBB on 8/11/22 and not helpful, left SI " "joint on  7/1/22 and was not helpful.      Current Pain Treatments:    3. Medications:    Acetaminophen   PO steroids    Methocarbamol 500 mg QID PRN  2. Other therapies:    Pain PT   TENS unit     Past treatments:   Injections -    1. left L4-5 REJI on 5/20/22 not helpful   2. left LMBB on 8/11/22 not helpful   3. left SI joint on 7/1/22 not helpful      Current Outpatient Medications   Medication     acetaminophen (TYLENOL) 325 MG tablet     alcohol swab prep pads     amLODIPine (NORVASC) 2.5 MG tablet     Aspirin Buf,CaCarb-MgCarb-MgO, 81 MG TABS     atorvastatin (LIPITOR) 80 MG tablet     blood glucose (NO BRAND SPECIFIED) test strip     blood glucose calibration (NO BRAND SPECIFIED) solution     carvedilol (COREG) 6.25 MG tablet     latanoprost (XALATAN) 0.005 % ophthalmic solution     levothyroxine (SYNTHROID/LEVOTHROID) 75 MCG tablet     losartan (COZAAR) 100 MG tablet     metFORMIN (GLUCOPHAGE) 500 MG tablet     methocarbamol (ROBAXIN) 500 MG tablet     omeprazole (PRILOSEC) 20 MG DR capsule     predniSONE (DELTASONE) 10 MG tablet     thin (NO BRAND SPECIFIED) lancets     timolol hemihydrate (BETIMOL) 0.25 % ophthalmic solution     nitroGLYcerin (NITROSTAT) 0.4 MG sublingual tablet     No current facility-administered medications for this visit.     Allergies   Allergen Reactions     Ampicillin Other (See Comments), Hives and Itching     \"not that bad\"     Lisinopril Cough     Sulfamethoxazole W/Trimethoprim Other (See Comments), Hives and Itching     \"not that bad\"      Past Medical History:   Diagnosis Date     Essential hypertension      NSTEMI (non-ST elevated myocardial infarction) (H) 12/2020    2 stents     Type 2 diabetes mellitus without complication (H) 2005     Past Surgical History:   Procedure Laterality Date     INJECT BLOCK MEDIAL BRANCH CERVICAL/THORACIC/LUMBAR Left 8/11/2022    Procedure: Left Lumbar 3, Left Lumbar 4, and Left Lumbar 5 medial branch nerve blocks using fluoroscopic guidance " and contrast control;  Surgeon: Malick Gomez MD;  Location: [facility] OR     INJECT EPIDURAL LUMBAR Left 5/20/2022    Procedure: Left lumbar 4-5 interlaminar epidural injection ;  Surgeon: Malick Gomez MD;  Location: [facility] OR     INJECT JOINT SACROILIAC Left 7/1/2022    Procedure: Fluoroscopically-guided injection of the Left sacroiliac joint with Left dimitrios Sacroiliac joint ligaments infiltration over the sacrum.;  Surgeon: Malick Gomez MD;  Location: [facility] OR     VAGINAL PROLAPSE REPAIR      done in [place]     No family history on file.  Social History     Socioeconomic History     Marital status:    Tobacco Use     Smoking status: Never     Smokeless tobacco: Never   Vaping Use     Vaping Use: Never used   Substance and Sexual Activity     Alcohol use: Yes     Comment: occ     Drug use: Never     Sexual activity: Yes     Partners: Male     Birth control/protection: Female Surgical      ROS: 10 point ROS neg other than the symptoms noted above in the HPI.      Objective      Diagnostic Testing - Imaging/Labs:  MRI OF THE LUMBAR SPINE WITHOUT CONTRAST 4/25/2022 10:06 AM      COMPARISON: Lumbar spine CT 2/6/2022     HISTORY: Left lumbar radiculopathy     TECHNIQUE: Multiplanar, multisequence MRI images of the lumbar spine  were acquired without IV contrast.     FINDINGS: There are five lumbar-type vertebrae for the purposes of  this dictation.      There is normal alignment of the lumbar vertebrae. Vertebral body  heights of the lumbar spine are normal. Marrow signal throughout the  lumbar vertebrae is within normal limits. There is no evidence for  fracture or pathologic bony lesion of the lumbar spine.      There is loss of disc height, disc desiccation and posterior disc  bulging/herniation to varying degrees at all levels of the lumbar  spine.      The tip of the conus medullaris is at the lower L1 level which is  within normal limits. There is no evidence for intrathecal  abnormality.      Level by level:       T12-L1: Loss of disc height, disc desiccation and circumferential disc  bulging with a superimposed small right paracentral disc herniation  (protrusion). Minimal facet arthropathy bilaterally. No spinal canal  stenosis. No foraminal stenosis on either side.     L1-L2: Loss of disc height, disc desiccation and circumferential disc  bulging with a superimposed tiny posterior central disc herniation  (protrusion). Mild facet arthropathy bilaterally. Minimal spinal canal  narrowing. No foraminal stenosis on either side.     L2-L3: Loss of disc height, disc desiccation and moderate  circumferential disc bulging. No herniation. Circumferential endplate  spurring. Mild facet arthropathy bilaterally. Minimal spinal canal  narrowing. Mild bilateral neural foraminal narrowing.     L3-L4: Loss of disc height, disc desiccation and moderate  circumferential disc bulging. No herniation. Circumferential endplate  spurring. Mild facet arthropathy bilaterally. Mild spinal canal  narrowing. Mild to moderate bilateral neural foraminal narrowing.     L4-L5: Loss of disc height, disc desiccation and moderate  circumferential disc bulging. No herniation. Moderate facet  arthropathy bilaterally. No spinal canal stenosis. Moderate left  foraminal stenosis. Mild right foraminal narrowing.     L5-S1: Loss of disc height, disc desiccation and mild circumferential  disc bulging. No herniation. Moderate facet arthropathy bilaterally.  No spinal canal stenosis. Moderate left foraminal stenosis. Minimal  right foraminal narrowing.                                                                      IMPRESSION:  1. Diffuse degenerative change of the lumbar spine as detailed above.  2. No spinal canal stenosis of the lumbar spine.  3. Moderate neural foraminal stenosis on the left at L4-L5 and on the  left at L5-S1.     DAREN FERNANDEZ MD      Physical Exam  HENT:      Head: Normocephalic.   Eyes:      Comments: Appear grossly normal     Pulmonary:      Effort: Pulmonary effort is normal.   Musculoskeletal:         General: Tenderness present.      Comments: Widespread myofascial tenderness, markedly over cervical paraspinals and lumbosacral region.    Neurological:      General: No focal deficit present.      Mental Status: She is alert.   Psychiatric:         Mood and Affect: Mood normal.         BILLING TIME DOCUMENTATION:   The total TIME spent on this patient on the date of the encounter/appointment was 50 minutes.      TOTAL TIME includes:   Time spent preparing to see the patient (reviewing records and tests)   Time spent face to face (or over the phone) with the patient   Time spent ordering tests, medications, procedures and referrals   Time spent Referring and communicating with other healthcare professionals   Time spent documenting clinical information in Epic

## 2022-10-13 NOTE — PATIENT INSTRUCTIONS
1.  Pain Physical Therapy:  YES   - I am referring for stretching, strengthening, and home exercise plan.    2.  Pain Psychologist to address relaxation, behavioral change, coping style, and other factors important to improvement.  NO - We may consider referring to Anum Castillo in the future if needed.    3.  Diagnostic Studies:  I reviewed lumbar MRI from 4/25/22.    4.  Medication Management:   I am prescribing methocarbamol 500 mg up to four times daily as needed for muscle pain and spasms.  I will message your primary care provider about adding Celebrex (NSAID) to your treatment regimen once steroid course is completed.    5.  Potential procedures: None     6.  Referrals:   7.  Follow up with BEVERLY Lanier CNP in 4-6 weeks, via video or in person       ----------------------------------------------------------------  Clinic Number:  [phone]   Call with any questions about your care and for scheduling assistance.   Calls are returned Monday through Friday between 8 AM and 4:30 PM. We usually get back to you within 2 business days depending on the issue/request.    If we are prescribing your medications:  For opioid medication refills, call the clinic or send a WebSafety message 7 days in advance.  Please include:  Name of requested medication  Name of the pharmacy.  For non-opioid medications, call your pharmacy directly to request a refill. Please allow 3-4 days to be processed.   Per [place] State Law:  All controlled substance prescriptions must be filled within 30 days of being written.    For those controlled substances allowing refills, pickup must occur within 30 days of last fill.      We believe regular attendance is key to your success in our program!    Any time you are unable to keep your appointment we ask that you call us at least 24 hours in advance to cancel.This will allow us to offer the appointment time to another patient.   Multiple missed appointments may lead to dismissal from the  clinic.

## 2022-10-25 ENCOUNTER — HOSPITAL ENCOUNTER (OUTPATIENT)
Dept: PHYSICAL THERAPY | Facility: CLINIC | Age: 77
Setting detail: THERAPIES SERIES
Discharge: HOME | End: 2022-10-25
Payer: COMMERCIAL

## 2022-10-25 DIAGNOSIS — M35.3 POLYMYALGIA RHEUMATICA (H): ICD-10-CM

## 2022-10-25 DIAGNOSIS — M47.896 OTHER SPONDYLOSIS, LUMBAR REGION: ICD-10-CM

## 2022-10-25 DIAGNOSIS — M62.838 MUSCLE SPASM: ICD-10-CM

## 2022-10-25 PROCEDURE — 97112 NEUROMUSCULAR REEDUCATION: CPT | Mod: GP | Performed by: PHYSICAL THERAPIST

## 2022-10-25 PROCEDURE — 97162 PT EVAL MOD COMPLEX 30 MIN: CPT | Mod: GP | Performed by: PHYSICAL THERAPIST

## 2022-10-26 NOTE — PROGRESS NOTES
10/25/22 0904   General Information   Type of Visit Initial OP Ortho PT Evaluation   Start of Care Date 10/25/22   Referring Physician Piedad PAUL CNP   Patient/Family Goals Statement walk, get rid of pain, get off prednisone   Orders Evaluate and Treat   Date of Order 10/13/22   Certification Required? Yes  ([employer])   Medical Diagnosis Other spondylosis, lumbar region (M47.896)     Polymyalgia rheumatica (H) (M35.3)     Muscle spasm (M62.838)   Surgical/Medical history reviewed Yes   Precautions/Limitations no known precautions/limitations   Weight-Bearing Status - LUE full weight-bearing   Weight-Bearing Status - RUE full weight-bearing   Weight-Bearing Status - LLE full weight-bearing   Weight-Bearing Status - RLE full weight-bearing   General Information Comments HIstory: Diabetes type 2       Present No   Body Part(s)   Body Part(s) Lumbar Spine/SI   Presentation and Etiology   Pertinent history of current problem (include personal factors and/or comorbidities that impact the POC) 76 yo female referred to PT due to pain. History of low back surgery for bone chip pressing on her nerve with full recovery. ONSET: February 2022 increased. Insidious onset. PAST TREATMENT:  Prednisone x 3 rounds and muscle relaxants from pain clinic helping and she is able to raise arms higher allowing her to put eye drops in. Had injections 5-, 7-1-2022, and 8- without benefit. One treatment with chiropractor with increased pain so did not continue. PT x 3 weeks: did not continue PT exercises as they caused pain. ED a few times most recent on 9- due to pain severity and difficulty walking. Diagnosed there with polymyalgia rheumatica. LIMITED ACTIVITIES: Used to need hep to get dressed now independent again. Difficult to get up from the floor when getting items out of the lowest cupboard, can only be active for a few hours and then she has to rest. Had  "been able to walk 30-60 minutes x 3-4 days per week. Now she can only walk 1 block at a time. Needed to use cane to walk earlier.  Was able to complete exercises but not anymore. DIET: eats a lot fruit and a few veggies, milk with meals and coffee am, occas tea - black, water: could be better - ice water occasionally a few times during the day.  SLEEP:  pretty good  and wakes 2-3 times per night for bathroom and pain. Sleep/wake time: between 9-10 pm and up 5-6 pm. Wakes somewhat refreshed. Has not taken sleep aides lately only if tossing and turning usually over the counter medication. Just before bed: watches TV in the living room.DIET: eats a lot fruit and a few veggies, milk with meals and coffee am, occas tea - black, water: could be better - ice water occasionally a few times during the day.  RED FLAGS: Negative: Cough, sneeze, BM, change bladder function (dropped and had surgery without change), deep breath, cauda equina syndrome, foot drop, swallowing, tinnitus, vision change - slight change eye doctor.   Impairments A. Pain;B. Decreased WB tolerance;D. Decreased ROM;E. Decreased flexibility;H. Impaired gait;N. Headaches;K. Numbness  (fingers neuropathy)   Functional Limitations   (can complete tasks but harder.)   Symptom Location Mid back bilaterally and L lateral thigh. Seldom has headaches - frontal   How/Where did it occur From insidious onset   Onset date of current episode/exacerbation 02/01/22  (approx date)   Chronicity Chronic   Pain rating (0-10 point scale) Other   Pain rating comment Mid back pain is dull and now: 1/10 with increase to 9/10; Shoulders: 2/10 now and range of 2/10 to 9/1; L lateral thigh symptoms: 0/10 and increases to 2-3/10.   Pain quality B. Dull;H. Other   Pain quality comment \"just pain\" L lateral thigh.   Frequency of pain/symptoms A. Constant   Pain/symptoms exacerbated by A. Sitting;B. Walking;C. Lifting;M. Other   Pain exacerbation comment standing, sleeping, " visiting/sitting, traveling   Pain/symptoms eased by I. OTC medication(s);E. Changing positions;F. Certain positions;K. Other   Pain eased by comment Prednisone and muscle relaxants, Sometimes had to lay on couch or in bed to help with pain,   Progression of symptoms since onset: Improved  (with medication)   Current / Previous Interventions   Diagnostic Tests: MRI;CT scan  (per EPIC report)   MRI Results Results   MRI results 1. Diffuse degenerative change of the lumbar spine as detailed above.  2. No spinal canal stenosis of the lumbar spine.  3. Moderate neural foraminal stenosis on the left at L4-L5 and on the  left at L5-S1.   CT Results Results   CT results 1.  No evidence of acute fracture or posttraumatic subluxation.  2.  Degenerative change with multiple stenoses.  3.  Foraminal stenosis is worst on the left at L4-L5.  4.  Central disc extrusion at L1-L2.   Prior Level of Function   Functional Level Prior Comment Independent and without back symptoms post surgery   Current Level of Function   Current Community Support Other  (1 son nearby and 1 is further away.)   Patient role/employment history F. 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 No   Have you fallen and had an injury in the past year? No   Is patient a fall risk? No   Abuse Screen (yes response referral indicated)   Feels Unsafe at Home or Work/School no   Feels Threatened by Someone no   Does Anyone Try to Keep You From Having Contact with Others or Doing Things Outside Your Home? no   Physical Signs of Abuse Present no   System Outcome Measures   Outcome Measures Low Back Pain (see [place] and [place])  (REviewed and discussed)   Lumbar Spine/SI Objective Findings    Gait/Locomotion normal   Flexion ROM 60% with pain in low back   Extension ROM 10% without change in symptoms   Right Side Bending ROM symptoms R low back pain and resolution of L lateral thigh symptoms   Left Side Bending ROM increased L thigh symptoms   Repeated Extension-Standing ROM no change x 10   Repeated Flexion-Standing ROM did not complete due to increased pain   Palpation tightness without symptoms along the L>R lumbar paraspinal muscles.   Observation Comfortable sitting in chair, pleasant.   Integumentary very light incision very low back   Posture standing: forward shoulders and head, ER of R hip and R foot eversion compared to the L. increased thoracic kyphosis and flatter lumbar lordosis. R>L elevation of iliac crest with L trunk rotation.   Planned Therapy Interventions   Planned Therapy Interventions manual therapy;neuromuscular re-education;ROM;strengthening;stretching   Planned Modality Interventions   Planned Modality Interventions Cryotherapy;Hot packs;TENS   Clinical Impression   Criteria for Skilled Therapeutic Interventions Met yes, treatment indicated   PT Diagnosis Low back pain with symptoms into shoulders   Influenced by the following impairments Chronic, multiple areas of pain, reliant on over the counter meds and prescribed medication to calm as other treatments were not helpful, DM, stress   Functional limitations due to impairments general activities such as house work, walking, standing, sitting, driving   Clinical Presentation Evolving/Changing   Clinical Presentation Rationale clinical judgement   Clinical Decision Making (Complexity) Moderate complexity   Predicted Duration of Therapy Intervention (days/wks) 1-2 times per week x 4 weeks and then decrease to every 2 weeks x 2 weeks   Risk & Benefits of therapy have been explained Yes   Patient, Family & other staff in agreement with plan of care Yes   Clinical Impression Comments Claribel is noting improvement with medication. She  has improvement with opening of the L facet joints. We agreed to work on leg strength and walking activities.Skilled intervention for positioning and posture, strength and endurance, pain education.   Education Assessment   Preferred Learning Style Listening;Demonstration;Reading;Pictures/video   Barriers to Learning No barriers   ORTHO GOALS   PT Ortho Eval Goals 1;2;3   Ortho Goal 1   Goal Identifier Walking   Goal Description Claribel will be able to walk x 4 blocks with rest as needed to calm symptoms   Target Date 11/17/22   Ortho Goal 2   Goal Identifier Pain level   Goal Description Claribel would like to get rid of her pain and be able to get off Prednisone   Target Date 12/09/22   Ortho Goal 3   Goal Identifier Driving   Goal Description Claribel will be able to comfortably drive using positioning and rest breaks so she and her  can visit (up to 5 and 1/2 hours to get to family at max).   Target Date 12/09/22   Total Evaluation Time   PT Eval, Moderate Complexity Minutes (41303) 40   Therapy Certification   Certification date from 10/25/22   Certification date to 12/06/22   Medical Diagnosis Other spondylosis, lumbar region (M47.896)     Polymyalgia rheumatica (H) (M35.3)     Muscle spasm (M62.838)

## 2022-10-26 NOTE — PROGRESS NOTES
[facility]    OUTPATIENT PHYSICAL THERAPY ORTHOPEDIC EVALUATION  PLAN OF TREATMENT FOR OUTPATIENT REHABILITATION  (COMPLETE FOR INITIAL CLAIMS ONLY)  Patient's Last Name, First Name, M.I.  YOB: 1945  Zaria Gaona    Provider s Name:  [facility]   Medical Record No.  [id number]   Start of Care Date:  10/25/22   Onset Date:  02/01/22 (approx date)   Type:     _X__PT   ___OT   ___SLP Medical Diagnosis:  Other spondylosis, lumbar region (M47.896)     Polymyalgia rheumatica (H) (M35.3)     Muscle spasm (M62.838)     PT Diagnosis:  Low back pain with symptoms into shoulders   Visits from SOC:  1      _________________________________________________________________________________  Plan of Treatment/Functional Goals:  manual therapy, neuromuscular re-education, ROM, strengthening, stretching     Cryotherapy, Hot packs, TENS     Goals  Goal Identifier: Walking  Goal Description: Claribel will be able to walk x 4 blocks with rest as needed to calm symptoms  Target Date: 11/17/22    Goal Identifier: Pain level  Goal Description: Claribel would like to get rid of her pain and be able to get off Prednisone  Target Date: 12/09/22    Goal Identifier: Driving  Goal Description: Claribel will be able to comfortably drive using positioning and rest breaks so she and her  can visit (up to 5 and 1/2 hours to get to family at max).  Target Date: 12/09/22       Therapy Frequency:     Predicted Duration of Therapy Intervention:  1-2 times per week x 4 weeks and then decrease to every 2 weeks x 2 weeks    Lesia Carreno, PT                 I CERTIFY THE NEED FOR THESE SERVICES FURNISHED UNDER        THIS PLAN OF TREATMENT AND WHILE UNDER MY CARE     (Physician co-signature of this document indicates review and certification of the therapy plan).                       Certification  Date From:  10/25/22   Certification Date To:  12/06/22    Referring Provider:  Piedad PAUL CNP    Initial Assessment        See Epic Evaluation Start of Care Date: 10/25/22

## 2022-10-28 ENCOUNTER — TELEPHONE (OUTPATIENT)
Dept: PALLIATIVE MEDICINE | Facility: CLINIC | Age: 77
End: 2022-10-28

## 2022-10-28 ENCOUNTER — DOCUMENTATION ONLY (OUTPATIENT)
Dept: PALLIATIVE MEDICINE | Facility: CLINIC | Age: 77
End: 2022-10-28

## 2022-10-28 NOTE — PROGRESS NOTES
Placed paper work regarding a patient forms for a tens unit in provider bin- Came as a fax.     Tg Segura MA  [facility]

## 2022-10-28 NOTE — TELEPHONE ENCOUNTER
[facility] Call Center    Phone Message    May a detailed message be left on voicemail: yes     Reason for Call: Medication Question or concern regarding medication   Prescription Clarification  Name of Medication: methocarbamol (ROBAXIN) 500 MG tablet  Prescribing Provider: Piedad Bourgeois    Pt thinks that she has a yeast infection from taking this medicine.  She is requesting a call back from a nurse to discuss.  Thanks.

## 2022-10-28 NOTE — TELEPHONE ENCOUNTER
RN returned call and spoke with patient. Patient concerned that she may have a yeast infection due to the Methocarbamol. Patient started this medication on 10/13 and symptoms of itchiness began a few days ago. Patient denies any other symptoms. RN to discuss with Piedad PAUL CNP and will update patient.     RN returned call to patient and advised to follow up with PCP if having symptoms of yeast infection, as this most likely unrelated to Methocarbamol. Patient verbalized understanding.       Sharona Mccauley RN

## 2022-11-01 ENCOUNTER — MEDICAL CORRESPONDENCE (OUTPATIENT)
Dept: HEALTH INFORMATION MANAGEMENT | Facility: CLINIC | Age: 77
End: 2022-11-01

## 2022-11-01 ENCOUNTER — HOSPITAL ENCOUNTER (OUTPATIENT)
Dept: PHYSICAL THERAPY | Facility: CLINIC | Age: 77
Setting detail: THERAPIES SERIES
Discharge: HOME | End: 2022-11-01
Attending: INTERNAL MEDICINE
Payer: COMMERCIAL

## 2022-11-01 PROCEDURE — 97110 THERAPEUTIC EXERCISES: CPT | Mod: GP | Performed by: PHYSICAL THERAPIST

## 2022-11-01 PROCEDURE — 97112 NEUROMUSCULAR REEDUCATION: CPT | Mod: GP | Performed by: PHYSICAL THERAPIST

## 2022-11-08 ENCOUNTER — HOSPITAL ENCOUNTER (OUTPATIENT)
Dept: PHYSICAL THERAPY | Facility: CLINIC | Age: 77
Setting detail: THERAPIES SERIES
Discharge: HOME | End: 2022-11-08
Attending: INTERNAL MEDICINE
Payer: COMMERCIAL

## 2022-11-08 PROCEDURE — 97110 THERAPEUTIC EXERCISES: CPT | Mod: GP | Performed by: PHYSICAL THERAPIST

## 2022-11-14 ENCOUNTER — HOSPITAL ENCOUNTER (OUTPATIENT)
Dept: MAMMOGRAPHY | Facility: CLINIC | Age: 77
Discharge: HOME | End: 2022-11-14
Attending: INTERNAL MEDICINE | Admitting: INTERNAL MEDICINE
Payer: COMMERCIAL

## 2022-11-14 DIAGNOSIS — Z12.31 VISIT FOR SCREENING MAMMOGRAM: ICD-10-CM

## 2022-11-14 PROCEDURE — 77067 SCR MAMMO BI INCL CAD: CPT

## 2022-11-15 ENCOUNTER — HOSPITAL ENCOUNTER (OUTPATIENT)
Dept: PHYSICAL THERAPY | Facility: CLINIC | Age: 77
Setting detail: THERAPIES SERIES
Discharge: HOME | End: 2022-11-15
Payer: COMMERCIAL

## 2022-11-15 PROCEDURE — 97112 NEUROMUSCULAR REEDUCATION: CPT | Mod: GP | Performed by: PHYSICAL THERAPIST

## 2022-11-16 ENCOUNTER — OFFICE VISIT (OUTPATIENT)
Dept: INTERNAL MEDICINE | Facility: CLINIC | Age: 77
End: 2022-11-16
Payer: COMMERCIAL

## 2022-11-16 VITALS
HEIGHT: 64 IN | OXYGEN SATURATION: 99 % | WEIGHT: 142 LBS | HEART RATE: 60 BPM | BODY MASS INDEX: 24.24 KG/M2 | RESPIRATION RATE: 14 BRPM | SYSTOLIC BLOOD PRESSURE: 124 MMHG | DIASTOLIC BLOOD PRESSURE: 65 MMHG | TEMPERATURE: 97.4 F

## 2022-11-16 DIAGNOSIS — M35.3 PMR (POLYMYALGIA RHEUMATICA) (H): Primary | ICD-10-CM

## 2022-11-16 DIAGNOSIS — D64.9 ANEMIA, UNSPECIFIED TYPE: ICD-10-CM

## 2022-11-16 DIAGNOSIS — E11.00 TYPE 2 DIABETES MELLITUS WITH HYPEROSMOLARITY WITHOUT COMA, WITHOUT LONG-TERM CURRENT USE OF INSULIN (H): ICD-10-CM

## 2022-11-16 PROCEDURE — 99213 OFFICE O/P EST LOW 20 MIN: CPT | Performed by: INTERNAL MEDICINE

## 2022-11-16 RX ORDER — PREDNISONE 5 MG/1
TABLET ORAL
Qty: 11 TABLET | Refills: 0 | Status: SHIPPED | OUTPATIENT
Start: 2022-11-16 | End: 2022-12-05

## 2022-11-16 ASSESSMENT — PAIN SCALES - GENERAL: PAINLEVEL: MILD PAIN (2)

## 2022-11-16 NOTE — H&P (VIEW-ONLY)
Assessment & Plan     PMR (polymyalgia rheumatica) (H)  Polymyalgia rheumatica the patient is weaning down her prednisone to 10 mg we will drop down to 5 mg and then 2.5 mg as she wants to get off of this and is feeling quite good.  - predniSONE (DELTASONE) 5 MG tablet; 1 a day for a 7 days then half a day for 8 days.    Type 2 diabetes mellitus with hyperosmolarity without coma, without long-term current use of insulin (H)  Diabetes okay with an A1c of 7.5.  Continue her medications hopefully get off prednisone will help her.  - Hemoglobin A1c; Future    Anemia, unspecified type  Mild anemia at 10.9 she does have a colonoscopy coming up she can recheck her hemoglobin with her hemoglobin A1c in another month.  - CBC with platelets; Future               No follow-ups on file.    Wang Vargas MD  [facility]    Boris Enciso is a 77 year old, presenting for the following health issues:  RECHECK (6 week follow up)      History of Present Illness       COPD:  She presents for follow up of COPD.  Overall, COPD symptoms are stable since last visit. She has same as usual fatigue or shortness of breath with exertion and no shortness of breath at rest.She rarely coughs and does not have change in sputum. No recent fever. She can walk 2-5 blocks without stopping to rest. She can walk 2 flights of stairs without resting.The patient has had no ED, urgent care, or hospital admissions because of COPD since the last visit.     She eats 2-3 servings of fruits and vegetables daily.She consumes 1 sweetened beverage(s) daily.She exercises with enough effort to increase her heart rate 9 or less minutes per day.  She exercises with enough effort to increase her heart rate 3 or less days per week.   She is taking medications regularly.     Back is better and doing PT, seeing pain clinic.      Polymyalgia and prednisone has helped, pain is tolerable now.     Sugars are ok, not up with prednisone too  "bad.     Anemic with hgb of 10.9, colonoscopy in December.     Not as much walking with snow.    Saw cardiology and doing ok.     Past Medical History:   Diagnosis Date     Essential hypertension      NSTEMI (non-ST elevated myocardial infarction) (H) 12/2020    2 stents     Type 2 diabetes mellitus without complication (H) 2005     Current Outpatient Medications   Medication     acetaminophen (TYLENOL) 325 MG tablet     alcohol swab prep pads     amLODIPine (NORVASC) 2.5 MG tablet     Aspirin Buf,CaCarb-MgCarb-MgO, 81 MG TABS     atorvastatin (LIPITOR) 80 MG tablet     blood glucose (NO BRAND SPECIFIED) test strip     blood glucose calibration (NO BRAND SPECIFIED) solution     carvedilol (COREG) 6.25 MG tablet     latanoprost (XALATAN) 0.005 % ophthalmic solution     levothyroxine (SYNTHROID/LEVOTHROID) 75 MCG tablet     losartan (COZAAR) 100 MG tablet     metFORMIN (GLUCOPHAGE) 500 MG tablet     methocarbamol (ROBAXIN) 500 MG tablet     omeprazole (PRILOSEC) 20 MG DR capsule     predniSONE (DELTASONE) 5 MG tablet     thin (NO BRAND SPECIFIED) lancets     timolol hemihydrate (BETIMOL) 0.25 % ophthalmic solution     nitroGLYcerin (NITROSTAT) 0.4 MG sublingual tablet     No current facility-administered medications for this visit.     Social History     Tobacco Use     Smoking status: Never     Smokeless tobacco: Never   Vaping Use     Vaping Use: Never used   Substance Use Topics     Alcohol use: Yes     Comment: occ     Drug use: Never         Review of Systems         Objective    /65 (BP Location: Left arm)   Pulse 60   Temp 97.4  F (36.3  C) (Temporal)   Resp 14   Ht 1.626 m (5' 4\")   Wt 64.4 kg (142 lb)   SpO2 99%   BMI 24.37 kg/m    Body mass index is 24.37 kg/m .  Physical Exam   No acute distress  Heart is regular  Lungs are clear no   extremities without edema                    "

## 2022-11-16 NOTE — PROGRESS NOTES
Assessment & Plan     PMR (polymyalgia rheumatica) (H)  Polymyalgia rheumatica the patient is weaning down her prednisone to 10 mg we will drop down to 5 mg and then 2.5 mg as she wants to get off of this and is feeling quite good.  - predniSONE (DELTASONE) 5 MG tablet; 1 a day for a 7 days then half a day for 8 days.    Type 2 diabetes mellitus with hyperosmolarity without coma, without long-term current use of insulin (H)  Diabetes okay with an A1c of 7.5.  Continue her medications hopefully get off prednisone will help her.  - Hemoglobin A1c; Future    Anemia, unspecified type  Mild anemia at 10.9 she does have a colonoscopy coming up she can recheck her hemoglobin with her hemoglobin A1c in another month.  - CBC with platelets; Future               No follow-ups on file.    Wang Vargas MD  [facility]    Boris Enciso is a 77 year old, presenting for the following health issues:  RECHECK (6 week follow up)      History of Present Illness       COPD:  She presents for follow up of COPD.  Overall, COPD symptoms are stable since last visit. She has same as usual fatigue or shortness of breath with exertion and no shortness of breath at rest.She rarely coughs and does not have change in sputum. No recent fever. She can walk 2-5 blocks without stopping to rest. She can walk 2 flights of stairs without resting.The patient has had no ED, urgent care, or hospital admissions because of COPD since the last visit.     She eats 2-3 servings of fruits and vegetables daily.She consumes 1 sweetened beverage(s) daily.She exercises with enough effort to increase her heart rate 9 or less minutes per day.  She exercises with enough effort to increase her heart rate 3 or less days per week.   She is taking medications regularly.     Back is better and doing PT, seeing pain clinic.      Polymyalgia and prednisone has helped, pain is tolerable now.     Sugars are ok, not up with prednisone too  "bad.     Anemic with hgb of 10.9, colonoscopy in December.     Not as much walking with snow.    Saw cardiology and doing ok.     Past Medical History:   Diagnosis Date     Essential hypertension      NSTEMI (non-ST elevated myocardial infarction) (H) 12/2020    2 stents     Type 2 diabetes mellitus without complication (H) 2005     Current Outpatient Medications   Medication     acetaminophen (TYLENOL) 325 MG tablet     alcohol swab prep pads     amLODIPine (NORVASC) 2.5 MG tablet     Aspirin Buf,CaCarb-MgCarb-MgO, 81 MG TABS     atorvastatin (LIPITOR) 80 MG tablet     blood glucose (NO BRAND SPECIFIED) test strip     blood glucose calibration (NO BRAND SPECIFIED) solution     carvedilol (COREG) 6.25 MG tablet     latanoprost (XALATAN) 0.005 % ophthalmic solution     levothyroxine (SYNTHROID/LEVOTHROID) 75 MCG tablet     losartan (COZAAR) 100 MG tablet     metFORMIN (GLUCOPHAGE) 500 MG tablet     methocarbamol (ROBAXIN) 500 MG tablet     omeprazole (PRILOSEC) 20 MG DR capsule     predniSONE (DELTASONE) 5 MG tablet     thin (NO BRAND SPECIFIED) lancets     timolol hemihydrate (BETIMOL) 0.25 % ophthalmic solution     nitroGLYcerin (NITROSTAT) 0.4 MG sublingual tablet     No current facility-administered medications for this visit.     Social History     Tobacco Use     Smoking status: Never     Smokeless tobacco: Never   Vaping Use     Vaping Use: Never used   Substance Use Topics     Alcohol use: Yes     Comment: occ     Drug use: Never         Review of Systems         Objective    /65 (BP Location: Left arm)   Pulse 60   Temp 97.4  F (36.3  C) (Temporal)   Resp 14   Ht 1.626 m (5' 4\")   Wt 64.4 kg (142 lb)   SpO2 99%   BMI 24.37 kg/m    Body mass index is 24.37 kg/m .  Physical Exam   No acute distress  Heart is regular  Lungs are clear no   extremities without edema                    "

## 2022-11-22 ENCOUNTER — HOSPITAL ENCOUNTER (OUTPATIENT)
Dept: PHYSICAL THERAPY | Facility: CLINIC | Age: 77
Setting detail: THERAPIES SERIES
Discharge: HOME | End: 2022-11-22
Payer: COMMERCIAL

## 2022-11-22 DIAGNOSIS — E11.00 TYPE 2 DIABETES MELLITUS WITH HYPEROSMOLARITY WITHOUT COMA, WITHOUT LONG-TERM CURRENT USE OF INSULIN (H): ICD-10-CM

## 2022-11-22 PROCEDURE — 97110 THERAPEUTIC EXERCISES: CPT | Mod: GP | Performed by: PHYSICAL THERAPIST

## 2022-11-22 NOTE — TELEPHONE ENCOUNTER
Pt is almost out and her mail order pharmacy is out of the test strips she uses. Needs sent to [facility]. Any questions call valeriy @ [phone]

## 2022-12-05 DIAGNOSIS — M35.3 PMR (POLYMYALGIA RHEUMATICA) (H): ICD-10-CM

## 2022-12-05 RX ORDER — PREDNISONE 5 MG/1
2.5 TABLET ORAL DAILY
Qty: 15 TABLET | Refills: 0 | Status: SHIPPED | OUTPATIENT
Start: 2022-12-05 | End: 2023-03-27

## 2022-12-05 NOTE — TELEPHONE ENCOUNTER
Patient calling regarding prednisone. She stated at her appointment on 11/16 she discussed with PCP about getting off prednisone but now she is thinking she would like to continue to stay on it. She has already decresed the dose and expressed that she feels better when she was on it regularly.     Medication pended for provider to review.     JANKI LeivaN, RN

## 2022-12-06 NOTE — TELEPHONE ENCOUNTER
Patient was informed.     JANKI SandovalN, RN, PHN  [place] [facility]  December 6, 2022

## 2022-12-14 ENCOUNTER — ANESTHESIA EVENT (OUTPATIENT)
Dept: GASTROENTEROLOGY | Facility: CLINIC | Age: 77
End: 2022-12-14
Payer: COMMERCIAL

## 2022-12-15 ENCOUNTER — HOSPITAL ENCOUNTER (OUTPATIENT)
Facility: CLINIC | Age: 77
Discharge: HOME | End: 2022-12-15
Attending: SURGERY | Admitting: SURGERY
Payer: COMMERCIAL

## 2022-12-15 ENCOUNTER — ANESTHESIA (OUTPATIENT)
Dept: GASTROENTEROLOGY | Facility: CLINIC | Age: 77
End: 2022-12-15
Payer: COMMERCIAL

## 2022-12-15 VITALS
OXYGEN SATURATION: 95 % | RESPIRATION RATE: 16 BRPM | DIASTOLIC BLOOD PRESSURE: 79 MMHG | SYSTOLIC BLOOD PRESSURE: 150 MMHG | HEART RATE: 63 BPM | TEMPERATURE: 98.1 F

## 2022-12-15 LAB
COLONOSCOPY: NORMAL
GLUCOSE BLDC GLUCOMTR-MCNC: 113 MG/DL (ref 70–99)

## 2022-12-15 PROCEDURE — 250N000009 HC RX 250: Performed by: NURSE ANESTHETIST, CERTIFIED REGISTERED

## 2022-12-15 PROCEDURE — 88305 TISSUE EXAM BY PATHOLOGIST: CPT | Mod: TC | Performed by: SURGERY

## 2022-12-15 PROCEDURE — 45385 COLONOSCOPY W/LESION REMOVAL: CPT | Mod: PT | Performed by: SURGERY

## 2022-12-15 PROCEDURE — 250N000011 HC RX IP 250 OP 636: Performed by: NURSE ANESTHETIST, CERTIFIED REGISTERED

## 2022-12-15 PROCEDURE — 250N000009 HC RX 250: Performed by: SURGERY

## 2022-12-15 PROCEDURE — 45380 COLONOSCOPY AND BIOPSY: CPT | Performed by: SURGERY

## 2022-12-15 PROCEDURE — 82962 GLUCOSE BLOOD TEST: CPT

## 2022-12-15 PROCEDURE — 370N000017 HC ANESTHESIA TECHNICAL FEE, PER MIN: Performed by: SURGERY

## 2022-12-15 PROCEDURE — 258N000003 HC RX IP 258 OP 636: Performed by: NURSE ANESTHETIST, CERTIFIED REGISTERED

## 2022-12-15 RX ORDER — ONDANSETRON 2 MG/ML
4 INJECTION INTRAMUSCULAR; INTRAVENOUS
Status: DISCONTINUED | OUTPATIENT
Start: 2022-12-15 | End: 2022-12-15 | Stop reason: HOSPADM

## 2022-12-15 RX ORDER — LIDOCAINE 40 MG/G
CREAM TOPICAL
Status: DISCONTINUED | OUTPATIENT
Start: 2022-12-15 | End: 2022-12-15 | Stop reason: HOSPADM

## 2022-12-15 RX ORDER — ONDANSETRON 4 MG/1
4 TABLET, ORALLY DISINTEGRATING ORAL EVERY 6 HOURS PRN
Status: DISCONTINUED | OUTPATIENT
Start: 2022-12-15 | End: 2022-12-15 | Stop reason: HOSPADM

## 2022-12-15 RX ORDER — FLUMAZENIL 0.1 MG/ML
0.2 INJECTION, SOLUTION INTRAVENOUS
Status: DISCONTINUED | OUTPATIENT
Start: 2022-12-15 | End: 2022-12-15 | Stop reason: HOSPADM

## 2022-12-15 RX ORDER — NALOXONE HYDROCHLORIDE 0.4 MG/ML
0.2 INJECTION, SOLUTION INTRAMUSCULAR; INTRAVENOUS; SUBCUTANEOUS
Status: DISCONTINUED | OUTPATIENT
Start: 2022-12-15 | End: 2022-12-15 | Stop reason: HOSPADM

## 2022-12-15 RX ORDER — SODIUM CHLORIDE, SODIUM LACTATE, POTASSIUM CHLORIDE, CALCIUM CHLORIDE 600; 310; 30; 20 MG/100ML; MG/100ML; MG/100ML; MG/100ML
INJECTION, SOLUTION INTRAVENOUS CONTINUOUS
Status: DISCONTINUED | OUTPATIENT
Start: 2022-12-15 | End: 2022-12-15 | Stop reason: HOSPADM

## 2022-12-15 RX ORDER — LIDOCAINE HYDROCHLORIDE 20 MG/ML
INJECTION, SOLUTION INFILTRATION; PERINEURAL PRN
Status: DISCONTINUED | OUTPATIENT
Start: 2022-12-15 | End: 2022-12-15

## 2022-12-15 RX ORDER — NALOXONE HYDROCHLORIDE 0.4 MG/ML
0.4 INJECTION, SOLUTION INTRAMUSCULAR; INTRAVENOUS; SUBCUTANEOUS
Status: DISCONTINUED | OUTPATIENT
Start: 2022-12-15 | End: 2022-12-15 | Stop reason: HOSPADM

## 2022-12-15 RX ORDER — PROPOFOL 10 MG/ML
INJECTION, EMULSION INTRAVENOUS PRN
Status: DISCONTINUED | OUTPATIENT
Start: 2022-12-15 | End: 2022-12-15

## 2022-12-15 RX ORDER — PROCHLORPERAZINE MALEATE 5 MG
5 TABLET ORAL EVERY 6 HOURS PRN
Status: DISCONTINUED | OUTPATIENT
Start: 2022-12-15 | End: 2022-12-15 | Stop reason: HOSPADM

## 2022-12-15 RX ORDER — PROPOFOL 10 MG/ML
INJECTION, EMULSION INTRAVENOUS CONTINUOUS PRN
Status: DISCONTINUED | OUTPATIENT
Start: 2022-12-15 | End: 2022-12-15

## 2022-12-15 RX ORDER — ONDANSETRON 2 MG/ML
4 INJECTION INTRAMUSCULAR; INTRAVENOUS EVERY 6 HOURS PRN
Status: DISCONTINUED | OUTPATIENT
Start: 2022-12-15 | End: 2022-12-15 | Stop reason: HOSPADM

## 2022-12-15 RX ADMIN — LIDOCAINE HYDROCHLORIDE 0.1 ML: 10 INJECTION, SOLUTION EPIDURAL; INFILTRATION; INTRACAUDAL; PERINEURAL at 09:27

## 2022-12-15 RX ADMIN — PROPOFOL 100 MG: 10 INJECTION, EMULSION INTRAVENOUS at 09:57

## 2022-12-15 RX ADMIN — SODIUM CHLORIDE, POTASSIUM CHLORIDE, SODIUM LACTATE AND CALCIUM CHLORIDE: 600; 310; 30; 20 INJECTION, SOLUTION INTRAVENOUS at 09:27

## 2022-12-15 RX ADMIN — LIDOCAINE HYDROCHLORIDE 50 MG: 20 INJECTION, SOLUTION INFILTRATION; PERINEURAL at 09:57

## 2022-12-15 RX ADMIN — PROPOFOL 150 MCG/KG/MIN: 10 INJECTION, EMULSION INTRAVENOUS at 09:57

## 2022-12-15 ASSESSMENT — ACTIVITIES OF DAILY LIVING (ADL)
ADLS_ACUITY_SCORE: 35
ADLS_ACUITY_SCORE: 35

## 2022-12-15 NOTE — ANESTHESIA CARE TRANSFER NOTE
Patient: Zaria Gaona    Procedure: Procedure(s):  COLONOSCOPY, WITH POLYPECTOMY       Diagnosis: Colon cancer screening [Z12.11]  Diagnosis Additional Information: No value filed.    Anesthesia Type:   MAC     Note:    Oropharynx: spontaneously breathing  Level of Consciousness: awake  Oxygen Supplementation: room air    Independent Airway: airway patency satisfactory and stable  Dentition: dentition unchanged  Vital Signs Stable: post-procedure vital signs reviewed and stable  Report to RN Given: handoff report given  Patient transferred to: Phase II    Handoff Report: Identifed the Patient, Identified the Reponsible Provider, Reviewed the pertinent medical history, Discussed the surgical course, Reviewed Intra-OP anesthesia mangement and issues during anesthesia, Set expectations for post-procedure period and Allowed opportunity for questions and acknowledgement of understanding      Vitals:  Vitals Value Taken Time   /64 12/15/22 1040   Temp     Pulse 51 12/15/22 1040   Resp 16 12/15/22 1025   SpO2 95 % 12/15/22 1044   Vitals shown include unvalidated device data.    Electronically Signed By: BEVERLY Macario CRNA  December 15, 2022  10:48 AM

## 2022-12-15 NOTE — INTERVAL H&P NOTE
I have verified the history with the patient and following examination, there are no changes to the history and physical examination. The patient is cleared for anesthesia for proposed surgical procedure.       Statement Selected

## 2022-12-15 NOTE — ANESTHESIA POSTPROCEDURE EVALUATION
Patient: Zaria Gaona    Procedure: Procedure(s):  COLONOSCOPY, WITH POLYPECTOMY       Anesthesia Type:  MAC    Note:  Disposition: Outpatient   Postop Pain Control: Uneventful            Sign Out: Well controlled pain   PONV: No   Neuro/Psych: Uneventful            Sign Out: Acceptable/Baseline neuro status   Airway/Respiratory: Uneventful            Sign Out: Acceptable/Baseline resp. status   CV/Hemodynamics: Uneventful            Sign Out: Acceptable CV status   Other NRE: NONE   DID A NON-ROUTINE EVENT OCCUR? No    Event details/Postop Comments:  Pt was happy with anesthesia care.  No complications.  I will follow up with the pt if needed.           Last vitals:  Vitals Value Taken Time   /64 12/15/22 1040   Temp     Pulse 51 12/15/22 1040   Resp 16 12/15/22 1025   SpO2 95 % 12/15/22 1044   Vitals shown include unvalidated device data.    Electronically Signed By: BEVERLY Macario CRNA  December 15, 2022  10:48 AM

## 2022-12-15 NOTE — ANESTHESIA PREPROCEDURE EVALUATION
"Anesthesia Pre-Procedure Evaluation    Patient: Zaria Gaona   MRN: [id number] : 1945        Procedure : Procedure(s):  COLONOSCOPY          Past Medical History:   Diagnosis Date     Essential hypertension      NSTEMI (non-ST elevated myocardial infarction) (H) 2020    2 stents     Type 2 diabetes mellitus without complication (H)       Past Surgical History:   Procedure Laterality Date     INJECT BLOCK MEDIAL BRANCH CERVICAL/THORACIC/LUMBAR Left 2022    Procedure: Left Lumbar 3, Left Lumbar 4, and Left Lumbar 5 medial branch nerve blocks using fluoroscopic guidance and contrast control;  Surgeon: Malick Gomez MD;  Location: [facility] OR     INJECT EPIDURAL LUMBAR Left 2022    Procedure: Left lumbar 4-5 interlaminar epidural injection ;  Surgeon: Malick Gomez MD;  Location: [facility] OR     INJECT JOINT SACROILIAC Left 2022    Procedure: Fluoroscopically-guided injection of the Left sacroiliac joint with Left dimitrios Sacroiliac joint ligaments infiltration over the sacrum.;  Surgeon: Malick Gomez MD;  Location: [facility] OR     VAGINAL PROLAPSE REPAIR      done in [place]      Allergies   Allergen Reactions     Ampicillin Other (See Comments), Hives and Itching     \"not that bad\"     Lisinopril Cough     Sulfamethoxazole W/Trimethoprim Other (See Comments), Hives and Itching     \"not that bad\"      Social History     Tobacco Use     Smoking status: Never     Smokeless tobacco: Never   Substance Use Topics     Alcohol use: Yes     Comment: occ      Wt Readings from Last 1 Encounters:   22 64.4 kg (142 lb)        Anesthesia Evaluation   Pt has had prior anesthetic. Type: MAC and General.    No history of anesthetic complications       ROS/MED HX  ENT/Pulmonary:  - neg pulmonary ROS     Neurologic:  - neg neurologic ROS     Cardiovascular: Comment: NSTEMI (non-ST elevated myocardial infarction    (+) hypertension--CAD -past MI --Previous cardiac testing   Echo: Date: 2021 " Results:  [facility]  Echocardiography Laboratory  [address] [place] [address]     Name: CHANG WILKERSON  MRN: [id number]  : 1945  Study Date: 2021 09:58 AM  Age: 76 yrs  Gender: Female  Patient Location: [facility]  Reason For Study: ST elevation myocardial infarction involving left anterior  desce  History: CAD, STENT, Diabetes  Ordering Physician: CANDIDA CLAIRE  Referring Physician: CANDIDA CLAIRE  Performed By: Rahel Staley     BSA: 1.7 m2  Height: 64 in  Weight: 138 lb  HR: 45  BP: 140/70 mmHg  ______________________________________________________________________________  Procedure  Complete Echo Adult.  ______________________________________________________________________________  Interpretation Summary     Left ventricular size, global systolic function are normal, estimated LVEF 55-  60%. Hypokinesis of the mid anteroseptal, apical septal wall segments.  Right ventricular global function is normal.  Trace to mild mitral regurgitation.  Trivial pericardial effusion  The inferior vena cava was normal in size with preserved respiratory  variability.     There are no prior studies available for comparison.  ______________________________________________________________________________  Left Ventricle  The left ventricle is normal in size. There is normal left ventricular wall  thickness. Left ventricular systolic function is normal. The visual ejection  fraction is 55-60%. Left ventricular diastolic function is normal. Hypokinesis  of the mid anteroseptal, apical septal wall segments.     Right Ventricle  The right ventricle is normal in structure, function and size.     Atria  Normal left atrial size. Right atrial size is normal.     Mitral Valve  There is trace to mild mitral regurgitation.     Tricuspid Valve  The tricuspid valve is not well visualized, but is grossly normal. There is  trace tricuspid regurgitation. Right ventricular systolic  pressure could not  be approximated due to inadequate tricuspid regurgitation.     Aortic Valve  The aortic valve is normal in structure and function.     Pulmonic Valve  The pulmonic valve is not well seen, but is grossly normal.     Vessels  The aortic root is normal size. Normal size ascending aorta. The inferior vena  cava was normal in size with preserved respiratory variability.     Pericardium  Trivial pericardial effusion. There are no echocardiographic indications of  cardiac tamponade.     ______________________________________________________________________________  MMode/2D Measurements & Calculations  IVSd: 1.1 cm  LVIDd: 4.6 cm  LVIDs: 2.7 cm  LVPWd: 1.0 cm  FS: 41.3 %  LV mass(C)d: 169.9 grams  LV mass(C)dI: 101.7 grams/m2     Ao root diam: 3.1 cm  LA dimension: 4.0 cm  asc Aorta Diam: 3.2 cm  LA/Ao: 1.3  RWT: 0.43     Doppler Measurements & Calculations  MV E max isidro: 72.2 cm/sec  MV A max isidro: 91.2 cm/sec  MV E/A: 0.79  MV dec slope: 256.0 cm/sec2  MV dec time: 0.28 sec  E/E' av.2  Lateral E/e': 12.3  Medial E/e': 12.1     ______________________________________________________________________________  Report approved by: Haleigh Doyle 2021 02:16 PM  Stress Test: Date: Results:    ECG Reviewed: Date: Results:    Cath: Date: Results:      METS/Exercise Tolerance:     Hematologic:       Musculoskeletal:  - neg musculoskeletal ROS     GI/Hepatic:       Renal/Genitourinary:  - neg Renal ROS     Endo:     (+) type II DM, Not using insulin, - not using insulin pump.     Psychiatric/Substance Use:  - neg psychiatric ROS     Infectious Disease:  - neg infectious disease ROS     Malignancy:  - neg malignancy ROS     Other:  - neg other ROS          Physical Exam    Airway  airway exam normal      Mallampati: II   TM distance: > 3 FB   Neck ROM: full   Mouth opening: > 3 cm    Respiratory Devices and Support         Dental  no notable dental history         Cardiovascular   cardiovascular exam  normal       Rhythm and rate: regular and normal     Pulmonary   pulmonary exam normal        breath sounds clear to auscultation           OUTSIDE LABS:  CBC:   Lab Results   Component Value Date    WBC 10.0 10/05/2022    WBC 11.1 (H) 09/25/2022    HGB 10.9 (L) 10/05/2022    HGB 10.9 (L) 09/25/2022    HCT 34.3 (L) 10/05/2022    HCT 33.8 (L) 09/25/2022     10/05/2022     09/25/2022     BMP:   Lab Results   Component Value Date     09/25/2022     09/08/2022    POTASSIUM 4.2 09/25/2022    POTASSIUM 5.3 09/08/2022    CHLORIDE 104 09/25/2022    CHLORIDE 107 09/08/2022    CO2 28 09/25/2022    CO2 26 09/08/2022    BUN 21 09/25/2022    BUN 27 09/08/2022    CR 0.84 09/25/2022    CR 0.86 09/08/2022     (H) 09/25/2022     (H) 09/08/2022     COAGS: No results found for: PTT, INR, FIBR  POC: No results found for: BGM, HCG, HCGS  HEPATIC:   Lab Results   Component Value Date    ALBUMIN 3.7 09/08/2022    PROTTOTAL 8.0 09/08/2022    ALT 15 09/08/2022    AST 9 09/08/2022    ALKPHOS 90 09/08/2022    BILITOTAL 0.3 09/08/2022     OTHER:   Lab Results   Component Value Date    A1C 7.5 (H) 09/08/2022    JOHANN 9.2 09/25/2022    MAG 1.8 09/25/2022    TSH 1.32 09/25/2022    CRP 6.3 10/05/2022    SED 23 09/25/2022       Anesthesia Plan    ASA Status:  3   NPO Status:  NPO Appropriate    Anesthesia Type: MAC.     - Reason for MAC: straight local not clinically adequate   Induction: Intravenous, Propofol.   Maintenance: TIVA.        Consents    Anesthesia Plan(s) and associated risks, benefits, and realistic alternatives discussed. Questions answered and patient/representative(s) expressed understanding.    - Discussed:     - Discussed with:  Patient      - Extended Intubation/Ventilatory Support Discussed: No.      - Patient is DNR/DNI Status: No    Use of blood products discussed: No .     Postoperative Care    Pain management: IV analgesics.   PONV prophylaxis: Ondansetron (or other 5HT-3),  Dexamethasone or Solumedrol     Comments:    Other Comments: The risks and benefits of anesthesia, and the alternatives where applicable, have been discussed with the patient, and they wish to proceed.            BEVERLY Macario CRNA

## 2022-12-15 NOTE — LETTER
Zaria Gaona  [address] [place] [id number]    December 22, 2022      Dear Zaria,  This letter is to inform you of the results of your pathology report from your colonoscopy.  Your pathology report was:  Final Diagnosis   A. Colon, descending, polypectomy:  --Tubular adenoma.  B. Colon, sigmoid, polypectomies:  --Tubular adenoma.   These are benign polyps. These types of polyps do carry a small risk of developing into a cancer over time if not removed. Yours were completely removed at the time of your colonoscopy. You should have another surveillance colonoscopy in 3 years.  If you have further questions please don t hesitate to call our clinic at [phone].   Sincerely,     Andres Trujillo, DO

## 2022-12-15 NOTE — DISCHARGE INSTRUCTIONS
[facility]    Home Care Following Endoscopy          Activity:  You have just undergone an endoscopic procedure usually performed with conscious sedation.  Do not work or operate machinery (including a car) for at least 12 hours.    I encourage you to walk and attempt to pass this air as soon as possible.    Diet:  Return to the diet you were on before your procedure but eat lightly for the first 12-24 hours.  Drink plenty of water.  Resume any regular medications unless otherwise advised by your physician.  Please begin any new medication prescribed as a result of your procedure as directed by your physician.   If you had any biopsy or polyp removed please refrain from aspirin or aspirin products for 2 days.  If on Coumadin please restart as instructed by your physician.   Pain:  You may take Tylenol as needed for pain.  Expected Recovery:  You can expect some mild abdominal fullness and/or discomfort due to the air used to inflate your intestinal tract. It is also normal to have a mild sore throat after upper endoscopy.    Call Your Physician if You Have:  After Upper Endoscopy:  Shoulder, back or chest pain.  Difficulty breathing or swallowing.  Vomiting blood.  After Colonoscopy:  Worsening persisting abdominal pain which is worse with activity.  Fevers (>101 degrees F), chills or shakes.  Passage of continued blood with bowel movements.   Any questions or concerns about your recovery, please call [phone] or after hours [phone] Nurse Advice Line.    Follow-up Care:  You did have polyps/biopsy tissue sample(s) removed.  The polyps/biopsy tissue sample(s) will be sent to pathology.  You should receive letter in your My Chart from Dr. Trujillo with your results within 1-2 weeks. If you do not participate in My Chart a physical letter will come in the mail in 2-3 weeks.  Please call if you have not received a notification of your results.

## 2022-12-16 LAB
PATH REPORT.COMMENTS IMP SPEC: NORMAL
PATH REPORT.COMMENTS IMP SPEC: NORMAL
PATH REPORT.FINAL DX SPEC: NORMAL
PATH REPORT.GROSS SPEC: NORMAL
PATH REPORT.MICROSCOPIC SPEC OTHER STN: NORMAL
PATH REPORT.RELEVANT HX SPEC: NORMAL
PHOTO IMAGE: NORMAL

## 2022-12-16 PROCEDURE — 88305 TISSUE EXAM BY PATHOLOGIST: CPT | Mod: 26 | Performed by: PATHOLOGY

## 2022-12-20 ENCOUNTER — TELEPHONE (OUTPATIENT)
Dept: INTERNAL MEDICINE | Facility: CLINIC | Age: 77
End: 2022-12-20

## 2022-12-20 DIAGNOSIS — M35.3 PMR (POLYMYALGIA RHEUMATICA) (H): Primary | ICD-10-CM

## 2022-12-20 RX ORDER — PREDNISONE 10 MG/1
10 TABLET ORAL DAILY
Qty: 30 TABLET | Refills: 0 | Status: SHIPPED | OUTPATIENT
Start: 2022-12-20 | End: 2023-03-27

## 2022-12-20 NOTE — TELEPHONE ENCOUNTER
She can go back on prednisone 10 mg a day I have sent a prescription for 30 pills but she should schedule follow-up with me to discuss how to wean her off.

## 2022-12-20 NOTE — TELEPHONE ENCOUNTER
S: Plymyalgia rheumatica pain    B: Patient is calling in asking Dr. Vargas's advice. Patient was seen on 11/16/22. Had been on 10mg of Prednisone. She was decreased to 5mg daily and then down to 2.5mg daily. She did have increased pain so was advised on 12/5 to continue 2.5mg daily for 1 month. Patient is stating today she feels like she is almost back to the beginning of where she started. She is having increased pain. Can hardly walk in the mornings, hard to turn over in bed. By evening it is the same thing. She is asking for providers advice. Can her Prednisone be increased back up? Does he need to see her? Does she need to try another medication?     A: Advised patient that message will be sent to provider for recommendations.     R: Patient verbalized understanding and is agreeable. She will await a call back with instructions.

## 2022-12-20 NOTE — DISCHARGE SUMMARY
[facility]    Outpatient Physical Therapy Discharge Note  Patient: Zaria Gaona  : 1945    Beginning/End Dates of Reporting Period:  5 sessions 10- to 2022    Referring Provider: Piedad PAUL CNP    Therapy Diagnosis: Low back pain with symptoms into shoulders     Client Self Report: At the time of the last session, she reported:   Not taking breaks increased symptoms at the end of the day. TNS helps 20-30 minute.    Objective Measurements:   2 minute walk test:448 feet and 7 inches     Outcome Measures (most recent score):  Woodrow STarT Sub-Score (Q5-9): 3  Woodrow STarT Total Score (all 9): 6       Goals:  Goal Identifier Walking   Goal Description Claribel will be able to walk x 4 blocks with rest as needed to calm symptoms   Target Date 22   Date Met      Progress (detail required for progress note):       Goal Identifier Pain level   Goal Description Claribel would like to get rid of her pain and be able to get off Prednisone   Target Date 22   Date Met      Progress (detail required for progress note):       Goal Identifier Driving   Goal Description Claribel will be able to comfortably drive using positioning and rest breaks so she and her  can visit (up to 5 and 1/2 hours to get to family at max).   Target Date 22   Date Met      Progress (detail required for progress note):       Goal Identifier Housework   Goal Description Claribel will be able to complete her house work especially vacuuming and    Target Date 22   Date Met      Progress (detail required for progress note):       Plan:  Discharge from therapy.    Discharge:    Reason for Discharge: Patient has failed to schedule further appointments.     Equipment Issued: none    Discharge Plan: Patient to continue home program.

## 2022-12-23 ENCOUNTER — NURSE TRIAGE (OUTPATIENT)
Dept: INTERNAL MEDICINE | Facility: CLINIC | Age: 77
End: 2022-12-23

## 2022-12-23 NOTE — TELEPHONE ENCOUNTER
Patient calling regarding R eye redness on white of eye. She stated it started last night around 7:30-8. Denies any pain. States eye is a little dry or itchy.     RN reviewed red flag symptoms with patient and when to see emergency care. Patient agreed and understood. RN advised patient on home care options or suggested she could contact her eye clinic as well. Instructed patient if symptoms worsen or new symptoms develop she should call or go to an UC. Patient understood.     CASIMIRO Leiva, RN         Reason for Disposition    Red eye is part of a cold, and no eye pain or blurred vision    Additional Information    Negative: Chemical got in the eye    Negative: Piece of something got in the eye    Negative: Followed an eye injury    Negative: Yellow or green pus in the eyes    Negative: SEVERE eye pain    Negative: Recent eye surgery and has increasing eye pain    Negative: Patient sounds very sick or weak to the triager    Negative: MODERATE eye pain or discomfort (e.g., interferes with normal activities or awakens from sleep; more than mild)    Negative: Looking at light causes MODERATE to SEVERE eye pain (i.e., photophobia)    Negative: New or worsening blurred vision    Negative: Cloudy spot or sore seen on the cornea (clear part of the eye)    Negative: Eyelids are very swollen (shut or almost)    Negative: Eyelid (outer) is very red    Negative: Vomiting    Negative: Foreign body sensation ('feels like something is in there')    Negative: Patient wants to be seen    Negative: Eye pain present > 24 hours    Negative: Bleeding on white of the eye and is taking Coumadin or known bleeding disorder (e.g., thrombocytopenia)    Negative: Only 1 eye is red, and persists > 48 hours    Negative: Red eyes present > 7 days    Negative: Bleeding on white of the eye    Negative: Red eye caused by sunscreen, smoke, smog, chlorine, food, soap or other mild irritant    Negative: Red eye caused by contact lens, and  no eye pain or blurred vision    Protocols used: EYE - RED WITHOUT PUS-A-OH

## 2023-01-09 ENCOUNTER — TELEPHONE (OUTPATIENT)
Dept: INTERNAL MEDICINE | Facility: CLINIC | Age: 78
End: 2023-01-09

## 2023-01-09 DIAGNOSIS — E11.00 TYPE 2 DIABETES MELLITUS WITH HYPEROSMOLARITY WITHOUT COMA, WITHOUT LONG-TERM CURRENT USE OF INSULIN (H): ICD-10-CM

## 2023-01-09 RX ORDER — LANCETS
EACH MISCELLANEOUS
Qty: 1 EACH | Refills: 3 | Status: SHIPPED | OUTPATIENT
Start: 2023-01-09 | End: 2023-09-29

## 2023-01-09 NOTE — TELEPHONE ENCOUNTER
Patient was sent a new accu check machine and its called the Guide Me machine.  This device is new for her but she also needs the test strips and lancets too.  She states usually gets a years worth.      [facility] by mail [facility].    Xochilt Rodriguez RN  [facility] ~ Registered Nurse  Clinic Triage ~ [place] [facility] [place]  January 9, 2023

## 2023-01-12 ENCOUNTER — OFFICE VISIT (OUTPATIENT)
Dept: PALLIATIVE MEDICINE | Facility: CLINIC | Age: 78
End: 2023-01-12
Payer: COMMERCIAL

## 2023-01-12 VITALS — SYSTOLIC BLOOD PRESSURE: 126 MMHG | HEART RATE: 65 BPM | DIASTOLIC BLOOD PRESSURE: 73 MMHG

## 2023-01-12 DIAGNOSIS — M47.896 OTHER SPONDYLOSIS, LUMBAR REGION: ICD-10-CM

## 2023-01-12 DIAGNOSIS — M25.512 CHRONIC PAIN OF BOTH SHOULDERS: ICD-10-CM

## 2023-01-12 DIAGNOSIS — M25.511 CHRONIC PAIN OF BOTH SHOULDERS: ICD-10-CM

## 2023-01-12 DIAGNOSIS — G89.29 CHRONIC PAIN OF BOTH SHOULDERS: ICD-10-CM

## 2023-01-12 DIAGNOSIS — M62.838 MUSCLE SPASM: ICD-10-CM

## 2023-01-12 DIAGNOSIS — M79.18 MYOFASCIAL PAIN: ICD-10-CM

## 2023-01-12 DIAGNOSIS — M35.3 POLYMYALGIA RHEUMATICA (H): Primary | ICD-10-CM

## 2023-01-12 PROCEDURE — 99215 OFFICE O/P EST HI 40 MIN: CPT

## 2023-01-12 RX ORDER — METHOCARBAMOL 500 MG/1
500-1000 TABLET, FILM COATED ORAL 4 TIMES DAILY PRN
Qty: 240 TABLET | Refills: 1 | Status: SHIPPED | OUTPATIENT
Start: 2023-01-12 | End: 2023-10-11

## 2023-01-12 ASSESSMENT — PAIN SCALES - GENERAL: PAINLEVEL: MILD PAIN (3)

## 2023-01-12 NOTE — PATIENT INSTRUCTIONS
1.  Pain Physical Therapy:  Continue activity as tolerated at home. We may consider referring to pain PT in the future, pending outcome/recommendations from other therapies rheumatology consult.               2.  Pain Psychologist to address relaxation, behavioral change, coping style, and other factors important to improvement.  NO - We may consider referring to Anum Castillo in the future if needed.               3.  Diagnostic Studies:  Reviewed lumbar MRI from 4/25/22 at last visit.               4.  Medication Management:   Increase methocarbamol 500-1000 mg up to four times daily as needed for muscle pain. Be careful driving until you know how this medication affects you. Avoid alcohol when you take this medication.   Apply diclofenac gel to both shoulders 3-4 times daily. This medication works best when used consistently, may take 1-2 weeks to appreciate benefit.   I will message your primary care provider again about possible NSAID after current steroid course is complete, also update about rheumatology referral.               5.  Potential procedures: None                6.  Referrals:              7.  Follow up with BEVERLY Lanier CNP in 8-12 weeks, in person or video visit is okay (patient preference)     ----------------------------------------------------------------  Clinic Number:  [phone]   Call with any questions about your care and for scheduling assistance.   Calls are returned Monday through Friday between 8 AM and 4:30 PM. We usually get back to you within 2 business days depending on the issue/request.    If we are prescribing your medications:  For opioid medication refills, call the clinic or send a [employer] message 7 days in advance.  Please include:  Name of requested medication  Name of the pharmacy.  For non-opioid medications, call your pharmacy directly to request a refill. Please allow 3-4 days to be processed.   Per [place] State Law:  All controlled substance  prescriptions must be filled within 30 days of being written.    For those controlled substances allowing refills, pickup must occur within 30 days of last fill.      We believe regular attendance is key to your success in our program!    Any time you are unable to keep your appointment we ask that you call us at least 24 hours in advance to cancel.This will allow us to offer the appointment time to another patient.   Multiple missed appointments may lead to dismissal from the clinic.

## 2023-01-12 NOTE — PROGRESS NOTES
[facility]     Date of visit: 1/12/2023      Assessment:   Zaria Gaona is a 77 year old female with a past medical history significant for HTN, NSTEMI (12/2020), DM 2, polymyalgia rheumatica, who presents with complaints of widespread pain affecting multiple sites, low back pain.      1. Widespread myofascial pain - She experienced onset of diffuse myofascial pain in February 2022, with progressive worsening of symptoms that were severely debilitating. This lead to an ED visit on 9/25/22, when she was diagnosed with polymyalgia rheumatica. Of note, her PCP has been prescribing oral steroids for pain control (reports she is currently taking 3rd course), which she has found very helpful for pain. We discussed possible strategies to target inflammatory pain, specifically NSAIDS, in the long-term versus referral to rheumatology for further evaluation and possible treatments to target polymyalgia rheumatica.   2. Low back pain - I suspect etiology of pain is associated with multiple factors, including underlying degenerative changes of the lumbar spine, significant myofascial component, for which polymyalgia rheumatica is likely contributing factor to some extent.   3. Mental health - I suspect she has some underlying depression/anxiety component to a certain extent that may be contributing to her chronic pain experience. I think it is important to support mental health as part of the chronic pain treatment plan and may consider referring to pain psychology in the future if indicated.    Visit Diagnoses:  1. Polymyalgia rheumatica (H)    2. Other spondylosis, lumbar region    3. Muscle spasm    4. Myofascial pain    5. Chronic pain of both shoulders        Plan:  Diagnosis reviewed, treatment option addressed, and risk/benifits discussed.  Self-care instructions given.  I am recommending a multidisciplinary treatment plan to help this patient better manage their pain.                  1.   Pain Physical Therapy:  Continue activity as tolerated at home. We may consider referring to pain PT in the future, pending outcome/recommendations from other therapies rheumatology consult.               2.  Pain Psychologist to address relaxation, behavioral change, coping style, and other factors important to improvement.  NO - We may consider referring to Anum Castillo in the future if needed.               3.  Diagnostic Studies:  Reviewed lumbar MRI from 4/25/22 at last visit.               4.  Medication Management:   4. Increase methocarbamol 500-1000 mg up to four times daily as needed for muscle pain. Be careful driving until you know how this medication affects you. Avoid alcohol when you take this medication.   5. Apply diclofenac gel to both shoulders 3-4 times daily. This medication works best when used consistently, may take 1-2 weeks to appreciate benefit.   6. I sent a message to her PCP Dr. Vargas again about possible NSAID after current steroid course is complete, also update about rheumatology referral.               5.  Potential procedures: None                6.  Referrals/other orders:     1. Rheumatology for further evaluation/treatment recommendations for polymyalgia rheumatica.     2. Continue TENS unit. Recommend more frequent use throughout day to target cervical myofascial pain/tightness.               7.  Follow up with BEVERLY Lanier CNP in 8-12 weeks, in person or video visit is okay (patient preference)     Review of Electronic Chart: Today I have also reviewed available medical information in the patient's medical record at [facility] and Care Everywhere (if available), including relevant provider notes, laboratory work, and imaging.     Piedad Bourgeois DNP, APRN, AGNP-C  [facility] Pain Management     -------------------------------------------------    Subjective:    Chief complaint:   Chief Complaint   Patient presents with     Pain       Interval  history:  Zaria Gaona is a 77 year old female last seen on 10/13/22.  They are a patient of mine seen in follow up.     Recommendations/plan at the last visit included:              1.  Pain Physical Therapy:  YES   - I am referring for stretching, strengthening, and home exercise plan.               2.  Pain Psychologist to address relaxation, behavioral change, coping style, and other factors important to improvement.  NO - We may consider referring to Anum Castillo in the future if needed.               3.  Diagnostic Studies:  I reviewed lumbar MRI from 4/25/22.               4.  Medication Management:   7. I am prescribing methocarbamol 500 mg up to four times daily as needed for muscle pain and spasms.  8. I will message your primary care provider about adding Celebrex (NSAID) to your treatment regimen once steroid course is completed.               5.  Potential procedures: None                6.  Referrals:              7.  Follow up with BEVERLY Lanier CNP in 4-6 weeks, via video or in person     Since her last visit, Zaria Gaona reports:  -Her pain has been okay since last visit, perhaps somewhat better baseline.   -Started on methocarbamol at last visit, 500 mg QID PRN, thinks this is somewhat helpful but does not take much.   -No side effects from medication.   -Referred to pain PT last visit, completed one visit only due to out of pocket cost (high co-pay for every visit).   -She reports long standing relationship with PT provider, seen them intermittently since 2007, and he was able to provide her with adequate exercises, which she performs at home frequently.   -Discussed possible NSAID after steroid course after last visit (I messaged Dr. Vargas about NSAID and possible rheumatology referral).   -She is currently on prednisone, has follow up with PCP Dr. Vargas next week, states plan is to start taper.   -She is very concerned about stopping prednisone, as her pain has increased  "after completion of courses in the past.   -She has not seen rheumatology, interested in referral.   -TENS unit ordered at last visit, was able to obtain device and trial over the past few months.   -TENS is helpful but temporary benefit.     HPI from initial visit with me on 10/13/22:  Zaria Gaona is a 77 year old female presents with a chief complaint of widespread myalgias, mid to low back pain, LLE pain (mostly in thigh).   -Goes by \"[employer]\"  The current pain episode has been present since february.    The pain is Moderate Pain (4) in severity reported at today's visit.    Severity/Intensity: 2/10 at best, 9/10 at worst, 4/10 on average  The pain is described as aching, dull, radiating, worse at night.   The pain is alleviated by lying down/rest.    It is exacerbated by siting, bending.    Modalities that have been utilized in the past which were helpful include PT, injections.    Things that were not helpful, but tried ,include PT, injections, chiropractor (1 session).    The patient has never tried TENS, pain PT.     -She has been dealing with widespread muscle pain for 8-9 months,   -She reports she has taken 3 courses of steroids to help with pain and inflammation.   -She went to ED on 9/25/22 due to progressively worsening pain and symptoms.  -She was diagnosed with polymyalgia rheumatica based on ED workup/findings.      The patient otherwise denies bowel or bladder incontinence, parasthesias, weakness, saddle anesthesia, unintentional weight loss, or fever/chills/sweats.      Zaria Gaona has been seen at a pain clinic in the past.  She is seeing Dr. Gomez in [place] for procedures: left L4-5 REJI on 5/20/22 and not helpful, left LMBB on 8/11/22 and not helpful, left SI joint on  7/1/22 and was not helpful.    Pain Information:   Pain rating: averages 3/10 on a 0-10 scale.      Current Pain Treatments:       Medications:               Acetaminophen              PO steroids               " Methocarbamol 500 mg QID PRN    Other therapies:               Pain PT              TENS unit       Current MME: 0    Review of [place] Prescription Monitoring Program (): No concern for abuse or misuse of controlled medications based on this report. Reviewed - appears appropriate.     Past pain treatments:  Injections -               1. left L4-5 REJI on 5/20/22 not helpful              2. left LMBB on 8/11/22 not helpful              3. left SI joint on 7/1/22 not helpful      Medications:  Current Outpatient Medications   Medication Sig Dispense Refill     acetaminophen (TYLENOL) 325 MG tablet Take 650 mg by mouth       alcohol swab prep pads Use to swab area of injection/shana as directed 100 each 3     amLODIPine (NORVASC) 2.5 MG tablet Take 1 tablet (2.5 mg) by mouth daily 90 tablet 3     Aspirin Buf,CaCarb-MgCarb-MgO, 81 MG TABS Take 81 mg by mouth       atorvastatin (LIPITOR) 80 MG tablet Take 1 tablet (80 mg) by mouth daily 90 tablet 3     bisacodyl (DULCOLAX) 5 MG EC tablet Take 2 tablets at 3 pm the day before your procedure. If your procedure is before 11 am, take 2 additional tablets at 11 pm. If your procedure is after 11 am, take 2 additional tablets at 6 am. For additional instructions refer to your colonoscopy prep instructions. 4 tablet 0     blood glucose (NO BRAND SPECIFIED) test strip Use to test blood sugar 1 times daily or as directed. To accompany: Blood Glucose Monitor Brands: per insurance. 100 strip 6     blood glucose calibration (NO BRAND SPECIFIED) solution Use to calibrate blood glucose monitor as needed as directed. To accompany: Blood Glucose Monitor Brands: per insurance. 1 Bottle 3     carvedilol (COREG) 6.25 MG tablet Take 1 tablet (6.25 mg) by mouth 2 times daily 180 tablet 3     diclofenac (VOLTAREN) 1 % topical gel Apply 4 g topically 4 times daily 350 g 1     latanoprost (XALATAN) 0.005 % ophthalmic solution Inject 1 drop into the eye daily       levothyroxine  (SYNTHROID/LEVOTHROID) 75 MCG tablet Take 1 tablet (75 mcg) by mouth daily 90 tablet 3     losartan (COZAAR) 100 MG tablet Take 1 tablet (100 mg) by mouth daily 90 tablet 3     metFORMIN (GLUCOPHAGE) 500 MG tablet Take 2 tablets (1,000 mg) by mouth 2 times daily (with meals) 360 tablet 1     methocarbamol (ROBAXIN) 500 MG tablet Take 1-2 tablets (500-1,000 mg) by mouth 4 times daily as needed for muscle spasms 240 tablet 1     omeprazole (PRILOSEC) 20 MG DR capsule Take 1 capsule (20 mg) by mouth daily 90 capsule 2     polyethylene glycol (GOLYTELY) 236 g suspension The night before the exam at 6 pm drink an 8-ounce glass every 15 minutes until the jug is half empty. If you arrive before 11 AM: Drink the other half of the Golytely jug at 11 PM night before procedure. If you arrive after 11 AM: Drink the other half of the Golytely jug at 6 AM day of procedure. For additional instructions refer to your colonoscopy prep instructions. 4000 mL 0     predniSONE (DELTASONE) 10 MG tablet Take 1 tablet (10 mg) by mouth daily 30 tablet 0     thin (NO BRAND SPECIFIED) lancets Use to test blood sugar 1 times daily or as directed. To accompany: Blood Glucose Monitor Brands: per insurance. 1 each 3     timolol hemihydrate (BETIMOL) 0.25 % ophthalmic solution Inject 1 drop into the eye daily       nitroGLYcerin (NITROSTAT) 0.4 MG sublingual tablet Place 0.4 mg under the tongue as needed  (Patient not taking: Reported on 10/11/2022)       predniSONE (DELTASONE) 5 MG tablet Take 0.5 tablets (2.5 mg) by mouth daily (Patient not taking: Reported on 1/12/2023) 15 tablet 0       Medical History: any changes in medical history since they were last seen? No      Objective:    Physical Exam:  Blood pressure 126/73, pulse 65, not currently breastfeeding.  Constitutional: Well developed, well nourished, appears stated age.  Gait: Antalgic (subtle today)  HEENT: Head atraumatic, normocephalic. Eyes without conjunctival injection or jaundice.  Oropharynx clear. Neck supple. No obvious neck masses.  Skin: No rash, lesions, or petechiae of exposed skin.   Psychiatric/mental status: Alert, without lethargy or stupor. Speech fluent. Appropriate affect. Mood normal. Able to follow commands without difficulty.     Imaging:  None     BILLING TIME DOCUMENTATION:   The total TIME spent on this patient on the date of the encounter/appointment was 41 minutes.      TOTAL TIME includes:   Time spent preparing to see the patient (reviewing records and tests)   Time spent face to face (or over the phone) with the patient   Time spent ordering tests, medications, procedures and referrals   Time spent Referring and communicating with other healthcare professionals   Time spent documenting clinical information in Epic

## 2023-01-18 ENCOUNTER — TELEPHONE (OUTPATIENT)
Dept: INTERNAL MEDICINE | Facility: CLINIC | Age: 78
End: 2023-01-18

## 2023-01-18 ENCOUNTER — OFFICE VISIT (OUTPATIENT)
Dept: INTERNAL MEDICINE | Facility: CLINIC | Age: 78
End: 2023-01-18
Payer: COMMERCIAL

## 2023-01-18 VITALS
DIASTOLIC BLOOD PRESSURE: 58 MMHG | BODY MASS INDEX: 24.07 KG/M2 | RESPIRATION RATE: 10 BRPM | SYSTOLIC BLOOD PRESSURE: 116 MMHG | HEART RATE: 60 BPM | WEIGHT: 141 LBS | OXYGEN SATURATION: 96 % | TEMPERATURE: 98.4 F | HEIGHT: 64 IN

## 2023-01-18 DIAGNOSIS — I10 HYPERTENSION, UNSPECIFIED TYPE: ICD-10-CM

## 2023-01-18 DIAGNOSIS — E11.9 TYPE 2 DIABETES MELLITUS WITHOUT COMPLICATION, WITHOUT LONG-TERM CURRENT USE OF INSULIN (H): Primary | ICD-10-CM

## 2023-01-18 DIAGNOSIS — Z20.822 EXPOSURE TO 2019 NOVEL CORONAVIRUS: ICD-10-CM

## 2023-01-18 DIAGNOSIS — E78.5 HYPERLIPIDEMIA LDL GOAL <100: ICD-10-CM

## 2023-01-18 DIAGNOSIS — M35.3 PMR (POLYMYALGIA RHEUMATICA) (H): ICD-10-CM

## 2023-01-18 LAB — SARS-COV-2 RNA RESP QL NAA+PROBE: NEGATIVE

## 2023-01-18 PROCEDURE — U0005 INFEC AGEN DETEC AMPLI PROBE: HCPCS | Performed by: INTERNAL MEDICINE

## 2023-01-18 PROCEDURE — U0003 INFECTIOUS AGENT DETECTION BY NUCLEIC ACID (DNA OR RNA); SEVERE ACUTE RESPIRATORY SYNDROME CORONAVIRUS 2 (SARS-COV-2) (CORONAVIRUS DISEASE [COVID-19]), AMPLIFIED PROBE TECHNIQUE, MAKING USE OF HIGH THROUGHPUT TECHNOLOGIES AS DESCRIBED BY CMS-2020-01-R: HCPCS | Performed by: INTERNAL MEDICINE

## 2023-01-18 PROCEDURE — 99214 OFFICE O/P EST MOD 30 MIN: CPT | Mod: CS | Performed by: INTERNAL MEDICINE

## 2023-01-18 RX ORDER — ETHACRYNIC ACID 25 MG/1
25 TABLET ORAL DAILY
Qty: 90 TABLET | Refills: 3 | Status: SHIPPED | OUTPATIENT
Start: 2023-01-18 | End: 2024-01-16

## 2023-01-18 RX ORDER — ETHACRYNIC ACID 25 MG/1
25 TABLET ORAL DAILY
Qty: 90 TABLET | Refills: 3 | Status: SHIPPED | OUTPATIENT
Start: 2023-01-18 | End: 2023-01-18

## 2023-01-18 RX ORDER — PREDNISONE 5 MG/1
TABLET ORAL
Qty: 75 TABLET | Refills: 0 | Status: SHIPPED | OUTPATIENT
Start: 2023-01-18 | End: 2023-03-27

## 2023-01-18 NOTE — PROGRESS NOTES
Assessment & Plan     Diabetes mellitus, type 2 (H)  We will check a future A1c under continue her metformin.  Hopefully as her prednisone goes down her diabetes will get better.    PMR (polymyalgia rheumatica) (H)  Polymyalgia rheumatica we tried to wean her down get down to 2.5 but symptoms came back so she has been on 10 mg we will get her down to 7.5 then 5 mg and then will have to go down by 1 mg each month from 5 mg.  - predniSONE (DELTASONE) 5 MG tablet; 1.5 pills 7.5 mg a day for 30 days, then 5 mg a day for 30 days.    Hypertension, unspecified type  Pressures controlled we will refill her diuretic to help control her fluid.  - ethacrynic acid (EDECRIN) 25 MG tablet; Take 1 tablet (25 mg) by mouth daily    Hyperlipidemia LDL goal <100  Lipids are treated and continue her statin.    Cold-like symptoms and exposure to COVID with her  being in the hospital we did do a COVID test today.             No follow-ups on file.    Wang Vargas MD  [facility]    Boris Enciso is a 77 year old, presenting for the following health issues:  Medication Problem      HPI     Diabetes Follow-up    How often are you checking your blood sugar? One time daily  What time of day are you checking your blood sugars (select all that apply)?  Before meals  Have you had any blood sugars above 200?  Yes last weekend was 294  Have you had any blood sugars below 70?  No    What symptoms do you notice when your blood sugar is low?  Dizzy    What concerns do you have today about your diabetes? Other: recent high reading     Do you have any of these symptoms? (Select all that apply)  No numbness or tingling in feet.  No redness, sores or blisters on feet.  No complaints of excessive thirst.  No reports of blurry vision.  No significant changes to weight.    Have you had a diabetic eye exam in the last 12 months? Yes- Date of last eye exam: 10/2023 ,  Location: [facility]  "Follow-Up      Are you regularly taking any medication or supplement to lower your cholesterol?   Yes- atorvastatin    Are you having muscle aches or other side effects that you think could be caused by your cholesterol lowering medication?  No    Hypertension Follow-up      Do you check your blood pressure regularly outside of the clinic? No     Are you following a low salt diet? No    Are your blood pressures ever more than 140 on the top number (systolic) OR more   than 90 on the bottom number (diastolic), for example 140/90? No    BP Readings from Last 2 Encounters:   01/18/23 116/58   01/12/23 126/73     Hemoglobin A1C (%)   Date Value   09/08/2022 7.5 (H)   02/25/2022 7.6 (H)   05/17/2021 7.0 (H)   05/05/2021 7.1 (H)     LDL Cholesterol Calculated (mg/dL)   Date Value   09/08/2022 45   09/21/2021 40   04/09/2021 44       She had a cold and  has covid and is in the hospital. No fever, no loss of taste and smell. She is vaccinated.      PMR and on prednisone at 10mg, tried to wean down and symptoms came back when down to 2.5 mg.    BP is doing ok     Sugar was high on Saturday when she was dizzy, 294. Up and down at times with prednisone and illness. Last hgba1c of 7.5 in September.     Review of Systems         Objective    /58   Pulse 60   Temp 98.4  F (36.9  C)   Resp 10   Ht 1.62 m (5' 3.78\")   Wt 64 kg (141 lb)   SpO2 96%   BMI 24.37 kg/m    Body mass index is 24.37 kg/m .  Physical Exam   GENERAL: healthy, alert and no distress  NECK: no adenopathy, no asymmetry, masses, or scars and thyroid normal to palpation  RESP: lungs clear to auscultation - no rales, rhonchi or wheezes  CV: regular rate and rhythm, normal S1 S2, no S3 or S4, no murmur, click or rub, no peripheral edema and peripheral pulses strong  ABDOMEN: soft, nontender, no hepatosplenomegaly, no masses and bowel sounds normal  MS: no gross musculoskeletal defects noted, no edema                    "

## 2023-01-18 NOTE — TELEPHONE ENCOUNTER
Patient seen in clinic today.   Ethacrynic acid sent to [facility]. Patient requesting this go to [facility] by mail.     Resent prescription to requested pharmacy.     Lolly SHEARERN, RN  [facility]

## 2023-01-20 ENCOUNTER — TELEPHONE (OUTPATIENT)
Dept: INTERNAL MEDICINE | Facility: CLINIC | Age: 78
End: 2023-01-20

## 2023-01-20 ENCOUNTER — VIRTUAL VISIT (OUTPATIENT)
Dept: INTERNAL MEDICINE | Facility: CLINIC | Age: 78
End: 2023-01-20
Payer: COMMERCIAL

## 2023-01-20 ENCOUNTER — VIRTUAL VISIT (OUTPATIENT)
Dept: URGENT CARE | Facility: CLINIC | Age: 78
End: 2023-01-20
Payer: COMMERCIAL

## 2023-01-20 DIAGNOSIS — U07.1 INFECTION DUE TO 2019 NOVEL CORONAVIRUS: Primary | ICD-10-CM

## 2023-01-20 DIAGNOSIS — U07.1 COVID-19: Primary | ICD-10-CM

## 2023-01-20 PROCEDURE — 99443 PR PHYSICIAN TELEPHONE EVALUATION 21-30 MIN: CPT | Mod: CS

## 2023-01-20 PROCEDURE — 99441 PR PHYSICIAN TELEPHONE EVALUATION 5-10 MIN: CPT | Performed by: INTERNAL MEDICINE

## 2023-01-20 NOTE — TELEPHONE ENCOUNTER
Patient was seen by you on 1/18/23. She tested Negative for Covid then. Her  was hospitalized and had covid. He came home Wednesday afternoon. She tested positive this morning with home test. She reports cough, fatigue, nasal congestion, sore throat, headache, runny nose. RN unable to complete protocol due to being on Amlodipine. Patient was scheduled with Virtual Urgent Care provider this afternoon but would like a message sent to PCP asking if he can prescribe it instead if possible. She will keep the appointment if he does not get back to her before 3:30pm.

## 2023-01-20 NOTE — TELEPHONE ENCOUNTER
RN COVID TREATMENT VISIT  01/20/23    Zaria Gaona  77 year old  Current weight? 141#    Has the patient been seen by a primary care provider at an [facility] or [facility] within the past two years? Yes.   Have you been in close proximity to/do you have a known exposure to a person with a confirmed case of influenza? No.     Date of positive COVID test (PCR or at home)?  1/20/23    Current COVID symptoms: cough, fatigue, headache, sore throat and congestion or runny nose    Date COVID symptoms began: 1/17/23    Do you have any of the following conditions that place you at risk of being very sick from COVID-19? 65 years or older    Is patient eligible to continue? Yes, established patient, 12 years or older weighing at least 88.2 lbs, who has COVID symptoms that started in the past 5 days and is at risk for being very sick from COVID-19.       Have you received monoclonal antibodies or oral antiviral medications since testing positive to COVID-19? No    Are you currently hospitalized for COVID-19? No    Do you have a history of hepatitis? No    Are you currently pregnant or nursing? No    Do you have a clinically significant hypersensitivity to nirmatrelvir, ritonavir, or molnupiravir? No    Do you have any history of severe renal impairment (eGFR < 30mL/min)? No    Do you have any history of hepatic impairment or abnormalities (e.g. hepatic panel, ALT, AST, ALK Phos, bilirubin)? No    Have you had a coronary stent placed in the previous 6 months? No    Is patient eligible to continue?   Yes, patient meets all eligibility requirements for the RN COVID treatment (as denoted by all no responses above).     Current Outpatient Medications   Medication Sig Dispense Refill     acetaminophen (TYLENOL) 325 MG tablet Take 650 mg by mouth       alcohol swab prep pads Use to swab area of injection/shana as directed 100 each 3     amLODIPine (NORVASC) 2.5 MG tablet Take 1 tablet (2.5 mg) by  mouth daily 90 tablet 3     Aspirin Buf,CaCarb-MgCarb-MgO, 81 MG TABS Take 81 mg by mouth       atorvastatin (LIPITOR) 80 MG tablet Take 1 tablet (80 mg) by mouth daily 90 tablet 3     bisacodyl (DULCOLAX) 5 MG EC tablet Take 2 tablets at 3 pm the day before your procedure. If your procedure is before 11 am, take 2 additional tablets at 11 pm. If your procedure is after 11 am, take 2 additional tablets at 6 am. For additional instructions refer to your colonoscopy prep instructions. 4 tablet 0     blood glucose (NO BRAND SPECIFIED) test strip Use to test blood sugar 1 times daily or as directed. To accompany: Blood Glucose Monitor Brands: per insurance. 100 strip 6     blood glucose calibration (NO BRAND SPECIFIED) solution Use to calibrate blood glucose monitor as needed as directed. To accompany: Blood Glucose Monitor Brands: per insurance. 1 Bottle 3     carvedilol (COREG) 6.25 MG tablet Take 1 tablet (6.25 mg) by mouth 2 times daily 180 tablet 3     diclofenac (VOLTAREN) 1 % topical gel Apply 4 g topically 4 times daily 350 g 1     ethacrynic acid (EDECRIN) 25 MG tablet Take 1 tablet (25 mg) by mouth daily 90 tablet 3     latanoprost (XALATAN) 0.005 % ophthalmic solution Inject 1 drop into the eye daily       levothyroxine (SYNTHROID/LEVOTHROID) 75 MCG tablet Take 1 tablet (75 mcg) by mouth daily 90 tablet 3     losartan (COZAAR) 100 MG tablet Take 1 tablet (100 mg) by mouth daily 90 tablet 3     metFORMIN (GLUCOPHAGE) 500 MG tablet Take 2 tablets (1,000 mg) by mouth 2 times daily (with meals) 360 tablet 1     methocarbamol (ROBAXIN) 500 MG tablet Take 1-2 tablets (500-1,000 mg) by mouth 4 times daily as needed for muscle spasms 240 tablet 1     nitroGLYcerin (NITROSTAT) 0.4 MG sublingual tablet Place 0.4 mg under the tongue as needed  (Patient not taking: Reported on 10/11/2022)       omeprazole (PRILOSEC) 20 MG DR capsule Take 1 capsule (20 mg) by mouth daily 90 capsule 2     polyethylene glycol (GOLYTELY) 236  g suspension The night before the exam at 6 pm drink an 8-ounce glass every 15 minutes until the jug is half empty. If you arrive before 11 AM: Drink the other half of the Golytely jug at 11 PM night before procedure. If you arrive after 11 AM: Drink the other half of the Golytely jug at 6 AM day of procedure. For additional instructions refer to your colonoscopy prep instructions. 4000 mL 0     predniSONE (DELTASONE) 10 MG tablet Take 1 tablet (10 mg) by mouth daily 30 tablet 0     predniSONE (DELTASONE) 5 MG tablet 1.5 pills 7.5 mg a day for 30 days, then 5 mg a day for 30 days. 75 tablet 0     predniSONE (DELTASONE) 5 MG tablet Take 0.5 tablets (2.5 mg) by mouth daily (Patient not taking: Reported on 1/12/2023) 15 tablet 0     thin (NO BRAND SPECIFIED) lancets Use to test blood sugar 1 times daily or as directed. To accompany: Blood Glucose Monitor Brands: per insurance. 1 each 3     timolol hemihydrate (BETIMOL) 0.25 % ophthalmic solution Inject 1 drop into the eye daily         Medications from List 1 of the standing order (on medications that exclude the use of Paxlovid) that patient is taking: NONE. Is patient taking Jyoti's Wort? No  Is patient taking [place]'s Wort or any meds from List 1? No.   Medications from List 2 of the standing order (on meds that provider needs to adjust) that patient is taking: amlodipine (Norvasc), explained a provider visit is necessary to discuss medication adjustments while taking Paxlovid. Is patient on any of the meds from List 2? Yes. Patient will be transferred to a  at the end of this call. Juve Davenport RN

## 2023-01-20 NOTE — PROGRESS NOTES
Zaria is a 77 year old who is being evaluated via a billable telephone visit.      What phone number would you like to be contacted at? [phone]  How would you like to obtain your AVS? Saloni  Distant Location (provider location):  On-site    Assessment & Plan     Infection due to 2019 novel coronavirus  Patient with an infection due to COVID-19.  's been sick and hospitalized.  She does have risk factors of age, diabetes but she has had symptoms for almost 5 days and then minimal cold-like symptoms with fatigue.  No fevers I will avoid using any treatment with Paxlovidat this time.        No follow-ups on file.    Wang Vargas MD  [facility]   Zaria is a 77 year old, presenting for the following health issues:  Covid Concern (Tested positive for covid/)    History of Present Illness       Back Pain:  She presents for follow up of back pain. Patient's back pain is a chronic problem.  Location of back pain:  Left lower back and right upper back  Description of back pain: dull ache and fullness  Back pain spreads: left thigh    Since patient first noticed back pain, pain is: unchanged  Does back pain interfere with her job:  Not applicable      Diabetes:   She presents for follow up of diabetes.  She is checking home blood glucose one time daily. She checks blood glucose before meals.  Blood glucose is sometimes over 200 and never under 70. When her blood glucose is low, the patient is asymptomatic for confusion, blurred vision, lethargy and reports not feeling dizzy, shaky, or weak.  She is concerned about other. She is not experiencing numbness or burning in feet, excessive thirst, blurry vision, weight changes or redness, sores or blisters on feet.         She eats 2-3 servings of fruits and vegetables daily.She consumes 0 sweetened beverage(s) daily.She exercises with enough effort to increase her heart rate 10 to 19 minutes per day.    She is taking  medications regularly.      has been in the hospital for Covid. He is not home since Wednesday.    She was negative on Wednesday and now is positive today.    Symptoms are a bad cold, no fevers.  Congestion, coughing, little scratchy throat.  No sputum.    Symptoms started 5 days ago, Sunday with dizziness and light cold symptoms.     Fatigue and had to sleep a lot yesterday.     Prednisone for PM&R right now.     Review of Systems         Objective           Vitals:  No vitals were obtained today due to virtual visit.    Physical Exam   healthy, alert and no distress  PSYCH: Alert and oriented times 3; coherent speech, normal   rate and volume, able to articulate logical thoughts, able   to abstract reason, no tangential thoughts, no hallucinations   or delusions  Her affect is normal  RESP: No cough, no audible wheezing, able to talk in full sentences  Remainder of exam unable to be completed due to telephone visits                Phone call duration: 5 minutes

## 2023-01-20 NOTE — PROGRESS NOTES
Zaria is a 77 year old who is being evaluated via a billable telephone visit.      What phone number would you like to be contacted at? [phone]  How would you like to obtain your AVS? Saloni    Distant Location (provider location):  Off-site    Assessment & Plan     (U07.1) COVID-19  (primary encounter diagnosis)      PLAN:  paxlovid rx  Normal gfr  Hold statin  Monitor with amlodipine(on low hickey 2.5 mg)    BEVERLY Aguero CNP  Virtual Urgent Care  [facility] VIRTUAL URGENT CARE    Subjective   Zaria is a 77 year old presenting for the following health issues:  COVID    HPI   Diagnosed with covid today  Symptoms started 2 days ago with cough runny nose  No sob wheezing chest pain  Hydrated        Objective           Vitals:  No vitals were obtained today due to virtual visit.    Physical Exam   healthy, alert and no distress  PSYCH: Alert and oriented times 3; coherent speech, normal   rate and volume, able to articulate logical thoughts, able   to abstract reason, no tangential thoughts, no hallucinations   or delusions  Her affect is normal  RESP: No cough, no audible wheezing, able to talk in full sentences  Remainder of exam unable to be completed due to telephone visits      Phone call duration: 25 minutes

## 2023-01-30 ENCOUNTER — HEALTH MAINTENANCE LETTER (OUTPATIENT)
Age: 78
End: 2023-01-30

## 2023-02-01 ENCOUNTER — LAB (OUTPATIENT)
Dept: LAB | Facility: CLINIC | Age: 78
End: 2023-02-01
Payer: COMMERCIAL

## 2023-02-01 DIAGNOSIS — E11.00 TYPE 2 DIABETES MELLITUS WITH HYPEROSMOLARITY WITHOUT COMA, WITHOUT LONG-TERM CURRENT USE OF INSULIN (H): ICD-10-CM

## 2023-02-01 DIAGNOSIS — D64.9 ANEMIA, UNSPECIFIED TYPE: ICD-10-CM

## 2023-02-01 LAB
ERYTHROCYTE [DISTWIDTH] IN BLOOD BY AUTOMATED COUNT: 13.7 % (ref 10–15)
HBA1C MFR BLD: 8.6 %
HCT VFR BLD AUTO: 34.5 % (ref 35–47)
HGB BLD-MCNC: 10.6 G/DL (ref 11.7–15.7)
MCH RBC QN AUTO: 28.8 PG (ref 26.5–33)
MCHC RBC AUTO-ENTMCNC: 30.7 G/DL (ref 31.5–36.5)
MCV RBC AUTO: 94 FL (ref 78–100)
PLATELET # BLD AUTO: 194 10E3/UL (ref 150–450)
RBC # BLD AUTO: 3.68 10E6/UL (ref 3.8–5.2)
WBC # BLD AUTO: 10.3 10E3/UL (ref 4–11)

## 2023-02-01 PROCEDURE — 83036 HEMOGLOBIN GLYCOSYLATED A1C: CPT

## 2023-02-01 PROCEDURE — 85027 COMPLETE CBC AUTOMATED: CPT

## 2023-02-01 PROCEDURE — 36415 COLL VENOUS BLD VENIPUNCTURE: CPT

## 2023-02-02 DIAGNOSIS — R53.83 OTHER FATIGUE: ICD-10-CM

## 2023-02-02 DIAGNOSIS — E11.9 TYPE 2 DIABETES MELLITUS WITHOUT COMPLICATION, WITHOUT LONG-TERM CURRENT USE OF INSULIN (H): Primary | ICD-10-CM

## 2023-03-15 ENCOUNTER — TRANSFERRED RECORDS (OUTPATIENT)
Dept: HEALTH INFORMATION MANAGEMENT | Facility: CLINIC | Age: 78
End: 2023-03-15
Payer: COMMERCIAL

## 2023-03-15 LAB — RETINOPATHY: POSITIVE

## 2023-03-23 ENCOUNTER — TRANSFERRED RECORDS (OUTPATIENT)
Dept: HEALTH INFORMATION MANAGEMENT | Facility: CLINIC | Age: 78
End: 2023-03-23

## 2023-03-27 ENCOUNTER — OFFICE VISIT (OUTPATIENT)
Dept: INTERNAL MEDICINE | Facility: CLINIC | Age: 78
End: 2023-03-27
Payer: COMMERCIAL

## 2023-03-27 VITALS
HEART RATE: 60 BPM | SYSTOLIC BLOOD PRESSURE: 128 MMHG | DIASTOLIC BLOOD PRESSURE: 70 MMHG | BODY MASS INDEX: 24.2 KG/M2 | TEMPERATURE: 97.9 F | WEIGHT: 140 LBS | RESPIRATION RATE: 16 BRPM | OXYGEN SATURATION: 97 %

## 2023-03-27 DIAGNOSIS — E11.00 TYPE 2 DIABETES MELLITUS WITH HYPEROSMOLARITY WITHOUT COMA, WITHOUT LONG-TERM CURRENT USE OF INSULIN (H): Primary | ICD-10-CM

## 2023-03-27 DIAGNOSIS — M35.3 PMR (POLYMYALGIA RHEUMATICA) (H): ICD-10-CM

## 2023-03-27 DIAGNOSIS — G56.02 CARPAL TUNNEL SYNDROME OF LEFT WRIST: ICD-10-CM

## 2023-03-27 PROCEDURE — 99214 OFFICE O/P EST MOD 30 MIN: CPT | Performed by: INTERNAL MEDICINE

## 2023-03-27 RX ORDER — PREDNISONE 5 MG/1
1 TABLET ORAL DAILY
COMMUNITY
Start: 2023-03-27 | End: 2024-07-24

## 2023-03-27 ASSESSMENT — ENCOUNTER SYMPTOMS: BACK PAIN: 1

## 2023-03-27 NOTE — PROGRESS NOTES
Assessment & Plan     Type 2 diabetes mellitus with hyperosmolarity without coma, without long-term current use of insulin (H)  Diabetes is running too high, A1c was over 8 she is taking prednisone for arthritis.  She has seen rheumatology question of rheumatoid versus lupus versus PMNR.  They increased her to 15 mg which is affecting her diabetes.  We will add Trulicity to her metformin she did not tolerate Farxiga due to yeast infections.  - metFORMIN (GLUCOPHAGE) 500 MG tablet; Take 2 tablets (1,000 mg) by mouth 2 times daily (with meals)  - dulaglutide (TRULICITY) 0.75 MG/0.5ML pen; Inject 0.75 mg Subcutaneous every 7 days    PMR (polymyalgia rheumatica) (H)  Polymyalgia rheumatica seen rheumatology question of lupus versus RA versus PMR.  We will continue the prednisone even though its affecting her diabetes hopefully will get off that in the next few weeks.    Carpal tunnel syndrome of left wrist  Carpal tunnel of her left wrist given her numbness and tingling I recommended she use a wrist brace.  She really is not ready for surgery if she can avoid it.                   Wang Vargas MD  [facility]    Boris Esparza is a 77 year old, presenting for the following health issues:  Back Pain and Joint Pain    Additional Questions 3/27/2023   Roomed by Arianna Mcmlilan     Back Pain     History of Present Illness       Back Pain:  She presents for follow up of back pain. Patient's back pain is a recurring problem.  Location of back pain:  Right lower back and left lower back  Description of back pain: fullness  Back pain spreads: nowhere    Since patient first noticed back pain, pain is: unchanged  Does back pain interfere with her job:  Not applicable      Diabetes:   She presents for follow up of diabetes.  She is checking home blood glucose one time daily. She checks blood glucose before meals.  Blood glucose is never over 200 and never under 70. When her blood glucose is low, the  patient is asymptomatic for confusion, blurred vision, lethargy and reports not feeling dizzy, shaky, or weak.  She is concerned about none and other. She is not experiencing numbness or burning in feet, excessive thirst, blurry vision, weight changes or redness, sores or blisters on feet.         Hyperlipidemia:  She presents for follow up of hyperlipidemia.  She is taking medication to lower cholesterol. She is not having myalgia or other side effects to statin medications.    She eats 2-3 servings of fruits and vegetables daily.She consumes 0 sweetened beverage(s) daily.She exercises with enough effort to increase her heart rate 20 to 29 minutes per day.    She is taking medications regularly.     Had Covid and that is better.     Went to rheumatologist in [place] last week.  Thought could be lupus, RA or PMR.  Goes back on April 14th, increased his prednisone to 15mg for 3 weeks, was hurting at 5 mg.     Left hand tingling and numbness. Thumb and first few fingers, palmar side. Probably carpel tunnel.     Sugars are 140-154 in the morning with prednisone. Metformin 1000mg bid,   farxiga gave her a yeast infection.      Back pain is good during the day, worse at evening in the lower back. Better with higher prednisone.     Did therapy and still does exercises.     Review of Systems   Musculoskeletal: Positive for back pain.          Objective    /70   Pulse 60   Temp 97.9  F (36.6  C) (Temporal)   Resp 16   Wt 63.5 kg (140 lb)   SpO2 97%   BMI 24.20 kg/m    Body mass index is 24.2 kg/m .  Physical Exam   GENERAL: healthy, alert and no distress  NECK: no adenopathy, no asymmetry, masses, or scars and thyroid normal to palpation  RESP: lungs clear to auscultation - no rales, rhonchi or wheezes  CV: regular rate and rhythm, normal S1 S2, no S3 or S4, no murmur, click or rub, no peripheral edema and peripheral pulses strong  MS: no gross musculoskeletal defects noted, no edema

## 2023-03-28 ENCOUNTER — VIRTUAL VISIT (OUTPATIENT)
Dept: PALLIATIVE MEDICINE | Facility: CLINIC | Age: 78
End: 2023-03-28
Payer: COMMERCIAL

## 2023-03-28 DIAGNOSIS — M35.3 POLYMYALGIA RHEUMATICA (H): Primary | ICD-10-CM

## 2023-03-28 DIAGNOSIS — M79.18 MYOFASCIAL PAIN: ICD-10-CM

## 2023-03-28 DIAGNOSIS — M47.896 OTHER SPONDYLOSIS, LUMBAR REGION: ICD-10-CM

## 2023-03-28 PROCEDURE — 99213 OFFICE O/P EST LOW 20 MIN: CPT | Mod: VID

## 2023-03-28 ASSESSMENT — ANXIETY QUESTIONNAIRES
7. FEELING AFRAID AS IF SOMETHING AWFUL MIGHT HAPPEN: NOT AT ALL
GAD7 TOTAL SCORE: 1
GAD7 TOTAL SCORE: 1
1. FEELING NERVOUS, ANXIOUS, OR ON EDGE: NOT AT ALL
GAD7 TOTAL SCORE: 1
4. TROUBLE RELAXING: NOT AT ALL
8. IF YOU CHECKED OFF ANY PROBLEMS, HOW DIFFICULT HAVE THESE MADE IT FOR YOU TO DO YOUR WORK, TAKE CARE OF THINGS AT HOME, OR GET ALONG WITH OTHER PEOPLE?: NOT DIFFICULT AT ALL
5. BEING SO RESTLESS THAT IT IS HARD TO SIT STILL: NOT AT ALL
2. NOT BEING ABLE TO STOP OR CONTROL WORRYING: NOT AT ALL
3. WORRYING TOO MUCH ABOUT DIFFERENT THINGS: SEVERAL DAYS
IF YOU CHECKED OFF ANY PROBLEMS ON THIS QUESTIONNAIRE, HOW DIFFICULT HAVE THESE PROBLEMS MADE IT FOR YOU TO DO YOUR WORK, TAKE CARE OF THINGS AT HOME, OR GET ALONG WITH OTHER PEOPLE: NOT DIFFICULT AT ALL
6. BECOMING EASILY ANNOYED OR IRRITABLE: NOT AT ALL

## 2023-03-28 ASSESSMENT — PAIN SCALES - PAIN ENJOYMENT GENERAL ACTIVITY SCALE (PEG)
INTERFERED_GENERAL_ACTIVITY: 1
INTERFERED_ENJOYMENT_LIFE: 2
PEG_TOTALSCORE: 2
PEG_TOTALSCORE: 2
AVG_PAIN_PASTWEEK: 3
INTERFERED_ENJOYMENT_LIFE: 2
INTERFERED_GENERAL_ACTIVITY: 1
AVG_PAIN_PASTWEEK: 3

## 2023-03-28 ASSESSMENT — PAIN SCALES - GENERAL: PAINLEVEL: SEVERE PAIN (6)

## 2023-03-28 NOTE — PROGRESS NOTES
Vilma is a 77 year old who is being evaluated via a billable video visit.      How would you like to obtain your AVS? MyChart  If the video visit is dropped, the invitation should be resent by: Text to cell phone: [phone]  Will anyone else be joining your video visit? No   Is Pt currently in [place]? Yes    NOTE:  If Pt is not in [place], Appointment needs to be canceled and rescheduled.         Converted to telephone visit, unable to do video visit.     Total time on telephone - 11 minutes     [facility]     Date of visit: 3/28/2023      Assessment:   Zaria Gaona is a 77 year old female with a past medical history significant for HTN, NSTEMI (12/2020), DM 2, polymyalgia rheumatica, who presents with complaints of widespread pain affecting multiple sites, low back pain.      1. Widespread myofascial pain - She experienced onset of diffuse myofascial pain in February 2022, with progressive worsening of symptoms that were severely debilitating. This lead to an ED visit on 9/25/22, when she was diagnosed with polymyalgia rheumatica. Of note, her PCP has been prescribing oral steroids for pain control (reports she is currently taking 3rd course), which she has found very helpful for pain. We discussed possible strategies to target inflammatory pain, specifically NSAIDS, in the long-term versus referral to rheumatology for further evaluation and possible treatments to target polymyalgia rheumatica.   2. Low back pain - I suspect etiology of pain is associated with multiple factors, including underlying degenerative changes of the lumbar spine, significant myofascial component, for which polymyalgia rheumatica is likely contributing factor to some extent.   3. Mental health - I suspect she has some underlying depression/anxiety component to a certain extent that may be contributing to her chronic pain experience. I think it is important to support mental health as part of the chronic pain  treatment plan and may consider referring to pain psychology in the future if indicated.    Visit Diagnoses:  1. Polymyalgia rheumatica (H)    2. Myofascial pain    3. Other spondylosis, lumbar region        Plan:  Diagnosis reviewed, treatment option addressed, and risk/benifits discussed.  Self-care instructions given.  I am recommending a multidisciplinary treatment plan to help this patient better manage their pain.                  1.  Pain Physical Therapy:  Continue activity as tolerated at home. We may consider referring to pain PT in the future, pending outcome/recommendations from other therapies rheumatology consult.               2.  Pain Psychologist to address relaxation, behavioral change, coping style, and other factors important to improvement.  NO - We may consider referring to Anum Castillo in the future if needed.               3.  Diagnostic Studies:  None               4.  Medication Management:   4. Continue methocarbamol 500-1000 mg up to four times daily as needed for muscle pain. She is not sure how much benefit she appreciates, though it sounds like she has been taking more scheduled as opposed to as needed. I advised to trial stopping for a couple of days and monitor for changes in pain. She is agreeable to this plan. She will resume taking if pain increases, we will discuss at follow up.   5. Apply diclofenac gel to both wrists/hands 3-4 times daily. This medication works best when used consistently, may take 1-2 weeks to appreciate benefit.               5.  Potential procedures: None                6.  Referrals/other orders:                           1. Rheumatology - referred at last visit, saw outside provider last week.                           2. Continue TENS unit. Recommend more frequent use throughout day to target cervical myofascial pain/tightness.               7.  Follow up with BEVERLY Lanier CNP in 3 months    Review of Electronic Chart: Today I have also reviewed  available medical information in the patient's medical record at [facility] ([facility]) and Care Everywhere (if available), including relevant provider notes, laboratory work, and imaging.     Piedad Bourgeois, DNP, APRN, AGNP-C  [facility]     -------------------------------------------------    Subjective:    Chief complaint:   Chief Complaint   Patient presents with     Pain       Interval history:  Zaria Gaona is a 77 year old female last seen on 1/12/23.  They are a patient of mine seen in follow up.     Recommendations/plan at the last visit included:              1.  Pain Physical Therapy:  Continue activity as tolerated at home. We may consider referring to pain PT in the future, pending outcome/recommendations from other therapies rheumatology consult.               2.  Pain Psychologist to address relaxation, behavioral change, coping style, and other factors important to improvement.  NO - We may consider referring to Anum Castillo in the future if needed.               3.  Diagnostic Studies:  Reviewed lumbar MRI from 4/25/22 at last visit.               4.  Medication Management:   6. Increase methocarbamol 500-1000 mg up to four times daily as needed for muscle pain. Be careful driving until you know how this medication affects you. Avoid alcohol when you take this medication.   7. Apply diclofenac gel to both shoulders 3-4 times daily. This medication works best when used consistently, may take 1-2 weeks to appreciate benefit.   8. I sent a message to her PCP Dr. Vargas again about possible NSAID after current steroid course is complete, also update about rheumatology referral.               5.  Potential procedures: None                6.  Referrals/other orders:                           1. Rheumatology for further evaluation/treatment recommendations for polymyalgia rheumatica.                           2. Continue TENS unit. Recommend more frequent use throughout day  to target cervical myofascial pain/tightness.               7.  Follow up with BEVERLY Lanier CNP in 8-12 weeks, in person or video visit is okay (patient preference)     Since her last visit, Zaria Gaona reports:  -Her pain is somewhat better than it was at last visit.   -Low back pain has improved, pain is not radiating down the sides her legs anymore.   -At last visit, she was referred to rheumatology, appears she scheduled for August but then cancelled, indicates going somewhere else.   -She went to a provider in [place], outside provider.   -She had blood work done, will follow up in 3 weeks.   -She states he suspects she may have Lupus vs. RA vs. Polymyalgia rheumatica  -She was prescribed prednisone, currently taking.   -Methocarbamol was increased as last visit, though she is not sure how much benefit she is getting.   -She was advised to use diclofenac gel on shoulders at last visit, but today she reports she is having most pain in wrists and hands.   -Dr. Vargas responded to message and indicated that NSAID okay after steroid course.   -She went to PCP yesterday regarding CTS symptoms, advised to use wrist splint at night.     Pain Information:   Pain rating: averages 6/10 on a 0-10 scale.      Interval history from last visit on 1/12/23:  -Her pain has been okay since last visit, perhaps somewhat better baseline.   -Started on methocarbamol at last visit, 500 mg QID PRN, thinks this is somewhat helpful but does not take much.   -No side effects from medication.   -Referred to pain PT last visit, completed one visit only due to out of pocket cost (high co-pay for every visit).   -She reports long standing relationship with PT provider, seen them intermittently since 2007, and he was able to provide her with adequate exercises, which she performs at home frequently.   -Discussed possible NSAID after steroid course after last visit (I messaged Dr. Vargas about NSAID and possible  "rheumatology referral).   -She is currently on prednisone, has follow up with PCP Dr. Vargas next week, states plan is to start taper.   -She is very concerned about stopping prednisone, as her pain has increased after completion of courses in the past.   -She has not seen rheumatology, interested in referral.   -TENS unit ordered at last visit, was able to obtain device and trial over the past few months.   -TENS is helpful but temporary benefit.      HPI from initial visit with me on 10/13/22:  Zaria Gaona is a 77 year old female presents with a chief complaint of widespread myalgias, mid to low back pain, LLE pain (mostly in thigh).   -Goes by \"Vilma\"  The current pain episode has been present since february.    The pain is Moderate Pain (4) in severity reported at today's visit.    Severity/Intensity: 2/10 at best, 9/10 at worst, 4/10 on average  The pain is described as aching, dull, radiating, worse at night.   The pain is alleviated by lying down/rest.    It is exacerbated by siting, bending.    Modalities that have been utilized in the past which were helpful include PT, injections.    Things that were not helpful, but tried ,include PT, injections, chiropractor (1 session).    The patient has never tried TENS, pain PT.     -She has been dealing with widespread muscle pain for 8-9 months,   -She reports she has taken 3 courses of steroids to help with pain and inflammation.   -She went to ED on 9/25/22 due to progressively worsening pain and symptoms.  -She was diagnosed with polymyalgia rheumatica based on ED workup/findings.      The patient otherwise denies bowel or bladder incontinence, parasthesias, weakness, saddle anesthesia, unintentional weight loss, or fever/chills/sweats.      Zaria Gaona has been seen at a pain clinic in the past.  She is seeing Dr. Gomez in [place] for procedures: left L4-5 REJI on 5/20/22 and not helpful, left LMBB on 8/11/22 and not helpful, left SI joint " on  7/1/22 and was not helpful.         Current Pain Treatments:      Medications:               Acetaminophen              PO steroids               Methocarbamol 500 mg QID PRN   Diclofenac gel to wrists/hands      Other therapies:               TENS unit   Rheumatology - working with outside provider (referred by me at prior visit)        Current MME: 0     Review of [place] Prescription Monitoring Program (): No concern for abuse or misuse of controlled medications based on this report. Reviewed - appears appropriate.      Past pain treatments:  Injections -               1. left L4-5 REJI on 5/20/22 not helpful              2. left LMBB on 8/11/22 not helpful              3. left SI joint on 7/1/22 not helpful    Pain PT      Medications:  Current Outpatient Medications   Medication Sig Dispense Refill     acetaminophen (TYLENOL) 325 MG tablet Take 650 mg by mouth       alcohol swab prep pads Use to swab area of injection/shana as directed 100 each 3     amLODIPine (NORVASC) 2.5 MG tablet Take 1 tablet (2.5 mg) by mouth daily 90 tablet 3     Aspirin Buf,CaCarb-MgCarb-MgO, 81 MG TABS Take 81 mg by mouth       atorvastatin (LIPITOR) 80 MG tablet Take 1 tablet (80 mg) by mouth daily 90 tablet 3     bisacodyl (DULCOLAX) 5 MG EC tablet Take 2 tablets at 3 pm the day before your procedure. If your procedure is before 11 am, take 2 additional tablets at 11 pm. If your procedure is after 11 am, take 2 additional tablets at 6 am. For additional instructions refer to your colonoscopy prep instructions. 4 tablet 0     blood glucose (NO BRAND SPECIFIED) test strip Use to test blood sugar 1 times daily or as directed. To accompany: Blood Glucose Monitor Brands: per insurance. 100 strip 6     blood glucose calibration (NO BRAND SPECIFIED) solution Use to calibrate blood glucose monitor as needed as directed. To accompany: Blood Glucose Monitor Brands: per insurance. 1 Bottle 3     carvedilol (COREG) 6.25 MG tablet Take 1  tablet (6.25 mg) by mouth 2 times daily 180 tablet 3     diclofenac (VOLTAREN) 1 % topical gel Apply 4 g topically 4 times daily 350 g 1     dulaglutide (TRULICITY) 0.75 MG/0.5ML pen Inject 0.75 mg Subcutaneous every 7 days 2 mL 3     ethacrynic acid (EDECRIN) 25 MG tablet Take 1 tablet (25 mg) by mouth daily 90 tablet 3     latanoprost (XALATAN) 0.005 % ophthalmic solution Inject 1 drop into the eye daily       levothyroxine (SYNTHROID/LEVOTHROID) 75 MCG tablet Take 1 tablet (75 mcg) by mouth daily 90 tablet 3     losartan (COZAAR) 100 MG tablet Take 1 tablet (100 mg) by mouth daily 90 tablet 3     metFORMIN (GLUCOPHAGE) 500 MG tablet Take 2 tablets (1,000 mg) by mouth 2 times daily (with meals) 360 tablet 1     methocarbamol (ROBAXIN) 500 MG tablet Take 1-2 tablets (500-1,000 mg) by mouth 4 times daily as needed for muscle spasms 240 tablet 1     omeprazole (PRILOSEC) 20 MG DR capsule Take 1 capsule (20 mg) by mouth daily 90 capsule 2     predniSONE (DELTASONE) 5 MG tablet Take 3 tablets (15 mg) by mouth daily       thin (NO BRAND SPECIFIED) lancets Use to test blood sugar 1 times daily or as directed. To accompany: Blood Glucose Monitor Brands: per insurance. 1 each 3     timolol hemihydrate (BETIMOL) 0.25 % ophthalmic solution Inject 1 drop into the eye daily       nitroGLYcerin (NITROSTAT) 0.4 MG sublingual tablet Place 0.4 mg under the tongue as needed  (Patient not taking: Reported on 10/11/2022)       polyethylene glycol (GOLYTELY) 236 g suspension The night before the exam at 6 pm drink an 8-ounce glass every 15 minutes until the jug is half empty. If you arrive before 11 AM: Drink the other half of the Golytely jug at 11 PM night before procedure. If you arrive after 11 AM: Drink the other half of the Golytely jug at 6 AM day of procedure. For additional instructions refer to your colonoscopy prep instructions. (Patient not taking: Reported on 1/20/2023) 4000 mL 0       Medical History: any changes in  medical history since they were last seen? Yes - rheumatology evaluation last week (outside provider), CTS per PCP yesterday      Objective:    Physical Exam:  not currently breastfeeding.  healthy, alert and no distress  PSYCH: Alert and oriented times 3; coherent speech, normal   rate and volume, able to articulate logical thoughts, able   to abstract reason, no tangential thoughts, no hallucinations   or delusions  Her affect is normal  RESP: No cough, no audible wheezing, able to talk in full sentences  Remainder of exam unable to be completed due to telephone visits    Diagnostic Tests/Imaging/Labs:  None     BILLING TIME DOCUMENTATION:   The total TIME spent on this patient on the date of the encounter/appointment was 25 minutes.      TOTAL TIME includes:   Time spent preparing to see the patient (reviewing records and tests)   Time spent face to face (or over the phone) with the patient   Time spent ordering tests, medications, procedures and referrals   Time spent Referring and communicating with other healthcare professionals   Time spent documenting clinical information in Epic

## 2023-03-28 NOTE — PATIENT INSTRUCTIONS
1.  Pain Physical Therapy:  Continue activity as tolerated at home. We may consider referring to pain PT in the future, pending outcome/recommendations from other therapies rheumatology consult.               2.  Pain Psychologist to address relaxation, behavioral change, coping style, and other factors important to improvement.  NO - We may consider referring to Anum Castillo in the future if needed.               3.  Diagnostic Studies:  None               4.  Medication Management:   Continue methocarbamol 500-1000 mg up to four times daily as needed for muscle pain. She is not sure how much benefit she appreciates, though it sounds like she has been taking more scheduled as opposed to as needed. I advised to trial stopping for a couple of days and monitor for changes in pain. She is agreeable to this plan. She will resume taking if pain increases, we will discuss at follow up.   Apply diclofenac gel to both wrists/hands 3-4 times daily. This medication works best when used consistently, may take 1-2 weeks to appreciate benefit.               5.  Potential procedures: None                6.  Referrals/other orders:                           1. Rheumatology - referred at last visit, saw outside provider last week.                           2. Continue TENS unit. Recommend more frequent use throughout day to target cervical myofascial pain/tightness.               7.  Follow up with BEVERLY Lanier CNP in 3 months    ----------------------------------------------------------------  Clinic Number:  [phone]   Call with any questions about your care and for scheduling assistance.   Calls are returned Monday through Friday between 8 AM and 4:30 PM. We usually get back to you within 2 business days depending on the issue/request.    If we are prescribing your medications:  For opioid medication refills, call the clinic or send a [email or web address] message 7 days in advance.  Please include:  Name of requested  medication  Name of the pharmacy.  For non-opioid medications, call your pharmacy directly to request a refill. Please allow 3-4 days to be processed.   Per [place] State Law:  All controlled substance prescriptions must be filled within 30 days of being written.    For those controlled substances allowing refills, pickup must occur within 30 days of last fill.      We believe regular attendance is key to your success in our program!    Any time you are unable to keep your appointment we ask that you call us at least 24 hours in advance to cancel.This will allow us to offer the appointment time to another patient.   Multiple missed appointments may lead to dismissal from the clinic.

## 2023-04-10 ENCOUNTER — ALLIED HEALTH/NURSE VISIT (OUTPATIENT)
Dept: FAMILY MEDICINE | Facility: CLINIC | Age: 78
End: 2023-04-10
Payer: COMMERCIAL

## 2023-04-10 DIAGNOSIS — Z91.148: Primary | ICD-10-CM

## 2023-04-10 PROCEDURE — 99207 PR NO CHARGE NURSE ONLY: CPT

## 2023-04-10 NOTE — PROGRESS NOTES
Instructed patient and reviewed directions and administering her Trulicity injection.  Patient successfully administered herself.

## 2023-04-13 ENCOUNTER — TRANSFERRED RECORDS (OUTPATIENT)
Dept: HEALTH INFORMATION MANAGEMENT | Facility: CLINIC | Age: 78
End: 2023-04-13
Payer: COMMERCIAL

## 2023-04-13 ENCOUNTER — TELEPHONE (OUTPATIENT)
Dept: INTERNAL MEDICINE | Facility: CLINIC | Age: 78
End: 2023-04-13
Payer: COMMERCIAL

## 2023-04-13 NOTE — TELEPHONE ENCOUNTER
Patient gave herself her first injection of her Trulicity on Monday 04/10/23.    That day (1st day) she had oneal heartburn.  She then started to have nausea, diarrhea, fatigue, upset stomach, no appetite, and was achy.  Informed patient that all these symptoms are common with Trulicity.    She has been eating small meals, bland foods, avoiding fatty foods, etc.  She has taken Pepto Bismol for her upset stomach.    Patient is wondering if these symptoms will go away or if she should switch to a different medication?    Please advise.

## 2023-04-14 NOTE — TELEPHONE ENCOUNTER
Patient notified, she agrees to try medication for another week, will let us know how she feels.    Madison Jackson XRO/

## 2023-05-08 ENCOUNTER — HEALTH MAINTENANCE LETTER (OUTPATIENT)
Age: 78
End: 2023-05-08

## 2023-05-09 ENCOUNTER — NURSE TRIAGE (OUTPATIENT)
Dept: CARDIOLOGY | Facility: CLINIC | Age: 78
End: 2023-05-09

## 2023-05-09 ENCOUNTER — OFFICE VISIT (OUTPATIENT)
Dept: CARDIOLOGY | Facility: CLINIC | Age: 78
End: 2023-05-09
Payer: COMMERCIAL

## 2023-05-09 ENCOUNTER — TRANSFERRED RECORDS (OUTPATIENT)
Dept: HEALTH INFORMATION MANAGEMENT | Facility: CLINIC | Age: 78
End: 2023-05-09

## 2023-05-09 VITALS
HEART RATE: 65 BPM | SYSTOLIC BLOOD PRESSURE: 102 MMHG | BODY MASS INDEX: 24.24 KG/M2 | DIASTOLIC BLOOD PRESSURE: 54 MMHG | HEIGHT: 64 IN | WEIGHT: 142 LBS | OXYGEN SATURATION: 96 %

## 2023-05-09 DIAGNOSIS — R07.9 CHEST PAIN, UNSPECIFIED TYPE: Primary | ICD-10-CM

## 2023-05-09 DIAGNOSIS — I25.2 HISTORY OF ST ELEVATION MYOCARDIAL INFARCTION (STEMI): ICD-10-CM

## 2023-05-09 DIAGNOSIS — I25.118 CORONARY ARTERY DISEASE INVOLVING NATIVE CORONARY ARTERY OF NATIVE HEART WITH OTHER FORM OF ANGINA PECTORIS (H): ICD-10-CM

## 2023-05-09 DIAGNOSIS — E78.5 DYSLIPIDEMIA, GOAL LDL BELOW 70: ICD-10-CM

## 2023-05-09 PROCEDURE — 93000 ELECTROCARDIOGRAM COMPLETE: CPT | Performed by: PHYSICIAN ASSISTANT

## 2023-05-09 PROCEDURE — 99214 OFFICE O/P EST MOD 30 MIN: CPT | Mod: 25 | Performed by: PHYSICIAN ASSISTANT

## 2023-05-09 NOTE — LETTER
2023    Wang Vargas MD  [address] [facility] [address]    RE: Zaria Gaona       Dear Colleague,     I had the pleasure of seeing Zaria Gaona in the [facility].  Cardiology Clinic Progress Note  Zaria Gaona   : 1945    Service Date: 2023      PRIMARY CARDIOLOGIST:  Dr. Covarrubias     REASON FOR VISIT:  chest heaviness.       HISTORY OF PRESENT ILLNESS:    Zaria Gaona is pleasant 77 year old yo woman with PMH significant for:  1.  Coronary artery disease with history of anterior STEMI in  treated at   [facility] in [place].  Patient had an occluded mid LAD and underwent drug-eluting stent x1 to the mid LAD.  She also had a 90% proximal RCA lesion and underwent drug-eluting stent x1 to the RCA.  She had a remaining 30% left main lesion and 60% mid circumflex lesion.  2.  Dyslipidemia  3.  Hypertension  4.  Polymyalgia rheumatica  5.  Type 2 diabetes    Claribel comes in today for nonroutine follow-up.  She is concerned about chest discomfort she has had that feels like something is sitting on her chest at times.  She notes this has been for the last 1 month or so and seems stable.  This can happen at rest or with exertion and is mild.  She takes Tums when it happens sometimes that helps and sometimes it does not.  She also describes feeling gassy and for this she takes Pepcid or Mylanta which improves things.  This is a different sensation in the sitting on her chest.  When she gets the sitting on her chest she denies associated symptoms of diaphoresis, dyspnea on exertion, vomiting or nausea.  She has not been able to bring this on with exertion.  She has not tried a nitro.  When it happens it lasts anywhere from 5 to 30 minutes.    She notes that she has had significant stress with her brother-in-law passing away, her cousin's  passing away and her  having fairly significant dementia.  She cares for him at home.     PHYSICAL  "EXAMINATION:   /54 (BP Location: Right arm, Patient Position: Sitting, Cuff Size: Adult Regular)   Pulse 65   Ht 1.62 m (5' 3.78\")   Wt 64.4 kg (142 lb)   SpO2 96%   BMI 24.54 kg/m    Well-developed well-nourished woman in no acute distress.  Normocephalic atraumatic.  Heart is regular rate rhythm, I do not appreciate murmur rub or gallop.  Lungs are clear without wheezes rales or rhonchi.  Extremities without peripheral edema.  Skin is warm and dry.   \: Shows sinus rhythm with anterior Q waves but no ST changes.    ASSESSMENT AND PLAN:   Chest pain, with both typical and atypical features.  It is typical of the fact that it feels like chest heaviness and something sitting on her chest atypical and that it is nonexertional and it seems to be fairly random in pattern.  Given she has a history of anterior MI as well as RCA stenting and nonocclusive left main and circumflex disease we should do a treadmill stress test to further evaluate.  She will hold her carvedilol 24 hours prior.  We discussed that if this is normal it is very reassuring and this may be more GI related.  If it is abnormal the neck step would be coronary angiogram.  At this time, we will set up the stress test and in the interim if she develops severe pain or dyspnea she should present to the ER for further action.  Hypertension well-controlled  Dyslipidemia, well controlled  Type 2 diabetes with last A1c of 8.6 recommend improved control  Polymyalgia rheumatica on her recent steroid burst but has been on prednisone since the fall, likely not contributing.    We will get this patient a stress test and follow-up on the results.  At a minimum she will see Dr. Covarrubias back in 6 months, we will see her sooner with abnormal results and possible angiogram.  Thank you for allowing me to participate in this patient's care.    Keyla Bustamante PA-C  3:01 PM 5/9/2023   [facility]  Pager: [phone]            Review of Systems:     Negative unless noted " in HPI          Medications:     Current Outpatient Medications   Medication Sig Dispense Refill    acetaminophen (TYLENOL) 325 MG tablet Take 650 mg by mouth      alcohol swab prep pads Use to swab area of injection/shana as directed 100 each 3    amLODIPine (NORVASC) 2.5 MG tablet Take 1 tablet (2.5 mg) by mouth daily 90 tablet 3    Aspirin Buf,CaCarb-MgCarb-MgO, 81 MG TABS Take 81 mg by mouth      atorvastatin (LIPITOR) 80 MG tablet Take 1 tablet (80 mg) by mouth daily 90 tablet 3    bisacodyl (DULCOLAX) 5 MG EC tablet Take 2 tablets at 3 pm the day before your procedure. If your procedure is before 11 am, take 2 additional tablets at 11 pm. If your procedure is after 11 am, take 2 additional tablets at 6 am. For additional instructions refer to your colonoscopy prep instructions. 4 tablet 0    blood glucose (NO BRAND SPECIFIED) test strip Use to test blood sugar 1 times daily or as directed. To accompany: Blood Glucose Monitor Brands: per insurance. 100 strip 6    blood glucose calibration (NO BRAND SPECIFIED) solution Use to calibrate blood glucose monitor as needed as directed. To accompany: Blood Glucose Monitor Brands: per insurance. 1 Bottle 3    carvedilol (COREG) 6.25 MG tablet Take 1 tablet (6.25 mg) by mouth 2 times daily 180 tablet 3    diclofenac (VOLTAREN) 1 % topical gel Apply 4 g topically 4 times daily 350 g 1    dulaglutide (TRULICITY) 0.75 MG/0.5ML pen Inject 0.75 mg Subcutaneous every 7 days 2 mL 3    ethacrynic acid (EDECRIN) 25 MG tablet Take 1 tablet (25 mg) by mouth daily 90 tablet 3    latanoprost (XALATAN) 0.005 % ophthalmic solution Inject 1 drop into the eye daily      levothyroxine (SYNTHROID/LEVOTHROID) 75 MCG tablet Take 1 tablet (75 mcg) by mouth daily 90 tablet 3    losartan (COZAAR) 100 MG tablet Take 1 tablet (100 mg) by mouth daily 90 tablet 3    metFORMIN (GLUCOPHAGE) 500 MG tablet Take 2 tablets (1,000 mg) by mouth 2 times daily (with meals) 360 tablet 1    methocarbamol  (ROBAXIN) 500 MG tablet Take 1-2 tablets (500-1,000 mg) by mouth 4 times daily as needed for muscle spasms 240 tablet 1    omeprazole (PRILOSEC) 20 MG DR capsule Take 1 capsule (20 mg) by mouth daily 90 capsule 2    predniSONE (DELTASONE) 5 MG tablet Take 3 tablets (15 mg) by mouth daily      thin (NO BRAND SPECIFIED) lancets Use to test blood sugar 1 times daily or as directed. To accompany: Blood Glucose Monitor Brands: per insurance. 1 each 3    timolol hemihydrate (BETIMOL) 0.25 % ophthalmic solution Inject 1 drop into the eye daily      nitroGLYcerin (NITROSTAT) 0.4 MG sublingual tablet Place 0.4 mg under the tongue as needed  (Patient not taking: Reported on 10/11/2022)      polyethylene glycol (GOLYTELY) 236 g suspension The night before the exam at 6 pm drink an 8-ounce glass every 15 minutes until the jug is half empty. If you arrive before 11 AM: Drink the other half of the Golytely jug at 11 PM night before procedure. If you arrive after 11 AM: Drink the other half of the Golytely jug at 6 AM day of procedure. For additional instructions refer to your colonoscopy prep instructions. (Patient not taking: Reported on 1/20/2023) 4000 mL 0       No family history on file.    Social History     Socioeconomic History    Marital status:      Spouse name: Not on file    Number of children: Not on file    Years of education: Not on file    Highest education level: Not on file   Occupational History    Not on file   Tobacco Use    Smoking status: Never    Smokeless tobacco: Never   Vaping Use    Vaping status: Never Used   Substance and Sexual Activity    Alcohol use: Yes     Comment: occ    Drug use: Never    Sexual activity: Yes     Partners: Male     Birth control/protection: Female Surgical   Other Topics Concern    Not on file   Social History Narrative    Not on file     Social Determinants of Health     Financial Resource Strain: Not on file   Food Insecurity: Not on file   Transportation Needs: Not on  file   Physical Activity: Not on file   Stress: Not on file   Social Connections: Not on file   Intimate Partner Violence: Not on file   Housing Stability: Not on file            Past Medical History:     Past Medical History:   Diagnosis Date    Essential hypertension     NSTEMI (non-ST elevated myocardial infarction) (H) 12/2020    2 stents    Type 2 diabetes mellitus without complication (H) 2005              Past Surgical History:     Past Surgical History:   Procedure Laterality Date    COLONOSCOPY N/A 12/15/2022    Procedure: COLONOSCOPY, WITH POLYPECTOMY;  Surgeon: Andres Trujillo DO;  [facility]: [facility]    INJECT BLOCK MEDIAL BRANCH CERVICAL/THORACIC/LUMBAR Left 8/11/2022    Procedure: Left Lumbar 3, Left Lumbar 4, and Left Lumbar 5 medial branch nerve blocks using fluoroscopic guidance and contrast control;  Surgeon: Malick Gomez MD;  [facility]: [facility]    INJECT EPIDURAL LUMBAR Left 5/20/2022    Procedure: Left lumbar 4-5 interlaminar epidural injection ;  Surgeon: Malick Gomez MD;  [facility]    INJECT JOINT SACROILIAC Left 7/1/2022    Procedure: Fluoroscopically-guided injection of the Left sacroiliac joint with Left dimitrios Sacroiliac joint ligaments infiltration over the sacrum.;  Surgeon: Malick Gomez MD;  [facility]: [facility]    VAGINAL PROLAPSE REPAIR      done in [place]              Allergies:   Ampicillin, Lisinopril, and Sulfamethoxazole-trimethoprim       Data:   All laboratory data reviewed:    Recent Labs   Lab Test 10/05/22  0940 09/25/22  1005 09/12/22  1339 09/12/22  1339 09/08/22  0719 02/25/22  1700 09/21/21  1045 09/14/21  0921 04/20/21  0914   LDL  --   --   --   --  45  --  40 53  --    HDL  --   --   --   --  45*  --  52 54  --    NHDL  --   --   --   --  97  --  87 92  --    CHOL  --   --   --   --  142  --  139 146  --    TRIG  --   --   --   --  258*  --  236* 193*  --    TSH  --  1.32  --   --   --  1.35  --   --  1.97   IRON 70  --    < > 65  --   --   --   --   --       --    < > 325  --   --   --   --   --    IRONSAT 20  --    < > 20  --   --   --   --   --    CHRISTOPHER  --   --   --  68  --   --   --   --   --     < > = values in this interval not displayed.       Lab Results   Component Value Date    WBC 10.3 02/01/2023    WBC 4.9 05/27/2021    RBC 3.68 (L) 02/01/2023    RBC 4.18 05/27/2021    HGB 10.6 (L) 02/01/2023    HGB 11.9 05/27/2021    HCT 34.5 (L) 02/01/2023    HCT 37.4 05/27/2021    MCV 94 02/01/2023    MCV 90 05/27/2021    MCH 28.8 02/01/2023    MCH 28.5 05/27/2021    MCHC 30.7 (L) 02/01/2023    MCHC 31.8 05/27/2021    RDW 13.7 02/01/2023    RDW 13.6 05/27/2021     02/01/2023     05/27/2021       Lab Results   Component Value Date     09/25/2022     04/09/2021    POTASSIUM 4.2 09/25/2022    POTASSIUM 4.6 04/09/2021    CHLORIDE 104 09/25/2022    CHLORIDE 106 04/09/2021    CO2 28 09/25/2022    CO2 26 04/09/2021    ANIONGAP 6 09/25/2022    ANIONGAP 7 04/09/2021     (H) 12/15/2022     (H) 09/25/2022     (H) 04/09/2021    BUN 21 09/25/2022    BUN 19 04/09/2021    CR 0.84 09/25/2022    CR 0.85 04/09/2021    GFRESTIMATED 72 09/25/2022    GFRESTIMATED 67 04/09/2021    GFRESTBLACK 78 04/09/2021    JOHANN 9.2 09/25/2022    JOHANN 8.9 04/09/2021      Lab Results   Component Value Date    AST 9 09/08/2022    AST 15 04/09/2021    ALT 15 09/08/2022    ALT 22 04/09/2021       Lab Results   Component Value Date    A1C 8.6 (H) 02/01/2023    A1C 7.0 (H) 05/17/2021       No results found for: INR        Thank you for allowing me to participate in the care of your patient.      Sincerely,     Keyla Bustamante PA-C     [facility]  cc:   No referring provider defined for this encounter.

## 2023-05-09 NOTE — PROGRESS NOTES
"Cardiology Clinic Progress Note  Zaria Gaona   : 1945    Service Date: 2023      PRIMARY CARDIOLOGIST:  Dr. Covarrubias     REASON FOR VISIT:  chest heaviness.       HISTORY OF PRESENT ILLNESS:    Zaria Gaona is pleasant 77 year old yo woman with PMH significant for:  1.  Coronary artery disease with history of anterior STEMI in  treated at   [facility] in [place].  Patient had an occluded mid LAD and underwent drug-eluting stent x1 to the mid LAD.  She also had a 90% proximal RCA lesion and underwent drug-eluting stent x1 to the RCA.  She had a remaining 30% left main lesion and 60% mid circumflex lesion.  2.  Dyslipidemia  3.  Hypertension  4.  Polymyalgia rheumatica  5.  Type 2 diabetes    Claribel comes in today for nonroutine follow-up.  She is concerned about chest discomfort she has had that feels like something is sitting on her chest at times.  She notes this has been for the last 1 month or so and seems stable.  This can happen at rest or with exertion and is mild.  She takes Tums when it happens sometimes that helps and sometimes it does not.  She also describes feeling gassy and for this she takes Pepcid or Mylanta which improves things.  This is a different sensation in the sitting on her chest.  When she gets the sitting on her chest she denies associated symptoms of diaphoresis, dyspnea on exertion, vomiting or nausea.  She has not been able to bring this on with exertion.  She has not tried a nitro.  When it happens it lasts anywhere from 5 to 30 minutes.    She notes that she has had significant stress with her brother-in-law passing away, her cousin's  passing away and her  having fairly significant dementia.  She cares for him at home.     PHYSICAL EXAMINATION:   /54 (BP Location: Right arm, Patient Position: Sitting, Cuff Size: Adult Regular)   Pulse 65   Ht 1.62 m (5' 3.78\")   Wt 64.4 kg (142 lb)   SpO2 96%   BMI 24.54 kg/m    Well-developed " well-nourished woman in no acute distress.  Normocephalic atraumatic.  Heart is regular rate rhythm, I do not appreciate murmur rub or gallop.  Lungs are clear without wheezes rales or rhonchi.  Extremities without peripheral edema.  Skin is warm and dry.   \  EKG: Shows sinus rhythm with anterior Q waves but no ST changes.    ASSESSMENT AND PLAN:   1. Chest pain, with both typical and atypical features.  It is typical of the fact that it feels like chest heaviness and something sitting on her chest atypical and that it is nonexertional and it seems to be fairly random in pattern.  Given she has a history of anterior MI as well as RCA stenting and nonocclusive left main and circumflex disease we should do a treadmill stress test to further evaluate.  She will hold her carvedilol 24 hours prior.  We discussed that if this is normal it is very reassuring and this may be more GI related.  If it is abnormal the neck step would be coronary angiogram.  At this time, we will set up the stress test and in the interim if she develops severe pain or dyspnea she should present to the ER for further action.  2. Hypertension well-controlled  3. Dyslipidemia, well controlled  4. Type 2 diabetes with last A1c of 8.6 recommend improved control  5. Polymyalgia rheumatica on her recent steroid burst but has been on prednisone since the fall, likely not contributing.    We will get this patient a stress test and follow-up on the results.  At a minimum she will see Dr. Covarrubias back in 6 months, we will see her sooner with abnormal results and possible angiogram.  Thank you for allowing me to participate in this patient's care.    Keyla Bustamante PA-C  3:01 PM 5/9/2023   [facility]  Pager: [phone]            Review of Systems:     Negative unless noted in HPI          Medications:     Current Outpatient Medications   Medication Sig Dispense Refill     acetaminophen (TYLENOL) 325 MG tablet Take 650 mg by mouth       alcohol swab prep pads  Use to swab area of injection/shana as directed 100 each 3     amLODIPine (NORVASC) 2.5 MG tablet Take 1 tablet (2.5 mg) by mouth daily 90 tablet 3     Aspirin Buf,CaCarb-MgCarb-MgO, 81 MG TABS Take 81 mg by mouth       atorvastatin (LIPITOR) 80 MG tablet Take 1 tablet (80 mg) by mouth daily 90 tablet 3     bisacodyl (DULCOLAX) 5 MG EC tablet Take 2 tablets at 3 pm the day before your procedure. If your procedure is before 11 am, take 2 additional tablets at 11 pm. If your procedure is after 11 am, take 2 additional tablets at 6 am. For additional instructions refer to your colonoscopy prep instructions. 4 tablet 0     blood glucose (NO BRAND SPECIFIED) test strip Use to test blood sugar 1 times daily or as directed. To accompany: Blood Glucose Monitor Brands: per insurance. 100 strip 6     blood glucose calibration (NO BRAND SPECIFIED) solution Use to calibrate blood glucose monitor as needed as directed. To accompany: Blood Glucose Monitor Brands: per insurance. 1 Bottle 3     carvedilol (COREG) 6.25 MG tablet Take 1 tablet (6.25 mg) by mouth 2 times daily 180 tablet 3     diclofenac (VOLTAREN) 1 % topical gel Apply 4 g topically 4 times daily 350 g 1     dulaglutide (TRULICITY) 0.75 MG/0.5ML pen Inject 0.75 mg Subcutaneous every 7 days 2 mL 3     ethacrynic acid (EDECRIN) 25 MG tablet Take 1 tablet (25 mg) by mouth daily 90 tablet 3     latanoprost (XALATAN) 0.005 % ophthalmic solution Inject 1 drop into the eye daily       levothyroxine (SYNTHROID/LEVOTHROID) 75 MCG tablet Take 1 tablet (75 mcg) by mouth daily 90 tablet 3     losartan (COZAAR) 100 MG tablet Take 1 tablet (100 mg) by mouth daily 90 tablet 3     metFORMIN (GLUCOPHAGE) 500 MG tablet Take 2 tablets (1,000 mg) by mouth 2 times daily (with meals) 360 tablet 1     methocarbamol (ROBAXIN) 500 MG tablet Take 1-2 tablets (500-1,000 mg) by mouth 4 times daily as needed for muscle spasms 240 tablet 1     omeprazole (PRILOSEC) 20 MG DR capsule Take 1 capsule  (20 mg) by mouth daily 90 capsule 2     predniSONE (DELTASONE) 5 MG tablet Take 3 tablets (15 mg) by mouth daily       thin (NO BRAND SPECIFIED) lancets Use to test blood sugar 1 times daily or as directed. To accompany: Blood Glucose Monitor Brands: per insurance. 1 each 3     timolol hemihydrate (BETIMOL) 0.25 % ophthalmic solution Inject 1 drop into the eye daily       nitroGLYcerin (NITROSTAT) 0.4 MG sublingual tablet Place 0.4 mg under the tongue as needed  (Patient not taking: Reported on 10/11/2022)       polyethylene glycol (GOLYTELY) 236 g suspension The night before the exam at 6 pm drink an 8-ounce glass every 15 minutes until the jug is half empty. If you arrive before 11 AM: Drink the other half of the Golytely jug at 11 PM night before procedure. If you arrive after 11 AM: Drink the other half of the Golytely jug at 6 AM day of procedure. For additional instructions refer to your colonoscopy prep instructions. (Patient not taking: Reported on 1/20/2023) 4000 mL 0       No family history on file.    Social History     Socioeconomic History     Marital status:      Spouse name: Not on file     Number of children: Not on file     Years of education: Not on file     Highest education level: Not on file   Occupational History     Not on file   Tobacco Use     Smoking status: Never     Smokeless tobacco: Never   Vaping Use     Vaping status: Never Used   Substance and Sexual Activity     Alcohol use: Yes     Comment: occ     Drug use: Never     Sexual activity: Yes     Partners: Male     Birth control/protection: Female Surgical   Other Topics Concern     Not on file   Social History Narrative     Not on file     Social Determinants of Health     Financial Resource Strain: Not on file   Food Insecurity: Not on file   Transportation Needs: Not on file   Physical Activity: Not on file   Stress: Not on file   Social Connections: Not on file   Intimate Partner Violence: Not on file   Housing Stability:  Not on file            Past Medical History:     Past Medical History:   Diagnosis Date     Essential hypertension      NSTEMI (non-ST elevated myocardial infarction) (H) 12/2020    2 stents     Type 2 diabetes mellitus without complication (H) 2005              Past Surgical History:     Past Surgical History:   Procedure Laterality Date     COLONOSCOPY N/A 12/15/2022    Procedure: COLONOSCOPY, WITH POLYPECTOMY;  Surgeon: Andres Trujillo DO;  [facility]     INJECT BLOCK MEDIAL BRANCH CERVICAL/THORACIC/LUMBAR Left 8/11/2022    Procedure: Left Lumbar 3, Left Lumbar 4, and Left Lumbar 5 medial branch nerve blocks using fluoroscopic guidance and contrast control;  Surgeon: Malick Gomez MD;  [facility]     INJECT EPIDURAL LUMBAR Left 5/20/2022    Procedure: Left lumbar 4-5 interlaminar epidural injection ;  Surgeon: Malick Gomez MD;  [facility]     INJECT JOINT SACROILIAC Left 7/1/2022    Procedure: Fluoroscopically-guided injection of the Left sacroiliac joint with Left dimitrios Sacroiliac joint ligaments infiltration over the sacrum.;  Surgeon: Malick Gomez MD;  [facility]     VAGINAL PROLAPSE REPAIR      done in [place]              Allergies:   Ampicillin, Lisinopril, and Sulfamethoxazole-trimethoprim       Data:   All laboratory data reviewed:    Recent Labs   Lab Test 10/05/22  0940 09/25/22  1005 09/12/22  1339 09/12/22  1339 09/08/22  0719 02/25/22  1700 09/21/21  1045 09/14/21  0921 04/20/21  0914   LDL  --   --   --   --  45  --  40 53  --    HDL  --   --   --   --  45*  --  52 54  --    NHDL  --   --   --   --  97  --  87 92  --    CHOL  --   --   --   --  142  --  139 146  --    TRIG  --   --   --   --  258*  --  236* 193*  --    TSH  --  1.32  --   --   --  1.35  --   --  1.97   IRON 70  --    < > 65  --   --   --   --   --      --    < > 325  --   --   --   --   --    IRONSAT 20  --    < > 20  --   --   --   --   --    CHRISTOPHER  --   --   --  68  --   --   --   --   --      < > = values in this interval not displayed.       Lab Results   Component Value Date    WBC 10.3 02/01/2023    WBC 4.9 05/27/2021    RBC 3.68 (L) 02/01/2023    RBC 4.18 05/27/2021    HGB 10.6 (L) 02/01/2023    HGB 11.9 05/27/2021    HCT 34.5 (L) 02/01/2023    HCT 37.4 05/27/2021    MCV 94 02/01/2023    MCV 90 05/27/2021    MCH 28.8 02/01/2023    MCH 28.5 05/27/2021    MCHC 30.7 (L) 02/01/2023    MCHC 31.8 05/27/2021    RDW 13.7 02/01/2023    RDW 13.6 05/27/2021     02/01/2023     05/27/2021       Lab Results   Component Value Date     09/25/2022     04/09/2021    POTASSIUM 4.2 09/25/2022    POTASSIUM 4.6 04/09/2021    CHLORIDE 104 09/25/2022    CHLORIDE 106 04/09/2021    CO2 28 09/25/2022    CO2 26 04/09/2021    ANIONGAP 6 09/25/2022    ANIONGAP 7 04/09/2021     (H) 12/15/2022     (H) 09/25/2022     (H) 04/09/2021    BUN 21 09/25/2022    BUN 19 04/09/2021    CR 0.84 09/25/2022    CR 0.85 04/09/2021    GFRESTIMATED 72 09/25/2022    GFRESTIMATED 67 04/09/2021    GFRESTBLACK 78 04/09/2021    JOHANN 9.2 09/25/2022    JOHANN 8.9 04/09/2021      Lab Results   Component Value Date    AST 9 09/08/2022    AST 15 04/09/2021    ALT 15 09/08/2022    ALT 22 04/09/2021       Lab Results   Component Value Date    A1C 8.6 (H) 02/01/2023    A1C 7.0 (H) 05/17/2021       No results found for: INR

## 2023-05-09 NOTE — TELEPHONE ENCOUNTER
"Patient was transferred from [facility] to speak to early AM Triage nurse. Patient has concern of heartburn-like chest discomfort she describes being on the right side of her chest like there is something sitting there. This has been occurring a couple months getting worse. She has been taking Tums which have been helping. She describes also feeling \"gassy.\" The heartburn pain happens at different times mainly at night when going to bed but feels during the day too. She also has nausea. She says she doesn't eat anything spicy. When asking if she has any other symptoms she notes having arthritis and taking prednisone. Patient wants to make an appointment to see Dr. Covarrubias. Routing this over to her care team with Dr. Covarrubias for further advising and follow-up with patient.     1. LOCATION: \"Where does it hurt?\" Right side.  2. RADIATION: \"Does the pain go anywhere else?\" (e.g., into neck, jaw, arms, back) No.  3. ONSET: \"When did the chest pain begin?\" (Minutes, hours or days) A couple months getting worse.  4. PATTERN \"Does the pain come and go, or has it been constant since it started?\" \"Does it get worse with exertion?\" Happens at different times mainly at night sometime during the day.   5. DURATION: \"How long does it last\" (e.g., seconds, minutes, hours)   6. SEVERITY: \"How bad is the pain?\" (e.g., Scale 1-10; mild, moderate, or severe) Patient says it feels like heartburn getting worse.   - MILD (1-3): doesn't interfere with normal activities   - MODERATE (4-7): interferes with normal activities or awakens from sleep  - SEVERE (8-10): excruciating pain, unable to do any normal activities   7. CARDIAC RISK FACTORS: \"Do you have any history of heart problems or risk factors for heart disease?\" (e.g., angina, prior heart attack; diabetes, high blood pressure, high cholesterol, smoker, or strong family history of heart disease) CAD, HTN, dyslipidemia.  8. PULMONARY RISK FACTORS: \"Do you have any history of lung " "disease?\" (e.g., blood clots in lung, asthma, emphysema, birth control pills) None.  9. CAUSE: \"What do you think is causing the chest pain?\" Cardiac or heartburn.  10. OTHER SYMPTOMS: \"Do you have any other symptoms?\" (e.g., dizziness, nausea, vomiting, sweating, fever, difficulty breathing, cough) Patient describes arthritis.    "

## 2023-05-09 NOTE — PATIENT INSTRUCTIONS
Thanks for coming into [facility] today.    We discussed: we'll do a stress test to see if your chest heaviness if from your heart.      Medication changes:  continue current medications.      Follow up: at a minimum with Dr. Covarrubias in October.  If your stress test is abnormal, we'll call you and discuss a possible angiogram.        Please call the clinic at  [phone] with any questions or concerns and my our nurses will be happy to help.    Please call [phone] for scheduling.      Reminder: Please bring in all current medications, over the counter supplements and vitamin bottles to your next appointment.

## 2023-05-09 NOTE — TELEPHONE ENCOUNTER
Spoke with patient. She was requesting to see Dr. Covarrubias but his next opening here in [place] is not until July. Patient is agreeable to seeing ARLENE Garcia today at 1:45pm.

## 2023-05-18 ENCOUNTER — HOSPITAL ENCOUNTER (OUTPATIENT)
Dept: CARDIOLOGY | Facility: CLINIC | Age: 78
Setting detail: NUCLEAR MEDICINE
Discharge: HOME | End: 2023-05-18
Attending: PHYSICIAN ASSISTANT | Admitting: PHYSICIAN ASSISTANT
Payer: COMMERCIAL

## 2023-05-18 ENCOUNTER — HOSPITAL ENCOUNTER (OUTPATIENT)
Dept: NUCLEAR MEDICINE | Facility: CLINIC | Age: 78
Setting detail: NUCLEAR MEDICINE
Discharge: HOME | End: 2023-05-18
Attending: PHYSICIAN ASSISTANT
Payer: COMMERCIAL

## 2023-05-18 DIAGNOSIS — I25.118 CORONARY ARTERY DISEASE INVOLVING NATIVE CORONARY ARTERY OF NATIVE HEART WITH OTHER FORM OF ANGINA PECTORIS (H): ICD-10-CM

## 2023-05-18 LAB
CV STRESS MAX HR HE: 137
RATE PRESSURE PRODUCT: NORMAL
STRESS ANGINA INDEX: 0
STRESS ECHO BASELINE DIASTOLIC HE: 62
STRESS ECHO BASELINE HR: 68 BPM
STRESS ECHO BASELINE SYSTOLIC BP: 114
STRESS ECHO CALCULATED PERCENT HR: 95 %
STRESS ECHO LAST STRESS DIASTOLIC BP: 62
STRESS ECHO LAST STRESS SYSTOLIC BP: 154
STRESS ECHO POST ESTIMATED WORKLOAD: 7 METS
STRESS ECHO POST EXERCISE DUR MIN: 6 MIN
STRESS ECHO POST EXERCISE DUR SEC: 0 SEC
STRESS ECHO TARGET HR: 144

## 2023-05-18 PROCEDURE — 343N000001 HC RX 343: Performed by: PHYSICIAN ASSISTANT

## 2023-05-18 PROCEDURE — 93017 CV STRESS TEST TRACING ONLY: CPT

## 2023-05-18 PROCEDURE — 93018 CV STRESS TEST I&R ONLY: CPT | Performed by: INTERNAL MEDICINE

## 2023-05-18 PROCEDURE — 93017 CV STRESS TEST TRACING ONLY: CPT | Performed by: REHABILITATION PRACTITIONER

## 2023-05-18 PROCEDURE — A9502 TC99M TETROFOSMIN: HCPCS | Performed by: PHYSICIAN ASSISTANT

## 2023-05-18 PROCEDURE — 78452 HT MUSCLE IMAGE SPECT MULT: CPT

## 2023-05-18 PROCEDURE — 93016 CV STRESS TEST SUPVJ ONLY: CPT | Performed by: INTERNAL MEDICINE

## 2023-05-18 PROCEDURE — 78452 HT MUSCLE IMAGE SPECT MULT: CPT | Mod: 26 | Performed by: INTERNAL MEDICINE

## 2023-05-18 RX ADMIN — TETROFOSMIN 10.7 MILLICURIE: 1.38 INJECTION, POWDER, LYOPHILIZED, FOR SOLUTION INTRAVENOUS at 08:34

## 2023-05-18 RX ADMIN — TETROFOSMIN 31.7 MILLICURIE: 1.38 INJECTION, POWDER, LYOPHILIZED, FOR SOLUTION INTRAVENOUS at 10:30

## 2023-06-02 ENCOUNTER — LAB (OUTPATIENT)
Dept: LAB | Facility: CLINIC | Age: 78
End: 2023-06-02
Payer: COMMERCIAL

## 2023-06-02 DIAGNOSIS — R53.83 OTHER FATIGUE: ICD-10-CM

## 2023-06-02 DIAGNOSIS — E11.9 TYPE 2 DIABETES MELLITUS WITHOUT COMPLICATION, WITHOUT LONG-TERM CURRENT USE OF INSULIN (H): ICD-10-CM

## 2023-06-02 LAB
HBA1C MFR BLD: 8 %
HGB BLD-MCNC: 10.7 G/DL (ref 11.7–15.7)

## 2023-06-02 PROCEDURE — 83036 HEMOGLOBIN GLYCOSYLATED A1C: CPT

## 2023-06-02 PROCEDURE — 85018 HEMOGLOBIN: CPT

## 2023-06-02 PROCEDURE — 36415 COLL VENOUS BLD VENIPUNCTURE: CPT

## 2023-06-13 ENCOUNTER — OFFICE VISIT (OUTPATIENT)
Dept: INTERNAL MEDICINE | Facility: CLINIC | Age: 78
End: 2023-06-13
Payer: COMMERCIAL

## 2023-06-13 VITALS
RESPIRATION RATE: 16 BRPM | HEART RATE: 53 BPM | BODY MASS INDEX: 22.99 KG/M2 | SYSTOLIC BLOOD PRESSURE: 126 MMHG | OXYGEN SATURATION: 99 % | WEIGHT: 133 LBS | DIASTOLIC BLOOD PRESSURE: 66 MMHG | TEMPERATURE: 97.9 F

## 2023-06-13 DIAGNOSIS — I25.118 CORONARY ARTERY DISEASE INVOLVING NATIVE CORONARY ARTERY OF NATIVE HEART WITH OTHER FORM OF ANGINA PECTORIS (H): ICD-10-CM

## 2023-06-13 DIAGNOSIS — D64.9 ANEMIA, UNSPECIFIED TYPE: ICD-10-CM

## 2023-06-13 DIAGNOSIS — M35.3 PMR (POLYMYALGIA RHEUMATICA) (H): ICD-10-CM

## 2023-06-13 DIAGNOSIS — I10 BENIGN ESSENTIAL HYPERTENSION: ICD-10-CM

## 2023-06-13 DIAGNOSIS — E11.00 TYPE 2 DIABETES MELLITUS WITH HYPEROSMOLARITY WITHOUT COMA, WITHOUT LONG-TERM CURRENT USE OF INSULIN (H): Primary | ICD-10-CM

## 2023-06-13 PROCEDURE — 99214 OFFICE O/P EST MOD 30 MIN: CPT | Performed by: INTERNAL MEDICINE

## 2023-06-13 RX ORDER — AMLODIPINE BESYLATE 2.5 MG/1
2.5 TABLET ORAL DAILY
Qty: 90 TABLET | Refills: 3 | Status: SHIPPED | OUTPATIENT
Start: 2023-06-13 | End: 2023-10-03

## 2023-06-13 RX ORDER — CARVEDILOL 6.25 MG/1
6.25 TABLET ORAL 2 TIMES DAILY
Qty: 180 TABLET | Refills: 3 | Status: SHIPPED | OUTPATIENT
Start: 2023-06-13 | End: 2024-07-15

## 2023-06-13 NOTE — PROGRESS NOTES
Assessment & Plan     Type 2 diabetes mellitus with hyperosmolarity without coma, without long-term current use of insulin (H)  Diabetes is doing much better she still on prednisone 8 mg a day for polymyalgia rheumatica and this is affecting her sugars but they are down to the 120-130 range on Trulicity and metformin.  We will recheck an A1c and 3 months.  - Lipid panel reflex to direct LDL Fasting; Future  - Hemoglobin A1c; Future  - omeprazole (PRILOSEC) 20 MG DR capsule; Take 1 capsule (20 mg) by mouth daily    PMR (polymyalgia rheumatica) (H)  Polymyalgia rheumatica being treated by rheumatology weaning down her prednisone, doing okay.    Anemia, unspecified type  Anemia for the last year or so with a hemoglobin 10.7.  She has been on ferrous sulfate no improvement she had a negative colonoscopy she does have some stomach pain we will get an EGD for further evaluation.  - Adult GI  Referral - Procedure Only; Future  - CBC with platelets; Future    Benign essential hypertension  Blood pressure is stable we will refill her amlodipine and carvedilol.  - amLODIPine (NORVASC) 2.5 MG tablet; Take 1 tablet (2.5 mg) by mouth daily    Coronary artery disease involving native coronary artery of native heart with other form of angina pectoris (H)  Coronary disease she is followed by cardiology she had an MI in 2020 she just had a stress test for some atypical symptoms.  Her stress test showed infarct but no ischemia.  Patient is reassured by this.                 Wang Vargas MD  [facility] YUE Esparza is a 78 year old, presenting for the following health issues:  Recheck Medication        6/13/2023     7:58 AM   Additional Questions   Roomed by Arianna Mcmlilan     History of Present Illness       Diabetes:   She presents for follow up of diabetes.  She is checking home blood glucose one time daily. She checks blood glucose before meals.  Blood glucose is never over 200 and  never under 70. When her blood glucose is low, the patient is asymptomatic for confusion, blurred vision, lethargy and reports not feeling dizzy, shaky, or weak.  She has no concerns regarding her diabetes at this time.  She is not experiencing numbness or burning in feet, excessive thirst, blurry vision, weight changes or redness, sores or blisters on feet.         Hyperlipidemia:  She presents for follow up of hyperlipidemia.  She is taking medication to lower cholesterol. She is not having myalgia or other side effects to statin medications.    She eats 2-3 servings of fruits and vegetables daily.She consumes 0 sweetened beverage(s) daily.She exercises with enough effort to increase her heart rate 10 to 19 minutes per day.  She exercises with enough effort to increase her heart rate 5 days per week.   She is taking medications regularly.       Saw cardiology and had a stress test. Occasional right side chest pains. Does do some exercises and no chest pains.  Walk half a mile and no chest pains.      Now taking Trulucity and tolerating it ok, weight is down 8 pounds.   Sugars in the morning are 100-130 range, some higher to 150 with eating.     hgba1c went from 8.6 to 8.0 in early June.     Anemia and hgb of 10.7, taking ferrous sulfate supplement. Colonoscopy was ok, just polyps.     Stomach has some heartburn that is more lately. Takes her baby aspirin,  Uses tylenol, no other nsaids. Still on omeprazole.     Prednisone is slowly trending down. 8 mg for PMR, sees rheumatology     Review of Systems         Objective    /66   Pulse 53   Temp 97.9  F (36.6  C) (Temporal)   Resp 16   Wt 60.3 kg (133 lb)   SpO2 99%   BMI 22.99 kg/m    Body mass index is 22.99 kg/m .  Physical Exam   No acute distress  Heart is regular  Lungs are clear  Extremities without any edema.

## 2023-06-20 ENCOUNTER — TELEPHONE (OUTPATIENT)
Dept: GASTROENTEROLOGY | Facility: CLINIC | Age: 78
End: 2023-06-20
Payer: COMMERCIAL

## 2023-06-20 NOTE — TELEPHONE ENCOUNTER
"Endoscopy Scheduling Screen    Have you had a positive Covid test in the last 14 days?  No    Are you active on MyChart?   Yes    What insurance is in the chart?  Other:  [facility]/medicare/marian    Ordering/Referring Provider: Wang Vargas,    (If ordering provider performs procedure, schedule with ordering provider unless otherwise instructed. )    BMI: Estimated body mass index is 22.99 kg/m  as calculated from the following:    Height as of 5/9/23: 1.62 m (5' 3.78\").    Weight as of 6/13/23: 60.3 kg (133 lb).     Sedation Ordered  moderate sedation.   If patient BMI > 50 do not schedule in ASC.    Are you taking any prescription medications for pain?   No    Are you taking methadone or Suboxone?  No    Do you have a history of malignant hyperthermia or adverse reaction to anesthesia?  No    (Females) Are you currently pregnant?   No     Have you been diagnosed or told you have pulmonary hypertension?   No    Do you have an LVAD?  No    Have you been told you have moderate to severe sleep apnea?  No    Have you been told you have COPD, asthma, or any other lung disease?  No    Do you have any heart conditions?  Yes heart attack 2 years ago    In the past 6 months, have you had any hospitalizations for heart related issues including cardiomyopathy, heart attack, or stent placement?  No    Do you have any implantable devices in your body (pacemaker, ICD)?  No    Do you take nitroglycerine?  No    Have you ever had or are you awaiting a heart or lung transplant?   No    Have you had a stroke or transient ischemic attack (TIA aka \"mini stroke\" in the last 6 months?   No    Have you been diagnosed with or been told you have cirrhosis of the liver?   No    Are you currently on dialysis?   No    Do you need assistance transferring?   No    BMI: Estimated body mass index is 22.99 kg/m  as calculated from the following:    Height as of 5/9/23: 1.62 m (5' 3.78\").    Weight as of 6/13/23: 60.3 kg (133 lb).     Is patients BMI " > 40 and scheduling location [facility]?  No    Do you take the medication Phentermine, Ozempic or Wegovy?  No    Do you take the medication Naltrexone?  No    Do you take blood thinners?  No    Preferred Pharmacy:    [facility] - [place] - [address]  [address]  [address]  [facility] [address]  Phone: [phone] Fax: [phone]    [facility] [address] [place], [place] - [address]  [address]  [facility] [address]  Phone: [phone] Fax: [phone]      Prep   Are you currently on dialysis or do you have chronic kidney disease?  No (standard prep)    Do you have a diagnosis of diabetes?  Yes (Golytely Prep)    Do you have a diagnosis of cystic fibrosis (CF)?  No    On a regular basis do you go 3 -5 days between bowel movements?  No    BMI > 40?  No    Final Scheduling Details   Colonoscopy prep sent?  N/A    Procedure scheduled  EGD    Surgeon:  Yefri     Date of procedure:  8/9/23     Schedule PAC:   No    Location      Sedation   MAC/Deep Sedation       I called her back and was able to offer her an earlier time at  on 6/27/23 at 8:30am arrival and 9:30am procedure with . New info sent to her.     Patient Reminders:    You will receive a call from a Nurse to review instructions and health history.  This assessment must be completed prior to your procedure.  Failure to complete the Nurse assessment may result in the procedure being cancelled.       On the day of your procedure, please designate an adult(s) who can drive you home stay with you for the next 24 hours. The medicines used in the exam will make you sleepy. You will not be able to drive.       You cannot take public transportation, ride share services, or non-medical taxi service without a responsible caregiver.  Medical transport services are allowed with the requirement that a responsible caregiver will receive you at your destination.  We require that drivers and caregivers are confirmed prior to your  procedure.

## 2023-06-23 DIAGNOSIS — E11.00 TYPE 2 DIABETES MELLITUS WITH HYPEROSMOLARITY WITHOUT COMA, WITHOUT LONG-TERM CURRENT USE OF INSULIN (H): ICD-10-CM

## 2023-06-23 NOTE — TELEPHONE ENCOUNTER
Medication Question or Refill        What medication are you calling about (include dose and sig)?: Dulaglutide 0.75mg/0.5ml    Preferred Pharmacy:   [facility]  Phone: [phone]      Controlled Substance Agreement on file:   CSA -- Patient Level:    CSA: None found at the patient level.       Who prescribed the medication?: Dr. Wang Vargas    Do you need a refill? Yes    When did you use the medication last? Tuesday     Patient offered an appointment? No    Do you have any questions or concerns?  Yes: No more refills left and takes 14 days to get medication      Could we send this information to you in [employer] or would you prefer to receive a phone call?:   Patient would prefer a phone call   Okay to leave a detailed message?: Yes at Home number on file [phone] (home)

## 2023-06-26 NOTE — H&P
[facility] Anesthesia Pre-op History and Physical    Zaria Gaona MRN# [id number]   Age: 78 year old YOB: 1945      Date of Surgery: 6/27/2023 Location [facility]      Date of Exam 6/27/2023 Facility (In hospital)       Home clinic: [facility]  Primary care provider: Wang Vargas         Chief Complaint and/or Reason for Procedure:   No chief complaint on file.  EGD. Anemia.  Hgb 10.6--10.9, nml MCV. Iron studies reviewed.       Active problem list:     Patient Active Problem List    Diagnosis Date Noted     Dyslipidemia, goal LDL below 70 05/09/2023     Priority: Medium     Coronary artery disease involving native coronary artery of native heart with other form of angina pectoris (H) 05/09/2023     Priority: Medium     History of ST elevation myocardial infarction (STEMI) 05/09/2023     Priority: Medium     Cystocele, midline 09/13/2021     Priority: Medium     Diabetes mellitus, type 2 (H) 04/21/2021     Priority: Medium            Medications (include herbals and vitamins):   Any Plavix use in the last 7 days? No     No current facility-administered medications for this encounter.     Current Outpatient Medications   Medication Sig     dulaglutide (TRULICITY) 0.75 MG/0.5ML pen Inject 0.75 mg Subcutaneous every 7 days     acetaminophen (TYLENOL) 325 MG tablet Take 650 mg by mouth     alcohol swab prep pads Use to swab area of injection/shana as directed     amLODIPine (NORVASC) 2.5 MG tablet Take 1 tablet (2.5 mg) by mouth daily     Aspirin Buf,CaCarb-MgCarb-MgO, 81 MG TABS Take 81 mg by mouth     atorvastatin (LIPITOR) 80 MG tablet Take 1 tablet (80 mg) by mouth daily     blood glucose (NO BRAND SPECIFIED) test strip Use to test blood sugar 1 times daily or as directed. To accompany: Blood Glucose Monitor Brands: per insurance.     blood glucose calibration (NO BRAND SPECIFIED) solution Use to calibrate blood glucose monitor as  "needed as directed. To accompany: Blood Glucose Monitor Brands: per insurance.     carvedilol (COREG) 6.25 MG tablet Take 1 tablet (6.25 mg) by mouth 2 times daily     diclofenac (VOLTAREN) 1 % topical gel Apply 4 g topically 4 times daily (Patient not taking: Reported on 6/13/2023)     ethacrynic acid (EDECRIN) 25 MG tablet Take 1 tablet (25 mg) by mouth daily     latanoprost (XALATAN) 0.005 % ophthalmic solution Inject 1 drop into the eye daily     levothyroxine (SYNTHROID/LEVOTHROID) 75 MCG tablet Take 1 tablet (75 mcg) by mouth daily     losartan (COZAAR) 100 MG tablet Take 1 tablet (100 mg) by mouth daily     metFORMIN (GLUCOPHAGE) 500 MG tablet Take 2 tablets (1,000 mg) by mouth 2 times daily (with meals)     methocarbamol (ROBAXIN) 500 MG tablet Take 1-2 tablets (500-1,000 mg) by mouth 4 times daily as needed for muscle spasms (Patient not taking: Reported on 6/13/2023)     nitroGLYcerin (NITROSTAT) 0.4 MG sublingual tablet Place 0.4 mg under the tongue as needed  (Patient not taking: Reported on 10/11/2022)     omeprazole (PRILOSEC) 20 MG DR capsule Take 1 capsule (20 mg) by mouth daily     predniSONE (DELTASONE) 5 MG tablet Take 3 tablets (15 mg) by mouth daily     thin (NO BRAND SPECIFIED) lancets Use to test blood sugar 1 times daily or as directed. To accompany: Blood Glucose Monitor Brands: per insurance.     timolol hemihydrate (BETIMOL) 0.25 % ophthalmic solution Inject 1 drop into the eye daily             Allergies:      Allergies   Allergen Reactions     Ampicillin Other (See Comments), Hives and Itching     \"not that bad\"     Lisinopril Cough     Sulfamethoxazole-Trimethoprim Other (See Comments), Hives and Itching     \"not that bad\"     Allergy to Latex? No  Allergy to tape?   No  Intolerances:             Physical Exam:   All vitals have been reviewed  No data found.  No intake/output data recorded.  Lungs:   No increased work of breathing, good air exchange, clear to auscultation bilaterally, " no crackles or wheezing     Cardiovascular:   Normal apical impulse, regular rate and rhythm, normal S1 and S2, no S3 or S4, and no murmur noted             Lab / Radiology Results:            Anesthetic risk and/or ASA classification:       Med Asif MD

## 2023-06-27 ENCOUNTER — ANESTHESIA EVENT (OUTPATIENT)
Dept: GASTROENTEROLOGY | Facility: CLINIC | Age: 78
End: 2023-06-27
Payer: COMMERCIAL

## 2023-06-27 ENCOUNTER — HOSPITAL ENCOUNTER (OUTPATIENT)
Facility: CLINIC | Age: 78
Discharge: HOME | End: 2023-06-27
Attending: INTERNAL MEDICINE | Admitting: INTERNAL MEDICINE
Payer: COMMERCIAL

## 2023-06-27 ENCOUNTER — ANESTHESIA (OUTPATIENT)
Dept: GASTROENTEROLOGY | Facility: CLINIC | Age: 78
End: 2023-06-27
Payer: COMMERCIAL

## 2023-06-27 VITALS
TEMPERATURE: 96.9 F | SYSTOLIC BLOOD PRESSURE: 120 MMHG | OXYGEN SATURATION: 98 % | DIASTOLIC BLOOD PRESSURE: 65 MMHG | HEART RATE: 57 BPM | RESPIRATION RATE: 16 BRPM

## 2023-06-27 LAB
GLUCOSE BLDC GLUCOMTR-MCNC: 160 MG/DL (ref 70–99)
GLUCOSE BLDC GLUCOMTR-MCNC: 169 MG/DL (ref 70–99)
UPPER GI ENDOSCOPY: NORMAL

## 2023-06-27 PROCEDURE — 250N000011 HC RX IP 250 OP 636: Performed by: NURSE ANESTHETIST, CERTIFIED REGISTERED

## 2023-06-27 PROCEDURE — 250N000009 HC RX 250: Performed by: NURSE ANESTHETIST, CERTIFIED REGISTERED

## 2023-06-27 PROCEDURE — 88305 TISSUE EXAM BY PATHOLOGIST: CPT | Mod: TC | Performed by: INTERNAL MEDICINE

## 2023-06-27 PROCEDURE — 370N000017 HC ANESTHESIA TECHNICAL FEE, PER MIN: Performed by: INTERNAL MEDICINE

## 2023-06-27 PROCEDURE — 43239 EGD BIOPSY SINGLE/MULTIPLE: CPT | Performed by: INTERNAL MEDICINE

## 2023-06-27 PROCEDURE — 82962 GLUCOSE BLOOD TEST: CPT

## 2023-06-27 PROCEDURE — 258N000003 HC RX IP 258 OP 636: Performed by: NURSE ANESTHETIST, CERTIFIED REGISTERED

## 2023-06-27 RX ORDER — LIDOCAINE 40 MG/G
CREAM TOPICAL
Status: DISCONTINUED | OUTPATIENT
Start: 2023-06-27 | End: 2023-06-27 | Stop reason: HOSPADM

## 2023-06-27 RX ORDER — LIDOCAINE HYDROCHLORIDE 20 MG/ML
INJECTION, SOLUTION INFILTRATION; PERINEURAL PRN
Status: DISCONTINUED | OUTPATIENT
Start: 2023-06-27 | End: 2023-06-27

## 2023-06-27 RX ORDER — ONDANSETRON 4 MG/1
4 TABLET, ORALLY DISINTEGRATING ORAL EVERY 30 MIN PRN
Status: DISCONTINUED | OUTPATIENT
Start: 2023-06-27 | End: 2023-06-27 | Stop reason: HOSPADM

## 2023-06-27 RX ORDER — SODIUM CHLORIDE, SODIUM LACTATE, POTASSIUM CHLORIDE, CALCIUM CHLORIDE 600; 310; 30; 20 MG/100ML; MG/100ML; MG/100ML; MG/100ML
INJECTION, SOLUTION INTRAVENOUS CONTINUOUS
Status: DISCONTINUED | OUTPATIENT
Start: 2023-06-27 | End: 2023-06-27 | Stop reason: HOSPADM

## 2023-06-27 RX ORDER — ONDANSETRON 2 MG/ML
4 INJECTION INTRAMUSCULAR; INTRAVENOUS EVERY 30 MIN PRN
Status: DISCONTINUED | OUTPATIENT
Start: 2023-06-27 | End: 2023-06-27 | Stop reason: HOSPADM

## 2023-06-27 RX ORDER — PROPOFOL 10 MG/ML
INJECTION, EMULSION INTRAVENOUS PRN
Status: DISCONTINUED | OUTPATIENT
Start: 2023-06-27 | End: 2023-06-27

## 2023-06-27 RX ORDER — PROPOFOL 10 MG/ML
INJECTION, EMULSION INTRAVENOUS CONTINUOUS PRN
Status: DISCONTINUED | OUTPATIENT
Start: 2023-06-27 | End: 2023-06-27

## 2023-06-27 RX ADMIN — SODIUM CHLORIDE, POTASSIUM CHLORIDE, SODIUM LACTATE AND CALCIUM CHLORIDE: 600; 310; 30; 20 INJECTION, SOLUTION INTRAVENOUS at 09:06

## 2023-06-27 RX ADMIN — PROPOFOL 70 MG: 10 INJECTION, EMULSION INTRAVENOUS at 09:13

## 2023-06-27 RX ADMIN — PROPOFOL 200 MCG/KG/MIN: 10 INJECTION, EMULSION INTRAVENOUS at 09:13

## 2023-06-27 RX ADMIN — LIDOCAINE HYDROCHLORIDE 50 MG: 20 INJECTION, SOLUTION INFILTRATION; PERINEURAL at 09:13

## 2023-06-27 ASSESSMENT — ACTIVITIES OF DAILY LIVING (ADL)
ADLS_ACUITY_SCORE: 35
ADLS_ACUITY_SCORE: 35

## 2023-06-27 ASSESSMENT — LIFESTYLE VARIABLES: TOBACCO_USE: 0

## 2023-06-27 NOTE — ANESTHESIA POSTPROCEDURE EVALUATION
Patient: Zaria Gaona    Procedure: Procedure(s):  ESOPHAGOGASTRODUODENOSCOPY, WITH BIOPSY       Anesthesia Type:  MAC    Note:  Disposition: Outpatient   Postop Pain Control: Uneventful            Sign Out: Well controlled pain   PONV: No   Neuro/Psych: Uneventful            Sign Out: Acceptable/Baseline neuro status   Airway/Respiratory: Uneventful            Sign Out: Acceptable/Baseline resp. status   CV/Hemodynamics: Uneventful            Sign Out: Acceptable CV status   Other NRE: NONE   DID A NON-ROUTINE EVENT OCCUR? No    Event details/Postop Comments:  Pt was happy with anesthesia care.  No complications.  I will follow up with the pt if needed.           Last vitals:  Vitals:    06/27/23 0844 06/27/23 0928   BP: 120/71 91/55   Resp: 16 14   Temp: 96.9  F (36.1  C)    SpO2: 98% 95%       Electronically Signed By: BEVERLY Selby CRNA  June 27, 2023  9:38 AM

## 2023-06-27 NOTE — DISCHARGE INSTRUCTIONS
[facility]    Home Care Following Endoscopy          Activity:  You have just undergone an endoscopic procedure usually performed with conscious sedation.  Do not work or operate machinery (including a car) for at least 12 hours.    I encourage you to walk and attempt to pass this air as soon as possible.    Diet:  Return to the diet you were on before your procedure but eat lightly for the first 12-24 hours.  Drink plenty of water.  Resume any regular medications unless otherwise advised by your physician.  Please begin any new medication prescribed as a result of your procedure as directed by your physician.   If you had any biopsy or polyp removed please refrain from aspirin or aspirin products for 2 days.  If on Coumadin please restart as instructed by your physician.   Pain:  You may take Tylenol as needed for pain.  Expected Recovery:  You can expect some mild abdominal fullness and/or discomfort due to the air used to inflate your intestinal tract. It is also normal to have a mild sore throat after upper endoscopy.    Call Your Physician if You Have:  After Upper Endoscopy:  Shoulder, back or chest pain.  Difficulty breathing or swallowing.  Vomiting blood.      Any questions or concerns about your recovery, please call [phone] or after hours [facility] ([phone]) Nurse Advice Line.    Follow-up Care:  You did have polyps/biopsy tissue sample(s) removed.  The polyps/biopsy tissue sample(s) will be sent to pathology.    You should receive letter in your My Chart from *** with your results within 1-2 weeks. If you do not participate in My Chart a physical letter will come in the mail in 2-3 weeks.  Please call if you have not received a notification of your results.  If asked to return to clinic please make an appointment 1 week after your procedure.  Call [phone].

## 2023-06-27 NOTE — ANESTHESIA CARE TRANSFER NOTE
Patient: Zaria Gaona    Procedure: Procedure(s):  ESOPHAGOGASTRODUODENOSCOPY, WITH BIOPSY       Diagnosis: Anemia, unspecified type [D64.9]  Diagnosis Additional Information: No value filed.    Anesthesia Type:   MAC     Note:    Oropharynx: oropharynx clear of all foreign objects and spontaneously breathing  Level of Consciousness: drowsy  Oxygen Supplementation: nasal cannula    Independent Airway: airway patency satisfactory and stable  Dentition: dentition unchanged  Vital Signs Stable: post-procedure vital signs reviewed and stable  Report to RN Given: handoff report given  Patient transferred to: Phase II    Handoff Report: Identifed the Patient, Identified the Reponsible Provider, Reviewed the pertinent medical history, Discussed the surgical course, Reviewed Intra-OP anesthesia mangement and issues during anesthesia, Set expectations for post-procedure period and Allowed opportunity for questions and acknowledgement of understanding      Vitals:  Vitals Value Taken Time   BP 91/55 06/27/23 0930   Temp     Pulse 60 06/27/23 0930   Resp 14 06/27/23 0928   SpO2 95 % 06/27/23 0936   Vitals shown include unvalidated device data.    Electronically Signed By: BEVERLY Selby CRNA  June 27, 2023  9:37 AM

## 2023-06-27 NOTE — LETTER
June 30, 2023      Vilma Gaona  [address] [facility] [address]        Dear ,    We are writing to inform you of your test results.    Normal stomach and small intestine.  Good reports.    Resulted Orders   Surgical Pathology Exam   Result Value Ref Range    Case Report       Surgical Pathology Report                         Case: [id number]                                  Authorizing Provider:  Med Asif MD        Collected:           06/27/2023 09:16 AM          Ordering Location:     [facility]          Received:            06/27/2023 09:56 AM                                 [facility]                                                          Pathologist:           Mike Lam MD                                                                           Specimens:   A) - Small Intestine, Duodenum, Small bowel biopsy                                                  B) - Stomach, Body, Gastric biopsy                                                         Final Diagnosis       A: Small intestine, duodenum, biopsy:  - Small bowel mucosa without diagnostic abnormality.  - No diagnostic histologic features of celiac disease/gluten-sensitive enteropathy, Whipple's disease, or Giardia microorganisms.  - Negative for dysplasia or malignancy.     B: Stomach, body, biopsy:  - Gastric body mucosa without diagnostic abnormality.  - Negative for Helicobacter pylori on H&E examination.        Clinical Information       Procedure:  ESOPHAGOGASTRODUODENOSCOPY, WITH BIOPSY  Pre-op Diagnosis: Anemia, unspecified type [D64.9]  Post-op Diagnosis: D64.9 - Anemia, unspecified type [ICD-10-CM]    Findings:        The Z-line was regular and was found 38 cm from the incisors.        The examined esophagus was normal.        The entire examined stomach was normal. Biopsies were taken with a cold        forceps for  histology.        The examined duodenum was normal. Biopsies were taken with a cold        forceps for histology.                                                                                     Impression:            - Z-line regular, 38 cm from the incisors.                          - Normal esophagus.                          - Normal stomach. Biopsied.                          - Normal examined duodenum. Biopsied.                          Anemia, normocytic, may be multifactorial. Anemia of                          chronic disease, NSAID (ASA) effect. Recent                          colonoscopy.       Gross Description       A(1). Small Intestine, Duodenum, Small bowel biopsy:  The specimen is received in formalin, labeled with the patient's name, medical record number and other identifying information and designated  small bowel biopsy . It consists of 3 tan soft tissue fragments ranging from 0.1-0.5 cm. Entirely submitted in one cassette.    B(2). Stomach, Body, Gastric biopsy:  The specimen is received in formalin, labeled with the patient's name, medical record number and other identifying information and designated  gastric biopsy . It consists of 4 tan soft tissue fragments ranging from 0.1-0.2 cm. Entirely submitted in one cassette.   (Anni Puentes)      Microscopic Description       A microscopic examination is performed.       Performing Labs       The technical component of this testing was completed at [facility]      Case Images         If you have any questions or concerns, please call the clinic at the number listed above.       Sincerely,      Med Asif MD

## 2023-06-27 NOTE — ANESTHESIA PREPROCEDURE EVALUATION
"Anesthesia Pre-Procedure Evaluation    Patient: Zaria Gaona   MRN: [id number] : 1945        Procedure : Procedure(s):  Esophagoscopy, gastroscopy, duodenoscopy (EGD), combined          Past Medical History:   Diagnosis Date     Essential hypertension      NSTEMI (non-ST elevated myocardial infarction) (H) 2020    2 stents     Type 2 diabetes mellitus without complication (H)       Past Surgical History:   Procedure Laterality Date     COLONOSCOPY N/A 12/15/2022    Procedure: COLONOSCOPY, WITH POLYPECTOMY;  Surgeon: Andres Trujillo DO;  [facility]     INJECT BLOCK MEDIAL BRANCH CERVICAL/THORACIC/LUMBAR Left 2022    Procedure: Left Lumbar 3, Left Lumbar 4, and Left Lumbar 5 medial branch nerve blocks using fluoroscopic guidance and contrast control;  Surgeon: Malick Gomez MD;  [facility]     INJECT EPIDURAL LUMBAR Left 2022    Procedure: Left lumbar 4-5 interlaminar epidural injection ;  Surgeon: Malick Gomez MD;  [facility]     INJECT JOINT SACROILIAC Left 2022    Procedure: Fluoroscopically-guided injection of the Left sacroiliac joint with Left dimitrios Sacroiliac joint ligaments infiltration over the sacrum.;  Surgeon: Malick Gomez MD;  [facility]     VAGINAL PROLAPSE REPAIR      done in [place]      Allergies   Allergen Reactions     Ampicillin Other (See Comments), Hives and Itching     \"not that bad\"     Lisinopril Cough     Sulfamethoxazole-Trimethoprim Other (See Comments), Hives and Itching     \"not that bad\"      Social History     Tobacco Use     Smoking status: Never     Smokeless tobacco: Never   Substance Use Topics     Alcohol use: Yes     Comment: occ      Wt Readings from Last 1 Encounters:   23 60.3 kg (133 lb)        Anesthesia Evaluation   Pt has had prior anesthetic. Type: MAC and General.    No history of anesthetic complications       ROS/MED HX  ENT/Pulmonary:  - neg pulmonary ROS  (-) tobacco use   Neurologic:  - neg " neurologic ROS     Cardiovascular: Comment: NSTEMI (non-ST elevated myocardial infarction    (+) hypertension--CAD -past MI (2 1/2 yrs ago.  2 stents.  No issues since then) -stent-. 2 Drug Eluting Stent. Previous cardiac testing   Echo: Date: 2021 Results:  [facility]  Echocardiography Laboratory  [address] [place] [address]     Name: CHANG WILKERSON  MRN: [id number]  : 1945  Study Date: 2021 09:58 AM  Age: 76 yrs  Gender: Female  Patient Location: [facility]  Reason For Study: ST elevation myocardial infarction involving left anterior  desce  History: CAD, STENT, Diabetes  Ordering Physician: CANDIDA CLAIRE  Referring Physician: CANDIDA CLAIRE  Performed By: Rahel Staley     BSA: 1.7 m2  Height: 64 in  Weight: 138 lb  HR: 45  BP: 140/70 mmHg  ______________________________________________________________________________  Procedure  Complete Echo Adult.  ______________________________________________________________________________  Interpretation Summary     Left ventricular size, global systolic function are normal, estimated LVEF 55-  60%. Hypokinesis of the mid anteroseptal, apical septal wall segments.  Right ventricular global function is normal.  Trace to mild mitral regurgitation.  Trivial pericardial effusion  The inferior vena cava was normal in size with preserved respiratory  variability.     There are no prior studies available for comparison.  ______________________________________________________________________________  Left Ventricle  The left ventricle is normal in size. There is normal left ventricular wall  thickness. Left ventricular systolic function is normal. The visual ejection  fraction is 55-60%. Left ventricular diastolic function is normal. Hypokinesis  of the mid anteroseptal, apical septal wall segments.     Right Ventricle  The right ventricle is normal in structure, function and size.     Atria  Normal left atrial  size. Right atrial size is normal.     Mitral Valve  There is trace to mild mitral regurgitation.     Tricuspid Valve  The tricuspid valve is not well visualized, but is grossly normal. There is  trace tricuspid regurgitation. Right ventricular systolic pressure could not  be approximated due to inadequate tricuspid regurgitation.     Aortic Valve  The aortic valve is normal in structure and function.     Pulmonic Valve  The pulmonic valve is not well seen, but is grossly normal.     Vessels  The aortic root is normal size. Normal size ascending aorta. The inferior vena  cava was normal in size with preserved respiratory variability.     Pericardium  Trivial pericardial effusion. There are no echocardiographic indications of  cardiac tamponade.     ______________________________________________________________________________  MMode/2D Measurements & Calculations  IVSd: 1.1 cm  LVIDd: 4.6 cm  LVIDs: 2.7 cm  LVPWd: 1.0 cm  FS: 41.3 %  LV mass(C)d: 169.9 grams  LV mass(C)dI: 101.7 grams/m2     Ao root diam: 3.1 cm  LA dimension: 4.0 cm  asc Aorta Diam: 3.2 cm  LA/Ao: 1.3  RWT: 0.43     Doppler Measurements & Calculations  MV E max isidro: 72.2 cm/sec  MV A max isidro: 91.2 cm/sec  MV E/A: 0.79  MV dec slope: 256.0 cm/sec2  MV dec time: 0.28 sec  E/E' av.2  Lateral E/e': 12.3  Medial E/e': 12.1     ______________________________________________________________________________  Report approved by: Haleigh Doyle 2021 02:16 PM  Stress Test: Date: 23 Results:       The nuclear stress test is abnormal.     There is a small area of a moderate degree of infarction in the apical segment(s) of the left ventricle. No ischemia.     The left ventricular ejection fraction at stress is greater than 70%.     The patient's exercise capacity is average.  No chest pain, no ischemic EKG changes.     There is no prior study for comparison.       ECG Summary    ECG Baseline electrocardiogram demonstrates sinus rhythm. Septal Q  waves.  The stress electrocardiogram is negative for inducible ischemic EKG changes.  There were no arrhythmias during stress.  Arrhythmias during recovery: Occasional PACs.      Technical Comments    Cardiac Protocol An exercise stress test was performed following a Kang protocol with the patient exercising for 6 minutes and 0 seconds under the supervision of Dr. Kusum Carter.  Blood pressure and heart rate demonstrated a normal response to exercise.  The patient's exercise capacity is average.  The test was stopped due to fatigue, dyspnea and target heart rate achieved.  The patient reported dyspnea during the stress test.    Isotope Administration Nuclear imaging was accomplished using a one day protocol with 31.7 mCi of technetium tetrofosmin injected at the peak of exercise on 5/18/2023 and 10.7 mCi of technetium tetrofosmin at rest on 5/18/2023.    Nuclear Study Quality Final image quality is satisfactory.      Stress Measurements    Baseline Vitals  Baseline HR   68 bpm      Baseline Systolic BP   114       Baseline Diastolic BP   62       Peak Stress Vitals  Max HR    137       Last Stress Systolic BP   154       Last Stress Diastolic BP   62       Exercise Data  Target HR   144       Max Predicted HR    95 %      Exercise duration (min)   6 min      Exercise duration (sec)   0 sec      Estimated workload   7 METS          Nuclear Perfusion    Stress Summed Score: 2 Percent Abnormal: 2.94%      Moderate count reduction in the following segments: apex.  All other segments are normal.      Resting Summed Score: 2 Percent Abnormal: 2.94%      Moderate count reduction in the following segments: apex.  All other segments are normal.            Wall Score      Wall Motion    Score Index: 1.00       The left ventricular wall motion is normal.              All Measurements            Exam Ended: 05/18/23 10:45 Last Resulted: 05/18/23 12:59      Order Details       View Encounter       Lab and Collection Details        Routing       Result History      View All Conversations on this Encounter          Scans on Order [id number]    Scan on 5/18/2023 10:44 AM by Outside, Provider              ECG Reviewed:  Date: 5-9-23 Results:  Sinus  Rhythm   -Anteroseptal infarct -age undetermined.     Cath:  Date: Results:      METS/Exercise Tolerance:     Hematologic:  - neg hematologic  ROS     Musculoskeletal:  - neg musculoskeletal ROS     GI/Hepatic:  - neg GI/hepatic ROS  (-) GERD   Renal/Genitourinary:  - neg Renal ROS     Endo:     (+) type II DM, Last HgA1c: 8.0, date: 6-2-23, Not using insulin, - not using insulin pump. Normal glucose range: was 160 pre-op.  Normal 110-120,     Psychiatric/Substance Use:  - neg psychiatric ROS     Infectious Disease:  - neg infectious disease ROS     Malignancy:  - neg malignancy ROS     Other:  - neg other ROS          Physical Exam    Airway        Mallampati: II   TM distance: > 3 FB   Neck ROM: full   Mouth opening: > 3 cm    Respiratory Devices and Support         Dental     Comment: Lower partial        Cardiovascular   cardiovascular exam normal       Rhythm and rate: regular and normal     Pulmonary   pulmonary exam normal        breath sounds clear to auscultation           OUTSIDE LABS:  CBC:   Lab Results   Component Value Date    WBC 10.3 02/01/2023    WBC 10.0 10/05/2022    HGB 10.7 (L) 06/02/2023    HGB 10.6 (L) 02/01/2023    HCT 34.5 (L) 02/01/2023    HCT 34.3 (L) 10/05/2022     02/01/2023     10/05/2022     BMP:   Lab Results   Component Value Date     09/25/2022     09/08/2022    POTASSIUM 4.2 09/25/2022    POTASSIUM 5.3 09/08/2022    CHLORIDE 104 09/25/2022    CHLORIDE 107 09/08/2022    CO2 28 09/25/2022    CO2 26 09/08/2022    BUN 21 09/25/2022    BUN 27 09/08/2022    CR 0.84 09/25/2022    CR 0.86 09/08/2022     (H) 12/15/2022     (H) 09/25/2022     COAGS: No results found for: PTT, INR, FIBR  POC: No results found for: BGM, HCG,  HCGS  HEPATIC:   Lab Results   Component Value Date    ALBUMIN 3.7 09/08/2022    PROTTOTAL 8.0 09/08/2022    ALT 15 09/08/2022    AST 9 09/08/2022    ALKPHOS 90 09/08/2022    BILITOTAL 0.3 09/08/2022     OTHER:   Lab Results   Component Value Date    A1C 8.0 (H) 06/02/2023    JOHANN 9.2 09/25/2022    MAG 1.8 09/25/2022    TSH 1.32 09/25/2022    CRP 6.3 10/05/2022    SED 23 09/25/2022       Anesthesia Plan    ASA Status:  2   NPO Status:  NPO Appropriate    Anesthesia Type: MAC.     - Reason for MAC: straight local not clinically adequate   Induction: Intravenous, Propofol.   Maintenance: TIVA.        Consents    Anesthesia Plan(s) and associated risks, benefits, and realistic alternatives discussed. Questions answered and patient/representative(s) expressed understanding.    - Discussed:     - Discussed with:  Patient      - Extended Intubation/Ventilatory Support Discussed: No.      - Patient is DNR/DNI Status: No    Use of blood products discussed: No .     Postoperative Care            Comments:    Other Comments: The risks and benefits of anesthesia, and the alternatives where applicable, have been discussed with the patient, and they wish to proceed.            BEVERLY Selby CRNA   no

## 2023-06-28 PROCEDURE — 88305 TISSUE EXAM BY PATHOLOGIST: CPT | Mod: 26 | Performed by: PATHOLOGY

## 2023-07-24 ENCOUNTER — LAB (OUTPATIENT)
Dept: LAB | Facility: CLINIC | Age: 78
End: 2023-07-24
Payer: COMMERCIAL

## 2023-07-24 DIAGNOSIS — D64.9 ANEMIA, UNSPECIFIED TYPE: ICD-10-CM

## 2023-07-24 DIAGNOSIS — E11.00 TYPE 2 DIABETES MELLITUS WITH HYPEROSMOLARITY WITHOUT COMA, WITHOUT LONG-TERM CURRENT USE OF INSULIN (H): ICD-10-CM

## 2023-07-24 LAB
CHOLEST SERPL-MCNC: 111 MG/DL
ERYTHROCYTE [DISTWIDTH] IN BLOOD BY AUTOMATED COUNT: 13.4 % (ref 10–15)
HBA1C MFR BLD: 7.5 %
HCT VFR BLD AUTO: 33.7 % (ref 35–47)
HDLC SERPL-MCNC: 46 MG/DL
HGB BLD-MCNC: 11 G/DL (ref 11.7–15.7)
LDLC SERPL CALC-MCNC: 32 MG/DL
MCH RBC QN AUTO: 30.7 PG (ref 26.5–33)
MCHC RBC AUTO-ENTMCNC: 32.6 G/DL (ref 31.5–36.5)
MCV RBC AUTO: 94 FL (ref 78–100)
NONHDLC SERPL-MCNC: 65 MG/DL
PLATELET # BLD AUTO: 189 10E3/UL (ref 150–450)
RBC # BLD AUTO: 3.58 10E6/UL (ref 3.8–5.2)
TRIGL SERPL-MCNC: 165 MG/DL
WBC # BLD AUTO: 8 10E3/UL (ref 4–11)

## 2023-07-24 PROCEDURE — 36415 COLL VENOUS BLD VENIPUNCTURE: CPT

## 2023-07-24 PROCEDURE — 80061 LIPID PANEL: CPT

## 2023-07-24 PROCEDURE — 83036 HEMOGLOBIN GLYCOSYLATED A1C: CPT

## 2023-07-24 PROCEDURE — 85027 COMPLETE CBC AUTOMATED: CPT

## 2023-08-10 ENCOUNTER — MEDICAL CORRESPONDENCE (OUTPATIENT)
Dept: HEALTH INFORMATION MANAGEMENT | Facility: CLINIC | Age: 78
End: 2023-08-10
Payer: COMMERCIAL

## 2023-08-10 ENCOUNTER — TRANSFERRED RECORDS (OUTPATIENT)
Dept: HEALTH INFORMATION MANAGEMENT | Facility: CLINIC | Age: 78
End: 2023-08-10
Payer: COMMERCIAL

## 2023-09-05 ENCOUNTER — PATIENT OUTREACH (OUTPATIENT)
Dept: CARE COORDINATION | Facility: CLINIC | Age: 78
End: 2023-09-05
Payer: COMMERCIAL

## 2023-09-13 ENCOUNTER — TRANSFERRED RECORDS (OUTPATIENT)
Dept: HEALTH INFORMATION MANAGEMENT | Facility: CLINIC | Age: 78
End: 2023-09-13
Payer: COMMERCIAL

## 2023-09-13 LAB — RETINOPATHY: POSITIVE

## 2023-09-19 ENCOUNTER — PATIENT OUTREACH (OUTPATIENT)
Dept: CARE COORDINATION | Facility: CLINIC | Age: 78
End: 2023-09-19
Payer: COMMERCIAL

## 2023-09-29 DIAGNOSIS — E11.00 TYPE 2 DIABETES MELLITUS WITH HYPEROSMOLARITY WITHOUT COMA, WITHOUT LONG-TERM CURRENT USE OF INSULIN (H): ICD-10-CM

## 2023-09-29 RX ORDER — LANCETS
EACH MISCELLANEOUS
Qty: 1 EACH | Refills: 3 | Status: SHIPPED | OUTPATIENT
Start: 2023-09-29

## 2023-10-02 ENCOUNTER — DOCUMENTATION ONLY (OUTPATIENT)
Dept: PALLIATIVE MEDICINE | Facility: CLINIC | Age: 78
End: 2023-10-02
Payer: COMMERCIAL

## 2023-10-02 DIAGNOSIS — M79.18 MYALGIA, OTHER SITE: ICD-10-CM

## 2023-10-02 DIAGNOSIS — M62.838 OTHER MUSCLE SPASM: ICD-10-CM

## 2023-10-02 DIAGNOSIS — M35.3 POLYMYALGIA RHEUMATICA (H): ICD-10-CM

## 2023-10-02 DIAGNOSIS — G89.29 OTHER CHRONIC PAIN: Primary | ICD-10-CM

## 2023-10-03 ENCOUNTER — LAB (OUTPATIENT)
Dept: LAB | Facility: CLINIC | Age: 78
End: 2023-10-03
Payer: COMMERCIAL

## 2023-10-03 ENCOUNTER — OFFICE VISIT (OUTPATIENT)
Dept: CARDIOLOGY | Facility: CLINIC | Age: 78
End: 2023-10-03
Payer: COMMERCIAL

## 2023-10-03 VITALS
BODY MASS INDEX: 22.2 KG/M2 | OXYGEN SATURATION: 98 % | HEART RATE: 58 BPM | SYSTOLIC BLOOD PRESSURE: 118 MMHG | HEIGHT: 64 IN | RESPIRATION RATE: 16 BRPM | WEIGHT: 130 LBS | DIASTOLIC BLOOD PRESSURE: 64 MMHG

## 2023-10-03 DIAGNOSIS — I21.02 ST ELEVATION MYOCARDIAL INFARCTION INVOLVING LEFT ANTERIOR DESCENDING (LAD) CORONARY ARTERY (H): ICD-10-CM

## 2023-10-03 DIAGNOSIS — E11.9 TYPE 2 DIABETES MELLITUS WITHOUT COMPLICATION, WITHOUT LONG-TERM CURRENT USE OF INSULIN (H): ICD-10-CM

## 2023-10-03 DIAGNOSIS — E78.00 HYPERCHOLESTEROLEMIA: ICD-10-CM

## 2023-10-03 DIAGNOSIS — E78.5 DYSLIPIDEMIA, GOAL LDL BELOW 70: ICD-10-CM

## 2023-10-03 DIAGNOSIS — I10 BENIGN ESSENTIAL HYPERTENSION: ICD-10-CM

## 2023-10-03 DIAGNOSIS — I25.118 CORONARY ARTERY DISEASE INVOLVING NATIVE CORONARY ARTERY OF NATIVE HEART WITH OTHER FORM OF ANGINA PECTORIS (H): Primary | ICD-10-CM

## 2023-10-03 LAB
ALT SERPL W P-5'-P-CCNC: 30 U/L (ref 0–50)
ANION GAP SERPL CALCULATED.3IONS-SCNC: 12 MMOL/L (ref 7–15)
BUN SERPL-MCNC: 21.3 MG/DL (ref 8–23)
CALCIUM SERPL-MCNC: 9.1 MG/DL (ref 8.8–10.2)
CHLORIDE SERPL-SCNC: 103 MMOL/L (ref 98–107)
CHOLEST SERPL-MCNC: 107 MG/DL
CREAT SERPL-MCNC: 1.03 MG/DL (ref 0.51–0.95)
DEPRECATED HCO3 PLAS-SCNC: 25 MMOL/L (ref 22–29)
EGFRCR SERPLBLD CKD-EPI 2021: 56 ML/MIN/1.73M2
GLUCOSE SERPL-MCNC: 122 MG/DL (ref 70–99)
HDLC SERPL-MCNC: 44 MG/DL
LDLC SERPL CALC-MCNC: 19 MG/DL
NONHDLC SERPL-MCNC: 63 MG/DL
POTASSIUM SERPL-SCNC: 4.2 MMOL/L (ref 3.4–5.3)
SODIUM SERPL-SCNC: 140 MMOL/L (ref 135–145)
TRIGL SERPL-MCNC: 220 MG/DL

## 2023-10-03 PROCEDURE — 36415 COLL VENOUS BLD VENIPUNCTURE: CPT

## 2023-10-03 PROCEDURE — 80048 BASIC METABOLIC PNL TOTAL CA: CPT

## 2023-10-03 PROCEDURE — 84460 ALANINE AMINO (ALT) (SGPT): CPT

## 2023-10-03 PROCEDURE — 99214 OFFICE O/P EST MOD 30 MIN: CPT | Performed by: INTERNAL MEDICINE

## 2023-10-03 PROCEDURE — 80061 LIPID PANEL: CPT

## 2023-10-03 ASSESSMENT — PAIN SCALES - GENERAL: PAINLEVEL: NO PAIN (0)

## 2023-10-03 NOTE — PROGRESS NOTES
General Cardiology Clinic Progress Note  Zaria Gaona MRN# [id number]   YOB: 1945 Age: 78 year old       Reason for visit: Coronary artery disease and history of anteroapical infarction    History of presenting illness:     I had the opportunity to see Ms. Zaria Gaona in Cardiology Clinic today at [facility] in [place] for reevaluation of coronary artery disease and cardiac risk factors including hypertension, type 2 diabetes and dyslipidemia.     She was living in [place], [place] when she suffered an acute anterior wall myocardial infarction on 12/26/2020.  She was treated in [place] with emergency stenting of her LAD and right coronary arteries at [facility].  She had some residual moderate disease within a small diagonal artery, mid left circumflex and 30% distal left main stenosis.  Fortunately, her left ventricular function normalized after that procedure and she has not had any recurrent anginal symptoms.  She had a very small area of distal anterior and apical hypokinesis by echocardiogram with an ejection fraction of 55%-60% in 09/2021.    More recently, she was experiencing chest discomfort last spring and visited with us in clinic.  She was referred for an exercise nuclear perfusion stress test on 5/18/2023 which showed a small to moderate-sized area of infarction involving the anteroapical wall but no ischemia and normal overall left ventricular function with an ejection fraction of 70%.    We continued to treat her medically and she is not having any further chest pain symptoms.  She continues to be bothered by low back pains which may be mostly due to polymyalgia rheumatica and remains on prednisone which is causing difficulty with her diabetes control.  She is tapering her dose.  However, she was started on Trulicity and we are now seeing her hemoglobin A1c coming back down.    She has had a few episodes of lightheadedness but no falling or  syncope.  Her examination is normal.  Her blood pressure is 118/64 with a heart rate of 58.    Her cholesterol numbers are excellent this year with an LDL of 19 and HDL 44.  Her creatinine is up slightly to 1.03.            Assessment and Plan:     ASSESSMENT:    Ms. Vilma Gaona is a 78-year-old woman with history of anterior STEMI treated emergently with stenting of her LAD in [place] in December 2020.  Fortunately she has a small area of infarction at most and continues to have normal left ventricular systolic function.  Her hypertension and dyslipidemia are well controlled.  Her diabetes control is improving.    I suggested that we could stop her amlodipine 2.5 mg daily and see if her lightheadedness symptoms improved.  I suspect they are due to low blood pressure at times, possibly when she is dehydrated.  I urged her to drink plenty of fluids.  She will keep track of her blood pressures at home and notify us if there significantly higher.    Otherwise her medication program looks good and I will not make any changes.  I will plan to see her back again in 1 year for reevaluation.    Artem Covarrubias MD       Orders this Visit:  No orders of the defined types were placed in this encounter.    No orders of the defined types were placed in this encounter.    There are no discontinued medications.    Today's clinic visit entailed:  Review of the result(s) of each unique test - basic metabolic panel, fasting lipid panel, ALT, nuclear perfusion stress test  Ordering of each unique test  Prescription drug management  30 minutes spent by me on the date of the encounter doing chart review, history and exam, documentation and further activities per the note  Provider  Link to [facility] Help Grid     The level of medical decision making during this visit was of moderate complexity.           Review of Systems:     Review of Systems:  Skin:        Eyes:  Positive for glasses  ENT:       Respiratory:  Positive for dyspnea on  "exertion  Cardiovascular:  Negative;palpitations;chest pain;dizziness;syncope or near-syncope Positive for;fatigue;edema  Gastroenterology:      Genitourinary:       Musculoskeletal:       Neurologic:  Positive for numbness or tingling of hands  Psychiatric:       Heme/Lymph/Imm:  Positive for allergies  Endocrine:                 Physical Exam:     Vitals: /64 (BP Location: Left arm, Patient Position: Sitting, Cuff Size: Adult Regular)   Pulse 58   Resp 16   Ht 1.62 m (5' 3.78\")   Wt 59 kg (130 lb)   SpO2 98%   BMI 22.47 kg/m    Constitutional: Well nourished and in no apparent distress.  Eyes: Pupils equal, round. Sclerae anicteric.   HEENT: Normocephalic, atraumatic.   Neck: Supple. JVD   Respiratory: Breathing non-labored. Lungs clear to auscultation bilaterally. No crackles, wheezes, rhonchi, or rales.  Cardiovascular:  Regular rate and rhythm, normal S1 and S2. No murmur, rub, or gallop.  Skin: Warm, dry. No rashes, cyanosis, or xanthelasma.  Extremities: No edema.  Neurologic: No gross motor deficits. Alert, awake, and oriented to person, place and time.  Psychiatric: Affect appropriate.             Medications:     Current Outpatient Medications   Medication Sig Dispense Refill    acetaminophen (TYLENOL) 325 MG tablet Take 650 mg by mouth      alcohol swab prep pads Use to swab area of injection/shana as directed 100 each 3    amLODIPine (NORVASC) 2.5 MG tablet Take 1 tablet (2.5 mg) by mouth daily 90 tablet 3    Aspirin Buf,CaCarb-MgCarb-MgO, 81 MG TABS Take 81 mg by mouth      atorvastatin (LIPITOR) 80 MG tablet Take 1 tablet (80 mg) by mouth daily 90 tablet 3    blood glucose (NO BRAND SPECIFIED) test strip Use to test blood sugar 1 times daily or as directed. To accompany: Blood Glucose Monitor Brands: per insurance. 100 strip 6    blood glucose calibration (NO BRAND SPECIFIED) solution Use to calibrate blood glucose monitor as needed as directed. To accompany: Blood Glucose Monitor Brands: " per insurance. 1 Bottle 3    carvedilol (COREG) 6.25 MG tablet Take 1 tablet (6.25 mg) by mouth 2 times daily 180 tablet 3    dulaglutide (TRULICITY) 0.75 MG/0.5ML pen Inject 0.75 mg Subcutaneous every 7 days 2 mL 1    ethacrynic acid (EDECRIN) 25 MG tablet Take 1 tablet (25 mg) by mouth daily 90 tablet 3    latanoprost (XALATAN) 0.005 % ophthalmic solution Inject 1 drop into the eye daily      levothyroxine (SYNTHROID/LEVOTHROID) 75 MCG tablet Take 1 tablet (75 mcg) by mouth daily 90 tablet 3    losartan (COZAAR) 100 MG tablet Take 1 tablet (100 mg) by mouth daily 90 tablet 3    metFORMIN (GLUCOPHAGE) 500 MG tablet Take 2 tablets (1,000 mg) by mouth 2 times daily (with meals) 360 tablet 1    omeprazole (PRILOSEC) 20 MG DR capsule Take 1 capsule (20 mg) by mouth daily 90 capsule 3    predniSONE (DELTASONE) 5 MG tablet Take 3 tablets (15 mg) by mouth daily      thin (NO BRAND SPECIFIED) lancets Use to test blood sugar 1 times daily or as directed. To accompany: Blood Glucose Monitor Brands: per insurance. 1 each 3    timolol hemihydrate (BETIMOL) 0.25 % ophthalmic solution Inject 1 drop into the eye daily      diclofenac (VOLTAREN) 1 % topical gel Apply 4 g topically 4 times daily (Patient not taking: Reported on 6/13/2023) 350 g 1    methocarbamol (ROBAXIN) 500 MG tablet Take 1-2 tablets (500-1,000 mg) by mouth 4 times daily as needed for muscle spasms (Patient not taking: Reported on 6/13/2023) 240 tablet 1    nitroGLYcerin (NITROSTAT) 0.4 MG sublingual tablet Place 0.4 mg under the tongue as needed  (Patient not taking: Reported on 10/11/2022)         No family history on file.    Social History     Socioeconomic History    Marital status:      Spouse name: Not on file    Number of children: Not on file    Years of education: Not on file    Highest education level: Not on file   Occupational History    Not on file   Tobacco Use    Smoking status: Never    Smokeless tobacco: Never   Vaping Use    Vaping Use:  Never used   Substance and Sexual Activity    Alcohol use: Yes     Comment: occ    Drug use: Never    Sexual activity: Yes     Partners: Male     Birth control/protection: Female Surgical   Other Topics Concern    Not on file   Social History Narrative    Not on file     Social Determinants of Health     Financial Resource Strain: Not on file   Food Insecurity: Not on file   Transportation Needs: Not on file   Physical Activity: Not on file   Stress: Not on file   Social Connections: Not on file   Interpersonal Safety: Not on file   Housing Stability: Not on file            Past Medical History:     Past Medical History:   Diagnosis Date    Essential hypertension     NSTEMI (non-ST elevated myocardial infarction) (H) 12/2020    2 stents    Type 2 diabetes mellitus without complication (H) 2005              Past Surgical History:     Past Surgical History:   Procedure Laterality Date    COLONOSCOPY N/A 12/15/2022    Procedure: COLONOSCOPY, WITH POLYPECTOMY;  Surgeon: Andres Trujillo DO;  Location:  GI    ESOPHAGOSCOPY, GASTROSCOPY, DUODENOSCOPY (EGD), COMBINED N/A 6/27/2023    Procedure: ESOPHAGOGASTRODUODENOSCOPY, WITH BIOPSY;  Surgeon: Med Asif MD;  Location:  GI    INJECT BLOCK MEDIAL BRANCH CERVICAL/THORACIC/LUMBAR Left 8/11/2022    Procedure: Left Lumbar 3, Left Lumbar 4, and Left Lumbar 5 medial branch nerve blocks using fluoroscopic guidance and contrast control;  Surgeon: Malick Gomez MD;  Location: [facility]    INJECT EPIDURAL LUMBAR Left 5/20/2022    Procedure: Left lumbar 4-5 interlaminar epidural injection ;  Surgeon: Malick Gomez MD;  Location: [facility]    INJECT JOINT SACROILIAC Left 7/1/2022    Procedure: Fluoroscopically-guided injection of the Left sacroiliac joint with Left dimitrios Sacroiliac joint ligaments infiltration over the sacrum.;  Surgeon: Malick Gomez MD;  Location: [facility]    VAGINAL PROLAPSE REPAIR      done in [place]              Allergies:   Ampicillin, Lisinopril,  and Sulfamethoxazole-trimethoprim       Data:   All laboratory data reviewed:    Recent Labs   Lab Test 10/03/23  0752 07/24/23  0801 10/05/22  0940 09/25/22  1005 09/12/22  1339 09/12/22  1339 09/08/22  0719 02/25/22  1700 09/14/21  0921 04/20/21  0914   LDL 19 32  --   --   --   --  45  --    < >  --    HDL 44* 46*  --   --   --   --  45*  --    < >  --    NHDL 63 65  --   --   --   --  97  --    < >  --    CHOL 107 111  --   --   --   --  142  --    < >  --    TRIG 220* 165*  --   --   --   --  258*  --    < >  --    TSH  --   --   --  1.32  --   --   --  1.35  --  1.97   IRON  --   --  70  --    < > 65  --   --   --   --    FEB  --   --  350  --    < > 325  --   --   --   --    IRONSAT  --   --  20  --    < > 20  --   --   --   --    CHRISTOPHER  --   --   --   --   --  68  --   --   --   --     < > = values in this interval not displayed.       Lab Results   Component Value Date    WBC 8.0 07/24/2023    WBC 4.9 05/27/2021    RBC 3.58 (L) 07/24/2023    RBC 4.18 05/27/2021    HGB 11.0 (L) 07/24/2023    HGB 11.9 05/27/2021    HCT 33.7 (L) 07/24/2023    HCT 37.4 05/27/2021    MCV 94 07/24/2023    MCV 90 05/27/2021    MCH 30.7 07/24/2023    MCH 28.5 05/27/2021    MCHC 32.6 07/24/2023    MCHC 31.8 05/27/2021    RDW 13.4 07/24/2023    RDW 13.6 05/27/2021     07/24/2023     05/27/2021       Lab Results   Component Value Date     10/03/2023     04/09/2021    POTASSIUM 4.2 10/03/2023    POTASSIUM 4.2 09/25/2022    POTASSIUM 4.6 04/09/2021    CHLORIDE 103 10/03/2023    CHLORIDE 104 09/25/2022    CHLORIDE 106 04/09/2021    CO2 25 10/03/2023    CO2 28 09/25/2022    CO2 26 04/09/2021    ANIONGAP 12 10/03/2023    ANIONGAP 6 09/25/2022    ANIONGAP 7 04/09/2021     (H) 10/03/2023     (H) 06/27/2023     (H) 09/25/2022     (H) 04/09/2021    BUN 21.3 10/03/2023    BUN 21 09/25/2022    BUN 19 04/09/2021    CR 1.03 (H) 10/03/2023    CR 0.85 04/09/2021    GFRESTIMATED 56 (L) 10/03/2023     GFRESTIMATED 67 04/09/2021    GFRESTBLACK 78 04/09/2021    JOHANN 9.1 10/03/2023    JOHANN 8.9 04/09/2021      Lab Results   Component Value Date    AST 9 09/08/2022    AST 15 04/09/2021    ALT 30 10/03/2023    ALT 22 04/09/2021       Lab Results   Component Value Date    A1C 7.5 (H) 07/24/2023    A1C 7.0 (H) 05/17/2021       No results found for: CANDIDA KAHN MD  [facility]

## 2023-10-03 NOTE — LETTER
10/3/2023    Wang Vargas MD  [address] [facility] [address]    RE: Zaria Gaona       Dear Colleague,     I had the pleasure of seeing Zaria Gaona in the [facility].    General Cardiology Clinic Progress Note  Zaria Gaona MRN# [id number]   YOB: 1945 Age: 78 year old       Reason for visit: Coronary artery disease and history of anteroapical infarction    History of presenting illness:     I had the opportunity to see Ms. Zaria Gaona in Cardiology Clinic today at [facility] in [place] for reevaluation of coronary artery disease and cardiac risk factors including hypertension, type 2 diabetes and dyslipidemia.     She was living in [place], [place] when she suffered an acute anterior wall myocardial infarction on 12/26/2020.  She was treated in [place] with emergency stenting of her LAD and right coronary arteries at [facility].  She had some residual moderate disease within a small diagonal artery, mid left circumflex and 30% distal left main stenosis.  Fortunately, her left ventricular function normalized after that procedure and she has not had any recurrent anginal symptoms.  She had a very small area of distal anterior and apical hypokinesis by echocardiogram with an ejection fraction of 55%-60% in 09/2021.    More recently, she was experiencing chest discomfort last spring and visited with us in clinic.  She was referred for an exercise nuclear perfusion stress test on 5/18/2023 which showed a small to moderate-sized area of infarction involving the anteroapical wall but no ischemia and normal overall left ventricular function with an ejection fraction of 70%.    We continued to treat her medically and she is not having any further chest pain symptoms.  She continues to be bothered by low back pains which may be mostly due to polymyalgia rheumatica and remains on prednisone which is causing difficulty  with her diabetes control.  She is tapering her dose.  However, she was started on Trulicity and we are now seeing her hemoglobin A1c coming back down.    She has had a few episodes of lightheadedness but no falling or syncope.  Her examination is normal.  Her blood pressure is 118/64 with a heart rate of 58.    Her cholesterol numbers are excellent this year with an LDL of 19 and HDL 44.  Her creatinine is up slightly to 1.03.            Assessment and Plan:     ASSESSMENT:    Ms. Vilma Gaona is a 78-year-old woman with history of anterior STEMI treated emergently with stenting of her LAD in [place] in December 2020.  Fortunately she has a small area of infarction at most and continues to have normal left ventricular systolic function.  Her hypertension and dyslipidemia are well controlled.  Her diabetes control is improving.    I suggested that we could stop her amlodipine 2.5 mg daily and see if her lightheadedness symptoms improved.  I suspect they are due to low blood pressure at times, possibly when she is dehydrated.  I urged her to drink plenty of fluids.  She will keep track of her blood pressures at home and notify us if there significantly higher.    Otherwise her medication program looks good and I will not make any changes.  I will plan to see her back again in 1 year for reevaluation.    Artem Covarrubias MD       Orders this Visit:  No orders of the defined types were placed in this encounter.    No orders of the defined types were placed in this encounter.    There are no discontinued medications.    Today's clinic visit entailed:  Review of the result(s) of each unique test - basic metabolic panel, fasting lipid panel, ALT, nuclear perfusion stress test  Ordering of each unique test  Prescription drug management  30 minutes spent by me on the date of the encounter doing chart review, history and exam, documentation and further activities per the note  Provider  Link to MDM Help Grid     The level of  "medical decision making during this visit was of moderate complexity.           Review of Systems:     Review of Systems:  Skin:        Eyes:  Positive for glasses  ENT:       Respiratory:  Positive for dyspnea on exertion  Cardiovascular:  Negative;palpitations;chest pain;dizziness;syncope or near-syncope Positive for;fatigue;edema  Gastroenterology:      Genitourinary:       Musculoskeletal:       Neurologic:  Positive for numbness or tingling of hands  Psychiatric:       Heme/Lymph/Imm:  Positive for allergies  Endocrine:                 Physical Exam:     Vitals: /64 (BP Location: Left arm, Patient Position: Sitting, Cuff Size: Adult Regular)   Pulse 58   Resp 16   Ht 1.62 m (5' 3.78\")   Wt 59 kg (130 lb)   SpO2 98%   BMI 22.47 kg/m    Constitutional: Well nourished and in no apparent distress.  Eyes: Pupils equal, round. Sclerae anicteric.   HEENT: Normocephalic, atraumatic.   Neck: Supple. JVD   Respiratory: Breathing non-labored. Lungs clear to auscultation bilaterally. No crackles, wheezes, rhonchi, or rales.  Cardiovascular:  Regular rate and rhythm, normal S1 and S2. No murmur, rub, or gallop.  Skin: Warm, dry. No rashes, cyanosis, or xanthelasma.  Extremities: No edema.  Neurologic: No gross motor deficits. Alert, awake, and oriented to person, place and time.  Psychiatric: Affect appropriate.             Medications:     Current Outpatient Medications   Medication Sig Dispense Refill    acetaminophen (TYLENOL) 325 MG tablet Take 650 mg by mouth      alcohol swab prep pads Use to swab area of injection/shana as directed 100 each 3    amLODIPine (NORVASC) 2.5 MG tablet Take 1 tablet (2.5 mg) by mouth daily 90 tablet 3    Aspirin Buf,CaCarb-MgCarb-MgO, 81 MG TABS Take 81 mg by mouth      atorvastatin (LIPITOR) 80 MG tablet Take 1 tablet (80 mg) by mouth daily 90 tablet 3    blood glucose (NO BRAND SPECIFIED) test strip Use to test blood sugar 1 times daily or as directed. To accompany: Blood " Glucose Monitor Brands: per insurance. 100 strip 6    blood glucose calibration (NO BRAND SPECIFIED) solution Use to calibrate blood glucose monitor as needed as directed. To accompany: Blood Glucose Monitor Brands: per insurance. 1 Bottle 3    carvedilol (COREG) 6.25 MG tablet Take 1 tablet (6.25 mg) by mouth 2 times daily 180 tablet 3    dulaglutide (TRULICITY) 0.75 MG/0.5ML pen Inject 0.75 mg Subcutaneous every 7 days 2 mL 1    ethacrynic acid (EDECRIN) 25 MG tablet Take 1 tablet (25 mg) by mouth daily 90 tablet 3    latanoprost (XALATAN) 0.005 % ophthalmic solution Inject 1 drop into the eye daily      levothyroxine (SYNTHROID/LEVOTHROID) 75 MCG tablet Take 1 tablet (75 mcg) by mouth daily 90 tablet 3    losartan (COZAAR) 100 MG tablet Take 1 tablet (100 mg) by mouth daily 90 tablet 3    metFORMIN (GLUCOPHAGE) 500 MG tablet Take 2 tablets (1,000 mg) by mouth 2 times daily (with meals) 360 tablet 1    omeprazole (PRILOSEC) 20 MG DR capsule Take 1 capsule (20 mg) by mouth daily 90 capsule 3    predniSONE (DELTASONE) 5 MG tablet Take 3 tablets (15 mg) by mouth daily      thin (NO BRAND SPECIFIED) lancets Use to test blood sugar 1 times daily or as directed. To accompany: Blood Glucose Monitor Brands: per insurance. 1 each 3    timolol hemihydrate (BETIMOL) 0.25 % ophthalmic solution Inject 1 drop into the eye daily      diclofenac (VOLTAREN) 1 % topical gel Apply 4 g topically 4 times daily (Patient not taking: Reported on 6/13/2023) 350 g 1    methocarbamol (ROBAXIN) 500 MG tablet Take 1-2 tablets (500-1,000 mg) by mouth 4 times daily as needed for muscle spasms (Patient not taking: Reported on 6/13/2023) 240 tablet 1    nitroGLYcerin (NITROSTAT) 0.4 MG sublingual tablet Place 0.4 mg under the tongue as needed  (Patient not taking: Reported on 10/11/2022)         No family history on file.    Social History     Socioeconomic History    Marital status:      Spouse name: Not on file    Number of children: Not  on file    Years of education: Not on file    Highest education level: Not on file   Occupational History    Not on file   Tobacco Use    Smoking status: Never    Smokeless tobacco: Never   Vaping Use    Vaping Use: Never used   Substance and Sexual Activity    Alcohol use: Yes     Comment: occ    Drug use: Never    Sexual activity: Yes     Partners: Male     Birth control/protection: Female Surgical   Other Topics Concern    Not on file   Social History Narrative    Not on file     Social Determinants of Health     Financial Resource Strain: Not on file   Food Insecurity: Not on file   Transportation Needs: Not on file   Physical Activity: Not on file   Stress: Not on file   Social Connections: Not on file   Interpersonal Safety: Not on file   Housing Stability: Not on file            Past Medical History:     Past Medical History:   Diagnosis Date    Essential hypertension     NSTEMI (non-ST elevated myocardial infarction) (H) 12/2020    2 stents    Type 2 diabetes mellitus without complication (H) 2005              Past Surgical History:     Past Surgical History:   Procedure Laterality Date    COLONOSCOPY N/A 12/15/2022    Procedure: COLONOSCOPY, WITH POLYPECTOMY;  Surgeon: Andres Trujillo DO;  Location:  GI    ESOPHAGOSCOPY, GASTROSCOPY, DUODENOSCOPY (EGD), COMBINED N/A 6/27/2023    Procedure: ESOPHAGOGASTRODUODENOSCOPY, WITH BIOPSY;  Surgeon: Med Asif MD;  Location: [facility] GI    INJECT BLOCK MEDIAL BRANCH CERVICAL/THORACIC/LUMBAR Left 8/11/2022    Procedure: Left Lumbar 3, Left Lumbar 4, and Left Lumbar 5 medial branch nerve blocks using fluoroscopic guidance and contrast control;  Surgeon: Malick Gomez MD;  Location: [facility] OR    INJECT EPIDURAL LUMBAR Left 5/20/2022    Procedure: Left lumbar 4-5 interlaminar epidural injection ;  Surgeon: Malick Gomez MD;  Location: [facility] OR    INJECT JOINT SACROILIAC Left 7/1/2022    Procedure: Fluoroscopically-guided injection of the Left sacroiliac joint  with Left dimitrios Sacroiliac joint ligaments infiltration over the sacrum.;  Surgeon: Malick Gomez MD;  Location: [facility] OR    VAGINAL PROLAPSE REPAIR      done in [place]              Allergies:   Ampicillin, Lisinopril, and Sulfamethoxazole-trimethoprim       Data:   All laboratory data reviewed:    Recent Labs   Lab Test 10/03/23  0752 07/24/23  0801 10/05/22  0940 09/25/22  1005 09/12/22  1339 09/12/22  1339 09/08/22  0719 02/25/22  1700 09/14/21  0921 04/20/21  0914   LDL 19 32  --   --   --   --  45  --    < >  --    HDL 44* 46*  --   --   --   --  45*  --    < >  --    NHDL 63 65  --   --   --   --  97  --    < >  --    CHOL 107 111  --   --   --   --  142  --    < >  --    TRIG 220* 165*  --   --   --   --  258*  --    < >  --    TSH  --   --   --  1.32  --   --   --  1.35  --  1.97   IRON  --   --  70  --    < > 65  --   --   --   --    FEB  --   --  350  --    < > 325  --   --   --   --    IRONSAT  --   --  20  --    < > 20  --   --   --   --    CHRISTOPHER  --   --   --   --   --  68  --   --   --   --     < > = values in this interval not displayed.       Lab Results   Component Value Date    WBC 8.0 07/24/2023    WBC 4.9 05/27/2021    RBC 3.58 (L) 07/24/2023    RBC 4.18 05/27/2021    HGB 11.0 (L) 07/24/2023    HGB 11.9 05/27/2021    HCT 33.7 (L) 07/24/2023    HCT 37.4 05/27/2021    MCV 94 07/24/2023    MCV 90 05/27/2021    MCH 30.7 07/24/2023    MCH 28.5 05/27/2021    MCHC 32.6 07/24/2023    MCHC 31.8 05/27/2021    RDW 13.4 07/24/2023    RDW 13.6 05/27/2021     07/24/2023     05/27/2021       Lab Results   Component Value Date     10/03/2023     04/09/2021    POTASSIUM 4.2 10/03/2023    POTASSIUM 4.2 09/25/2022    POTASSIUM 4.6 04/09/2021    CHLORIDE 103 10/03/2023    CHLORIDE 104 09/25/2022    CHLORIDE 106 04/09/2021    CO2 25 10/03/2023    CO2 28 09/25/2022    CO2 26 04/09/2021    ANIONGAP 12 10/03/2023    ANIONGAP 6 09/25/2022    ANIONGAP 7 04/09/2021     (H) 10/03/2023      (H) 06/27/2023     (H) 09/25/2022     (H) 04/09/2021    BUN 21.3 10/03/2023    BUN 21 09/25/2022    BUN 19 04/09/2021    CR 1.03 (H) 10/03/2023    CR 0.85 04/09/2021    GFRESTIMATED 56 (L) 10/03/2023    GFRESTIMATED 67 04/09/2021    GFRESTBLACK 78 04/09/2021    JOHANN 9.1 10/03/2023    JOHANN 8.9 04/09/2021      Lab Results   Component Value Date    AST 9 09/08/2022    AST 15 04/09/2021    ALT 30 10/03/2023    ALT 22 04/09/2021       Lab Results   Component Value Date    A1C 7.5 (H) 07/24/2023    A1C 7.0 (H) 05/17/2021       No results found for: CANDIDA KAHN MD  [facility]      Thank you for allowing me to participate in the care of your patient.      Sincerely,     CANDIDA CLAIRE MD     [facility]  cc:   Keyla Bustamante, PA-C  [address]  [place] [id number]

## 2023-10-04 ENCOUNTER — HOSPITAL ENCOUNTER (OUTPATIENT)
Dept: BONE DENSITY | Facility: CLINIC | Age: 78
Discharge: HOME | End: 2023-10-04
Attending: STUDENT IN AN ORGANIZED HEALTH CARE EDUCATION/TRAINING PROGRAM | Admitting: STUDENT IN AN ORGANIZED HEALTH CARE EDUCATION/TRAINING PROGRAM
Payer: COMMERCIAL

## 2023-10-04 DIAGNOSIS — M81.0 OSTEOPOROSIS: ICD-10-CM

## 2023-10-04 PROCEDURE — 77080 DXA BONE DENSITY AXIAL: CPT

## 2023-10-11 ENCOUNTER — VIRTUAL VISIT (OUTPATIENT)
Dept: INTERNAL MEDICINE | Facility: CLINIC | Age: 78
End: 2023-10-11
Payer: COMMERCIAL

## 2023-10-11 DIAGNOSIS — I10 HYPERTENSION, UNSPECIFIED TYPE: ICD-10-CM

## 2023-10-11 DIAGNOSIS — E78.5 HYPERLIPIDEMIA LDL GOAL <100: ICD-10-CM

## 2023-10-11 DIAGNOSIS — E11.00 TYPE 2 DIABETES MELLITUS WITH HYPEROSMOLARITY WITHOUT COMA, WITHOUT LONG-TERM CURRENT USE OF INSULIN (H): Primary | ICD-10-CM

## 2023-10-11 DIAGNOSIS — E03.9 ACQUIRED HYPOTHYROIDISM: ICD-10-CM

## 2023-10-11 PROCEDURE — 99441 PR PHYSICIAN TELEPHONE EVALUATION 5-10 MIN: CPT | Mod: 93 | Performed by: INTERNAL MEDICINE

## 2023-10-11 RX ORDER — LEVOTHYROXINE SODIUM 75 UG/1
75 TABLET ORAL DAILY
Qty: 90 TABLET | Refills: 3 | Status: SHIPPED | OUTPATIENT
Start: 2023-10-11

## 2023-10-11 RX ORDER — ATORVASTATIN CALCIUM 80 MG/1
80 TABLET, FILM COATED ORAL DAILY
Qty: 90 TABLET | Refills: 3 | Status: SHIPPED | OUTPATIENT
Start: 2023-10-11 | End: 2024-07-24

## 2023-10-11 RX ORDER — LOSARTAN POTASSIUM 100 MG/1
100 TABLET ORAL DAILY
Qty: 90 TABLET | Refills: 3 | Status: SHIPPED | OUTPATIENT
Start: 2023-10-11 | End: 2024-07-24

## 2023-10-11 NOTE — COMMUNITY RESOURCES LIST (ENGLISH)
10/11/2023   [facility]  N/A  For additional resource needs, please contact your health insurance member services or your primary care team.  Phone: [phone]   Email: N/A   Address: [address] [place] [address]   Hours: N/A        Housing       Housing search assistance  [facility] [place] [facility] Distance: 22.97 miles      In-Person   [address] [facility] [place] [address]  Language: English  Hours: Mon - Fri 8:00 AM - 4:00 PM  Fees: Free   Phone: (889) [phone] Email: David@[employer] Website: https://[employer]/          Important Numbers & Websites       [facility]   [address].org  Poison Control   (449) 539-2209 [place]  Suicide and Crisis Lifeline   [address] [place], [place] [address]line.org  Childhelp [place] Child Abuse Hotline   797.973.6367 Childhelphotline.org  [place] Sexual Assault Hotline   (543) 439-5756 (HOPE) [facility]  [place] Runaway Safeline   (225) 877-5229 (RUNAWAY) [facility]  Pregnancy & Postpartum Support [place]   Call/text 875-992-3433 Ppsupport[place].org  Substance Abuse National Helpline ([facility]   950-025-HELP (0580) Findtreatment.gov  Emergency Services   911

## 2023-10-11 NOTE — PROGRESS NOTES
Vilma is a 78 year old who is being evaluated via a billable telephone visit.      What phone number would you like to be contacted at? [phone]  How would you like to obtain your AVS? Saloni    Distant Location (provider location):  On-site    Assessment & Plan     Hyperlipidemia LDL goal <100  Hyperlipidemia patient is taking atorvastatin we will refill this for her.  - atorvastatin (LIPITOR) 80 MG tablet; Take 1 tablet (80 mg) by mouth daily    Acquired hypothyroidism  Hypothyroidism refill her levothyroxine, she should have her TSH checked at sometime in the future.  - levothyroxine (SYNTHROID/LEVOTHROID) 75 MCG tablet; Take 1 tablet (75 mcg) by mouth daily    Hypertension, unspecified type  Hypertension on losartan we will refill this.  - losartan (COZAAR) 100 MG tablet; Take 1 tablet (100 mg) by mouth daily    Type 2 diabetes mellitus with hyperosmolarity without coma, without long-term current use of insulin (H)  Diabetes she feels a stomach pain is coming from Trulicity, loose stools I think this is actually from her metformin I would reduce her metformin to 3 pills daily.  Her A1c was good last time continue Trulicity weight has dropped.  - metFORMIN (GLUCOPHAGE) 500 MG tablet; 1 in the  AM and 2 at night.      Wang Vargas MD  [facility]   Vilma is a 78 year old, presenting for the following health issues:  No chief complaint on file.        10/11/2023    11:51 AM   Additional Questions   Roomed by Agustina GARCIA     Patient took Trulicity yesterday, reports loose stool and stomach pain within a few hours afterwards. Also reports lower left back pain, weight loss.     Take Trulucity and then had a soft stool, wonders if from the shot.   Weight is down to 126,   Farxiga was giving yeast infections.   Glucose 105-120.    Will reduce metformin to 3 a day and see if stools improve.     Review of Systems         Objective         Vitals:  No vitals were obtained  today due to virtual visit.    Physical Exam   healthy, alert, and no distress  PSYCH: Alert and oriented times 3; coherent speech, normal   rate and volume, able to articulate logical thoughts, able   to abstract reason, no tangential thoughts, no hallucinations   or delusions  Her affect is normal  RESP: No cough, no audible wheezing, able to talk in full sentences  Remainder of exam unable to be completed due to telephone visits          Phone call duration: 9 minutes

## 2023-10-30 DIAGNOSIS — E11.00 TYPE 2 DIABETES MELLITUS WITH HYPEROSMOLARITY WITHOUT COMA, WITHOUT LONG-TERM CURRENT USE OF INSULIN (H): Primary | ICD-10-CM

## 2023-10-30 DIAGNOSIS — I21.4 NON-STEMI (NON-ST ELEVATED MYOCARDIAL INFARCTION) (H): ICD-10-CM

## 2023-10-30 RX ORDER — ASPIRIN 81 MG/1
81 TABLET, CHEWABLE ORAL DAILY
Qty: 90 TABLET | Refills: 3 | Status: SHIPPED | OUTPATIENT
Start: 2023-10-30

## 2023-10-30 NOTE — TELEPHONE ENCOUNTER
Medication Question or Refill    Contacts         Type Contact Phone/Fax    10/30/2023 08:08 AM CDT Phone (Incoming) Vilma Gaona (Self) [phone] (H)            What medication are you calling about (include dose and sig)?:     metFORMIN (GLUCOPHAGE) 500 MG tablet       losartan (COZAAR) 100 MG tablet   levothyroxine (SYNTHROID/LEVOTHROID) 75 MCG tablet     Aspirin Buf,CaCarb-MgCarb-MgO, 81 MG TABS     Preferred Pharmacy:   [facility] [place], [place] - [address] [facility]  [address] [address]  Phone: [phone] Fax: [phone]    [facility] [address] [place] [address] - [address]  [address]  [facility] [address]  Phone: [phone] Fax: [phone]      Controlled Substance Agreement on file:   CSA -- Patient Level:    CSA: None found at the patient level.       Who prescribed the medication?: Wang Vargas    Do you need a refill? Yes    When did you use the medication last?     Patient offered an appointment? No    Do you have any questions or concerns?  No      Could we send this information to you in [facility] or would you prefer to receive a phone call?:   Patient would prefer a phone call   Okay to leave a detailed message?: Yes at Home number on file [phone] (home)

## 2023-10-30 NOTE — TELEPHONE ENCOUNTER
Spoke with patient.  Informed her that Dr. Vargas approved four meds on 10/11/23.  She does get  baby aspirin by prescription.  Rx is pended

## 2023-10-30 NOTE — TELEPHONE ENCOUNTER
Please find out where she needs the metformin and losartan.  I sent them to [facility] on October 23 for year-long refills.  Find out on the aspirin mixture how often she is taking this and what for.  Does not appear to be refilled since 2021

## 2023-11-15 ENCOUNTER — HOSPITAL ENCOUNTER (OUTPATIENT)
Dept: MAMMOGRAPHY | Facility: CLINIC | Age: 78
Discharge: HOME | End: 2023-11-15
Attending: INTERNAL MEDICINE | Admitting: INTERNAL MEDICINE
Payer: COMMERCIAL

## 2023-11-15 DIAGNOSIS — Z12.31 BREAST CANCER SCREENING BY MAMMOGRAM: ICD-10-CM

## 2023-11-15 PROCEDURE — 77067 SCR MAMMO BI INCL CAD: CPT

## 2023-11-22 DIAGNOSIS — E11.00 TYPE 2 DIABETES MELLITUS WITH HYPEROSMOLARITY WITHOUT COMA, WITHOUT LONG-TERM CURRENT USE OF INSULIN (H): ICD-10-CM

## 2023-11-22 NOTE — TELEPHONE ENCOUNTER
Per pt, only has 2 doses left. Patient will drop off letter this afternoon at  clinic stating medication is to be used for DM only starting Dec.1 2023.     Please advise.

## 2023-12-11 ENCOUNTER — TRANSFERRED RECORDS (OUTPATIENT)
Dept: HEALTH INFORMATION MANAGEMENT | Facility: CLINIC | Age: 78
End: 2023-12-11
Payer: COMMERCIAL

## 2023-12-19 ENCOUNTER — OFFICE VISIT (OUTPATIENT)
Dept: INTERNAL MEDICINE | Facility: CLINIC | Age: 78
End: 2023-12-19
Payer: COMMERCIAL

## 2023-12-19 VITALS
OXYGEN SATURATION: 99 % | RESPIRATION RATE: 16 BRPM | WEIGHT: 128.4 LBS | TEMPERATURE: 96.8 F | DIASTOLIC BLOOD PRESSURE: 74 MMHG | SYSTOLIC BLOOD PRESSURE: 116 MMHG | HEIGHT: 63 IN | HEART RATE: 60 BPM | BODY MASS INDEX: 22.75 KG/M2

## 2023-12-19 DIAGNOSIS — M35.3 PMR (POLYMYALGIA RHEUMATICA) (H): ICD-10-CM

## 2023-12-19 DIAGNOSIS — H61.23 BILATERAL IMPACTED CERUMEN: ICD-10-CM

## 2023-12-19 DIAGNOSIS — D64.9 ANEMIA, UNSPECIFIED TYPE: ICD-10-CM

## 2023-12-19 DIAGNOSIS — E11.9 TYPE 2 DIABETES MELLITUS WITHOUT COMPLICATION, WITHOUT LONG-TERM CURRENT USE OF INSULIN (H): Primary | ICD-10-CM

## 2023-12-19 DIAGNOSIS — M53.3 SACROILIAC JOINT PAIN: ICD-10-CM

## 2023-12-19 DIAGNOSIS — G56.02 CARPAL TUNNEL SYNDROME OF LEFT WRIST: ICD-10-CM

## 2023-12-19 LAB
CREAT UR-MCNC: 89.5 MG/DL
ERYTHROCYTE [DISTWIDTH] IN BLOOD BY AUTOMATED COUNT: 12.8 % (ref 10–15)
HBA1C MFR BLD: 6.7 %
HCT VFR BLD AUTO: 34.3 % (ref 35–47)
HGB BLD-MCNC: 10.7 G/DL (ref 11.7–15.7)
IRON BINDING CAPACITY (ROCHE): 332 UG/DL (ref 240–430)
IRON SATN MFR SERPL: 12 % (ref 15–46)
IRON SERPL-MCNC: 41 UG/DL (ref 37–145)
MCH RBC QN AUTO: 28.9 PG (ref 26.5–33)
MCHC RBC AUTO-ENTMCNC: 31.2 G/DL (ref 31.5–36.5)
MCV RBC AUTO: 93 FL (ref 78–100)
MICROALBUMIN UR-MCNC: 22.5 MG/L
MICROALBUMIN/CREAT UR: 25.14 MG/G CR (ref 0–25)
PLATELET # BLD AUTO: 129 10E3/UL (ref 150–450)
RBC # BLD AUTO: 3.7 10E6/UL (ref 3.8–5.2)
TSH SERPL DL<=0.005 MIU/L-ACNC: 1.27 UIU/ML (ref 0.3–4.2)
WBC # BLD AUTO: 7 10E3/UL (ref 4–11)

## 2023-12-19 PROCEDURE — 85027 COMPLETE CBC AUTOMATED: CPT | Performed by: INTERNAL MEDICINE

## 2023-12-19 PROCEDURE — 82570 ASSAY OF URINE CREATININE: CPT | Performed by: INTERNAL MEDICINE

## 2023-12-19 PROCEDURE — 83550 IRON BINDING TEST: CPT | Performed by: INTERNAL MEDICINE

## 2023-12-19 PROCEDURE — 82043 UR ALBUMIN QUANTITATIVE: CPT | Performed by: INTERNAL MEDICINE

## 2023-12-19 PROCEDURE — 83540 ASSAY OF IRON: CPT | Performed by: INTERNAL MEDICINE

## 2023-12-19 PROCEDURE — 84443 ASSAY THYROID STIM HORMONE: CPT | Performed by: INTERNAL MEDICINE

## 2023-12-19 PROCEDURE — 36415 COLL VENOUS BLD VENIPUNCTURE: CPT | Performed by: INTERNAL MEDICINE

## 2023-12-19 PROCEDURE — 99214 OFFICE O/P EST MOD 30 MIN: CPT | Performed by: INTERNAL MEDICINE

## 2023-12-19 PROCEDURE — 83036 HEMOGLOBIN GLYCOSYLATED A1C: CPT | Performed by: INTERNAL MEDICINE

## 2023-12-19 RX ORDER — RESPIRATORY SYNCYTIAL VIRUS VACCINE 120MCG/0.5
0.5 KIT INTRAMUSCULAR ONCE
Qty: 1 EACH | Refills: 0 | Status: CANCELLED | OUTPATIENT
Start: 2023-12-19 | End: 2023-12-19

## 2023-12-19 ASSESSMENT — PAIN SCALES - GENERAL: PAINLEVEL: MODERATE PAIN (5)

## 2023-12-19 NOTE — PROGRESS NOTES
Assessment & Plan     Type 2 diabetes mellitus without complication, without long-term current use of insulin (H)  Diabetes, patient is taking Trulicity she is lost weight too much weight.  Better with metformin down to 3 a day instead of 4 a day.  Will check her A1c and thyroid today.  Stop her Trulicity if her weight comes back some.  Hopefully sugars are better as her prednisone is weaned down to 2 mg and will be done soon.  - Albumin Random Urine Quantitative with Creat Ratio; Future  - TSH WITH FREE T4 REFLEX; Future  - HEMOGLOBIN A1C; Future    PMR (polymyalgia rheumatica) (H24)  Polymyalgia weaning down prednisone doing better    Anemia, unspecified type  Anemia she has been on iron we will do recheck her hemoglobin and iron studies today.  - CBC with platelets; Future  - Iron and iron binding capacity; Future    Carpal tunnel syndrome of left wrist  Carpal tunnel of the left wrist with numbness, strength is still there but worried about permanent damage will refer to orthopedic surgery.  - Orthopedic  Referral; Future    Sacroiliac joint pain  Sacroiliac pain worsen when she sits, happens in the left SI area no radiation will refer to physical therapy.  - Physical Therapy Referral; Future    Bilateral impacted cerumen  Removed wax to help her hearing.  - REMOVE IMPACTED CERUMEN; Standing  - REMOVE IMPACTED CERUMEN                 Wang Vargas MD  [facility]    Boris Esparza is a 78 year old, presenting for the following health issues:  Hand Pain (Left hand numb and tingling)      12/19/2023     1:06 PM   Additional Questions   Roomed by Nelli HESS         12/19/2023     1:06 PM   Patient Reported Additional Medications   Patient reports taking the following new medications prednisone 5 mg 2 tablets daily, vitamin B, C, D and niacin, ferrous sulfate 324 mg 1 tablet daily     Hand Pain    History of Present Illness       Back Pain:  She presents for follow up of back  "pain. Patient's back pain is a recurring problem.  Location of back pain:  Left lower back and left side of waist  Description of back pain: dull ache  Back pain spreads: nowhere    Since patient first noticed back pain, pain is: gradually improving  Does back pain interfere with her job:  Not applicable       Diabetes:   She presents for follow up of diabetes.  She is checking home blood glucose one time daily.   She checks blood glucose before meals.  Blood glucose is never over 200 and never under 70. She is aware of hypoglycemia symptoms including none.    She has no concerns regarding her diabetes at this time.   She is not experiencing numbness or burning in feet, excessive thirst, blurry vision, weight changes or redness, sores or blisters on feet.           She eats 2-3 servings of fruits and vegetables daily.She exercises with enough effort to increase her heart rate 9 or less minutes per day.  She exercises with enough effort to increase her heart rate 5 days per week.   She is taking medications regularly.         Losing weight since on Trulucity, just not hungry.  Diarrhea from metformin on 3 a day.   Blood sugars are 110-120 range so pretty good.     Prednisone is going down as well, 2mg then next month 1 mg a day.     Left hand numbness, wears a brace for her wrist a few weeks at night.      Left sacroiliac area hurts during the day, worse with sitting. Uses TENS machine.  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     Hearing loss on right side, wonders about wax.       Review of Systems         Objective    /74 (BP Location: Left arm, Patient Position: Chair)   Pulse 60   Temp 96.8  F (36  C) (Temporal)   Resp 16   Ht 1.6 m (5' 3\")   Wt 58.2 kg (128 lb 6.4 oz)   SpO2 99%   BMI 22.75 kg/m    Body mass index is 22.75 kg/m .  Physical Exam   No acute distress  Both ears have wax in them, some is removed by blue curette and the rest this flushed out  Left hand " has numbness in the palm and the thumb, index middle finger and half of the ring finger, little finger is normal.  Strength is normal  Left sacroiliac area Is tender to touch.  Good range of motion of the hip.                    .

## 2023-12-23 ENCOUNTER — HEALTH MAINTENANCE LETTER (OUTPATIENT)
Age: 78
End: 2023-12-23

## 2024-01-02 ENCOUNTER — ANCILLARY PROCEDURE (OUTPATIENT)
Dept: GENERAL RADIOLOGY | Facility: CLINIC | Age: 79
End: 2024-01-02
Attending: PHYSICIAN ASSISTANT
Payer: COMMERCIAL

## 2024-01-02 ENCOUNTER — OFFICE VISIT (OUTPATIENT)
Dept: ORTHOPEDICS | Facility: CLINIC | Age: 79
End: 2024-01-02
Payer: COMMERCIAL

## 2024-01-02 VITALS
BODY MASS INDEX: 22.86 KG/M2 | TEMPERATURE: 97.3 F | HEIGHT: 63 IN | SYSTOLIC BLOOD PRESSURE: 136 MMHG | DIASTOLIC BLOOD PRESSURE: 58 MMHG | WEIGHT: 129 LBS

## 2024-01-02 DIAGNOSIS — M25.532 LEFT WRIST PAIN: ICD-10-CM

## 2024-01-02 DIAGNOSIS — G56.02 CARPAL TUNNEL SYNDROME OF LEFT WRIST: Primary | ICD-10-CM

## 2024-01-02 PROCEDURE — 99204 OFFICE O/P NEW MOD 45 MIN: CPT | Performed by: PHYSICIAN ASSISTANT

## 2024-01-02 PROCEDURE — 73110 X-RAY EXAM OF WRIST: CPT | Mod: TC | Performed by: RADIOLOGY

## 2024-01-02 RX ORDER — FERROUS SULFATE 325(65) MG
325 TABLET ORAL
COMMUNITY

## 2024-01-02 RX ORDER — MULTIVIT-MIN/IRON/FOLIC ACID/K 18-600-40
500 CAPSULE ORAL DAILY
COMMUNITY

## 2024-01-02 RX ORDER — GINSENG 100 MG
50 CAPSULE ORAL DAILY
COMMUNITY

## 2024-01-02 ASSESSMENT — PAIN SCALES - GENERAL: PAINLEVEL: MODERATE PAIN (5)

## 2024-01-02 NOTE — PROGRESS NOTES
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  She has 2 boys and 6 grandchildren.    She is being seen for   Chief Complaints and History of Present Illnesses   Patient presents with    Musculoskeletal Problem     Left wrist    Consult     Wang Vargas MD         History of Present Illness:   Mechanism of Injury: No injury fall or trauma  Location: Left hand digits 1 through 4  Duration of Pain: Patient states numbness and tingling that is constant for the last 6 months or less however I do believe it is much longer than that as she had an EMG she states 25 years ago.  Rating of Pain: 5 out of 10  Pain Quality: Numbness and tingling and paresthesias  Pain is better with: Sometimes brace and other changes of position  Pain is worse with: Using phone or driving or sleeping is the worst  Treatment so far consists of: Patient has had bilateral wrist braces has helped slightly.  Patient also has tried Voltaren gel which has helped slightly.  She has no her steroid injections or any formal therapy.  She states that she did have an EMG 25 years ago but does not recall the results..   Associated Features: Admits to numbness and tingling digits 1 through 4.  Prior history of related problems: No previous major injury to the hand however she states 25 years ago she had an EMG of the left upper extremity.  Pain is Limiting: Heavy use of the left upper extremity.  Sleep also.  Here to: Orthopedic consultation  The Pain Has: Gotten worse  Additional History: What has brought her in is her inability to sleep at night because of this.  Patient also has noticed weakness in the left upper extremity/hand and also decreased dexterity and dropping items.  Patient has polymyalgia rheumatica and also has a diabetic type II      Patient's past medical, surgical, social and family histories reviewed.      Past Medical History:   Diagnosis Date    Essential hypertension     NSTEMI (non-ST elevated myocardial infarction) (H) 12/2020    2 stents    Type 2 diabetes mellitus without complication (H) 2005        Past Surgical History:   Procedure Laterality Date    COLONOSCOPY N/A 12/15/2022    Procedure: COLONOSCOPY, WITH POLYPECTOMY;  Surgeon: Andres Trujillo DO;  [facility]: [facility]    ESOPHAGOSCOPY, GASTROSCOPY, DUODENOSCOPY (EGD), COMBINED N/A 6/27/2023    Procedure: ESOPHAGOGASTRODUODENOSCOPY, WITH BIOPSY;  Surgeon: Med Asif MD;  Location: [facility]    INJECT BLOCK MEDIAL BRANCH CERVICAL/THORACIC/LUMBAR Left 8/11/2022    Procedure: Left Lumbar 3, Left Lumbar 4, and Left Lumbar 5 medial branch nerve blocks using fluoroscopic guidance and contrast control;  Surgeon: Malick Gomez MD;  [facility]    INJECT EPIDURAL LUMBAR Left 5/20/2022    Procedure: Left lumbar 4-5 interlaminar epidural injection ;  Surgeon: Malick Gomez MD;  [facility]    INJECT JOINT SACROILIAC Left 7/1/2022    Procedure: Fluoroscopically-guided injection of the Left sacroiliac joint with Left dimitrios Sacroiliac joint ligaments infiltration over the sacrum.;  Surgeon: Malick Gomez MD;  [facility]    VAGINAL PROLAPSE REPAIR      done in [place]       Medications:  acetaminophen (TYLENOL) 325 MG tablet, Take 650 mg by mouth  alcohol swab prep pads, Use to swab area of injection/shana as directed  Ascorbic Acid (VITAMIN C) 500 MG CAPS, Take 500 mg by mouth daily  aspirin (ASA) 81 MG chewable tablet, Take 1 tablet (81 mg) by mouth daily  atorvastatin (LIPITOR) 80 MG tablet, Take 1 tablet (80 mg) by mouth daily  blood glucose (NO BRAND SPECIFIED) test strip, Use to test blood sugar 1 times daily or as directed. To accompany: Blood Glucose Monitor Brands: per insurance.  blood glucose calibration (NO BRAND SPECIFIED) solution, Use to calibrate blood glucose monitor as needed as directed. To accompany: Blood Glucose Monitor  "Brands: per insurance.  carvedilol (COREG) 6.25 MG tablet, Take 1 tablet (6.25 mg) by mouth 2 times daily  cholecalciferol (VITAMIN D3) 25 mcg (1000 units) capsule, Take 1 capsule by mouth daily  ethacrynic acid (EDECRIN) 25 MG tablet, Take 1 tablet (25 mg) by mouth daily  ferrous sulfate (FEROSUL) 325 (65 Fe) MG tablet, Take 325 mg by mouth daily (with breakfast)  latanoprost (XALATAN) 0.005 % ophthalmic solution, Inject 1 drop into the eye daily  levothyroxine (SYNTHROID/LEVOTHROID) 75 MCG tablet, Take 1 tablet (75 mcg) by mouth daily  losartan (COZAAR) 100 MG tablet, Take 1 tablet (100 mg) by mouth daily  metFORMIN (GLUCOPHAGE) 500 MG tablet, 1 in the  AM and 2 at night.  omeprazole (PRILOSEC) 20 MG DR capsule, Take 1 capsule (20 mg) by mouth daily  predniSONE (DELTASONE) 5 MG tablet, Take 1 mg by mouth daily 2 tablet once daily  thin (NO BRAND SPECIFIED) lancets, Use to test blood sugar 1 times daily or as directed. To accompany: Blood Glucose Monitor Brands: per insurance.  timolol hemihydrate (BETIMOL) 0.25 % ophthalmic solution, Inject 1 drop into the eye daily  zinc 50 MG TABS, Take 50 mg by mouth daily  nitroGLYcerin (NITROSTAT) 0.4 MG sublingual tablet, Place 0.4 mg under the tongue as needed  (Patient not taking: Reported on 10/11/2022)    No current facility-administered medications on file prior to visit.      Allergies   Allergen Reactions    Ampicillin Other (See Comments), Hives and Itching     \"not that bad\"    Lisinopril Cough    Sulfamethoxazole-Trimethoprim Other (See Comments), Hives and Itching     \"not that bad\"       Social History     Occupational History    Not on file   Tobacco Use    Smoking status: Never    Smokeless tobacco: Never   Vaping Use    Vaping Use: Never used   Substance and Sexual Activity    Alcohol use: Yes     Comment: occ    Drug use: Never    Sexual activity: Yes     Partners: Male     Birth control/protection: Female Surgical       History reviewed. No pertinent family " "history.    REVIEW OF SYSTEMS  10 point review systems performed otherwise negative as noted as per history of present illness.    Physical Exam:  Vitals: /58 (BP Location: Right arm, Cuff Size: Adult Regular)   Temp 97.3  F (36.3  C) (Temporal)   Ht 1.6 m (5' 3\")   Wt 58.5 kg (129 lb)   BMI 22.85 kg/m    BMI= Body mass index is 22.85 kg/m .    Constitutional: healthy, alert and no acute distress   Psychiatric: mentation appears normal and affect normal/bright  NEURO: no focal deficits, CMS intact left upper extremity, except decreased sensation in digits 1 through 4  RESP: Normal with easy respirations and no use of accessory muscles to breathe, no audible wheezing or retractions  CV: +2 radial pulse and her hand is warm to palpation.   SKIN: No erythema, rashes, excoriation, or breakdown. No evidence of infection.   MUSCULOSKELETAL:  INSPECTION of left hand: I do believe her to have atrophy of the thenar muscle when compared to the contralateral side.  No gross deformities, erythema, edema, ecchymosis, or fasciculations.   PALPATION: No tenderness distal radius or distal ulna, no tenderness to palpation of the hand or digits or forearm.  No increased warmth.   ROM: Patient has full extension and flexion of all 5 digits and wrist flexion to approximately 80 degrees and extension to 35 degrees without any catching locking or pain.    STRENGTH: 5 out of 5 , interosseous and thumb without pain.   SPECIAL TEST: Negative Tinel's test, negative Phalen's test, negative carpal compression test.   GAIT: non-antalgic  Lymph: no palpable lymph nodes    Diagnostic Modalities:  X-rays done today showing no fracture no dislocation no tumor and adequate joint spacing except for CMC joint which has mild joint space narrowing.  Alignment acceptable.    Independent visualization of the images was performed.    Impression: 1.  Left carpal tunnel syndrome    Plan:  All of the above pertinent physical exam and imaging " modalities findings was reviewed with Zaria.    FOCUSED PLAN:  Patient complains of numbness and tingling and cold sensation digits 1 through 4 over the last 6 months or less however she might have had a longer course as she had an EMG 25 years ago she states.  Her carpal tunnel tests are negative today although it just seems that they do not get worse than they already are with the maneuvers.  I do believe it is best to get a new EMG to see if she has carpal tunnel which by history it definitely sounds that way.  I did offer a steroid injection into the carpal tunnel area for diagnostic and treatment purposes but she wanted to hold off on that.  We did discuss carpal tunnel release surgery.  I did mention Dr. Keith as a possible surgeon.  I will follow-up with the patient virtually when the EMG is done and we can go from there.    Re-x-ray on return: No      This note was dictated with [email or web address].    Med Lopez PA-C

## 2024-01-02 NOTE — LETTER
1/2/2024         RE: Zaria Gaona  [address] [facility] [address]        Dear Colleague,    Thank you for referring your patient, Zaria Gaona, to the [facility]. Please see a copy of my visit note below.    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  She has 2 boys and 6 grandchildren.    She is being seen for   Chief Complaints and History of Present Illnesses   Patient presents with     Musculoskeletal Problem     Left wrist     Consult     Wang Vargas MD         History of Present Illness:   Mechanism of Injury: No injury fall or trauma  Location: Left hand digits 1 through 4  Duration of Pain: Patient states numbness and tingling that is constant for the last 6 months or less however I do believe it is much longer than that as she had an EMG she states 25 years ago.  Rating of Pain: 5 out of 10  Pain Quality: Numbness and tingling and paresthesias  Pain is better with: Sometimes brace and other changes of position  Pain is worse with: Using phone or driving or sleeping is the worst  Treatment so far consists of: Patient has had bilateral wrist braces has helped slightly.  Patient also has tried Voltaren gel which has helped slightly.  She has no her steroid injections or any formal therapy.  She states that she did have an EMG 25 years ago but does not recall the results..   Associated Features: Admits to numbness and tingling digits 1 through 4.  Prior history of related problems: No previous major injury to the hand however she states 25 years ago she had an EMG of the left upper extremity.  Pain is Limiting: Heavy use of the left upper extremity.  Sleep also.  Here to: Orthopedic consultation  The Pain Has: Gotten worse  Additional History: What has brought her in is her inability to sleep at night because of this.   Patient also has noticed weakness in the left upper extremity/hand and also decreased dexterity and dropping items.      Patient's past medical, surgical, social and family histories reviewed.     Past Medical History:   Diagnosis Date     Essential hypertension      NSTEMI (non-ST elevated myocardial infarction) (H) 12/2020    2 stents     Type 2 diabetes mellitus without complication (H) 2005        Past Surgical History:   Procedure Laterality Date     COLONOSCOPY N/A 12/15/2022    Procedure: COLONOSCOPY, WITH POLYPECTOMY;  Surgeon: Andres Trujillo DO;  [facility]     ESOPHAGOSCOPY, GASTROSCOPY, DUODENOSCOPY (EGD), COMBINED N/A 6/27/2023    Procedure: ESOPHAGOGASTRODUODENOSCOPY, WITH BIOPSY;  Surgeon: Med Asif MD;  [facility]     INJECT BLOCK MEDIAL BRANCH CERVICAL/THORACIC/LUMBAR Left 8/11/2022    Procedure: Left Lumbar 3, Left Lumbar 4, and Left Lumbar 5 medial branch nerve blocks using fluoroscopic guidance and contrast control;  Surgeon: Malikc Gomez MD;  [facility]     INJECT EPIDURAL LUMBAR Left 5/20/2022    Procedure: Left lumbar 4-5 interlaminar epidural injection ;  Surgeon: Malick Gomez MD;  [facility]     INJECT JOINT SACROILIAC Left 7/1/2022    Procedure: Fluoroscopically-guided injection of the Left sacroiliac joint with Left dimitrios Sacroiliac joint ligaments infiltration over the sacrum.;  Surgeon: Malick Gomez MD;  [facility]     VAGINAL PROLAPSE REPAIR      done in [place]       Medications:  acetaminophen (TYLENOL) 325 MG tablet, Take 650 mg by mouth  alcohol swab prep pads, Use to swab area of injection/shana as directed  Ascorbic Acid (VITAMIN C) 500 MG CAPS, Take 500 mg by mouth daily  aspirin (ASA) 81 MG chewable tablet, Take 1 tablet (81 mg) by mouth daily  atorvastatin (LIPITOR) 80 MG tablet, Take 1 tablet (80 mg) by mouth daily  blood glucose (NO BRAND SPECIFIED) test strip, Use to test blood sugar 1 times daily or as directed. To accompany:  "Blood Glucose Monitor Brands: per insurance.  blood glucose calibration (NO BRAND SPECIFIED) solution, Use to calibrate blood glucose monitor as needed as directed. To accompany: Blood Glucose Monitor Brands: per insurance.  carvedilol (COREG) 6.25 MG tablet, Take 1 tablet (6.25 mg) by mouth 2 times daily  cholecalciferol (VITAMIN D3) 25 mcg (1000 units) capsule, Take 1 capsule by mouth daily  ethacrynic acid (EDECRIN) 25 MG tablet, Take 1 tablet (25 mg) by mouth daily  ferrous sulfate (FEROSUL) 325 (65 Fe) MG tablet, Take 325 mg by mouth daily (with breakfast)  latanoprost (XALATAN) 0.005 % ophthalmic solution, Inject 1 drop into the eye daily  levothyroxine (SYNTHROID/LEVOTHROID) 75 MCG tablet, Take 1 tablet (75 mcg) by mouth daily  losartan (COZAAR) 100 MG tablet, Take 1 tablet (100 mg) by mouth daily  metFORMIN (GLUCOPHAGE) 500 MG tablet, 1 in the  AM and 2 at night.  omeprazole (PRILOSEC) 20 MG DR capsule, Take 1 capsule (20 mg) by mouth daily  predniSONE (DELTASONE) 5 MG tablet, Take 1 mg by mouth daily 2 tablet once daily  thin (NO BRAND SPECIFIED) lancets, Use to test blood sugar 1 times daily or as directed. To accompany: Blood Glucose Monitor Brands: per insurance.  timolol hemihydrate (BETIMOL) 0.25 % ophthalmic solution, Inject 1 drop into the eye daily  zinc 50 MG TABS, Take 50 mg by mouth daily  nitroGLYcerin (NITROSTAT) 0.4 MG sublingual tablet, Place 0.4 mg under the tongue as needed  (Patient not taking: Reported on 10/11/2022)    No current facility-administered medications on file prior to visit.      Allergies   Allergen Reactions     Ampicillin Other (See Comments), Hives and Itching     \"not that bad\"     Lisinopril Cough     Sulfamethoxazole-Trimethoprim Other (See Comments), Hives and Itching     \"not that bad\"       Social History     Occupational History     Not on file   Tobacco Use     Smoking status: Never     Smokeless tobacco: Never   Vaping Use     Vaping Use: Never used   Substance " "and Sexual Activity     Alcohol use: Yes     Comment: occ     Drug use: Never     Sexual activity: Yes     Partners: Male     Birth control/protection: Female Surgical       History reviewed. No pertinent family history.    REVIEW OF SYSTEMS  10 point review systems performed otherwise negative as noted as per history of present illness.    Physical Exam:  Vitals: /58 (BP Location: Right arm, Cuff Size: Adult Regular)   Temp 97.3  F (36.3  C) (Temporal)   Ht 1.6 m (5' 3\")   Wt 58.5 kg (129 lb)   BMI 22.85 kg/m    BMI= Body mass index is 22.85 kg/m .    Constitutional: healthy, alert and no acute distress   Psychiatric: mentation appears normal and affect normal/bright  NEURO: no focal deficits, CMS intact left upper extremity, except decreased sensation in digits 1 through 4  RESP: Normal with easy respirations and no use of accessory muscles to breathe, no audible wheezing or retractions  CV: +2 radial pulse and her hand is warm to palpation.   SKIN: No erythema, rashes, excoriation, or breakdown. No evidence of infection.   MUSCULOSKELETAL:  INSPECTION of left hand: I do believe her to have atrophy of the thenar muscle when compared to the contralateral side.  No gross deformities, erythema, edema, ecchymosis, or fasciculations.   PALPATION: No tenderness distal radius or distal ulna, no tenderness to palpation of the hand or digits or forearm.  No increased warmth.   ROM: Patient has full extension and flexion of all 5 digits and wrist flexion to approximately 80 degrees and extension to 35 degrees without any catching locking or pain.    STRENGTH: 5 out of 5 , interosseous and thumb without pain.   SPECIAL TEST: Negative Tinel's test, negative Phalen's test, negative carpal compression test.   GAIT: non-antalgic  Lymph: no palpable lymph nodes    Diagnostic Modalities:  X-rays done today showing no fracture no dislocation no tumor and adequate joint spacing except for CMC joint which has mild joint " space narrowing.  Alignment acceptable.    Independent visualization of the images was performed.    Impression: 1.  Left carpal tunnel syndrome    Plan:  All of the above pertinent physical exam and imaging modalities findings was reviewed with Zaria.    FOCUSED PLAN:  Patient complains of numbness and tingling and cold sensation digits 1 through 4 over the last 6 months or less however she might have had a longer course as she had an EMG 25 years ago she states.  Her carpal tunnel tests are negative today although it just seems that they do not get worse than they already are with the maneuvers.  I do believe it is best to get a new EMG to see if she has carpal tunnel which by history it definitely sounds that way.  I did offer a steroid injection into the carpal tunnel area for diagnostic and treatment purposes but she wanted to hold off on that.  We did discuss carpal tunnel release surgery.  I did mention Dr. Ketih as a possible surgeon.  I will follow-up with the patient virtually when the EMG is done and we can go from there.    Re-x-ray on return: No      This note was dictated with [employer].    Med Lopez PA-C        Again, thank you for allowing me to participate in the care of your patient.        Sincerely,        Med Lopez PA-C

## 2024-01-04 ENCOUNTER — NURSE TRIAGE (OUTPATIENT)
Dept: INTERNAL MEDICINE | Facility: CLINIC | Age: 79
End: 2024-01-04
Payer: COMMERCIAL

## 2024-01-04 NOTE — TELEPHONE ENCOUNTER
S-(situation): Patient called in stating she has a cold that won't go away. She has had symptoms for one week, and OTC medications are not helping. She has tried cough drops, Vicks, Dayquil and Nyquil with no relief. Patient reports she has a productive cough, runny nose, watery and crusty eyes, congestion, and wheezing. She reports her cough is worse at night when she lays down, and she has trouble sleeping because of this. Patient denies SOB, chest pain    B-(background): History of heart attack. No history of lung disease.     A-(assessment): Patient needs an in person assessment to have her lungs checked,     Per protocol patient was advised she should be seen in office today. No appointments available so writer advised patient to go to urgent care now. Patient states she plans to go to urgent care in [place].      CASIMIRO Escobar, RN      Reason for Disposition   Wheezing is present    Additional Information   Negative: Bluish (or gray) lips or face   Negative: SEVERE difficulty breathing (e.g., struggling for each breath, speaks in single words)   Negative: Rapid onset of cough and has hives   Negative: Coughing started suddenly after medicine, an allergic food or bee sting   Negative: Difficulty breathing after exposure to flames, smoke, or fumes   Negative: Sounds like a life-threatening emergency to the triager   Negative: Previous asthma attacks and this feels like asthma attack   Negative: Dry cough (non-productive; no sputum or minimal clear sputum) and within 14 days of COVID-19 Exposure   Negative: MODERATE difficulty breathing (e.g., speaks in phrases, SOB even at rest, pulse 100-120) and still present when not coughing   Negative: Chest pain present when not coughing   Negative: Passed out (i.e., fainted, collapsed and was not responding)   Negative: Patient sounds very sick or weak to the triager   Negative: Fever > 103 F (39.4 C)   Negative: Fever > 101 F (38.3 C) and over 60 years of age    "Negative: Fever > 100.0 F (37.8 C) and has diabetes mellitus or a weak immune system (e.g., HIV positive, cancer chemotherapy, organ transplant, splenectomy, chronic steroids)   Negative: MILD difficulty breathing (e.g., minimal/no SOB at rest, SOB with walking, pulse <100) and still present when not coughing   Negative: Coughed up > 1 tablespoon (15 ml) blood (Exception: Blood-tinged sputum.)   Negative: Fever > 100.0 F (37.8 C) and bedridden (e.g., CVA, chronic illness, recovering from surgery)   Negative: Increasing ankle swelling    Answer Assessment - Initial Assessment Questions  1. ONSET: \"When did the cough begin?\"       1 week ago  2. SEVERITY: \"How bad is the cough today?\"       \"A little better\" but has been a deep cough   3. SPUTUM: \"Describe the color of your sputum\" (none, dry cough; clear, white, yellow, green)      White  4. HEMOPTYSIS: \"Are you coughing up any blood?\" If so ask: \"How much?\" (flecks, streaks, tablespoons, etc.)      No  5. DIFFICULTY BREATHING: \"Are you having difficulty breathing?\" If Yes, ask: \"How bad is it?\" (e.g., mild, moderate, severe)     - MILD: No SOB at rest, mild SOB with walking, speaks normally in sentences, can lie down, no retractions, pulse < 100.     - MODERATE: SOB at rest, SOB with minimal exertion and prefers to sit, cannot lie down flat, speaks in phrases, mild retractions, audible wheezing, pulse 100-120.     - SEVERE: Very SOB at rest, speaks in single words, struggling to breathe, sitting hunched forward, retractions, pulse > 120       No  6. FEVER: \"Do you have a fever?\" If Yes, ask: \"What is your temperature, how was it measured, and when did it start?\"      Have not checked temp. Feels warm.  7. CARDIAC HISTORY: \"Do you have any history of heart disease?\" (e.g., heart attack, congestive heart failure)       Heart attack, stent   8. LUNG HISTORY: \"Do you have any history of lung disease?\"  (e.g., pulmonary embolus, asthma, emphysema)      No  9. PE RISK " "FACTORS: \"Do you have a history of blood clots?\" (or: recent major surgery, recent prolonged travel, bedridden)      No  10. OTHER SYMPTOMS: \"Do you have any other symptoms?\" (e.g., runny nose, wheezing, chest pain)        Runny nose, wheezing, crusty eyes, watery eyes, congestion.   11. PREGNANCY: \"Is there any chance you are pregnant?\" \"When was your last menstrual period?\"        N/A  12. TRAVEL: \"Have you traveled out of the country in the last month?\" (e.g., travel history, exposures)        No    Protocols used: Cough-A-OH    "

## 2024-01-15 ENCOUNTER — THERAPY VISIT (OUTPATIENT)
Dept: PHYSICAL THERAPY | Facility: CLINIC | Age: 79
End: 2024-01-15
Attending: INTERNAL MEDICINE
Payer: COMMERCIAL

## 2024-01-15 DIAGNOSIS — M53.3 SACROILIAC JOINT PAIN: Primary | ICD-10-CM

## 2024-01-15 PROCEDURE — 97161 PT EVAL LOW COMPLEX 20 MIN: CPT | Mod: GP | Performed by: PHYSICAL THERAPIST

## 2024-01-15 PROCEDURE — 97110 THERAPEUTIC EXERCISES: CPT | Mod: GP | Performed by: PHYSICAL THERAPIST

## 2024-01-15 PROCEDURE — 97035 APP MDLTY 1+ULTRASOUND EA 15: CPT | Mod: GP | Performed by: PHYSICAL THERAPIST

## 2024-01-15 PROCEDURE — 97530 THERAPEUTIC ACTIVITIES: CPT | Mod: GP | Performed by: PHYSICAL THERAPIST

## 2024-01-15 NOTE — PROGRESS NOTES
PHYSICAL THERAPY EVALUATION  Type of Visit: Evaluation    See electronic medical record for Abuse and Falls Screening details.    Subjective       Presenting condition or subjective complaint: Pain in left side of lower back  Date of onset: 06/01/23 (approximately)    Relevant medical history: Arthritis; Bladder or bowel problems; Diabetes; High blood pressure; Thyroid problems   Dates & types of surgery:      Prior diagnostic imaging/testing results: MRI; X-ray; Bone scan   MRI:   Narrative & Impression   MRI OF THE LUMBAR SPINE WITHOUT CONTRAST 4/25/2022 10:06 AM      COMPARISON: Lumbar spine CT 2/6/2022     HISTORY: Left lumbar radiculopathy     TECHNIQUE: Multiplanar, multisequence MRI images of the lumbar spine  were acquired without IV contrast.     FINDINGS: There are five lumbar-type vertebrae for the purposes of  this dictation.      There is normal alignment of the lumbar vertebrae. Vertebral body  heights of the lumbar spine are normal. Marrow signal throughout the  lumbar vertebrae is within normal limits. There is no evidence for  fracture or pathologic bony lesion of the lumbar spine.      There is loss of disc height, disc desiccation and posterior disc  bulging/herniation to varying degrees at all levels of the lumbar  spine.      The tip of the conus medullaris is at the lower L1 level which is  within normal limits. There is no evidence for intrathecal  abnormality.      Level by level:      T12-L1: Loss of disc height, disc desiccation and circumferential disc  bulging with a superimposed small right paracentral disc herniation  (protrusion). Minimal facet arthropathy bilaterally. No spinal canal  stenosis. No foraminal stenosis on either side.     L1-L2: Loss of disc height, disc desiccation and circumferential disc  bulging with a superimposed tiny posterior central disc herniation  (protrusion). Mild facet arthropathy bilaterally. Minimal spinal canal  narrowing. No foraminal stenosis on either  side.     L2-L3: Loss of disc height, disc desiccation and moderate  circumferential disc bulging. No herniation. Circumferential endplate  spurring. Mild facet arthropathy bilaterally. Minimal spinal canal  narrowing. Mild bilateral neural foraminal narrowing.     L3-L4: Loss of disc height, disc desiccation and moderate  circumferential disc bulging. No herniation. Circumferential endplate  spurring. Mild facet arthropathy bilaterally. Mild spinal canal  narrowing. Mild to moderate bilateral neural foraminal narrowing.     L4-L5: Loss of disc height, disc desiccation and moderate  circumferential disc bulging. No herniation. Moderate facet  arthropathy bilaterally. No spinal canal stenosis. Moderate left  foraminal stenosis. Mild right foraminal narrowing.     L5-S1: Loss of disc height, disc desiccation and mild circumferential  disc bulging. No herniation. Moderate facet arthropathy bilaterally.  No spinal canal stenosis. Moderate left foraminal stenosis. Minimal  right foraminal narrowing.                                                                     IMPRESSION:  1. Diffuse degenerative change of the lumbar spine as detailed above.  2. No spinal canal stenosis of the lumbar spine.  3. Moderate neural foraminal stenosis on the left at L4-L5 and on the  left at L5-S1.     Prior therapy history for the same diagnosis, illness or injury: Yes last year     Prior Level of Function  Transfers: Independent  Ambulation: Independent  ADL: Independent  IADL:     Living Environment  Social support: With a significant other or spouse   Type of home: [facility]   Stairs to enter the home: No       Ramp:     Stairs inside the home: No       Help at home:    Equipment owned:       Employment: No    Hobbies/Interests: caring for     Patient goals for therapy: have less pain with daily activities    Pain assessment: see below      Sittin% Standin%      Pain Location: Left low back, left buttock    Visual  Analog Scale:  Low back average pain intensity: 1/10  Low back worst pain intensity: 4/10    Lower extremity average pain intensity: denies  Lower extremity worst pain intensity:     History:  Onset Date:   Onset Cause: fell on her patio  Onset Symptoms: L LBP, buttock  Symptom Change: no change  Constant Symptoms: none  Intermittent Symptoms: L LBP/L buttock  Investigations/Imaging: MRI   Meds for LBP: prednisone for arthritis, hands  Prior Treatment: has tens unit, helps. Does some bed exercises. Also does chair exercises with bands, has done them over 1 year  LBP History: 30 or more year hx  Last time pt has been sx free for 30 straight days: almost a year  Health Problems: polymyalgia rheumatica  Surgeries: low back surgery 30 years ago, not a fusion  Accidents: fell on patio in , tripped getting out of car in Dec    Symptom Frequency:   Low Back: \50%  Lower Extremity: none    Functional Comparison:  Bending: worse  Rising: worse  Sitting: worst  Standing: worse  Walking: better  Lying: better, Worst position none  Awaking: better, Sx's none  AM: better  Midday: worse  PM: worst    Overall Functional Level 90% of Normal compared to . Takes longer to do things  Objective     Physical Exam: slow transfers, poor body mechanics with transfers    Sitting Posture: kyphotic lumbar spine  Standing Posture:  fair  Standing Lordosis:  reducted  Standing Scoliosis: L iliac crest higher in standing     Range of Motion loss:   Flexion: 50% decrease  Extension: 75%   Deviation Noted: none    Special Tests:  Neurological: n/t  Sensation: n/t  MMT: n/t  HPI/SI: n/t  Other Info:     Static/Dynamic Force Analysis/Directional Preference Exam:    Symptoms prior to testing: no sx in sitting    Standin/10 sx  Flexion (x1): produced 2/10 L LBP/buttock  Flexion (3x10): n/t  Extension (x1): 0/10  Extension (3x10): n/t    Hook Lyin/10 sx   Flexion (x1): L low back pull  Flexion (3x10): n/t    Prone: 0/10  sx  Prone on Elbows: n/t  Extension (x1): n/t  Extension (3x10): n/t    Assessment & Plan   CLINICAL IMPRESSIONS  Medical Diagnosis: Sacroiliac joint pain (M53.3)    Treatment Diagnosis: mechanical low back pain   Impression/Assessment: Patient is a 78 year old female with L LBP complaints.  The following significant findings have been identified: Pain, Decreased ROM/flexibility, Decreased strength, Impaired balance, Impaired muscle performance, Decreased activity tolerance, and Impaired posture. These impairments interfere with their ability to perform self care tasks, work tasks, recreational activities, household chores, driving , household mobility, and community mobility as compared to previous level of function.     Clinical Decision Making (Complexity):  Clinical Presentation: Stable/Uncomplicated  Clinical Presentation Rationale: based on medical and personal factors listed in PT evaluation  Clinical Decision Making (Complexity): Low complexity    PLAN OF CARE  Treatment Interventions:  Modalities: E-stim, Ultrasound  Interventions: Manual Therapy, Neuromuscular Re-education, Therapeutic Activity, Therapeutic Exercise    Long Term Goals     PT Goal 1  Goal Identifier: Pain  Goal Description: Pt will note a 50% or greater reduction in low back frequency so pt can have improved sitting tolerance with adls  Target Date: 02/26/24  PT Goal 2  Goal Identifier: Function  Goal Description: pt will note a decrease in L LBP and carry over to improved functional level as measured by DAKOTAH score decrease from 33.33% to 20% or less  Target Date: 03/15/24      Frequency of Treatment: 1x per week  Duration of Treatment: 90 days, decrease as able    Recommended Referrals to Other Professionals:   Education Assessment:   Learner/Method: Patient;Listening;Reading;Demonstration;Pictures/Video;No Barriers to Learning  Education Comments: Home program    Risks and benefits of evaluation/treatment have been explained.    Patient/Family/caregiver agrees with Plan of Care.     Evaluation Time:     PT Eval, Low Complexity Minutes (11258): 20     Signing Clinician: Jeancarlos Shepherd PT      [facility]                                                                                   OUTPATIENT PHYSICAL THERAPY      PLAN OF TREATMENT FOR OUTPATIENT REHABILITATION   Patient's Last Name, First Name, Zaria Serrano YOB: 1945   Provider's Name   [facility]   Medical Record No.  [id number]     Onset Date: 06/01/23 (approximately)  Start of Care Date: 01/15/24     Medical Diagnosis:  Sacroiliac joint pain (M53.3)      PT Treatment Diagnosis:  mechanical low back pain Plan of Treatment  Frequency/Duration: 1x per week/ 90 days, decrease as able    Certification date from 01/15/24 to 04/13/24         See note for plan of treatment details and functional goals     Jeancarlos Shepherd, PT                         I CERTIFY THE NEED FOR THESE SERVICES FURNISHED UNDER        THIS PLAN OF TREATMENT AND WHILE UNDER MY CARE     (Physician attestation of this document indicates review and certification of the therapy plan).              Referring Provider:  Wang Vargas    Initial Assessment  See Epic Evaluation- Start of Care Date: 01/15/24

## 2024-01-16 ENCOUNTER — OFFICE VISIT (OUTPATIENT)
Dept: INTERNAL MEDICINE | Facility: CLINIC | Age: 79
End: 2024-01-16
Payer: COMMERCIAL

## 2024-01-16 VITALS
DIASTOLIC BLOOD PRESSURE: 80 MMHG | TEMPERATURE: 97 F | BODY MASS INDEX: 22.79 KG/M2 | OXYGEN SATURATION: 99 % | SYSTOLIC BLOOD PRESSURE: 132 MMHG | WEIGHT: 128.6 LBS | RESPIRATION RATE: 20 BRPM | HEART RATE: 60 BPM | HEIGHT: 63 IN

## 2024-01-16 DIAGNOSIS — I25.118 CORONARY ARTERY DISEASE INVOLVING NATIVE CORONARY ARTERY OF NATIVE HEART WITH OTHER FORM OF ANGINA PECTORIS (H): ICD-10-CM

## 2024-01-16 DIAGNOSIS — M35.3 PMR (POLYMYALGIA RHEUMATICA) (H): ICD-10-CM

## 2024-01-16 DIAGNOSIS — R05.2 SUBACUTE COUGH: ICD-10-CM

## 2024-01-16 DIAGNOSIS — Z00.00 ENCOUNTER FOR MEDICARE ANNUAL WELLNESS EXAM: Primary | ICD-10-CM

## 2024-01-16 DIAGNOSIS — E11.9 TYPE 2 DIABETES MELLITUS WITHOUT COMPLICATION, WITHOUT LONG-TERM CURRENT USE OF INSULIN (H): ICD-10-CM

## 2024-01-16 DIAGNOSIS — D64.9 ANEMIA, UNSPECIFIED TYPE: ICD-10-CM

## 2024-01-16 DIAGNOSIS — B37.31 YEAST INFECTION OF THE VAGINA: ICD-10-CM

## 2024-01-16 LAB
ERYTHROCYTE [DISTWIDTH] IN BLOOD BY AUTOMATED COUNT: 13.2 % (ref 10–15)
HCT VFR BLD AUTO: 33.2 % (ref 35–47)
HGB BLD-MCNC: 10.2 G/DL (ref 11.7–15.7)
IRON BINDING CAPACITY (ROCHE): 300 UG/DL (ref 240–430)
IRON SATN MFR SERPL: 13 % (ref 15–46)
IRON SERPL-MCNC: 38 UG/DL (ref 37–145)
MCH RBC QN AUTO: 28.5 PG (ref 26.5–33)
MCHC RBC AUTO-ENTMCNC: 30.7 G/DL (ref 31.5–36.5)
MCV RBC AUTO: 93 FL (ref 78–100)
PLATELET # BLD AUTO: 202 10E3/UL (ref 150–450)
RBC # BLD AUTO: 3.58 10E6/UL (ref 3.8–5.2)
WBC # BLD AUTO: 10.8 10E3/UL (ref 4–11)

## 2024-01-16 PROCEDURE — 83550 IRON BINDING TEST: CPT | Performed by: INTERNAL MEDICINE

## 2024-01-16 PROCEDURE — 83540 ASSAY OF IRON: CPT | Performed by: INTERNAL MEDICINE

## 2024-01-16 PROCEDURE — 36415 COLL VENOUS BLD VENIPUNCTURE: CPT | Performed by: INTERNAL MEDICINE

## 2024-01-16 PROCEDURE — 85027 COMPLETE CBC AUTOMATED: CPT | Performed by: INTERNAL MEDICINE

## 2024-01-16 PROCEDURE — G0439 PPPS, SUBSEQ VISIT: HCPCS | Performed by: INTERNAL MEDICINE

## 2024-01-16 PROCEDURE — 99214 OFFICE O/P EST MOD 30 MIN: CPT | Mod: 25 | Performed by: INTERNAL MEDICINE

## 2024-01-16 RX ORDER — BENZONATATE 100 MG/1
100 CAPSULE ORAL 3 TIMES DAILY PRN
Qty: 30 CAPSULE | Refills: 1 | Status: SHIPPED | OUTPATIENT
Start: 2024-01-16 | End: 2024-07-24

## 2024-01-16 RX ORDER — RESPIRATORY SYNCYTIAL VIRUS VACCINE 120MCG/0.5
0.5 KIT INTRAMUSCULAR ONCE
Qty: 1 EACH | Refills: 0 | Status: CANCELLED | OUTPATIENT
Start: 2024-01-16 | End: 2024-01-16

## 2024-01-16 RX ORDER — FLUCONAZOLE 150 MG/1
150 TABLET ORAL ONCE
Qty: 1 TABLET | Refills: 0 | Status: SHIPPED | OUTPATIENT
Start: 2024-01-16 | End: 2024-01-16

## 2024-01-16 ASSESSMENT — ENCOUNTER SYMPTOMS: COUGH: 1

## 2024-01-16 ASSESSMENT — PAIN SCALES - GENERAL: PAINLEVEL: SEVERE PAIN (6)

## 2024-01-16 NOTE — PROGRESS NOTES
Assessment & Plan     Encounter for Medicare annual wellness exam  Care wellness exam, patient is doing well, continues to drive lives in a townhouse with her .  Memory is okay had a couple falls but those from tripping.    Yeast infection of the vagina  Yeast infection after having antibiotics we will give her Diflucan  - fluconazole (DIFLUCAN) 150 MG tablet; Take 1 tablet (150 mg) by mouth once for 1 dose  - OFFICE/OUTPT VISIT,EST,LEVL III    Subacute cough  Subacute cough she is already had antibiotics.  Lungs are very clear on examination without any wheezes.  Will try her on Tessalon to help her symptoms  - benzonatate (TESSALON) 100 MG capsule; Take 1 capsule (100 mg) by mouth 3 times daily as needed for cough  - OFFICE/OUTPT VISIT,EST,LEVL III    Anemia, unspecified type  Anemia 10.6, she is been on iron we will recheck her CBC and iron studies to see how she is doing with this.  - CBC with platelets; Future  - Iron and iron binding capacity; Future  - CBC with platelets  - Iron and iron binding capacity  - OFFICE/OUTPT VISIT,EST,LEVL III    Type 2 diabetes mellitus without complication, without long-term current use of insulin (H)  Diabetes stable A1c was good.  Did go off Trulicity because she was losing too much weight.  Recheck in 2 to 3 months    PMR (polymyalgia rheumatica) (H24)  Polymyalgia rheumatica sees rheumatology on steroids went from 2 mg to 4 mg for a slight flare    Coronary artery disease involving native coronary artery of native heart with other form of angina pectoris (H24)  Stable no chest pain continue to be active.                 Wang Vargas MD  [facility] YUE Esparza is a 78 year old, presenting for the following health issues:  Cough (Was seen in urgent care for sinus infection,  Here today with cough and vaginal itching) and Wellness Visit      1/16/2024    12:45 PM   Additional Questions   Roomed by Nelli HESS       Cough    History of  "Present Illness       Reason for visit:  Cough and vaginal itching  Symptom onset:  3-7 days ago  Symptom intensity:  Mild  Symptom progression:  Staying the same  Had these symptoms before:  Yes  Has tried/received treatment for these symptoms:  No  What makes it better:  Cough drops tyenol    She eats 2-3 servings of fruits and vegetables daily.She consumes 0 sweetened beverage(s) daily.She exercises with enough effort to increase her heart rate 9 or less minutes per day.  She exercises with enough effort to increase her heart rate 3 or less days per week.   She is taking medications regularly.     Had a sinus infection and cold and given keflex and eye drops at urgent care.     Better still has a cough, using cough drops and dayquil  no fevers, just the cough    Vaginal yeast infection, started 3 days ago, done with antibiotics.                 Annual Wellness Visit    Patient has been advised of split billing requirements and indicates understanding: Yes     Are you in the first 12 months of your Medicare Part B coverage?  No    Physical Health:  In general, how would you rate your overall physical health? good  Outside of work, how many days during the week do you exercise?4-5 days/week  Outside of work, approximately how many minutes a day do you exercise?less than 15 minutes  If you drink alcohol do you typically have >3 drinks per day or >7 drinks per week? No  Do you usually eat at least 4 servings of fruit and vegetables a day, include whole grains & fiber and avoid regularly eating high fat or \"junk\" foods? Yes  Do you have any problems taking medications regularly? No  Do you have any side effects from medications? none  Needs assistance for the following daily activities: no assistance needed  Which of the following safety concerns are present in your home?  none identified   Hearing impairment: No  In the past 6 months, have you been bothered by leaking of urine? yes    Exercises about 10 minutes a " day, legs have arthritis, rheumatologist increase prednisone to 4 for a week.     Lives in [facility] in town with     Off trulucity due to weight too low, gained back a few pounds. Sugars are higher now,     Sugars 120-140 range.     Mental Health:  In general, how would you rate your overall mental or emotional health? excellent        Do you feel safe in your environment? Yes    Have you ever done Advance Care Planning? (For example, a Health Directive, POLST, or a discussion with a medical provider or your loved ones about your wishes)? No, advance care planning information given to patient to review.  Patient plans to discuss their wishes with loved ones or provider.      Fall risk:  Fallen 2 or more times in the past year?: Yes  Any fall with injury in the past year?: No  Tripped on blacktop.     Cognitive Screenin) Repeat 3 items (Leader, Season, Table)    2) Clock draw: NORMAL  3) 3 item recall: Recalls 3 objects  Results: 3 items recalled: COGNITIVE IMPAIRMENT LESS LIKELY    Mini-CogTM Copyright HEATH Mata. Licensed by the author for use in [facility]; reprinted with permission (sid@[facility]). All rights reserved.      Do you have sleep apnea, excessive snoring or daytime drowsiness? : no    Social History     Tobacco Use    Smoking status: Never    Smokeless tobacco: Never   Substance Use Topics    Alcohol use: Yes     Comment: occ         10/5/2022     7:51 AM   Alcohol Use   Prescreen: >3 drinks/day or >7 drinks/week? No     Do you have a current opioid prescription? No  Do you use any other controlled substances or medications that are not prescribed by a provider? None    Current providers sharing in care for this patient include:   Patient Care Team:  Wang Vargas MD as PCP - General (Internal Medicine)  Wang Vargas MD as Assigned PCP  John Cruz PA-C as Assigned Musculoskeletal Provider  Piedad Bourgeois APRN CNP as Nurse Practitioner  Clarence Hernandez DPM as Assigned  Surgical Provider  Artem Covarrubias MD as Assigned Heart and Vascular Provider    The following health maintenance items are reviewed in Epic and correct as of today:  Health Maintenance   Topic Date Due    RSV VACCINE (Pregnancy & 60+) (1 - 1-dose 60+ series) Never done    DIABETIC FOOT EXAM  04/05/2022    DTAP/TDAP/TD IMMUNIZATION (2 - Td or Tdap) 08/15/2023    COVID-19 Vaccine (6 - 2023-24 season) 09/01/2023    MEDICARE ANNUAL WELLNESS VISIT  10/05/2023    A1C  03/19/2024    EYE EXAM  09/13/2024    BMP  10/03/2024    LIPID  10/03/2024    ANNUAL REVIEW OF HM ORDERS  10/11/2024    FALL RISK ASSESSMENT  10/11/2024    MICROALBUMIN  12/19/2024    TSH W/FREE T4 REFLEX  12/19/2024    COLORECTAL CANCER SCREENING  12/15/2025    ADVANCE CARE PLANNING  10/05/2027    DEXA  10/04/2038    HEPATITIS C SCREENING  Completed    PHQ-2 (once per calendar year)  Completed    INFLUENZA VACCINE  Completed    Pneumococcal Vaccine: 65+ Years  Completed    ZOSTER IMMUNIZATION  Completed    IPV IMMUNIZATION  Aged Out    HPV IMMUNIZATION  Aged Out    MENINGITIS IMMUNIZATION  Aged Out    RSV MONOCLONAL ANTIBODY  Aged Out    MAMMO SCREENING  Discontinued       Patient has been advised of split billing requirements and indicates understanding: Yes    Appropriate preventive services were discussed with this patient, including applicable screening as appropriate for fall prevention, nutrition, physical activity, Tobacco-use cessation, weight loss and cognition.  Checklist reviewing preventive services available has been given to the patient.        Review of Systems   Respiratory:  Positive for cough.       CONSTITUTIONAL: NEGATIVE for fever, chills, change in weight  INTEGUMENTARY/SKIN: NEGATIVE for worrisome rashes, moles or lesions  EYES: NEGATIVE for vision changes or irritation  ENT/MOUTH: NEGATIVE for ear, mouth and throat problems  RESP: NEGATIVE for significant cough or SOB  CV: NEGATIVE for chest pain, palpitations or peripheral  "edema  GI: diarrhea  : NEGATIVE for frequency, dysuria, or hematuria  MUSCULOSKELETAL: NEGATIVE for significant arthralgias or myalgia  NEURO: NEGATIVE for weakness, dizziness or paresthesias  ENDOCRINE: NEGATIVE for temperature intolerance, skin/hair changes  HEME: NEGATIVE for bleeding problems  PSYCHIATRIC: NEGATIVE for changes in mood or affect      Objective    /80 (BP Location: Right arm, Patient Position: Chair)   Pulse 60   Temp 97  F (36.1  C) (Temporal)   Resp 20   Ht 1.594 m (5' 2.75\")   Wt 58.3 kg (128 lb 9.6 oz)   SpO2 99%   BMI 22.96 kg/m    Body mass index is 22.96 kg/m .  Physical Exam   GENERAL: healthy, alert and no distress  EYES: Eyes grossly normal to inspection, PERRL and conjunctivae and sclerae normal  HENT: ear canals and TM's normal, nose and mouth without ulcers or lesions  NECK: no adenopathy, no asymmetry, masses, or scars and thyroid normal to palpation  RESP: lungs clear to auscultation - no rales, rhonchi or wheezes  CV: regular rate and rhythm, normal S1 S2, no S3 or S4, no murmur, click or rub, no peripheral edema and peripheral pulses strong  ABDOMEN: soft, nontender, no hepatosplenomegaly, no masses and bowel sounds normal  MS: no gross musculoskeletal defects noted, no edema  SKIN: no suspicious lesions or rashes  NEURO: Normal strength and tone, mentation intact and speech normal  PSYCH: mentation appears normal, affect normal/bright                    Information on urinary incontinence and treatment options given to patient.  She is at risk for falling and has been provided with information to reduce the risk of falling at home.  "

## 2024-01-16 NOTE — PATIENT INSTRUCTIONS
Patient Education   Personalized Prevention Plan  You are due for the preventive services outlined below.  Your care team is available to assist you in scheduling these services.  If you have already completed any of these items, please share that information with your care team to update in your medical record.  Health Maintenance Due   Topic Date Due     RSV VACCINE (Pregnancy & 60+) (1 - 1-dose 60+ series) Never done     Diabetic Foot Exam  04/05/2022     Diptheria Tetanus Pertussis (DTAP/TDAP/TD) Vaccine (2 - Td or Tdap) 08/15/2023     COVID-19 Vaccine (6 - 2023-24 season) 09/01/2023     Bladder Training: Care Instructions  Your Care Instructions     Bladder training is used to treat urge incontinence and stress incontinence. Urge incontinence means that the need to urinate comes on so fast that you can't get to a toilet in time. Stress incontinence means that you leak urine because of pressure on your bladder. For example, it may happen when you laugh, cough, or lift something heavy.  Bladder training can increase how long you can wait before you have to urinate. It can also help your bladder hold more urine. And it can give you better control over the urge to urinate.  It is important to remember that bladder training takes a few weeks to a few months to make a difference. You may not see results right away, but don't give up.  Follow-up care is a key part of your treatment and safety. Be sure to make and go to all appointments, and call your doctor if you are having problems. It's also a good idea to know your test results and keep a list of the medicines you take.  How can you care for yourself at home?  Work with your doctor to come up with a bladder training program that is right for you. You may use one or more of the following methods.  Delayed urination  In the beginning, try to keep from urinating for 5 minutes after you first feel the need to go.  While you wait, take deep, slow breaths to relax.  "Kegel exercises can also help you delay the need to go to the bathroom.  After some practice, when you can easily wait 5 minutes to urinate, try to wait 10 minutes before you urinate.  Slowly increase the waiting period until you are able to control when you have to urinate.  Scheduled urination  Empty your bladder when you first wake up in the morning.  Schedule times throughout the day when you will urinate.  Start by going to the bathroom every hour, even if you don't need to go.  Slowly increase the time between trips to the bathroom.  When you have found a schedule that works well for you, keep doing it.  If you wake up during the night and have to urinate, do it. Apply your schedule to waking hours only.  Kegel exercises  These tighten and strengthen pelvic muscles, which can help you control the flow of urine. (If doing these exercises causes pain, stop doing them and talk with your doctor.) To do Kegel exercises:  Squeeze your muscles as if you were trying not to pass gas. Or squeeze your muscles as if you were stopping the flow of urine. Your belly, legs, and buttocks shouldn't move.  Hold the squeeze for 3 seconds, then relax for 5 to 10 seconds.  Start with 3 seconds, then add 1 second each week until you are able to squeeze for 10 seconds.  Repeat the exercise 10 times a session. Do 3 to 8 sessions a day.  When should you call for help?  Watch closely for changes in your health, and be sure to contact your doctor if:    Your incontinence is getting worse.     You do not get better as expected.   Where can you learn more?  Go to https://www.[facility].net/patiented  Enter V684 in the search box to learn more about \"Bladder Training: Care Instructions.\"  Current as of: February 28, 2023               Content Version: 13.8    1787-4520 [employer].   Care instructions adapted under license by your healthcare professional. If you have questions about a medical condition or this instruction, " always ask your healthcare professional. Healthwise, [facility] disclaims any warranty or liability for your use of this information.      Preventing Falls: Care Instructions  Injuries and health problems such as trouble walking or poor eyesight can increase your risk of falling. So can some medicines. But there are things you can do to help prevent falls. You can exercise to get stronger. You can also arrange your home to make it safer.    Talk to your doctor about the medicines you take. Ask if any of them increase the risk of falls and whether they can be changed or stopped.   Try to exercise regularly. It can help improve your strength and balance. This can help lower your risk of falling.     Practice fall safety and prevention.    Wear low-heeled shoes that fit well and give your feet good support. Talk to your doctor if you have foot problems that make this hard.  Carry a cellphone or wear a medical alert device that you can use to call for help.  Use stepladders instead of chairs to reach high objects. Don't climb if you're at risk for falls. Ask for help, if needed.  Wear the correct eyeglasses, if you need them.    Make your home safer.    Remove rugs, cords, clutter, and furniture from walkways.  Keep your house well lit. Use night-lights in hallways and bathrooms.  Install and use sturdy handrails on stairways.  Wear nonskid footwear, even inside. Don't walk barefoot or in socks without shoes.    Be safe outside.    Use handrails, curb cuts, and ramps whenever possible.  Keep your hands free by using a shoulder bag or backpack.  Try to walk in well-lit areas. Watch out for uneven ground, changes in pavement, and debris.  Be careful in the winter. Walk on the grass or gravel when sidewalks are slippery. Use de-icer on steps and walkways. Add non-slip devices to shoes.    Put grab bars and nonskid mats in your shower or tub and near the toilet. Try to use a shower chair or bath bench when bathing.   Get  "into a tub or shower by putting in your weaker leg first. Get out with your strong side first. Have a phone or medical alert device in the bathroom with you.   Where can you learn more?  Go to https://www.[employer].net/patiented  Enter G117 in the search box to learn more about \"Preventing Falls: Care Instructions.\"  Current as of: July 18, 2023               Content Version: 13.8    0814-7777 [employer].   Care instructions adapted under license by your healthcare professional. If you have questions about a medical condition or this instruction, always ask your healthcare professional. [employer] disclaims any warranty or liability for your use of this information.      How to Get Up Safely After a Fall: Care Instructions  Overview     If you have injuries, health problems, or other reasons that may make it easy for you to fall at home, it is a good idea to learn how to get up safely after a fall. Learning how to get up correctly can help you avoid making an injury worse.  Also, knowing what to do if you cannot get up can help you stay safe until help arrives.  Follow-up care is a key part of your treatment and safety. Be sure to make and go to all appointments, and call your doctor if you are having problems. It's also a good idea to know your test results and keep a list of the medicines you take.  How can you care for yourself after a fall?  If you think you can get up  First lie still for a few minutes and think about how you feel. If your body feels okay and you think you can get up safely, follow the rest of the steps below:  Look for a chair or other piece of furniture that is close to you.  Roll onto your side and rest. Roll by turning your head in the direction you want to roll, move your shoulder and arm, then hip and leg in the same direction.  Lie still for a moment to let your blood pressure adjust.  Slowly push your upper body up, lift your head, and take a moment to " "rest.  Slowly get up on your hands and knees, and crawl to the chair or other stable piece of furniture.  Put your hands on the chair.  Move one foot forward, and place it flat on the floor. Your other leg should be bent with the knee on the floor.  Rise slowly, turn your body, and sit in the chair. Stay seated for a bit and think about how you feel. Call for help. Even if you feel okay, let someone know what happened to you. You might not know that you have a serious injury.  If you cannot get up  If you think you are injured after a fall or you cannot get up, try not to panic.  Call out for help.  If you have a phone within reach or you have an emergency call device, use it to call for help.  If you do not have a phone within reach, try to slide yourself toward it. If you cannot get to the phone, try to slide toward a door or window or a place where you think you can be heard.  [place] or use an object to make noise so someone might hear you.  If you can reach something that you can use for a pillow, place it under your head. Try to stay warm by covering yourself with a blanket or clothing while you wait for help.  When should you call for help?   Call 911 anytime you think you may need emergency care. For example, call if:    You passed out (lost consciousness).     You cannot get up after a fall.     You have severe pain.   Call your doctor now or seek immediate medical care if:    You have new or worse pain.     You are dizzy or lightheaded.     You hit your head.   Watch closely for changes in your health, and be sure to contact your doctor if:    You do not get better as expected.   Where can you learn more?  Go to https://www.[employer].net/patiented  Enter G513 in the search box to learn more about \"How to Get Up Safely After a Fall: Care Instructions.\"  Current as of: November 13, 2022               Content Version: 13.8    9836-5017 [employer].   Care instructions adapted under license by your " healthcare professional. If you have questions about a medical condition or this instruction, always ask your healthcare professional. Healthwise, Incorporated disclaims any warranty or liability for your use of this information.

## 2024-01-22 ENCOUNTER — THERAPY VISIT (OUTPATIENT)
Dept: PHYSICAL THERAPY | Facility: CLINIC | Age: 79
End: 2024-01-22
Attending: INTERNAL MEDICINE
Payer: COMMERCIAL

## 2024-01-22 DIAGNOSIS — M53.3 SACROILIAC JOINT PAIN: Primary | ICD-10-CM

## 2024-01-22 DIAGNOSIS — M47.896 OTHER SPONDYLOSIS, LUMBAR REGION: ICD-10-CM

## 2024-01-22 PROCEDURE — 97110 THERAPEUTIC EXERCISES: CPT | Mod: GP | Performed by: PHYSICAL THERAPIST

## 2024-01-22 PROCEDURE — 97530 THERAPEUTIC ACTIVITIES: CPT | Mod: GP | Performed by: PHYSICAL THERAPIST

## 2024-01-22 PROCEDURE — 97035 APP MDLTY 1+ULTRASOUND EA 15: CPT | Mod: GP | Performed by: PHYSICAL THERAPIST

## 2024-01-25 ENCOUNTER — TRANSFERRED RECORDS (OUTPATIENT)
Dept: HEALTH INFORMATION MANAGEMENT | Facility: CLINIC | Age: 79
End: 2024-01-25
Payer: COMMERCIAL

## 2024-01-30 ENCOUNTER — THERAPY VISIT (OUTPATIENT)
Dept: PHYSICAL THERAPY | Facility: CLINIC | Age: 79
End: 2024-01-30
Attending: INTERNAL MEDICINE
Payer: COMMERCIAL

## 2024-01-30 DIAGNOSIS — M53.3 SACROILIAC JOINT PAIN: Primary | ICD-10-CM

## 2024-01-30 DIAGNOSIS — M47.896 OTHER SPONDYLOSIS, LUMBAR REGION: ICD-10-CM

## 2024-01-30 PROCEDURE — 97035 APP MDLTY 1+ULTRASOUND EA 15: CPT | Mod: GP | Performed by: PHYSICAL THERAPIST

## 2024-01-30 PROCEDURE — 97110 THERAPEUTIC EXERCISES: CPT | Mod: GP | Performed by: PHYSICAL THERAPIST

## 2024-02-05 ENCOUNTER — THERAPY VISIT (OUTPATIENT)
Dept: PHYSICAL THERAPY | Facility: CLINIC | Age: 79
End: 2024-02-05
Attending: INTERNAL MEDICINE
Payer: COMMERCIAL

## 2024-02-05 DIAGNOSIS — M53.3 SACROILIAC JOINT PAIN: Primary | ICD-10-CM

## 2024-02-05 PROCEDURE — 97014 ELECTRIC STIMULATION THERAPY: CPT | Mod: GP | Performed by: PHYSICAL THERAPIST

## 2024-02-05 PROCEDURE — 97110 THERAPEUTIC EXERCISES: CPT | Mod: GP | Performed by: PHYSICAL THERAPIST

## 2024-02-13 ENCOUNTER — THERAPY VISIT (OUTPATIENT)
Dept: PHYSICAL THERAPY | Facility: CLINIC | Age: 79
End: 2024-02-13
Attending: INTERNAL MEDICINE
Payer: COMMERCIAL

## 2024-02-13 DIAGNOSIS — M53.3 SACROILIAC JOINT PAIN: Primary | ICD-10-CM

## 2024-02-13 PROCEDURE — 97530 THERAPEUTIC ACTIVITIES: CPT | Mod: GP | Performed by: PHYSICAL THERAPIST

## 2024-02-13 PROCEDURE — 97112 NEUROMUSCULAR REEDUCATION: CPT | Mod: GP | Performed by: PHYSICAL THERAPIST

## 2024-02-13 PROCEDURE — 97110 THERAPEUTIC EXERCISES: CPT | Mod: GP | Performed by: PHYSICAL THERAPIST

## 2024-02-20 ENCOUNTER — TRANSFERRED RECORDS (OUTPATIENT)
Dept: HEALTH INFORMATION MANAGEMENT | Facility: CLINIC | Age: 79
End: 2024-02-20
Payer: COMMERCIAL

## 2024-02-22 ENCOUNTER — TRANSFERRED RECORDS (OUTPATIENT)
Dept: HEALTH INFORMATION MANAGEMENT | Facility: CLINIC | Age: 79
End: 2024-02-22
Payer: COMMERCIAL

## 2024-02-22 LAB
ALT SERPL-CCNC: 20 IU/L (ref 5–35)
AST SERPL-CCNC: 21 U/L (ref 5–34)
CREATININE (EXTERNAL): 1.02 MG/DL (ref 0.5–1.3)

## 2024-02-27 ENCOUNTER — VIRTUAL VISIT (OUTPATIENT)
Dept: FAMILY MEDICINE | Facility: CLINIC | Age: 79
End: 2024-02-27
Payer: COMMERCIAL

## 2024-02-27 ENCOUNTER — TELEPHONE (OUTPATIENT)
Dept: FAMILY MEDICINE | Facility: CLINIC | Age: 79
End: 2024-02-27

## 2024-02-27 ENCOUNTER — LAB (OUTPATIENT)
Dept: LAB | Facility: CLINIC | Age: 79
End: 2024-02-27
Payer: COMMERCIAL

## 2024-02-27 DIAGNOSIS — N18.31 CHRONIC KIDNEY DISEASE, STAGE 3A (H): ICD-10-CM

## 2024-02-27 DIAGNOSIS — N89.8 VAGINAL IRRITATION: ICD-10-CM

## 2024-02-27 DIAGNOSIS — R35.0 URINARY FREQUENCY: ICD-10-CM

## 2024-02-27 DIAGNOSIS — E11.9 TYPE 2 DIABETES MELLITUS WITHOUT COMPLICATION, WITHOUT LONG-TERM CURRENT USE OF INSULIN (H): ICD-10-CM

## 2024-02-27 DIAGNOSIS — R19.5 LOOSE STOOLS: ICD-10-CM

## 2024-02-27 DIAGNOSIS — R35.0 URINARY FREQUENCY: Primary | ICD-10-CM

## 2024-02-27 LAB
CLUE CELLS: ABNORMAL
TRICHOMONAS, WET PREP: ABNORMAL
WBC'S/HIGH POWER FIELD, WET PREP: ABNORMAL
YEAST, WET PREP: ABNORMAL

## 2024-02-27 PROCEDURE — 99442 PR PHYSICIAN TELEPHONE EVALUATION 11-20 MIN: CPT | Mod: 93 | Performed by: NURSE PRACTITIONER

## 2024-02-27 PROCEDURE — 87210 SMEAR WET MOUNT SALINE/INK: CPT

## 2024-02-27 NOTE — PROGRESS NOTES
Vilma is a 78 year old who is being evaluated via a billable telephone visit.    What phone number would you like to be contacted at? [phone]  How would you like to obtain your AVS? Mail a copy  Phone call duration: 11 minutes   Originating Location (pt. Location): Home    Distant Location (provider location):  Off-site      Assessment & Plan   -complex med issues, advise in person next visits  Urinary frequency  Drink fluids, getting.   Warning signs discussed.   - UA Macroscopic with reflex to Microscopic and Culture - Lab Collect; Future    Vaginal irritation  Getting wet prep  - Wet prep - lab collect; Future    Chronic kidney disease, stage 3a (H)  monitoring    Type 2 diabetes mellitus without complication, without long-term current use of insulin (H)  Stable.     Loose stools  PO and fluids as able. Pt. States occ. Will get this. Has for one day. Reach out if worsening/dehydration.     Will treat accordingly with labs.     Return to clinic with any new or worsening symptoms, and as needed.               MEDICATIONS:  Continue current medications without change    Subjective   Vilma is a 78 year old, presenting for the following health issues:  Vaginal Problem      2/27/2024     9:08 AM   Additional Questions   Roomed by mayank         2/27/2024     9:08 AM   Patient Reported Additional Medications   Patient reports taking the following new medications [email or web address]     Vaginal Problem     History of Present Illness       Reason for visit:  Vaginal problem  Symptom onset:  3-7 days ago  Symptoms include:  Vaginal itching  Symptom intensity:  Moderate  Symptom progression:  Worsening  Had these symptoms before:  Yes  Has tried/received treatment for these symptoms:  Yes  Previous treatment was successful:  Yes  Prior treatment description:  Medication  What makes it worse:  None  What makes it better:  None    She eats 4 or more servings of fruits and vegetables daily.She consumes 0 sweetened beverage(s) daily.She  exercises with enough effort to increase her heart rate 20 to 29 minutes per day.  She exercises with enough effort to increase her heart rate 7 days per week.   She is taking medications regularly.     Loose stools today, X2.   Not feeling ill.   Has some fatigue - due to getting up to urinate.     No recent antibiotics recently.     PO is good.     DM is good.     No fever.   Iron deficiency anemia. On iron supplement daily.    On prednisone.               Review of Systems  Constitutional, HEENT, cardiovascular, pulmonary, gi and gu systems are negative, except as otherwise noted.      Objective           Vitals:  No vitals were obtained today due to virtual visit.    Physical Exam   General: Alert and no distress //Respiratory: No audible wheeze, cough, or shortness of breath // Psychiatric:  Appropriate affect, tone, and pace of words            Phone call duration: 11 minutes  Signed Electronically by: BEVERLY Mcdermott CNP

## 2024-02-27 NOTE — TELEPHONE ENCOUNTER
Attempted to reach patient for scheduling following virtual visit but number just rang/no voicemail:      Dispositions Check-out Note   Return in about 4 weeks (around 3/26/2024) for re-check office visit. Schedule lab only for today       If patient returns call please schedule at any clinic today a lab only visit per orders from virtual visit and schedule a 4 week recheck with provider of her choice.    Zenobia Wong CMA ([facility])

## 2024-02-28 ENCOUNTER — MYC MEDICAL ADVICE (OUTPATIENT)
Dept: FAMILY MEDICINE | Facility: CLINIC | Age: 79
End: 2024-02-28
Payer: COMMERCIAL

## 2024-02-28 NOTE — TELEPHONE ENCOUNTER
"Please call patient back upon providers response instead of MyCharting per patient request.     Patient seen virtually yesterday 2/28/2024.   Wet prep completed and did not show signs of infection. UA ordered but not completed.    Urine sample was not collected at time of wet prep collection according to lab tab in chart review.     RN placed call to patient to gather more information. Patient reports that she only completed vaginal swab. Patient reports she was given a specimen cub but did not leave a urine sample because she was told \"only the one test was needed.\"    RN offered to schedule a lab appointment for patient to leave urine sample to rule out UTI as wet prep did not show signs of infection.     Patient would like RN to reach out to provider to see if UA is still needed and if so she will go in to [place] to complete. RN advised it will most likely be recommend as a source for her symptoms has not been determined but she will reach out to provider as requested. Patient verbalized understanding and all questions answered.     CASIMIRO Amanda, RN  [facility] ~ Registered Nurse  Clinic Triage ~ [place] [facility] [place]  February 28, 2024    "

## 2024-02-28 NOTE — TELEPHONE ENCOUNTER
RN called patient and relayed belwo message. Coming in 2/29/24 to have UA.  If she is having urinary symptoms, can leave UA.   Sincerely,   Vira Wladen DNP    Patient verbalized understanding and all questions answered.       Noreen Stubbs RN  [facility] - Registered Nurse  Clinic Triage [place]   February 28, 2024

## 2024-02-29 ENCOUNTER — APPOINTMENT (OUTPATIENT)
Dept: LAB | Facility: CLINIC | Age: 79
End: 2024-02-29
Payer: COMMERCIAL

## 2024-02-29 LAB
ALBUMIN UR-MCNC: 30 MG/DL
APPEARANCE UR: ABNORMAL
BACTERIA #/AREA URNS HPF: ABNORMAL /HPF
BILIRUB UR QL STRIP: NEGATIVE
COLOR UR AUTO: YELLOW
GLUCOSE UR STRIP-MCNC: NEGATIVE MG/DL
HGB UR QL STRIP: NEGATIVE
KETONES UR STRIP-MCNC: NEGATIVE MG/DL
LEUKOCYTE ESTERASE UR QL STRIP: NEGATIVE
MUCOUS THREADS #/AREA URNS LPF: PRESENT /LPF
NITRATE UR QL: NEGATIVE
PH UR STRIP: 5 [PH] (ref 5–7)
RBC URINE: 0 /HPF
SP GR UR STRIP: 1.02 (ref 1–1.03)
SQUAMOUS EPITHELIAL: 7 /HPF
UROBILINOGEN UR STRIP-MCNC: NORMAL MG/DL
WBC URINE: 0 /HPF

## 2024-02-29 PROCEDURE — 81001 URINALYSIS AUTO W/SCOPE: CPT

## 2024-02-29 NOTE — RESULT ENCOUNTER NOTE
No evidence of infection. If you are not feeling better, please be seen in the clinic.   Sincerely,   Vira Walden DNP

## 2024-03-04 ENCOUNTER — NURSE TRIAGE (OUTPATIENT)
Dept: FAMILY MEDICINE | Facility: CLINIC | Age: 79
End: 2024-03-04

## 2024-03-04 NOTE — TELEPHONE ENCOUNTER
Test Results    Contacts         Type Contact Phone/Fax    03/04/2024 08:52 AM CST Phone (Incoming) Vilma Gaona (Self) [phone] (H)            Who ordered the test:  JOLYNN    Type of test: Lab    Date of test:  2/29/24    Where was the test performed:  [facility]    What are your questions/concerns?:  patient would like a call back regarding labs    Could we send this information to you in [place] or would you prefer to receive a phone call?:   Patient would prefer a phone call   Okay to leave a detailed message?: Yes at Home number on file [phone] (home)

## 2024-03-04 NOTE — TELEPHONE ENCOUNTER
RN Triage    Patient Contact    Attempt # 1    Was call answered?  No.  Unable to leave message. No voicemail, call ended with dial tone beeping.     Upon patient callback please gather information on patient's questions and answer if able or route to provider.     CASIMIRO Amanda, RN  [facility] ~ Registered Nurse  Clinic Triage ~ [place] [facility] [place]  March 4, 2024

## 2024-03-05 NOTE — TELEPHONE ENCOUNTER
Called patient and let her know provider's advice. She will try that and let us know if she has further issues.     Ingrid Jackson RN on 3/5/2024 at 1:19 PM

## 2024-03-05 NOTE — TELEPHONE ENCOUNTER
"Are you able to work patient in for in person appointment to follow up on vaginal itching as well as moderate back pain? Patient has a follow up appointment for \"blood results\" in April but per protocol should be seen sooner for back pain.      Nurse Triage SBAR    Is this a 2nd Level Triage? NO    Situation:   RN called patient to follow up on questions she had regarding urine results and recommend in person appointment if symptoms are persisting.     Patient reports she has some vaginal itching that is relieved by Monistat instant itch relief cream when applied but also endorses moderate back pain.     Background:  Patient reports he back pain started about a month ago.   5-6/10. She had been seeing Physical therapy but is still having back pain.     Patient reports she has to lay down every 2-3 hours on abdomen to help relieve back pain.     No numbness or tingling  No changes in bowel or bladder from baseline. History of incontinence and leaking reported by patient.     Patient also notes that her back itches.   No swelling   No redness    History of back surgery.    Assessment:   Per protocol patient should be seen for assessment within 3 days due to persist pain and pain interfering with normal activities.     RN advised she is unable to schedule for home clinic but will pass on request for work in to provider and RN team. Patient to be seen in UC/ED for new or worsening symptoms. Patient verbalized understanding and all questions answered.     Protocol Recommended Disposition:   See in Office Within 3 Days    Recommendation:   Are you able to work patient in for in person appointment to follow up on vaginal itching as well as moderate back pain? Patient has a follow up appointment for \"blood results\" in April but per protocol should be seen sooner for back pain.      Routed to provider    Does the patient meet one of the following criteria for ADS visit consideration? 16+ years old, with an [facility] PCP "     CASIMIRO Amanda, RN  [facility] ~ Registered Nurse  Clinic Triage ~ [place] [facility] [place]  March 5, 2024    Reason for Disposition   MODERATE back pain (e.g., interferes with normal activities) and present > 3 days    Additional Information   Negative: Passed out (i.e., fainted, collapsed and was not responding)   Negative: Shock suspected (e.g., cold/pale/clammy skin, too weak to stand, low BP, rapid pulse)   Negative: Sounds like a life-threatening emergency to the triager   Negative: Major injury to the back (e.g., MVA, fall > 10 feet or 3 meters, penetrating injury, etc.)   Negative: Pain in the upper back over the ribs (rib cage) that radiates (travels) into the chest   Negative: Pain in the upper back over the ribs (rib cage) and worsened by coughing (or clearly increases with breathing)   Negative: Back pain during pregnancy   Negative: SEVERE back pain of sudden onset and age > 60 years   Negative: SEVERE abdominal pain (e.g., excruciating)   Negative: Abdominal pain and age > 60 years   Negative: Unable to urinate (or only a few drops) and bladder feels very full   Negative: Loss of bladder or bowel control (urine or bowel incontinence; wetting self, leaking stool) of new-onset   Negative: Numbness (loss of sensation) in groin or rectal area   Negative: Pain radiates into groin, scrotum   Negative: Blood in urine (red, pink, or tea-colored)   Negative: Vomiting and pain over lower ribs of back (i.e., flank - kidney area)   Negative: Weakness of a leg or foot (e.g., unable to bear weight, dragging foot)   Negative: Patient sounds very sick or weak to the triager   Negative: Fever > 100.4 F (38.0 C) and flank pain   Negative: Pain or burning with passing urine (urination)   Negative: SEVERE back pain (e.g., excruciating, unable to do any normal activities) and not improved after pain medicine and CARE ADVICE   Negative: Numbness in an arm or hand (i.e., loss of sensation) and upper back pain    "Negative: Numbness in a leg or foot (i.e., loss of sensation)   Negative: High-risk adult (e.g., history of cancer, history of HIV, or history of IV Drug Use)   Negative: Soft tissue infection (e.g., abscess, cellulitis) or other serious infection (e.g., bacteremia) in last 2 weeks   Negative: Painful rash with multiple small blisters grouped together (i.e., dermatomal distribution or 'band' or 'stripe')   Negative: Pain radiates into the thigh or further down the leg, and in both legs   Negative: Age > 50 and no history of prior similar back pain    Answer Assessment - Initial Assessment Questions  1. ONSET: \"When did the pain begin?\"   Over a month ago    2. LOCATION: \"Where does it hurt?\" (upper, mid or lower back)  Lower and Mid back     3. SEVERITY: \"How bad is the pain?\"  (e.g., Scale 1-10; mild, moderate, or severe)    - MILD (1-3): Doesn't interfere with normal activities.     - MODERATE (4-7): Interferes with normal activities or awakens from sleep.     - SEVERE (8-10): Excruciating pain, unable to do any normal activities.   5-6/10        4. PATTERN: \"Is the pain constant?\" (e.g., yes, no; constant, intermittent)   Intermittent worse with activity, better with rest    5. RADIATION: \"Does the pain shoot into your legs or somewhere else?\"  No     6. CAUSE:  \"What do you think is causing the back pain?\"   Unsure     7. BACK OVERUSE:  \"Any recent lifting of heavy objects, strenuous work or exercise?\"  No changes in exercise, \"sometimes I lift things because my  can't\"    8. MEDICINES: \"What have you taken so far for the pain?\" (e.g., nothing, acetaminophen, NSAIDS)   Has not taken analgesics  Heat helps    9. NEUROLOGIC SYMPTOMS: \"Do you have any weakness, numbness, or problems with bowel/bladder control?\"  Has had bladder concerns for awhile- not new with back pain   Leaking  Nocturia    10. OTHER SYMPTOMS: \"Do you have any other symptoms?\" (e.g., fever, abdomen pain, burning with urination, blood in " "urine)  No    11. PREGNANCY: \"Is there any chance you are pregnant?\" \"When was your last menstrual period?\"  No    Protocols used: Back Pain-A-[place]    "

## 2024-03-14 ENCOUNTER — TELEPHONE (OUTPATIENT)
Dept: ORTHOPEDICS | Facility: CLINIC | Age: 79
End: 2024-03-14

## 2024-03-14 ENCOUNTER — OFFICE VISIT (OUTPATIENT)
Dept: ORTHOPEDICS | Facility: CLINIC | Age: 79
End: 2024-03-14
Attending: PHYSICIAN ASSISTANT
Payer: COMMERCIAL

## 2024-03-14 VITALS
DIASTOLIC BLOOD PRESSURE: 77 MMHG | HEIGHT: 63 IN | RESPIRATION RATE: 18 BRPM | SYSTOLIC BLOOD PRESSURE: 148 MMHG | BODY MASS INDEX: 22.68 KG/M2 | HEART RATE: 65 BPM | WEIGHT: 128 LBS

## 2024-03-14 DIAGNOSIS — Z79.899 ENCOUNTER FOR LONG-TERM (CURRENT) USE OF MEDICATIONS: ICD-10-CM

## 2024-03-14 DIAGNOSIS — M35.3 POLYMYALGIA RHEUMATICA (H): Primary | ICD-10-CM

## 2024-03-14 DIAGNOSIS — G56.02 CARPAL TUNNEL SYNDROME OF LEFT WRIST: ICD-10-CM

## 2024-03-14 PROCEDURE — 99213 OFFICE O/P EST LOW 20 MIN: CPT | Performed by: ORTHOPAEDIC SURGERY

## 2024-03-14 NOTE — LETTER
3/14/2024         RE: Zaria Gaona  [address] [facility] [address]        Dear Colleague,    Thank you for referring your patient, Zaria Gaona, to the [facility]. Please see a copy of my visit note below.    Zaria Gaona is a 78 year old female who is seen in consultation at the request of [facility] for complaint of numbness of lateral aspect of left hand, especially with use of the hand and at night. Numbness  is constant.    She has had symptoms for 4 months.    Small finger is not involved.  Prior treatment used: Wrist splint - yes-1 month.  Helped on right, but not on left..  NSAID: - no.  She is diabetic with elevated creatinine .    Employment: retired. This is not work related.      PAST MEDICAL HISTORY:  Diabetes mellitus positive , hypothyroid negative.    EMG has been performed.  2/21/24 showing severe carpal tunnel syndrome with sensory and motor involvement    Past Medical History:   Diagnosis Date     Essential hypertension      NSTEMI (non-ST elevated myocardial infarction) (H) 12/2020    2 stents     Type 2 diabetes mellitus without complication (H) 2005       Past Surgical History:   Procedure Laterality Date     COLONOSCOPY N/A 12/15/2022    Procedure: COLONOSCOPY, WITH POLYPECTOMY;  Surgeon: Andres Trujillo DO;  [facility]     ESOPHAGOSCOPY, GASTROSCOPY, DUODENOSCOPY (EGD), COMBINED N/A 6/27/2023    Procedure: ESOPHAGOGASTRODUODENOSCOPY, WITH BIOPSY;  Surgeon: Med Asif MD;  [facility]: [facility]     INJECT BLOCK MEDIAL BRANCH CERVICAL/THORACIC/LUMBAR Left 8/11/2022    Procedure: Left Lumbar 3, Left Lumbar 4, and Left Lumbar 5 medial branch nerve blocks using fluoroscopic guidance and contrast control;  Surgeon: Malick Gomez MD;  [facility]     INJECT EPIDURAL LUMBAR Left 5/20/2022    Procedure: Left lumbar 4-5 interlaminar epidural injection ;  Surgeon: Malick Gomez MD;  [facility]     INJECT JOINT SACROILIAC  Left 7/1/2022    Procedure: Fluoroscopically-guided injection of the Left sacroiliac joint with Left dimitrios Sacroiliac joint ligaments infiltration over the sacrum.;  Surgeon: Malick Gomez MD;  [facility]     VAGINAL PROLAPSE REPAIR      done in [place]       No family history on file.    Social History     Socioeconomic History     Marital status:      Spouse name: Not on file     Number of children: Not on file     Years of education: Not on file     Highest education level: Not on file   Occupational History     Not on file   Tobacco Use     Smoking status: Never     Smokeless tobacco: Never   Vaping Use     Vaping Use: Never used   Substance and Sexual Activity     Alcohol use: Yes     Comment: occ     Drug use: Never     Sexual activity: Yes     Partners: Male     Birth control/protection: Female Surgical   Other Topics Concern     Not on file   Social History Narrative     Not on file     Social Determinants of Health     Financial Resource Strain: Low Risk  (12/19/2023)    Financial Resource Strain      Within the past 12 months, have you or your family members you live with been unable to get utilities (heat, electricity) when it was really needed?: No   Food Insecurity: Low Risk  (12/19/2023)    Food Insecurity      Within the past 12 months, did you worry that your food would run out before you got money to buy more?: No      Within the past 12 months, did the food you bought just not last and you didn t have money to get more?: No   Transportation Needs: Low Risk  (12/19/2023)    Transportation Needs      Within the past 12 months, has lack of transportation kept you from medical appointments, getting your medicines, non-medical meetings or appointments, work, or from getting things that you need?: No   Physical Activity: Not on file   Stress: Not on file   Social Connections: Not on file   Interpersonal Safety: Low Risk  (12/19/2023)    Interpersonal Safety      Do you feel physically and  emotionally safe where you currently live?: Yes      Within the past 12 months, have you been hit, slapped, kicked or otherwise physically hurt by someone?: No      Within the past 12 months, have you been humiliated or emotionally abused in other ways by your partner or ex-partner?: No   Housing Stability: Low Risk  (12/19/2023)    Housing Stability      Do you have housing? : Yes      Are you worried about losing your housing?: No   Recent Concern: Housing Stability - High Risk (10/11/2023)    Housing Stability      Do you have housing? : No      Are you worried about losing your housing?: No       Current Outpatient Medications   Medication Sig Dispense Refill     acetaminophen (TYLENOL) 325 MG tablet Take 650 mg by mouth       alcohol swab prep pads Use to swab area of injection/shana as directed 100 each 3     Ascorbic Acid (VITAMIN C) 500 MG CAPS Take 500 mg by mouth daily       aspirin (ASA) 81 MG chewable tablet Take 1 tablet (81 mg) by mouth daily 90 tablet 3     atorvastatin (LIPITOR) 80 MG tablet Take 1 tablet (80 mg) by mouth daily 90 tablet 3     benzonatate (TESSALON) 100 MG capsule Take 1 capsule (100 mg) by mouth 3 times daily as needed for cough 30 capsule 1     blood glucose (NO BRAND SPECIFIED) test strip Use to test blood sugar 1 times daily or as directed. To accompany: Blood Glucose Monitor Brands: per insurance. 100 strip 6     blood glucose calibration (NO BRAND SPECIFIED) solution Use to calibrate blood glucose monitor as needed as directed. To accompany: Blood Glucose Monitor Brands: per insurance. 1 Bottle 3     carvedilol (COREG) 6.25 MG tablet Take 1 tablet (6.25 mg) by mouth 2 times daily 180 tablet 3     cholecalciferol (VITAMIN D3) 25 mcg (1000 units) capsule Take 1 capsule by mouth daily       ferrous sulfate (FEROSUL) 325 (65 Fe) MG tablet Take 325 mg by mouth daily (with breakfast)       latanoprost (XALATAN) 0.005 % ophthalmic solution Inject 1 drop into the eye daily        "leflunomide (ARAVA) 20 MG tablet Take 20 mg by mouth daily at 2 pm       levothyroxine (SYNTHROID/LEVOTHROID) 75 MCG tablet Take 1 tablet (75 mcg) by mouth daily 90 tablet 3     losartan (COZAAR) 100 MG tablet Take 1 tablet (100 mg) by mouth daily 90 tablet 3     metFORMIN (GLUCOPHAGE) 500 MG tablet 1 in the  AM and 2 at night. 270 tablet 3     nitroGLYcerin (NITROSTAT) 0.4 MG sublingual tablet Place 0.4 mg under the tongue as needed       omeprazole (PRILOSEC) 20 MG DR capsule Take 1 capsule (20 mg) by mouth daily 90 capsule 3     predniSONE (DELTASONE) 5 MG tablet Take 1 mg by mouth daily 2 tablet once daily       thin (NO BRAND SPECIFIED) lancets Use to test blood sugar 1 times daily or as directed. To accompany: Blood Glucose Monitor Brands: per insurance. 1 each 3     timolol hemihydrate (BETIMOL) 0.25 % ophthalmic solution Inject 1 drop into the eye daily       zinc 50 MG TABS Take 50 mg by mouth daily         Allergies   Allergen Reactions     Ampicillin Other (See Comments), Hives and Itching     \"not that bad\"     Lisinopril Cough     Sulfamethoxazole-Trimethoprim Other (See Comments), Hives and Itching     \"not that bad\"       REVIEW OF SYSTEMS:  CONSTITUTIONAL:  NEGATIVE for fever, chills, change in weight, not feeling tired  SKIN:  NEGATIVE for worrisome rashes, no skin lumps, no skin ulcers and no non-healing wounds  EYES:  NEGATIVE for vision changes or irritation.  ENT/MOUTH:  NEGATIVE.  No hearing loss, no hoarseness, no difficulty swallowing.  RESP:  NEGATIVE. No cough or shortness of breath.  BREAST:  NEGATIVE for masses, tenderness or discharge  CV:  NEGATIVE for chest pain, palpitations or peripheral edema  GI:  NEGATIVE for nausea, abdominal pain, heartburn, or change in bowel habits  :  Negative. No dysuria, no hematuria  MUSCULOSKELETAL:  See HPI above  NEURO:  NEGATIVE . No headaches, no dizziness,  no numbness  ENDOCRINE:  NEGATIVE for temperature intolerance, skin/hair " "changes  HEME/ALLERGY/IMMUNE:  NEGATIVE for bleeding problems  PSYCHIATRIC:  NEGATIVE. no anxiety, no depression.          O: She appears well,   Vitals: BP (!) 148/77   Pulse 65   Resp 18   Ht 1.594 m (5' 2.75\")   Wt 58.1 kg (128 lb)   BMI 22.86 kg/m    BMI= Body mass index is 22.86 kg/m .   Cervical spine has full range of motion without pain.  Full range of motion of shoulder, elbows, wrists and fingers.  Hand exam revealed     Right: normal sensation of hand and arm.  Left: reduced sensation along median nerve distribution, + Tinel's sign, + thenar atrophy, + weakness of thumb/pinky pincer grasp   strength: Right normal, Left decreased.    A: Left carpal tunnel syndrome.      P: Explanation of median nerve entrapment is given.  The treatment spectrum is discussed, including conservative treatment with night splint, NSAID, activity modification, and possible surgical release if the symptoms are persistent and severe.   Will proceed with left carpal tunnel release with IV regional block.  Risks, benefits, potential complications and alternatives were discussed.      Again, thank you for allowing me to participate in the care of your patient.        Sincerely,        Case Swenson MD  "

## 2024-03-14 NOTE — PATIENT INSTRUCTIONS
Vilma to follow up with Primary Care provider regarding elevated blood pressure.    Surgery:  left carpal tunnel release.    Preop physical with primary physician is needed within 30 days of the surgery.  Nothing to eat or drink for 8 hours before surgery.  For same day surgery you need a ride.  Someone should stay with you for 12 hours afterward.  Stop blood thinners 5 days before surgery (aspirin, warfarin, anti-inflammatories).      Surgery schedulers will call you to schedule surgery.    If you don't get a call in the next few days, then call [phone] to schedule your surgery for [place] or [phone] to schedule for [place].

## 2024-03-14 NOTE — TELEPHONE ENCOUNTER
Type of surgery: RELEASE, CARPAL TUNNEL (Left)   Location of surgery: [facility]  Date and time of surgery: 4/26  Surgeon: Messi  Pre-Op Appt Date: 4/12  Post-Op Appt Date: 5/9   Packet sent out: Yes mailed   Pre-cert/Authorization completed:  Not Applicable  Date: na

## 2024-03-14 NOTE — PROGRESS NOTES
Zaria Gaona is a 78 year old female who is seen in consultation at the request of [facility] for complaint of numbness of lateral aspect of left hand, especially with use of the hand and at night. Numbness  is constant.    She has had symptoms for 4 months.    Small finger is not involved.  Prior treatment used: Wrist splint - yes-1 month.  Helped on right, but not on left..  NSAID: - no.  She is diabetic with elevated creatinine .    Employment: retired. This is not work related.      PAST MEDICAL HISTORY:  Diabetes mellitus positive , hypothyroid negative.    EMG has been performed.  2/21/24 showing severe carpal tunnel syndrome with sensory and motor involvement    Past Medical History:   Diagnosis Date    Essential hypertension     NSTEMI (non-ST elevated myocardial infarction) (H) 12/2020    2 stents    Type 2 diabetes mellitus without complication (H) 2005       Past Surgical History:   Procedure Laterality Date    COLONOSCOPY N/A 12/15/2022    Procedure: COLONOSCOPY, WITH POLYPECTOMY;  Surgeon: Andres Trujillo DO;  Location:  GI    ESOPHAGOSCOPY, GASTROSCOPY, DUODENOSCOPY (EGD), COMBINED N/A 6/27/2023    Procedure: ESOPHAGOGASTRODUODENOSCOPY, WITH BIOPSY;  Surgeon: Med Asif MD;  [facility]    INJECT BLOCK MEDIAL BRANCH CERVICAL/THORACIC/LUMBAR Left 8/11/2022    Procedure: Left Lumbar 3, Left Lumbar 4, and Left Lumbar 5 medial branch nerve blocks using fluoroscopic guidance and contrast control;  Surgeon: Malick Gomez MD;  [facility]    INJECT EPIDURAL LUMBAR Left 5/20/2022    Procedure: Left lumbar 4-5 interlaminar epidural injection ;  Surgeon: Malick Gomez MD;  [facility]    INJECT JOINT SACROILIAC Left 7/1/2022    Procedure: Fluoroscopically-guided injection of the Left sacroiliac joint with Left dimitrios Sacroiliac joint ligaments infiltration over the sacrum.;  Surgeon: Malick Gomez MD;  [facility]    VAGINAL PROLAPSE REPAIR      done in [place]       No  family history on file.    Social History     Socioeconomic History    Marital status:      Spouse name: Not on file    Number of children: Not on file    Years of education: Not on file    Highest education level: Not on file   Occupational History    Not on file   Tobacco Use    Smoking status: Never    Smokeless tobacco: Never   Vaping Use    Vaping Use: Never used   Substance and Sexual Activity    Alcohol use: Yes     Comment: occ    Drug use: Never    Sexual activity: Yes     Partners: Male     Birth control/protection: Female Surgical   Other Topics Concern    Not on file   Social History Narrative    Not on file     Social Determinants of Health     Financial Resource Strain: Low Risk  (12/19/2023)    Financial Resource Strain     Within the past 12 months, have you or your family members you live with been unable to get utilities (heat, electricity) when it was really needed?: No   Food Insecurity: Low Risk  (12/19/2023)    Food Insecurity     Within the past 12 months, did you worry that your food would run out before you got money to buy more?: No     Within the past 12 months, did the food you bought just not last and you didn t have money to get more?: No   Transportation Needs: Low Risk  (12/19/2023)    Transportation Needs     Within the past 12 months, has lack of transportation kept you from medical appointments, getting your medicines, non-medical meetings or appointments, work, or from getting things that you need?: No   Physical Activity: Not on file   Stress: Not on file   Social Connections: Not on file   Interpersonal Safety: Low Risk  (12/19/2023)    Interpersonal Safety     Do you feel physically and emotionally safe where you currently live?: Yes     Within the past 12 months, have you been hit, slapped, kicked or otherwise physically hurt by someone?: No     Within the past 12 months, have you been humiliated or emotionally abused in other ways by your partner or ex-partner?: No    Housing Stability: Low Risk  (12/19/2023)    Housing Stability     Do you have housing? : Yes     Are you worried about losing your housing?: No   Recent Concern: Housing Stability - High Risk (10/11/2023)    Housing Stability     Do you have housing? : No     Are you worried about losing your housing?: No       Current Outpatient Medications   Medication Sig Dispense Refill    acetaminophen (TYLENOL) 325 MG tablet Take 650 mg by mouth      alcohol swab prep pads Use to swab area of injection/shana as directed 100 each 3    Ascorbic Acid (VITAMIN C) 500 MG CAPS Take 500 mg by mouth daily      aspirin (ASA) 81 MG chewable tablet Take 1 tablet (81 mg) by mouth daily 90 tablet 3    atorvastatin (LIPITOR) 80 MG tablet Take 1 tablet (80 mg) by mouth daily 90 tablet 3    benzonatate (TESSALON) 100 MG capsule Take 1 capsule (100 mg) by mouth 3 times daily as needed for cough 30 capsule 1    blood glucose (NO BRAND SPECIFIED) test strip Use to test blood sugar 1 times daily or as directed. To accompany: Blood Glucose Monitor Brands: per insurance. 100 strip 6    blood glucose calibration (NO BRAND SPECIFIED) solution Use to calibrate blood glucose monitor as needed as directed. To accompany: Blood Glucose Monitor Brands: per insurance. 1 Bottle 3    carvedilol (COREG) 6.25 MG tablet Take 1 tablet (6.25 mg) by mouth 2 times daily 180 tablet 3    cholecalciferol (VITAMIN D3) 25 mcg (1000 units) capsule Take 1 capsule by mouth daily      ferrous sulfate (FEROSUL) 325 (65 Fe) MG tablet Take 325 mg by mouth daily (with breakfast)      latanoprost (XALATAN) 0.005 % ophthalmic solution Inject 1 drop into the eye daily      leflunomide (ARAVA) 20 MG tablet Take 20 mg by mouth daily at 2 pm      levothyroxine (SYNTHROID/LEVOTHROID) 75 MCG tablet Take 1 tablet (75 mcg) by mouth daily 90 tablet 3    losartan (COZAAR) 100 MG tablet Take 1 tablet (100 mg) by mouth daily 90 tablet 3    metFORMIN (GLUCOPHAGE) 500 MG tablet 1 in the  AM  "and 2 at night. 270 tablet 3    nitroGLYcerin (NITROSTAT) 0.4 MG sublingual tablet Place 0.4 mg under the tongue as needed      omeprazole (PRILOSEC) 20 MG DR capsule Take 1 capsule (20 mg) by mouth daily 90 capsule 3    predniSONE (DELTASONE) 5 MG tablet Take 1 mg by mouth daily 2 tablet once daily      thin (NO BRAND SPECIFIED) lancets Use to test blood sugar 1 times daily or as directed. To accompany: Blood Glucose Monitor Brands: per insurance. 1 each 3    timolol hemihydrate (BETIMOL) 0.25 % ophthalmic solution Inject 1 drop into the eye daily      zinc 50 MG TABS Take 50 mg by mouth daily         Allergies   Allergen Reactions    Ampicillin Other (See Comments), Hives and Itching     \"not that bad\"    Lisinopril Cough    Sulfamethoxazole-Trimethoprim Other (See Comments), Hives and Itching     \"not that bad\"       REVIEW OF SYSTEMS:  CONSTITUTIONAL:  NEGATIVE for fever, chills, change in weight, not feeling tired  SKIN:  NEGATIVE for worrisome rashes, no skin lumps, no skin ulcers and no non-healing wounds  EYES:  NEGATIVE for vision changes or irritation.  ENT/MOUTH:  NEGATIVE.  No hearing loss, no hoarseness, no difficulty swallowing.  RESP:  NEGATIVE. No cough or shortness of breath.  BREAST:  NEGATIVE for masses, tenderness or discharge  CV:  NEGATIVE for chest pain, palpitations or peripheral edema  GI:  NEGATIVE for nausea, abdominal pain, heartburn, or change in bowel habits  :  Negative. No dysuria, no hematuria  MUSCULOSKELETAL:  See HPI above  NEURO:  NEGATIVE . No headaches, no dizziness,  no numbness  ENDOCRINE:  NEGATIVE for temperature intolerance, skin/hair changes  HEME/ALLERGY/IMMUNE:  NEGATIVE for bleeding problems  PSYCHIATRIC:  NEGATIVE. no anxiety, no depression.          O: She appears well,   Vitals: BP (!) 148/77   Pulse 65   Resp 18   Ht 1.594 m (5' 2.75\")   Wt 58.1 kg (128 lb)   BMI 22.86 kg/m    BMI= Body mass index is 22.86 kg/m .   Cervical spine has full range of motion " without pain.  Full range of motion of shoulder, elbows, wrists and fingers.  Hand exam revealed     Right: normal sensation of hand and arm.  Left: reduced sensation along median nerve distribution, + Tinel's sign, + thenar atrophy, + weakness of thumb/pinky pincer grasp   strength: Right normal, Left decreased.    A: Left carpal tunnel syndrome.      P: Explanation of median nerve entrapment is given.  The treatment spectrum is discussed, including conservative treatment with night splint, NSAID, activity modification, and possible surgical release if the symptoms are persistent and severe.   Will proceed with left carpal tunnel release with IV regional block.  Risks, benefits, potential complications and alternatives were discussed.

## 2024-03-21 NOTE — PROGRESS NOTES
DISCHARGE  Reason for Discharge: Patient has progressed well toward goals.  PT has not been notified of any problems or questions since last visit.    Equipment Issued: none    Discharge Plan: Patient to continue home program.    Referring Provider:  Wang Vargas     02/13/24 0500   Appointment Info   Signing clinician's name / credentials Tom Shepherd, PT   Visits Used 5   Medical Diagnosis Sacroiliac joint pain (M53.3)   PT Tx Diagnosis mechanical low back pain   Other pertinent information History:  Onset Date: June, 2023  Onset Cause: fell on her patio  Onset Symptoms: L LBP, buttock  Symptom Change: no change  Constant Symptoms: none  Intermittent Symptoms: L LBP/L buttock  Investigations/Imaging: MRI 2022  Meds for LBP: prednisone for arthritis, hands  Prior Treatment: has tens unit, helps. Does some bed exercises. Also does chair exercises with bands, has done them over 1 year  LBP History: 30 or more year hx  Last time pt has been sx free for 30 straight days: almost a year  Health Problems: polymyalgia rheumatica  Surgeries: low back surgery 30 years ago, not a fusion  Accidents: fell on patio in June, tripped getting out of car in Dec   Quick Adds Certification   Progress Note/Certification   Onset of illness/injury or Date of Surgery 06/01/23  (approximately)   Therapy Frequency 1x per week   Predicted Duration 90 days, decrease as able   Certification date from 01/15/24   Certification date to 04/13/24   GOALS   PT Goals 2   PT Goal 1   Goal Identifier Pain   Goal Description Pt will note a 50% or greater reduction in low back frequency so pt can have improved sitting tolerance with adls   Goal Progress sx only present 20-30 minutes per day on average   Target Date 02/26/24   Date Met 02/13/24   PT Goal 2   Goal Identifier Function   Goal Description pt will note a decrease in L LBP and carry over to improved functional level as measured by DAKOTAH score decrease from 33.33% to 20% or less   Goal Progress  "DAKOTAH: 17.78 on 2/13/24   Target Date 03/15/24   Date Met 02/13/24   Subjective Report   Subjective Report Overall better. Mainly gets sx at the end of the day. Doing PREP 4-5x per day. Does other exercises in the morning, takes about 20 minutes. Pt will try things on her own but keep chart open for a couple of months.   Objective Measures   Objective Measures Objective Measure 1;Objective Measure 2;Objective Measure 3;Objective Measure 4   Objective Measure 1   Objective Measure Average pain level (1/10 at eval on 1/15/24)   Details 90% of the day 0/10   Objective Measure 2   Objective Measure Frequency of pain (L LBP 50% of day at eval on 1/15/24)   Details 20-30 minutes per day.   Objective Measure 3   Objective Measure [place]/DAKOTAH (at eval 2,3,low/33.33% at eval on 1/15/24)   Details DAKOTAH: 17.78 on 2/13/24   Treatment Interventions (PT)   Interventions Therapeutic Procedure/Exercise;Therapeutic Activity;Neuromuscular Re-education   Therapeutic Procedure/Exercise   Therapeutic Procedures: strength, endurance, ROM, flexibility minutes (00749) 20   Ther Proc 1 - Details No sx at rest. Did sit to stand x 5, then x 10 reps. Standing with chair support for R and L hip adduction x 10 each. IM hip add 5\"x5 each she notes for her bladder. Standing on towel roll R and L calf stretch 30\"x 2 each, blue theraband standing hip abd, ext, flex x 10, seated blue band LAQ x 10 each, hip abd with slight IR x 10.Sidelying band hip abd x 10 each, hooklying gentle trunk rotation. B shoulder flexion AROM x 10, open/closing hands. Side hip rot stretch 2x30\" each. Finished with prone lying   Skilled Intervention instruction/assessment/review/progression   Patient Response/Progress reports understanding, tolerated well   Therapeutic Activity   Therapeutic Activities: dynamic activities to improve functional performance minutes (52389) 10   Ther Act 1 - Details Instructed in proper body mechanics with sit to stand, supine to side, sidelying to " sit transfers. Limit heavy lifting but can do wide base squat lift, single knee lift, golfers lift.   Skilled Intervention instruction/review   Patient Response/Progress reports understanding   Neuromuscular Re-education   Neuromuscular re-ed of mvmt, balance, coord, kinesthetic sense, posture, proprioception minutes (91184) 10   Neuro Re-ed 1 - Details re ed with VCs, manual and demonstration cues for abdominal bracing using pelvic clock and tactile cues. Pt with some difficulty activating abs without lifting her head or upper trunk. Re ed to carry over to transfers, standing, adls to keep pelvis stab   Skilled Intervention instruction   Patient Response/Progress reports understanding   Education   Learner/Method Patient;Listening;Reading;Demonstration;Pictures/Video;No Barriers to Learning   Education Comments Home program   Plan   Home program PREP of prone lying x 5-15' every 2 hours, lumbar support with sitting, proper body mechanics, resume previous LE ex, frequent short walks, sit to stands for functional LE strengthening, cont with previous home program as above, core stab with ex and adls   Plan for next session keep chart open up to 2 months or so, DC if no more PT needed   Comments   Comments pt doing better, not sx free but better DAKOTAH. Pt not sure if modal helping so did not do today.   Total Session Time   Timed Code Treatment Minutes 40   Total Treatment Time (sum of timed and untimed services) 40

## 2024-04-12 ENCOUNTER — OFFICE VISIT (OUTPATIENT)
Dept: INTERNAL MEDICINE | Facility: CLINIC | Age: 79
End: 2024-04-12
Payer: COMMERCIAL

## 2024-04-12 VITALS
SYSTOLIC BLOOD PRESSURE: 120 MMHG | BODY MASS INDEX: 22.77 KG/M2 | TEMPERATURE: 97.4 F | DIASTOLIC BLOOD PRESSURE: 60 MMHG | RESPIRATION RATE: 12 BRPM | OXYGEN SATURATION: 96 % | HEART RATE: 60 BPM | WEIGHT: 127.5 LBS

## 2024-04-12 DIAGNOSIS — Z01.818 PRE-OP EXAM: Primary | ICD-10-CM

## 2024-04-12 DIAGNOSIS — D64.9 ANEMIA, UNSPECIFIED TYPE: ICD-10-CM

## 2024-04-12 DIAGNOSIS — M35.3 PMR (POLYMYALGIA RHEUMATICA) (H): ICD-10-CM

## 2024-04-12 DIAGNOSIS — G56.02 CARPAL TUNNEL SYNDROME OF LEFT WRIST: ICD-10-CM

## 2024-04-12 DIAGNOSIS — N89.8 VAGINAL IRRITATION: ICD-10-CM

## 2024-04-12 LAB
ANION GAP SERPL CALCULATED.3IONS-SCNC: 10 MMOL/L (ref 7–15)
BUN SERPL-MCNC: 23.5 MG/DL (ref 8–23)
CALCIUM SERPL-MCNC: 9.7 MG/DL (ref 8.8–10.2)
CHLORIDE SERPL-SCNC: 104 MMOL/L (ref 98–107)
CLUE CELLS: NORMAL
CREAT SERPL-MCNC: 1.06 MG/DL (ref 0.51–0.95)
DEPRECATED HCO3 PLAS-SCNC: 26 MMOL/L (ref 22–29)
EGFRCR SERPLBLD CKD-EPI 2021: 54 ML/MIN/1.73M2
ERYTHROCYTE [DISTWIDTH] IN BLOOD BY AUTOMATED COUNT: 14.3 % (ref 10–15)
GLUCOSE SERPL-MCNC: 153 MG/DL (ref 70–99)
HCT VFR BLD AUTO: 34.3 % (ref 35–47)
HGB BLD-MCNC: 11 G/DL (ref 11.7–15.7)
IRON BINDING CAPACITY (ROCHE): 274 UG/DL (ref 240–430)
IRON SATN MFR SERPL: 20 % (ref 15–46)
IRON SERPL-MCNC: 54 UG/DL (ref 37–145)
MCH RBC QN AUTO: 28.1 PG (ref 26.5–33)
MCHC RBC AUTO-ENTMCNC: 32.1 G/DL (ref 31.5–36.5)
MCV RBC AUTO: 88 FL (ref 78–100)
PLATELET # BLD AUTO: 157 10E3/UL (ref 150–450)
POTASSIUM SERPL-SCNC: 5 MMOL/L (ref 3.4–5.3)
RBC # BLD AUTO: 3.91 10E6/UL (ref 3.8–5.2)
SODIUM SERPL-SCNC: 140 MMOL/L (ref 135–145)
TRICHOMONAS, WET PREP: NORMAL
WBC # BLD AUTO: 5.7 10E3/UL (ref 4–11)
WBC'S/HIGH POWER FIELD, WET PREP: NORMAL
YEAST, WET PREP: NORMAL

## 2024-04-12 PROCEDURE — 87210 SMEAR WET MOUNT SALINE/INK: CPT | Performed by: INTERNAL MEDICINE

## 2024-04-12 PROCEDURE — 83540 ASSAY OF IRON: CPT | Performed by: INTERNAL MEDICINE

## 2024-04-12 PROCEDURE — 36415 COLL VENOUS BLD VENIPUNCTURE: CPT | Performed by: INTERNAL MEDICINE

## 2024-04-12 PROCEDURE — 99214 OFFICE O/P EST MOD 30 MIN: CPT | Performed by: INTERNAL MEDICINE

## 2024-04-12 PROCEDURE — 80048 BASIC METABOLIC PNL TOTAL CA: CPT | Performed by: INTERNAL MEDICINE

## 2024-04-12 PROCEDURE — 85027 COMPLETE CBC AUTOMATED: CPT | Performed by: INTERNAL MEDICINE

## 2024-04-12 PROCEDURE — 83550 IRON BINDING TEST: CPT | Performed by: INTERNAL MEDICINE

## 2024-04-12 NOTE — PROGRESS NOTES
Preoperative Evaluation  [address] [address]  Phone: [phone]  Primary Provider: Dawson Vargas  Pre-op Performing Provider: DAWSON VARGAS  Apr 12, 2024       Vilma is a 78 year old, presenting for the following:  Pre-Op Exam        4/12/2024     8:48 AM   Additional Questions   Roomed by Agustina gonzalez     Surgical Information  Surgery/Procedure: Carpal Tunnel Release   Surgery Location: [facility]   Surgeon: Messi   Surgery Date: 4/26/24  Time of Surgery: 1:10  Where patient plans to recover: At home with family  Fax number for surgical facility: Note does not need to be faxed, will be available electronically in Epic.    Assessment & Plan     The proposed surgical procedure is considered INTERMEDIATE risk.    Problem List Items Addressed This Visit       PMR (polymyalgia rheumatica) (H24)    Carpal tunnel syndrome of left wrist    Relevant Orders    CBC with platelets    Iron and iron binding capacity    Basic metabolic panel  (Ca, Cl, CO2, Creat, Gluc, K, Na, BUN)     Other Visit Diagnoses       Pre-op exam    -  Primary    Relevant Orders    CBC with platelets    Iron and iron binding capacity    Basic metabolic panel  (Ca, Cl, CO2, Creat, Gluc, K, Na, BUN)    Vaginal irritation        Relevant Orders    Wet prep - lab collect    Anemia, unspecified type        Relevant Orders    CBC with platelets    Iron and iron binding capacity            Patient is approved for preop this is a low risk procedure.  She will hold her aspirin, losartan.  She will still take her carvedilol and prednisone low-dose.  If she becomes hypotensive could give her stress dose steroids but may not be needed.  Patient does have some vaginal irritation feels she has a yeast infection we will do a wet prep, this was done in February and she did not have yeast so we will recheck this today.              - No identified additional risk factors other than previously  addressed    Antiplatelet or Anticoagulation Medication Instructions   - aspirin: Discontinue aspirin 7-10 days prior to procedure to reduce bleeding risk. It should be resumed postoperatively.     Additional Medication Instructions   - ACE/ARB: HOLD on day of surgery (minimum 11 hours for general anesthesia).    Recommendation  APPROVAL GIVEN to proceed with proposed procedure, without further diagnostic evaluation.    Ordering of each unique test  Prescription drug management      Subjective       HPI related to upcoming procedure: left hand carpel tunnel with numbness and needs surgery.         4/12/2024     8:35 AM   Preop Questions   1. Have you ever had a heart attack or stroke? YES - MI and 2 stents in 2020    2. Have you ever had surgery on your heart or blood vessels, such as a stent placement, a coronary artery bypass, or surgery on an artery in your head, neck, heart, or legs? YES - stents 2020    Stress test in 2023    3. Do you have chest pain with activity? No   4. Do you have a history of  heart failure? No   5. Do you currently have a cold, bronchitis or symptoms of other infection? No   6. Do you have a cough, shortness of breath, or wheezing? No   7. Do you or anyone in your family have previous history of blood clots? No   8. Do you or does anyone in your family have a serious bleeding problem such as prolonged bleeding following surgeries or cuts? No   9. Have you ever had problems with anemia or been told to take iron pills? YES -    10. Have you had any abnormal blood loss such as black, tarry or bloody stools, or abnormal vaginal bleeding? No   11. Have you ever had a blood transfusion? No   12. Are you willing to have a blood transfusion if it is medically needed before, during, or after your surgery? Yes   13. Have you or any of your relatives ever had problems with anesthesia? No   14. Do you have sleep apnea, excessive snoring or daytime drowsiness? No   15. Do you have any artifical heart  valves or other implanted medical devices like a pacemaker, defibrillator, or continuous glucose monitor? No   16. Do you have artificial joints? No   17. Are you allergic to latex? No       Health Care Directive  Patient does not have a Health Care Directive or Living Will: Discussed advance care planning with patient; however, patient declined at this time.    Preoperative Review of    reviewed - no record of controlled substances prescribed.      Status of Chronic Conditions:  CAD - Patient has a longstanding history of moderate-severe CAD. Patient denies recent chest pain or NTG use, denies exercise induced dyspnea or PND. Last Stress test 2023 , EKG .     DIABETES - Patient has a longstanding history of DiabetesType Type II . Patient is being treated with oral agents and denies significant side effects. Control has been good. Complicating factors include but are not limited to: hypertension and hyperlipidemia.     HYPERLIPIDEMIA - Patient has a long history of significant Hyperlipidemia requiring medication for treatment with recent good control. Patient reports no problems or side effects with the medication.     HYPERTENSION - Patient has longstanding history of HTN , currently denies any symptoms referable to elevated blood pressure. Specifically denies chest pain, palpitations, dyspnea, orthopnea, PND or peripheral edema. Blood pressure readings have been in normal range. Current medication regimen is as listed below. Patient denies any side effects of medication.     HYPOTHYROIDISM - Patient has a longstanding history of chronic Hypothyroidism. Patient has been doing well, noting no tremor, insomnia, hair loss or changes in skin texture. Continues to take medications as directed, without adverse reactions or side effects. Last TSH     PM&R on prednisone low dose now, on Arava for transition off prednisone.          Lab Results   Component Value Date    TSH 1.27 12/19/2023   .      Patient Active  Problem List    Diagnosis Date Noted    Carpal tunnel syndrome of left wrist 03/14/2024     Priority: Medium    Chronic kidney disease, stage 3a (H) 02/27/2024     Priority: Medium    PMR (polymyalgia rheumatica) (H24) 01/16/2024     Priority: Medium    Sacroiliac joint pain 01/15/2024     Priority: Medium    Dyslipidemia, goal LDL below 70 05/09/2023     Priority: Medium    Coronary artery disease involving native coronary artery of native heart with other form of angina pectoris (H24) 05/09/2023     Priority: Medium    History of ST elevation myocardial infarction (STEMI) 05/09/2023     Priority: Medium    Cystocele, midline 09/13/2021     Priority: Medium    Diabetes mellitus, type 2 (H) 04/21/2021     Priority: Medium      Past Medical History:   Diagnosis Date    Essential hypertension     NSTEMI (non-ST elevated myocardial infarction) (H) 12/2020    2 stents    Type 2 diabetes mellitus without complication (H) 2005     Past Surgical History:   Procedure Laterality Date    COLONOSCOPY N/A 12/15/2022    Procedure: COLONOSCOPY, WITH POLYPECTOMY;  Surgeon: Andres Trujillo DO;  Location:  GI    ESOPHAGOSCOPY, GASTROSCOPY, DUODENOSCOPY (EGD), COMBINED N/A 6/27/2023    Procedure: ESOPHAGOGASTRODUODENOSCOPY, WITH BIOPSY;  Surgeon: Med Asif MD;  Location: [facility]    INJECT BLOCK MEDIAL BRANCH CERVICAL/THORACIC/LUMBAR Left 8/11/2022    Procedure: Left Lumbar 3, Left Lumbar 4, and Left Lumbar 5 medial branch nerve blocks using fluoroscopic guidance and contrast control;  Surgeon: Malick Gomez MD;  Location: [facility] OR    INJECT EPIDURAL LUMBAR Left 5/20/2022    Procedure: Left lumbar 4-5 interlaminar epidural injection ;  Surgeon: Malick Gomez MD;  Location: [facility] OR    INJECT JOINT SACROILIAC Left 7/1/2022    Procedure: Fluoroscopically-guided injection of the Left sacroiliac joint with Left dimitrios Sacroiliac joint ligaments infiltration over the sacrum.;  Surgeon: Malick Gomez MD;  Location: [facility] OR     VAGINAL PROLAPSE REPAIR      done in [place]     Current Outpatient Medications   Medication Sig Dispense Refill    acetaminophen (TYLENOL) 325 MG tablet Take 650 mg by mouth      alcohol swab prep pads Use to swab area of injection/shana as directed 100 each 3    Ascorbic Acid (VITAMIN C) 500 MG CAPS Take 500 mg by mouth daily      aspirin (ASA) 81 MG chewable tablet Take 1 tablet (81 mg) by mouth daily 90 tablet 3    atorvastatin (LIPITOR) 80 MG tablet Take 1 tablet (80 mg) by mouth daily 90 tablet 3    benzonatate (TESSALON) 100 MG capsule Take 1 capsule (100 mg) by mouth 3 times daily as needed for cough 30 capsule 1    blood glucose (NO BRAND SPECIFIED) test strip Use to test blood sugar 1 times daily or as directed. To accompany: Blood Glucose Monitor Brands: per insurance. 100 strip 6    blood glucose calibration (NO BRAND SPECIFIED) solution Use to calibrate blood glucose monitor as needed as directed. To accompany: Blood Glucose Monitor Brands: per insurance. 1 Bottle 3    carvedilol (COREG) 6.25 MG tablet Take 1 tablet (6.25 mg) by mouth 2 times daily 180 tablet 3    cholecalciferol (VITAMIN D3) 25 mcg (1000 units) capsule Take 1 capsule by mouth daily      ferrous sulfate (FEROSUL) 325 (65 Fe) MG tablet Take 325 mg by mouth daily (with breakfast)      latanoprost (XALATAN) 0.005 % ophthalmic solution Inject 1 drop into the eye daily      leflunomide (ARAVA) 20 MG tablet Take 20 mg by mouth daily at 2 pm      levothyroxine (SYNTHROID/LEVOTHROID) 75 MCG tablet Take 1 tablet (75 mcg) by mouth daily 90 tablet 3    losartan (COZAAR) 100 MG tablet Take 1 tablet (100 mg) by mouth daily 90 tablet 3    metFORMIN (GLUCOPHAGE) 500 MG tablet 1 in the  AM and 2 at night. 270 tablet 3    nitroGLYcerin (NITROSTAT) 0.4 MG sublingual tablet Place 0.4 mg under the tongue as needed      omeprazole (PRILOSEC) 20 MG DR capsule Take 1 capsule (20 mg) by mouth daily 90 capsule 3    predniSONE (DELTASONE) 5 MG tablet Take 1 mg by  "mouth daily 2 tablet once daily      thin (NO BRAND SPECIFIED) lancets Use to test blood sugar 1 times daily or as directed. To accompany: Blood Glucose Monitor Brands: per insurance. 1 each 3    timolol hemihydrate (BETIMOL) 0.25 % ophthalmic solution Inject 1 drop into the eye daily      zinc 50 MG TABS Take 50 mg by mouth daily         Allergies   Allergen Reactions    Ampicillin Other (See Comments), Hives and Itching     \"not that bad\"    Lisinopril Cough    Sulfamethoxazole-Trimethoprim Other (See Comments), Hives and Itching     \"not that bad\"        Social History     Tobacco Use    Smoking status: Never    Smokeless tobacco: Never   Substance Use Topics    Alcohol use: Yes     Comment: occ       History   Drug Use Unknown         Review of Systems    Review of Systems  CONSTITUTIONAL: NEGATIVE for fever, chills, change in weight  ENT/MOUTH: NEGATIVE for ear, mouth and throat problems  RESP: NEGATIVE for significant cough or SOB  CV: NEGATIVE for chest pain, palpitations or peripheral edema    Objective    /60   Pulse 60   Temp 97.4  F (36.3  C)   Resp 12   Wt 57.8 kg (127 lb 8 oz)   SpO2 96%   BMI 22.77 kg/m     Estimated body mass index is 22.77 kg/m  as calculated from the following:    Height as of 3/14/24: 1.594 m (5' 2.75\").    Weight as of this encounter: 57.8 kg (127 lb 8 oz).  Physical Exam  GENERAL: alert and no distress  NECK: no adenopathy, no asymmetry, masses, or scars  RESP: lungs clear to auscultation - no rales, rhonchi or wheezes  CV: regular rate and rhythm, normal S1 S2, no S3 or S4, no murmur, click or rub, no peripheral edema  ABDOMEN: soft, nontender, no hepatosplenomegaly, no masses and bowel sounds normal  MS: no gross musculoskeletal defects noted, no edema    Recent Labs   Lab Test 01/16/24  1321 12/19/23  1418 10/03/23  0752 07/24/23  0801 10/05/22  0940 09/25/22  1005   HGB 10.2* 10.7*  --  11.0*   < > 10.9*    129*  --  189   < > 163   NA  --   --  140  --   " --  138   POTASSIUM  --   --  4.2  --   --  4.2   CR  --   --  1.03*  --   --  0.84   A1C  --  6.7*  --  7.5*   < >  --     < > = values in this interval not displayed.        Diagnostics  Labs pending at this time.  Results will be reviewed when available.   Stable stress test last year, low risk procedure     Revised Cardiac Risk Index (RCRI)  The patient has the following serious cardiovascular risks for perioperative complications:   - No serious cardiac risks = 0 points     RCRI Interpretation: 0 points: Class I (very low risk - 0.4% complication rate)         Signed Electronically by: Wang Vargas MD  Copy of this evaluation report is provided to requesting physician.

## 2024-04-17 ENCOUNTER — TRANSFERRED RECORDS (OUTPATIENT)
Dept: HEALTH INFORMATION MANAGEMENT | Facility: CLINIC | Age: 79
End: 2024-04-17
Payer: COMMERCIAL

## 2024-04-17 LAB — RETINOPATHY: POSITIVE

## 2024-04-18 ENCOUNTER — TRANSFERRED RECORDS (OUTPATIENT)
Dept: HEALTH INFORMATION MANAGEMENT | Facility: CLINIC | Age: 79
End: 2024-04-18
Payer: COMMERCIAL

## 2024-04-22 NOTE — TELEPHONE ENCOUNTER
Pt calling to request procedure be scheduled for morning as she only will have a ride in the AM of the day she is currently scheduled. Please call back advise

## 2024-04-22 NOTE — TELEPHONE ENCOUNTER
Miley in OR informed and said we could move to morning.  Preop nurse will be calling patient with time/instructions

## 2024-04-26 ENCOUNTER — ANESTHESIA (OUTPATIENT)
Dept: SURGERY | Facility: CLINIC | Age: 79
End: 2024-04-26
Payer: COMMERCIAL

## 2024-04-26 ENCOUNTER — ANESTHESIA EVENT (OUTPATIENT)
Dept: SURGERY | Facility: CLINIC | Age: 79
End: 2024-04-26
Payer: COMMERCIAL

## 2024-04-26 ENCOUNTER — HOSPITAL ENCOUNTER (OUTPATIENT)
Facility: CLINIC | Age: 79
Discharge: HOME | End: 2024-04-26
Attending: ORTHOPAEDIC SURGERY | Admitting: ORTHOPAEDIC SURGERY
Payer: COMMERCIAL

## 2024-04-26 VITALS
TEMPERATURE: 97.4 F | HEART RATE: 52 BPM | SYSTOLIC BLOOD PRESSURE: 163 MMHG | OXYGEN SATURATION: 95 % | DIASTOLIC BLOOD PRESSURE: 76 MMHG | WEIGHT: 128 LBS | BODY MASS INDEX: 22.86 KG/M2 | RESPIRATION RATE: 18 BRPM

## 2024-04-26 DIAGNOSIS — G56.02 CARPAL TUNNEL SYNDROME OF LEFT WRIST: Primary | ICD-10-CM

## 2024-04-26 LAB — GLUCOSE BLDC GLUCOMTR-MCNC: 151 MG/DL (ref 70–99)

## 2024-04-26 PROCEDURE — 370N000017 HC ANESTHESIA TECHNICAL FEE, PER MIN: Performed by: ORTHOPAEDIC SURGERY

## 2024-04-26 PROCEDURE — 258N000003 HC RX IP 258 OP 636: Performed by: NURSE ANESTHETIST, CERTIFIED REGISTERED

## 2024-04-26 PROCEDURE — 82962 GLUCOSE BLOOD TEST: CPT

## 2024-04-26 PROCEDURE — 710N000012 HC RECOVERY PHASE 2, PER MINUTE: Performed by: ORTHOPAEDIC SURGERY

## 2024-04-26 PROCEDURE — 64721 CARPAL TUNNEL SURGERY: CPT | Mod: LT | Performed by: ORTHOPAEDIC SURGERY

## 2024-04-26 PROCEDURE — 250N000011 HC RX IP 250 OP 636: Performed by: ORTHOPAEDIC SURGERY

## 2024-04-26 PROCEDURE — 999N000141 HC STATISTIC PRE-PROCEDURE NURSING ASSESSMENT: Performed by: ORTHOPAEDIC SURGERY

## 2024-04-26 PROCEDURE — 360N000075 HC SURGERY LEVEL 2, PER MIN: Performed by: ORTHOPAEDIC SURGERY

## 2024-04-26 PROCEDURE — 250N000009 HC RX 250: Performed by: NURSE ANESTHETIST, CERTIFIED REGISTERED

## 2024-04-26 PROCEDURE — 272N000001 HC OR GENERAL SUPPLY STERILE: Performed by: ORTHOPAEDIC SURGERY

## 2024-04-26 PROCEDURE — 250N000011 HC RX IP 250 OP 636: Performed by: NURSE ANESTHETIST, CERTIFIED REGISTERED

## 2024-04-26 RX ORDER — CLINDAMYCIN PHOSPHATE 900 MG/50ML
900 INJECTION, SOLUTION INTRAVENOUS
Status: DISCONTINUED | OUTPATIENT
Start: 2024-04-26 | End: 2024-04-26

## 2024-04-26 RX ORDER — SODIUM CHLORIDE, SODIUM LACTATE, POTASSIUM CHLORIDE, CALCIUM CHLORIDE 600; 310; 30; 20 MG/100ML; MG/100ML; MG/100ML; MG/100ML
INJECTION, SOLUTION INTRAVENOUS CONTINUOUS
Status: DISCONTINUED | OUTPATIENT
Start: 2024-04-26 | End: 2024-04-26 | Stop reason: HOSPADM

## 2024-04-26 RX ORDER — OXYCODONE HYDROCHLORIDE 5 MG/1
10 TABLET ORAL
Status: DISCONTINUED | OUTPATIENT
Start: 2024-04-26 | End: 2024-04-26 | Stop reason: HOSPADM

## 2024-04-26 RX ORDER — FENTANYL CITRATE 50 UG/ML
25 INJECTION, SOLUTION INTRAMUSCULAR; INTRAVENOUS
Status: DISCONTINUED | OUTPATIENT
Start: 2024-04-26 | End: 2024-04-26 | Stop reason: HOSPADM

## 2024-04-26 RX ORDER — ONDANSETRON 2 MG/ML
4 INJECTION INTRAMUSCULAR; INTRAVENOUS EVERY 30 MIN PRN
Status: DISCONTINUED | OUTPATIENT
Start: 2024-04-26 | End: 2024-04-26 | Stop reason: HOSPADM

## 2024-04-26 RX ORDER — LIDOCAINE HYDROCHLORIDE 5 MG/ML
INJECTION, SOLUTION INFILTRATION; INTRAVENOUS PRN
Status: DISCONTINUED | OUTPATIENT
Start: 2024-04-26 | End: 2024-04-26

## 2024-04-26 RX ORDER — FENTANYL CITRATE 50 UG/ML
INJECTION, SOLUTION INTRAMUSCULAR; INTRAVENOUS PRN
Status: DISCONTINUED | OUTPATIENT
Start: 2024-04-26 | End: 2024-04-26

## 2024-04-26 RX ORDER — CLINDAMYCIN PHOSPHATE 900 MG/50ML
900 INJECTION, SOLUTION INTRAVENOUS SEE ADMIN INSTRUCTIONS
Status: DISCONTINUED | OUTPATIENT
Start: 2024-04-26 | End: 2024-04-26

## 2024-04-26 RX ORDER — CEFAZOLIN SODIUM/WATER 2 G/20 ML
2 SYRINGE (ML) INTRAVENOUS SEE ADMIN INSTRUCTIONS
Status: DISCONTINUED | OUTPATIENT
Start: 2024-04-26 | End: 2024-04-26 | Stop reason: HOSPADM

## 2024-04-26 RX ORDER — ONDANSETRON 4 MG/1
4 TABLET, ORALLY DISINTEGRATING ORAL EVERY 30 MIN PRN
Status: DISCONTINUED | OUTPATIENT
Start: 2024-04-26 | End: 2024-04-26 | Stop reason: HOSPADM

## 2024-04-26 RX ORDER — NALOXONE HYDROCHLORIDE 0.4 MG/ML
0.1 INJECTION, SOLUTION INTRAMUSCULAR; INTRAVENOUS; SUBCUTANEOUS
Status: DISCONTINUED | OUTPATIENT
Start: 2024-04-26 | End: 2024-04-26 | Stop reason: HOSPADM

## 2024-04-26 RX ORDER — ONDANSETRON 2 MG/ML
INJECTION INTRAMUSCULAR; INTRAVENOUS PRN
Status: DISCONTINUED | OUTPATIENT
Start: 2024-04-26 | End: 2024-04-26

## 2024-04-26 RX ORDER — CEFAZOLIN SODIUM/WATER 2 G/20 ML
2 SYRINGE (ML) INTRAVENOUS
Status: COMPLETED | OUTPATIENT
Start: 2024-04-26 | End: 2024-04-26

## 2024-04-26 RX ORDER — HYDROCODONE BITARTRATE AND ACETAMINOPHEN 5; 325 MG/1; MG/1
.5-1 TABLET ORAL EVERY 4 HOURS PRN
Qty: 10 TABLET | Refills: 0 | Status: SHIPPED | OUTPATIENT
Start: 2024-04-26 | End: 2024-07-24

## 2024-04-26 RX ORDER — BUPIVACAINE HYDROCHLORIDE 2.5 MG/ML
INJECTION, SOLUTION INFILTRATION; PERINEURAL PRN
Status: DISCONTINUED | OUTPATIENT
Start: 2024-04-26 | End: 2024-04-26 | Stop reason: HOSPADM

## 2024-04-26 RX ORDER — OXYCODONE HYDROCHLORIDE 5 MG/1
5 TABLET ORAL
Status: DISCONTINUED | OUTPATIENT
Start: 2024-04-26 | End: 2024-04-26 | Stop reason: HOSPADM

## 2024-04-26 RX ORDER — PROPOFOL 10 MG/ML
INJECTION, EMULSION INTRAVENOUS CONTINUOUS PRN
Status: DISCONTINUED | OUTPATIENT
Start: 2024-04-26 | End: 2024-04-26

## 2024-04-26 RX ADMIN — ONDANSETRON 4 MG: 2 INJECTION INTRAMUSCULAR; INTRAVENOUS at 08:11

## 2024-04-26 RX ADMIN — PROPOFOL 100 MCG/KG/MIN: 10 INJECTION, EMULSION INTRAVENOUS at 07:33

## 2024-04-26 RX ADMIN — Medication 2 G: at 07:23

## 2024-04-26 RX ADMIN — LIDOCAINE HYDROCHLORIDE 45 ML: 5 INJECTION, SOLUTION INFILTRATION at 07:41

## 2024-04-26 RX ADMIN — FENTANYL CITRATE 25 MCG: 50 INJECTION INTRAMUSCULAR; INTRAVENOUS at 08:10

## 2024-04-26 RX ADMIN — SODIUM CHLORIDE, POTASSIUM CHLORIDE, SODIUM LACTATE AND CALCIUM CHLORIDE: 600; 310; 30; 20 INJECTION, SOLUTION INTRAVENOUS at 07:17

## 2024-04-26 RX ADMIN — FENTANYL CITRATE 25 MCG: 50 INJECTION INTRAMUSCULAR; INTRAVENOUS at 07:28

## 2024-04-26 ASSESSMENT — ACTIVITIES OF DAILY LIVING (ADL)
ADLS_ACUITY_SCORE: 31
ADLS_ACUITY_SCORE: 31
ADLS_ACUITY_SCORE: 29
ADLS_ACUITY_SCORE: 31

## 2024-04-26 ASSESSMENT — LIFESTYLE VARIABLES: TOBACCO_USE: 0

## 2024-04-26 NOTE — BRIEF OP NOTE
[facility]    Brief Operative Note    Pre-operative diagnosis: Carpal tunnel syndrome of left wrist [G56.02]  Post-operative diagnosis Same as pre-operative diagnosis    Procedure: RELEASE, CARPAL TUNNEL left, Left - Wrist    Surgeon: Surgeons and Role:     * Case Swenson MD - Primary     * Med Lopez PA-C - Assisting  Anesthesia: Mejia Block   Estimated Blood Loss: 0 mL from 4/26/2024  7:25 AM to 4/26/2024  8:21 AM      Drains: None  Specimens: * No specimens in log *  Findings:   Compressed median nerve .  Complications: None.  Implants: * No implants in log *

## 2024-04-26 NOTE — ANESTHESIA POSTPROCEDURE EVALUATION
Patient: Zaria Gaona    Procedure: Procedure(s):  RELEASE, CARPAL TUNNEL left       Anesthesia Type:  IV Regional Anesthesia    Note:  Disposition: Outpatient   Postop Pain Control: Uneventful            Sign Out: Well controlled pain   PONV: No   Neuro/Psych: Uneventful            Sign Out: Acceptable/Baseline neuro status   Airway/Respiratory: Uneventful            Sign Out: Acceptable/Baseline resp. status   CV/Hemodynamics: Uneventful            Sign Out: Acceptable CV status   Other NRE: NONE   DID A NON-ROUTINE EVENT OCCUR? No    Event details/Postop Comments:  Pt was happy with anesthesia care.  No complications.  I will follow up with the pt if needed.           Last vitals:  Vitals Value Taken Time   /76 04/26/24 0900   Temp 97.6  F (36.4  C) 04/26/24 0825   Pulse 52 04/26/24 0900   Resp 16 04/26/24 0830   SpO2 94 % 04/26/24 0911   Vitals shown include unfiled device data.    Electronically Signed By: BEVERLY Selby CRNA  April 26, 2024  9:19 AM

## 2024-04-26 NOTE — ANESTHESIA CARE TRANSFER NOTE
Patient: Zaria Gaona    Procedure: Procedure(s):  RELEASE, CARPAL TUNNEL left       Diagnosis: Carpal tunnel syndrome of left wrist [G56.02]  Diagnosis Additional Information: No value filed.    Anesthesia Type:   IV Regional Anesthesia     Note:    Oropharynx: oropharynx clear of all foreign objects and spontaneously breathing  Level of Consciousness: drowsy  Oxygen Supplementation: face mask    Independent Airway: airway patency satisfactory and stable  Dentition: dentition unchanged  Vital Signs Stable: post-procedure vital signs reviewed and stable  Report to RN Given: handoff report given  Patient transferred to: Phase II    Handoff Report: Identifed the Patient, Identified the Reponsible Provider, Reviewed the pertinent medical history, Discussed the surgical course, Reviewed Intra-OP anesthesia mangement and issues during anesthesia, Set expectations for post-procedure period and Allowed opportunity for questions and acknowledgement of understanding      Vitals:  Vitals Value Taken Time   /67 04/26/24 0825   Temp     Pulse 51 04/26/24 0825   Resp     SpO2 99 % 04/26/24 0826   Vitals shown include unfiled device data.    Electronically Signed By: BEVERLY Selby CRNA  April 26, 2024  8:27 AM

## 2024-04-26 NOTE — ANESTHESIA PREPROCEDURE EVALUATION
"Anesthesia Pre-Procedure Evaluation    Patient: Zaria Gaona   MRN: [id number] : 1945        Procedure : Procedure(s):  RELEASE, CARPAL TUNNEL left          Past Medical History:   Diagnosis Date    Essential hypertension     NSTEMI (non-ST elevated myocardial infarction) (H) 2020    2 stents    Type 2 diabetes mellitus without complication (H)       Past Surgical History:   Procedure Laterality Date    COLONOSCOPY N/A 12/15/2022    Procedure: COLONOSCOPY, WITH POLYPECTOMY;  Surgeon: Andres Trujillo DO;  Location: [facility]    ESOPHAGOSCOPY, GASTROSCOPY, DUODENOSCOPY (EGD), COMBINED N/A 2023    Procedure: ESOPHAGOGASTRODUODENOSCOPY, WITH BIOPSY;  Surgeon: Med sAif MD;  Location: [facility]    INJECT BLOCK MEDIAL BRANCH CERVICAL/THORACIC/LUMBAR Left 2022    Procedure: Left Lumbar 3, Left Lumbar 4, and Left Lumbar 5 medial branch nerve blocks using fluoroscopic guidance and contrast control;  Surgeon: Malick Gomez MD;  [facility]    INJECT EPIDURAL LUMBAR Left 2022    Procedure: Left lumbar 4-5 interlaminar epidural injection ;  Surgeon: Malick Gomez MD;  [facility]    INJECT JOINT SACROILIAC Left 2022    Procedure: Fluoroscopically-guided injection of the Left sacroiliac joint with Left dimitrios Sacroiliac joint ligaments infiltration over the sacrum.;  Surgeon: Malick Gomez MD;  Location: [facility]    VAGINAL PROLAPSE REPAIR      done in [place]      Allergies   Allergen Reactions    Ampicillin Other (See Comments), Hives and Itching     \"not that bad\"    Lisinopril Cough    Sulfamethoxazole-Trimethoprim Other (See Comments), Hives and Itching     \"not that bad\"      Social History     Tobacco Use    Smoking status: Never    Smokeless tobacco: Never   Substance Use Topics    Alcohol use: Yes     Comment: occ      Wt Readings from Last 1 Encounters:   24 57.8 kg (127 lb 8 oz)        Anesthesia Evaluation   Pt has had prior anesthetic. Type: MAC and " General.    No history of anesthetic complications       ROS/MED HX  ENT/Pulmonary:  - neg pulmonary ROS  (-) tobacco use   Neurologic:  - neg neurologic ROS     Cardiovascular: Comment: NSTEMI (non-ST elevated myocardial infarction    (+)  hypertension- -  CAD - past MI (2 1/2 yrs ago.  2 stents.  No issues since then) - stent-. 2 Drug Eluting Stent.                               Previous cardiac testing   Echo: Date: 2021 Results:  [facility]  Echocardiography Laboratory  [address] [place] [address]     Name: CHANG WILKERSON  MRN: [id number]  : 1945  Study Date: 2021 09:58 AM  Age: 76 yrs  Gender: Female  Patient Location: [facility]  Reason For Study: ST elevation myocardial infarction involving left anterior  desce  History: CAD, STENT, Diabetes  Ordering Physician: CANDIDA CLAIRE  Referring Physician: CANDIDA CLAIRE  Performed By: Rahel Staley     BSA: 1.7 m2  Height: 64 in  Weight: 138 lb  HR: 45  BP: 140/70 mmHg  ______________________________________________________________________________  Procedure  Complete Echo Adult.  ______________________________________________________________________________  Interpretation Summary     Left ventricular size, global systolic function are normal, estimated LVEF 55-  60%. Hypokinesis of the mid anteroseptal, apical septal wall segments.  Right ventricular global function is normal.  Trace to mild mitral regurgitation.  Trivial pericardial effusion  The inferior vena cava was normal in size with preserved respiratory  variability.     There are no prior studies available for comparison.  ______________________________________________________________________________  Left Ventricle  The left ventricle is normal in size. There is normal left ventricular wall  thickness. Left ventricular systolic function is normal. The visual ejection  fraction is 55-60%. Left ventricular diastolic function is normal.  Hypokinesis  of the mid anteroseptal, apical septal wall segments.     Right Ventricle  The right ventricle is normal in structure, function and size.     Atria  Normal left atrial size. Right atrial size is normal.     Mitral Valve  There is trace to mild mitral regurgitation.     Tricuspid Valve  The tricuspid valve is not well visualized, but is grossly normal. There is  trace tricuspid regurgitation. Right ventricular systolic pressure could not  be approximated due to inadequate tricuspid regurgitation.     Aortic Valve  The aortic valve is normal in structure and function.     Pulmonic Valve  The pulmonic valve is not well seen, but is grossly normal.     Vessels  The aortic root is normal size. Normal size ascending aorta. The inferior vena  cava was normal in size with preserved respiratory variability.     Pericardium  Trivial pericardial effusion. There are no echocardiographic indications of  cardiac tamponade.     ______________________________________________________________________________  MMode/2D Measurements & Calculations  IVSd: 1.1 cm  LVIDd: 4.6 cm  LVIDs: 2.7 cm  LVPWd: 1.0 cm  FS: 41.3 %  LV mass(C)d: 169.9 grams  LV mass(C)dI: 101.7 grams/m2     Ao root diam: 3.1 cm  LA dimension: 4.0 cm  asc Aorta Diam: 3.2 cm  LA/Ao: 1.3  RWT: 0.43     Doppler Measurements & Calculations  MV E max isidro: 72.2 cm/sec  MV A max isidro: 91.2 cm/sec  MV E/A: 0.79  MV dec slope: 256.0 cm/sec2  MV dec time: 0.28 sec  E/E' av.2  Lateral E/e': 12.3  Medial E/e': 12.1     ______________________________________________________________________________  Report approved by: Haleigh Doyle 2021 02:16 PM  Stress Test:  Date: 23 Results:       The nuclear stress test is abnormal.    There is a small area of a moderate degree of infarction in the apical segment(s) of the left ventricle. No ischemia.    The left ventricular ejection fraction at stress is greater than 70%.    The patient's exercise capacity is  average.  No chest pain, no ischemic EKG changes.    There is no prior study for comparison.       ECG Summary    ECG Baseline electrocardiogram demonstrates sinus rhythm. Septal Q waves.  The stress electrocardiogram is negative for inducible ischemic EKG changes.  There were no arrhythmias during stress.  Arrhythmias during recovery: Occasional PACs.      Technical Comments    Cardiac Protocol An exercise stress test was performed following a Kang protocol with the patient exercising for 6 minutes and 0 seconds under the supervision of Dr. Kusum Carter.  Blood pressure and heart rate demonstrated a normal response to exercise.  The patient's exercise capacity is average.  The test was stopped due to fatigue, dyspnea and target heart rate achieved.  The patient reported dyspnea during the stress test.    Isotope Administration Nuclear imaging was accomplished using a one day protocol with 31.7 mCi of technetium tetrofosmin injected at the peak of exercise on 5/18/2023 and 10.7 mCi of technetium tetrofosmin at rest on 5/18/2023.    Nuclear Study Quality Final image quality is satisfactory.      Stress Measurements    Baseline Vitals  Baseline HR   68 bpm      Baseline Systolic BP   114       Baseline Diastolic BP   62       Peak Stress Vitals  Max HR    137       Last Stress Systolic BP   154       Last Stress Diastolic BP   62       Exercise Data  Target HR   144       Max Predicted HR    95 %      Exercise duration (min)   6 min      Exercise duration (sec)   0 sec      Estimated workload   7 METS          Nuclear Perfusion    Stress Summed Score: 2 Percent Abnormal: 2.94%      Moderate count reduction in the following segments: apex.  All other segments are normal.      Resting Summed Score: 2 Percent Abnormal: 2.94%      Moderate count reduction in the following segments: apex.  All other segments are normal.            Wall Score      Wall Motion    Score Index: 1.00       The left ventricular wall motion  is normal.              All Measurements            Exam Ended: 05/18/23 10:45 Last Resulted: 05/18/23 12:59      Order Details       View Encounter       Lab and Collection Details       Routing       Result History      View All Conversations on this Encounter          Scans on Order [id number]    Scan on 5/18/2023 10:44 AM by Outside, Provider              ECG Reviewed:  Date: 5-9-23 Results:  Sinus  Rhythm   -Anteroseptal infarct -age undetermined.     Cath:  Date: Results:      METS/Exercise Tolerance:     Hematologic:     (+)      anemia,          Musculoskeletal:  - neg musculoskeletal ROS     GI/Hepatic:  - neg GI/hepatic ROS  (-) GERD   Renal/Genitourinary:  - neg Renal ROS     Endo:     (+)  type II DM, Last HgA1c: 6.7, date: 12-19-23, Not using insulin, - not using insulin pump. Normal glucose range: was 151 pre-op.  Normal 110-120,  Diabetic complications: nephropathy cardiac problems.   Chronic steroid usage for Arthritis. Date most recently used: today.        Psychiatric/Substance Use:  - neg psychiatric ROS     Infectious Disease:  - neg infectious disease ROS     Malignancy:  - neg malignancy ROS     Other:  - neg other ROS          Physical Exam    Airway        Mallampati: II   TM distance: > 3 FB   Neck ROM: full   Mouth opening: > 3 cm    Respiratory Devices and Support         Dental     Comment: Lower partial        Cardiovascular   cardiovascular exam normal       Rhythm and rate: regular and normal     Pulmonary   pulmonary exam normal        breath sounds clear to auscultation           OUTSIDE LABS:  CBC:   Lab Results   Component Value Date    WBC 5.7 04/12/2024    WBC 10.8 01/16/2024    HGB 11.0 (L) 04/12/2024    HGB 10.2 (L) 01/16/2024    HCT 34.3 (L) 04/12/2024    HCT 33.2 (L) 01/16/2024     04/12/2024     01/16/2024     BMP:   Lab Results   Component Value Date     04/12/2024     10/03/2023    POTASSIUM 5.0 04/12/2024    POTASSIUM 4.2 10/03/2023    CHLORIDE  "104 04/12/2024    CHLORIDE 103 10/03/2023    CO2 26 04/12/2024    CO2 25 10/03/2023    BUN 23.5 (H) 04/12/2024    BUN 21.3 10/03/2023    CR 1.06 (H) 04/12/2024    CR 1.03 (H) 10/03/2023     (H) 04/12/2024     (H) 10/03/2023     COAGS: No results found for: \"PTT\", \"INR\", \"FIBR\"  POC: No results found for: \"BGM\", \"HCG\", \"HCGS\"  HEPATIC:   Lab Results   Component Value Date    ALBUMIN 3.7 09/08/2022    PROTTOTAL 8.0 09/08/2022    ALT 30 10/03/2023    AST 9 09/08/2022    ALKPHOS 90 09/08/2022    BILITOTAL 0.3 09/08/2022     OTHER:   Lab Results   Component Value Date    A1C 6.7 (H) 12/19/2023    JOHANN 9.7 04/12/2024    MAG 1.8 09/25/2022    TSH 1.27 12/19/2023    CRP 6.3 10/05/2022    SED 23 09/25/2022       Anesthesia Plan    ASA Status:  2    NPO Status:  NPO Appropriate    Anesthesia Type: IV Regional Anesthesia.   Induction: Intravenous, Propofol.   Maintenance: TIVA.        Consents    Anesthesia Plan(s) and associated risks, benefits, and realistic alternatives discussed. Questions answered and patient/representative(s) expressed understanding.     - Discussed:     - Discussed with:  Patient      - Extended Intubation/Ventilatory Support Discussed: No.      - Patient is DNR/DNI Status: No     Use of blood products discussed: No .     Postoperative Care    Pain management: IV analgesics, Oral pain medications.   PONV prophylaxis: Ondansetron (or other 5HT-3), Background Propofol Infusion     Comments:    Other Comments: The risks and benefits of anesthesia, and the alternatives where applicable, have been discussed with the patient, and they wish to proceed.           BEVERLY Selby CRNA    I have reviewed the pertinent notes and labs in the chart from the past 30 days and (re)examined the patient.  Any updates or changes from those notes are reflected in this note.             # Drug Induced Platelet Defect: home medication list includes an antiplatelet medication  # DMII: A1C = N/A within past " 6 months

## 2024-04-26 NOTE — OP NOTE
OPERATIVE REPORT    DATE OF SERVICE:  2024    PREOPERATIVE DIAGNOSIS  Left carpal tunnel syndrome.    POSTOPERATIVE DIAGNOSIS  Left carpal tunnel syndrome.    NAME OF OPERATION  1. Left carpal tunnel release.    SURGEON  Case Troy MD    ASSISTANT: Med Lopez PA-C       SPECIMENS REMOVED: None    ESTIMATED BLOOD LOSS: Minimal    FINDINGS: Compressed median nerve in carpal tunnel    HISTORY  Zaria Gaona is a 78 year old female with severe left carpal tunnel syndrome.  She has positive EMG, positive Tinel.  She has failed conservative treatment, and presents now for left carpal tunnel release.  Risks, benefits, potential complications and alternatives were discussed.    SURGERY  After a smooth IV regional block, the patient's left arm was prepped and draped in sterile fashion.  A pause was performed for patient verification.   A longitudinal incision was made in the thenar palmar crease and carried down through subcutaneous tissue on the ulnar side of the palmaris longus fascia.  The transverse carpal ligament was identified and divided sharply.  Beneath this the median nerve was noted to be significantly narrowed.  Synovium was teased away from the nerve.  The nerve was now seen to be fully decompressed.  The wound was irrigated with saline, and subcutaneous tissue was injected with 0.25% Marcaine.  Skin edges were closed with interrupted 5-0 nylon suture.  Sterile dressings were applied.  A volar splint was applied.  The patient was taken to the recovery room in stable condition.    CASE TROY MD      MR#: [id number]   : 1945   ADMIT DATE: 2024

## 2024-04-26 NOTE — DISCHARGE INSTRUCTIONS
Carpal Tunnel Release Discharge Instructions                                     [phone]   Bone and Joint Service Line for issues or concerns    Home Prescriptions:  Tylenol and Ibuprofen as needed.      General Care:  After surgery you may feel tired/sleepy. This is normal. Please have someone stay with you for 24 hours after surgery. You should avoid driving until released by your physician to do so. If you have any question along the way please contact the office. If you feel it is an issue cannot wait for normal office hours, contact the on-call physician.    Bandages:   Keep this bandage/splint clean and dry. You can always loosen the ace wrap if it feels too tight or you continue to have numbness or tingling.  Keep your bandage and splint on until follow-up appointment.      Bathing:  Do not submerge, scrub or soak your incision in water such as a bath or pool. Keep your splint/bandage clean and dry. Keep your incision/splint covered with a sealed bandage when bathing. Saran wrap and a bag works well.     Follow up:  Your follow up appointment should already be scheduled. If its not, please call the office to verify an appointment about 10-14 days after surgery.     Diet:  Start with non-alcoholic liquids at first, particularly water or sports drinks after surgery. Progress to bland foods such as crackers and bread and finally to your normal diet if you have no problems.     Pain control:  Take your pain medications as prescribed (if prescribed). These medications may make you sleepy. Do not drive, operate equipment, or drink alcohol when taking these.  You may take Tylenol (Generic name is acetaminophen) as directed on the bottle in place of the prescribed pain medications as your pain gets better. You may take NSAIDs (Motrin, Ibuprofen, Aleve, Naproxen) as directed on the bottle for minor discomfort. If the medications cause a reaction such as nausea or skin rash, stop taking them and contact your  doctor. Please plan accordingly, pain medications will not be re-filled on the weekends or at night. Call the office during the day if you need more medications.    Physical Therapy/Occupational Therapy:  At your first post-operative visit, your doctor will direct you on your personal therapy program if needed.     Activity:  Keep your hand elevated for the first couple days after surgery. Avoid lifting, pushing, and or pulling with the operated hand.       Normal findings after surgery:  Numbness and tenderness around the incisions is normal.  You may have bruising around the incisions.  Low grade fevers less than 100.5 degrees Fahrenheit are normal.     When to call the Office:  Temperature greater than 101.5 degrees Fahreheit.  Fever, chills, and increasing pain in the hand and wrist.  Excessive drainage from the incisions that include bright red blood.  Drainage from the incisions sites that appear yellow, pus-like, or foul smelling.  Persistent nausea or vomiting.  Any other effects you feel are significant.

## 2024-05-09 ENCOUNTER — OFFICE VISIT (OUTPATIENT)
Dept: ORTHOPEDICS | Facility: CLINIC | Age: 79
End: 2024-05-09
Payer: COMMERCIAL

## 2024-05-09 VITALS
HEART RATE: 87 BPM | BODY MASS INDEX: 22.5 KG/M2 | DIASTOLIC BLOOD PRESSURE: 67 MMHG | WEIGHT: 127 LBS | HEIGHT: 63 IN | SYSTOLIC BLOOD PRESSURE: 155 MMHG | RESPIRATION RATE: 18 BRPM

## 2024-05-09 DIAGNOSIS — Z98.890 S/P CARPAL TUNNEL RELEASE: Primary | ICD-10-CM

## 2024-05-09 PROCEDURE — 99024 POSTOP FOLLOW-UP VISIT: CPT | Performed by: ORTHOPAEDIC SURGERY

## 2024-05-09 NOTE — PROGRESS NOTES
Follow up left carpal tunnel release on 4/26/24.  Splint is removed.  Wound is healing well.   Sutures are removed.  She  has persistent  numbness of fingers.    Start scar massage with vitamin-E cream.  Use night splint for 4 weeks.    Resume activity as tolerated.  Return to clinic 4 weeks

## 2024-05-09 NOTE — LETTER
5/9/2024         RE: Zaria Gaona  [address] [facility] [address]        Dear Colleague,    Thank you for referring your patient, Zaria Gaona, to the [facility]. Please see a copy of my visit note below.    Follow up left carpal tunnel release on 4/26/24.  Splint is removed.  Wound is healing well.   Sutures are removed.  She  has persistent  numbness of fingers.    Start scar massage with vitamin-E cream.  Use night splint for 4 weeks.    Resume activity as tolerated.  Return to clinic 4 weeks      Again, thank you for allowing me to participate in the care of your patient.        Sincerely,        Case Swenson MD

## 2024-05-11 ENCOUNTER — HEALTH MAINTENANCE LETTER (OUTPATIENT)
Age: 79
End: 2024-05-11

## 2024-06-06 ENCOUNTER — HOSPITAL ENCOUNTER (OUTPATIENT)
Dept: GENERAL RADIOLOGY | Facility: CLINIC | Age: 79
Discharge: HOME | End: 2024-06-06
Attending: STUDENT IN AN ORGANIZED HEALTH CARE EDUCATION/TRAINING PROGRAM
Payer: COMMERCIAL

## 2024-06-06 ENCOUNTER — OFFICE VISIT (OUTPATIENT)
Dept: ORTHOPEDICS | Facility: CLINIC | Age: 79
End: 2024-06-06
Payer: COMMERCIAL

## 2024-06-06 VITALS
WEIGHT: 127 LBS | SYSTOLIC BLOOD PRESSURE: 192 MMHG | DIASTOLIC BLOOD PRESSURE: 77 MMHG | HEIGHT: 63 IN | HEART RATE: 75 BPM | RESPIRATION RATE: 18 BRPM | BODY MASS INDEX: 22.5 KG/M2

## 2024-06-06 DIAGNOSIS — M54.59 OTHER LOW BACK PAIN: ICD-10-CM

## 2024-06-06 DIAGNOSIS — Z98.890 S/P CARPAL TUNNEL RELEASE: Primary | ICD-10-CM

## 2024-06-06 PROCEDURE — 99024 POSTOP FOLLOW-UP VISIT: CPT | Performed by: ORTHOPAEDIC SURGERY

## 2024-06-06 PROCEDURE — 72100 X-RAY EXAM L-S SPINE 2/3 VWS: CPT

## 2024-06-06 PROCEDURE — 72070 X-RAY EXAM THORAC SPINE 2VWS: CPT

## 2024-06-06 NOTE — LETTER
6/6/2024      Zaria Gaona  [address] [facility] [address]      Dear Colleague,    Thank you for referring your patient, Zaria Gaona, to the [facility]. Please see a copy of my visit note below.    6 week follow up left carpal tunnel release on 4/26/24.  She is working on scar massage.  She complains of lumps on her palm. These are early Dupuytren's contracture.  Wound is healing well.   She has mild numbness of fingers.  There is no tenderness of wound.  Full range of motion.    Continue  scar massage with vitamin-E cream.  Discontinue night splint.  Resume activity as tolerated.  Return to clinic as needed.     Again, thank you for allowing me to participate in the care of your patient.        Sincerely,        Case Swenson MD

## 2024-07-06 ENCOUNTER — TRANSFERRED RECORDS (OUTPATIENT)
Dept: HEALTH INFORMATION MANAGEMENT | Facility: CLINIC | Age: 79
End: 2024-07-06

## 2024-07-15 DIAGNOSIS — E78.5 HYPERLIPIDEMIA LDL GOAL <100: ICD-10-CM

## 2024-07-15 DIAGNOSIS — E11.00 TYPE 2 DIABETES MELLITUS WITH HYPEROSMOLARITY WITHOUT COMA, WITHOUT LONG-TERM CURRENT USE OF INSULIN (H): ICD-10-CM

## 2024-07-15 RX ORDER — ATORVASTATIN CALCIUM 80 MG/1
80 TABLET, FILM COATED ORAL DAILY
Qty: 90 TABLET | Refills: 3 | OUTPATIENT
Start: 2024-07-15

## 2024-07-15 RX ORDER — CARVEDILOL 6.25 MG/1
6.25 TABLET ORAL 2 TIMES DAILY
Qty: 180 TABLET | Refills: 3 | Status: SHIPPED | OUTPATIENT
Start: 2024-07-15

## 2024-07-15 NOTE — TELEPHONE ENCOUNTER
Medication Question or Refill        What medication are you calling about (include dose and sig)?: Omeprazole; Carbedilol; Atorvistatin    Please contact patient.  Thank you.    Preferred Pharmacy:   [facility] - [place], [place] - [address]  [address]  [address]  [facility] [address]  Phone: [phone] Fax: [phone]    [facility] [place], [place] - [address]  [address]  [facility] [address]  Phone: [phone] Fax: [phone]      Controlled Substance Agreement on file:   CSA -- Patient Level:    CSA: None found at the patient level.       Who prescribed the medication?: Sam Murray at [facility]     Do you need a refill? Yes    When did you use the medication last? current    Patient offered an appointment? No    Do you have any questions or concerns?  No      Could we send this information to you in [facility] or would you prefer to receive a phone call?:   Patient would prefer a phone call   Okay to leave a detailed message?: Yes at Home number on file [phone] (home)

## 2024-07-18 ENCOUNTER — MEDICAL CORRESPONDENCE (OUTPATIENT)
Dept: HEALTH INFORMATION MANAGEMENT | Facility: CLINIC | Age: 79
End: 2024-07-18
Payer: COMMERCIAL

## 2024-07-18 ENCOUNTER — TRANSFERRED RECORDS (OUTPATIENT)
Dept: HEALTH INFORMATION MANAGEMENT | Facility: CLINIC | Age: 79
End: 2024-07-18
Payer: COMMERCIAL

## 2024-07-18 LAB
ALT SERPL-CCNC: 14 IU/L (ref 6–32)
AST SERPL-CCNC: 21 U/L (ref 10–35)

## 2024-07-24 ENCOUNTER — OFFICE VISIT (OUTPATIENT)
Dept: INTERNAL MEDICINE | Facility: CLINIC | Age: 79
End: 2024-07-24
Payer: COMMERCIAL

## 2024-07-24 VITALS
HEIGHT: 63 IN | TEMPERATURE: 97 F | OXYGEN SATURATION: 96 % | SYSTOLIC BLOOD PRESSURE: 138 MMHG | HEART RATE: 56 BPM | BODY MASS INDEX: 22.8 KG/M2 | WEIGHT: 128.7 LBS | RESPIRATION RATE: 16 BRPM | DIASTOLIC BLOOD PRESSURE: 60 MMHG

## 2024-07-24 DIAGNOSIS — E78.5 HYPERLIPIDEMIA LDL GOAL <100: ICD-10-CM

## 2024-07-24 DIAGNOSIS — G89.29 CHRONIC BILATERAL LOW BACK PAIN WITHOUT SCIATICA: ICD-10-CM

## 2024-07-24 DIAGNOSIS — E11.9 TYPE 2 DIABETES MELLITUS WITHOUT COMPLICATION, WITHOUT LONG-TERM CURRENT USE OF INSULIN (H): ICD-10-CM

## 2024-07-24 DIAGNOSIS — R19.7 DIARRHEA, UNSPECIFIED TYPE: ICD-10-CM

## 2024-07-24 DIAGNOSIS — M54.50 CHRONIC BILATERAL LOW BACK PAIN WITHOUT SCIATICA: ICD-10-CM

## 2024-07-24 DIAGNOSIS — R10.13 EPIGASTRIC PAIN: Primary | ICD-10-CM

## 2024-07-24 DIAGNOSIS — I10 HYPERTENSION, UNSPECIFIED TYPE: ICD-10-CM

## 2024-07-24 LAB
ALBUMIN SERPL BCG-MCNC: 4 G/DL (ref 3.5–5.2)
ALBUMIN UR-MCNC: 30 MG/DL
ALP SERPL-CCNC: 91 U/L (ref 40–150)
ALT SERPL W P-5'-P-CCNC: 10 U/L (ref 0–50)
ANION GAP SERPL CALCULATED.3IONS-SCNC: 10 MMOL/L (ref 7–15)
APPEARANCE UR: ABNORMAL
AST SERPL W P-5'-P-CCNC: 18 U/L (ref 0–45)
BACTERIA #/AREA URNS HPF: ABNORMAL /HPF
BILIRUB SERPL-MCNC: 0.2 MG/DL
BILIRUB UR QL STRIP: NEGATIVE
BUN SERPL-MCNC: 22.7 MG/DL (ref 8–23)
CALCIUM SERPL-MCNC: 9.4 MG/DL (ref 8.8–10.4)
CHLORIDE SERPL-SCNC: 106 MMOL/L (ref 98–107)
COLOR UR AUTO: YELLOW
CREAT SERPL-MCNC: 1.06 MG/DL (ref 0.51–0.95)
EGFRCR SERPLBLD CKD-EPI 2021: 54 ML/MIN/1.73M2
ERYTHROCYTE [DISTWIDTH] IN BLOOD BY AUTOMATED COUNT: 13.3 % (ref 10–15)
GLUCOSE SERPL-MCNC: 150 MG/DL (ref 70–99)
GLUCOSE UR STRIP-MCNC: NEGATIVE MG/DL
HBA1C MFR BLD: 6.7 %
HCO3 SERPL-SCNC: 22 MMOL/L (ref 22–29)
HCT VFR BLD AUTO: 31.5 % (ref 35–47)
HGB BLD-MCNC: 9.9 G/DL (ref 11.7–15.7)
HGB UR QL STRIP: NEGATIVE
KETONES UR STRIP-MCNC: NEGATIVE MG/DL
LEUKOCYTE ESTERASE UR QL STRIP: ABNORMAL
MCH RBC QN AUTO: 28.4 PG (ref 26.5–33)
MCHC RBC AUTO-ENTMCNC: 31.4 G/DL (ref 31.5–36.5)
MCV RBC AUTO: 91 FL (ref 78–100)
NITRATE UR QL: NEGATIVE
PH UR STRIP: 6 [PH] (ref 5–7)
PLATELET # BLD AUTO: 132 10E3/UL (ref 150–450)
POTASSIUM SERPL-SCNC: 5 MMOL/L (ref 3.4–5.3)
PROT SERPL-MCNC: 6.9 G/DL (ref 6.4–8.3)
RBC # BLD AUTO: 3.48 10E6/UL (ref 3.8–5.2)
SODIUM SERPL-SCNC: 138 MMOL/L (ref 135–145)
SP GR UR STRIP: >=1.03 (ref 1–1.03)
SQUAMOUS EPITHELIAL: 4 /HPF
UROBILINOGEN UR STRIP-MCNC: NORMAL MG/DL
WBC # BLD AUTO: 3.5 10E3/UL (ref 4–11)
WBC URINE: 1 /HPF

## 2024-07-24 PROCEDURE — 85027 COMPLETE CBC AUTOMATED: CPT | Performed by: INTERNAL MEDICINE

## 2024-07-24 PROCEDURE — 81001 URINALYSIS AUTO W/SCOPE: CPT | Performed by: INTERNAL MEDICINE

## 2024-07-24 PROCEDURE — G2211 COMPLEX E/M VISIT ADD ON: HCPCS | Performed by: INTERNAL MEDICINE

## 2024-07-24 PROCEDURE — 83036 HEMOGLOBIN GLYCOSYLATED A1C: CPT | Performed by: INTERNAL MEDICINE

## 2024-07-24 PROCEDURE — 80053 COMPREHEN METABOLIC PANEL: CPT | Performed by: INTERNAL MEDICINE

## 2024-07-24 PROCEDURE — 36415 COLL VENOUS BLD VENIPUNCTURE: CPT | Performed by: INTERNAL MEDICINE

## 2024-07-24 PROCEDURE — 99214 OFFICE O/P EST MOD 30 MIN: CPT | Mod: 24 | Performed by: INTERNAL MEDICINE

## 2024-07-24 RX ORDER — LOSARTAN POTASSIUM 100 MG/1
100 TABLET ORAL DAILY
Qty: 90 TABLET | Refills: 3 | Status: SHIPPED | OUTPATIENT
Start: 2024-07-24

## 2024-07-24 RX ORDER — ATORVASTATIN CALCIUM 80 MG/1
80 TABLET, FILM COATED ORAL DAILY
Qty: 90 TABLET | Refills: 3 | Status: SHIPPED | OUTPATIENT
Start: 2024-07-24

## 2024-07-24 ASSESSMENT — ENCOUNTER SYMPTOMS: BACK PAIN: 1

## 2024-07-24 ASSESSMENT — PAIN SCALES - GENERAL: PAINLEVEL: MODERATE PAIN (5)

## 2024-07-24 NOTE — PROGRESS NOTES
Assessment & Plan     Epigastric pain  Epigastric pain, some diarrhea.  Patient is on metformin we have already reduced her from 4 pills to 3 pills will reduce her down to 2 pills, check labs today and an ultrasound of the abdomen in the future.  Will also increase her omeprazole as appeased seems to be epigastric pain try to cut back on her coffee.  - UA Macroscopic with reflex to Microscopic and Culture - Lab Collect; Future  - CBC with platelets; Future  - Comprehensive metabolic panel (BMP + Alb, Alk Phos, ALT, AST, Total. Bili, TP); Future  - US Abdomen Complete; Future  - omeprazole (PRILOSEC) 20 MG DR capsule; Take 1 capsule (20 mg) by mouth 2 times daily    Type 2 diabetes mellitus without complication, without long-term current use of insulin (H)  Diabetes we have reduced her metformin from 4-3 will go from 3-2 and check her A1c today.  - HEMOGLOBIN A1C; Future  - Comprehensive metabolic panel (BMP + Alb, Alk Phos, ALT, AST, Total. Bili, TP); Future    Diarrhea, unspecified type  Diarrhea probably from her metformin make adjustments.    Chronic bilateral low back pain without sciatica  Low back pain I think she is taking some NSAIDs which may bother her epigastric region increase the omeprazole she is instructed on exercises to do to help this.    The longitudinal plan of care for the diagnosis(es)/condition(s) as documented were addressed during this visit. Due to the added complexity in care, I will continue to support Vilma in the subsequent management and with ongoing continuity of care.                Subjective   Vilma is a 79 year old, presenting for the following health issues:  Abdominal Pain and Back Pain        7/24/2024     7:02 AM   Additional Questions   Roomed by Liza AVILES     History of Present Illness       Back Pain:  She presents for follow up of back pain. Patient's back pain is a recurring problem.  Location of back pain:  Right lower back and left lower back  Description of back pain:  "dull ache  Back pain spreads: nowhere    Since patient first noticed back pain, pain is: gradually worsening  Does back pain interfere with her job:  Not applicable       She eats 2-3 servings of fruits and vegetables daily.She exercises with enough effort to increase her heart rate 9 or less minutes per day.  She exercises with enough effort to increase her heart rate 3 or less days per week.   She is taking medications regularly.       Stomach pain giving her trouble. No vomiting, diarrhea often can be dark but not bloody.  Pain is in the epigastric region, unsure food makes better or worse.  Sore in the morning.     Metformin went from 4 to 3 a day, slight improvement. Sugars are 120-130 in the morning.     Back is sore as well went to chiropracter and no better. Can be both sides.     Sometimes yeast infection and does miconazole treatment.     Some heartburn symptoms, takes omeprazole 20 mg, drinks coffee 2-3 cups and some soda. No alcohol or tobacco.           Objective    BP (!) 144/60   Pulse 56   Temp 97  F (36.1  C) (Temporal)   Resp 16   Ht 1.595 m (5' 2.8\")   Wt 58.4 kg (128 lb 11.2 oz)   SpO2 96%   BMI 22.94 kg/m    Body mass index is 22.94 kg/m .  Physical Exam   GENERAL: alert and no distress  NECK: no adenopathy, no asymmetry, masses, or scars  RESP: lungs clear to auscultation - no rales, rhonchi or wheezes  CV: regular rate and rhythm, normal S1 S2, no S3 or S4, no murmur, click or rub, no peripheral edema  ABDOMEN: soft, nontender, no hepatosplenomegaly, no masses and bowel sounds normal  MS: no gross musculoskeletal defects noted, no edema            Signed Electronically by: Wang Vargas MD  "

## 2024-08-12 ENCOUNTER — HOSPITAL ENCOUNTER (OUTPATIENT)
Dept: ULTRASOUND IMAGING | Facility: CLINIC | Age: 79
Discharge: HOME | End: 2024-08-12
Attending: INTERNAL MEDICINE | Admitting: INTERNAL MEDICINE
Payer: COMMERCIAL

## 2024-08-12 DIAGNOSIS — R10.13 EPIGASTRIC PAIN: ICD-10-CM

## 2024-08-12 DIAGNOSIS — K82.4 GALLBLADDER POLYP: Primary | ICD-10-CM

## 2024-08-12 PROCEDURE — 76700 US EXAM ABDOM COMPLETE: CPT

## 2024-08-13 ENCOUNTER — ALLIED HEALTH/NURSE VISIT (OUTPATIENT)
Dept: FAMILY MEDICINE | Facility: CLINIC | Age: 79
End: 2024-08-13
Payer: COMMERCIAL

## 2024-08-13 VITALS — DIASTOLIC BLOOD PRESSURE: 80 MMHG | SYSTOLIC BLOOD PRESSURE: 180 MMHG

## 2024-08-13 DIAGNOSIS — Z01.30 BP CHECK: Primary | ICD-10-CM

## 2024-08-13 PROCEDURE — 99207 PR NO CHARGE NURSE ONLY: CPT

## 2024-08-13 NOTE — PROGRESS NOTES
Zaria Gaona is a 79 year old patient who comes in today for a Blood Pressure check.  Initial BP:  BP (!) 180/80      Data Unavailable  Disposition: provider notified while patient in the clinic he would like to patient to wait this out out she just got results of gallbladder py lops and needs to see general surgery. He would like her to have them check her BP when she sees them and we will watch this for now.     Home Machine: 165/83  Me: 180/80    Home Machine: 138/82  Me: 180/80

## 2024-08-22 ENCOUNTER — HOSPITAL ENCOUNTER (OUTPATIENT)
Dept: CT IMAGING | Facility: CLINIC | Age: 79
Discharge: HOME | End: 2024-08-22
Attending: INTERNAL MEDICINE | Admitting: INTERNAL MEDICINE
Payer: COMMERCIAL

## 2024-08-22 DIAGNOSIS — K82.4 GALLBLADDER POLYP: ICD-10-CM

## 2024-08-22 DIAGNOSIS — R10.13 EPIGASTRIC PAIN: ICD-10-CM

## 2024-08-22 PROCEDURE — 250N000009 HC RX 250: Performed by: INTERNAL MEDICINE

## 2024-08-22 PROCEDURE — 74177 CT ABD & PELVIS W/CONTRAST: CPT

## 2024-08-22 PROCEDURE — 250N000011 HC RX IP 250 OP 636: Performed by: INTERNAL MEDICINE

## 2024-08-22 RX ORDER — IOPAMIDOL 755 MG/ML
500 INJECTION, SOLUTION INTRAVASCULAR ONCE
Status: COMPLETED | OUTPATIENT
Start: 2024-08-22 | End: 2024-08-22

## 2024-08-22 RX ADMIN — SODIUM CHLORIDE 60 ML: 9 INJECTION, SOLUTION INTRAVENOUS at 14:39

## 2024-08-22 RX ADMIN — IOPAMIDOL 63 ML: 755 INJECTION, SOLUTION INTRAVENOUS at 14:39

## 2024-08-22 NOTE — PROGRESS NOTES
Appropriate assistive devices provided during their visit. yes (Yes, No, N/A) tech (list device)    Exam table and/or cart  placed in the lowest position. yes (Yes, No, N/A)    Brakes on tables/carts/wheelchairs used at all times. yes (Yes, No, N/A)    Non slip footwear applied. yes (Yes, No, NA)    Patient was accompanied by staff throughout visit. yes (Yes, No, N/A)    Equipment safety straps used. N/a (Yes, No, N/A)    Assist with toileting. na (Yes, No, N/A)     Ewa Johnston  8/22/2024  2:33 PM

## 2024-08-23 DIAGNOSIS — K86.2 PANCREATIC CYST: ICD-10-CM

## 2024-08-23 DIAGNOSIS — K82.4 GALLBLADDER POLYP: ICD-10-CM

## 2024-08-23 DIAGNOSIS — R93.5 ABNORMAL CT OF THE ABDOMEN: Primary | ICD-10-CM

## 2024-08-26 ENCOUNTER — TELEPHONE (OUTPATIENT)
Dept: INTERNAL MEDICINE | Facility: CLINIC | Age: 79
End: 2024-08-26
Payer: COMMERCIAL

## 2024-08-26 NOTE — TELEPHONE ENCOUNTER
Patient calling as she had a message on her phone that she needs to have an MRI. She states she just completed a CT and is wondering why she needs further imaging. Do not see an encounter of why the call was made. Will route to PCP to advise.    Louann Antonio RN on 8/26/2024 at 1:42 PM

## 2024-08-26 NOTE — TELEPHONE ENCOUNTER
Please see the result note.  Her gallbladder polyps were okay on the CT scan but there was something concerning of the pancreas therefore an MRI of the pancreas is recommended I did place an order for that this is all in her [place] [employer] message result note from her last image.

## 2024-08-27 NOTE — TELEPHONE ENCOUNTER
Patient returned call. Relayed providers message. No further questions or concerns at this time.    Louann Antonio RN on 8/27/2024 at 9:13 AM

## 2024-08-28 ENCOUNTER — OFFICE VISIT (OUTPATIENT)
Dept: SURGERY | Facility: CLINIC | Age: 79
End: 2024-08-28
Attending: INTERNAL MEDICINE
Payer: COMMERCIAL

## 2024-08-28 VITALS
DIASTOLIC BLOOD PRESSURE: 80 MMHG | HEART RATE: 58 BPM | BODY MASS INDEX: 22.2 KG/M2 | SYSTOLIC BLOOD PRESSURE: 132 MMHG | HEIGHT: 63 IN | TEMPERATURE: 97.2 F | OXYGEN SATURATION: 97 % | WEIGHT: 125.3 LBS

## 2024-08-28 DIAGNOSIS — K82.4 GALLBLADDER POLYP: ICD-10-CM

## 2024-08-28 DIAGNOSIS — R10.13 EPIGASTRIC PAIN: ICD-10-CM

## 2024-08-28 PROCEDURE — 99213 OFFICE O/P EST LOW 20 MIN: CPT | Performed by: SURGERY

## 2024-08-28 ASSESSMENT — PAIN SCALES - GENERAL: PAINLEVEL: NO PAIN (0)

## 2024-08-28 NOTE — PROGRESS NOTES
Patient seen in consultation for abdominal pain and gallbladder polyps by Wang Vargas    HPI:  Patient is a 79 year old female with several month history of nonspecific generalized abdominal pain.  She reports the pain is moderate in intensity and cannot associate it with any particular other activity such as exercise, eating, time of day or other.  She first had an ultrasound which showed multiple gallbladder polyps.  Subsequent CT scan showed cholelithiasis but unclear whether this is calcified polyps.  This scan also showed a cystic lesion in the pancreatic head with internal calcifications.  Recommending further MRI which she has yet to do.  She has had a prior hysterectomy.  She is current on colonoscopy and had an upper endoscopy last year.    Review Of Systems    Skin: negative  Ears/Nose/Throat: negative  Respiratory: No shortness of breath, dyspnea on exertion, cough, or hemoptysis  Cardiovascular: negative  Gastrointestinal: negative  Genitourinary: negative  Musculoskeletal: negative  Neurologic: negative  Hematologic/Lymphatic/Immunologic: negative  Endocrine: negative      Past Medical History:   Diagnosis Date    Essential hypertension     NSTEMI (non-ST elevated myocardial infarction) (H) 12/2020    2 stents    Type 2 diabetes mellitus without complication (H) 2005       Past Surgical History:   Procedure Laterality Date    COLONOSCOPY N/A 12/15/2022    Procedure: COLONOSCOPY, WITH POLYPECTOMY;  Surgeon: Andres Trujillo DO;  Location:  GI    ESOPHAGOSCOPY, GASTROSCOPY, DUODENOSCOPY (EGD), COMBINED N/A 6/27/2023    Procedure: ESOPHAGOGASTRODUODENOSCOPY, WITH BIOPSY;  Surgeon: Med Asif MD;  Location: [facility]    INJECT BLOCK MEDIAL BRANCH CERVICAL/THORACIC/LUMBAR Left 8/11/2022    Procedure: Left Lumbar 3, Left Lumbar 4, and Left Lumbar 5 medial branch nerve blocks using fluoroscopic guidance and contrast control;  Surgeon: Malick Gomez MD;  Location: [facility]    INJECT EPIDURAL  LUMBAR Left 5/20/2022    Procedure: Left lumbar 4-5 interlaminar epidural injection ;  Surgeon: Malick Gomez MD;  Location: [facility]    INJECT JOINT SACROILIAC Left 7/1/2022    Procedure: Fluoroscopically-guided injection of the Left sacroiliac joint with Left dimitrios Sacroiliac joint ligaments infiltration over the sacrum.;  Surgeon: Malick Gomez MD;  Location: [facility]    RELEASE CARPAL TUNNEL Left 4/26/2024    Procedure: RELEASE, CARPAL TUNNEL left;  Surgeon: Case Swenson MD;  Location: [facility]    VAGINAL PROLAPSE REPAIR      done in [place]       No family history on file.    Social History     Socioeconomic History    Marital status:      Spouse name: Not on file    Number of children: Not on file    Years of education: Not on file    Highest education level: Not on file   Occupational History    Not on file   Tobacco Use    Smoking status: Never    Smokeless tobacco: Never   Vaping Use    Vaping status: Never Used   Substance and Sexual Activity    Alcohol use: Yes     Comment: occ    Drug use: Never    Sexual activity: Yes     Partners: Male     Birth control/protection: Female Surgical   Other Topics Concern    Not on file   Social History Narrative    Not on file     Social Determinants of Health     Financial Resource Strain: Low Risk  (12/19/2023)    Financial Resource Strain     Within the past 12 months, have you or your family members you live with been unable to get utilities (heat, electricity) when it was really needed?: No   Food Insecurity: Low Risk  (12/19/2023)    Food Insecurity     Within the past 12 months, did you worry that your food would run out before you got money to buy more?: No     Within the past 12 months, did the food you bought just not last and you didn t have money to get more?: No   Transportation Needs: Low Risk  (12/19/2023)    Transportation Needs     Within the past 12 months, has lack of transportation kept you from medical appointments, getting your medicines,  non-medical meetings or appointments, work, or from getting things that you need?: No   Physical Activity: Not on file   Stress: Not on file   Social Connections: Not on file   Interpersonal Safety: Low Risk  (7/24/2024)    Interpersonal Safety     Do you feel physically and emotionally safe where you currently live?: Yes     Within the past 12 months, have you been hit, slapped, kicked or otherwise physically hurt by someone?: No     Within the past 12 months, have you been humiliated or emotionally abused in other ways by your partner or ex-partner?: No   Housing Stability: Low Risk  (12/19/2023)    Housing Stability     Do you have housing? : Yes     Are you worried about losing your housing?: No   Recent Concern: Housing Stability - High Risk (10/11/2023)    Housing Stability     Do you have housing? : No     Are you worried about losing your housing?: No       Current Outpatient Medications   Medication Sig Dispense Refill    acetaminophen (TYLENOL) 325 MG tablet Take 650 mg by mouth      alcohol swab prep pads Use to swab area of injection/shana as directed 100 each 3    Ascorbic Acid (VITAMIN C) 500 MG CAPS Take 500 mg by mouth daily      aspirin (ASA) 81 MG chewable tablet Take 1 tablet (81 mg) by mouth daily 90 tablet 3    atorvastatin (LIPITOR) 80 MG tablet Take 1 tablet (80 mg) by mouth daily 90 tablet 3    blood glucose (NO BRAND SPECIFIED) test strip Use to test blood sugar 1 times daily or as directed. To accompany: Blood Glucose Monitor Brands: per insurance. 100 strip 6    blood glucose calibration (NO BRAND SPECIFIED) solution Use to calibrate blood glucose monitor as needed as directed. To accompany: Blood Glucose Monitor Brands: per insurance. 1 Bottle 3    carvedilol (COREG) 6.25 MG tablet Take 1 tablet (6.25 mg) by mouth 2 times daily 180 tablet 3    cholecalciferol (VITAMIN D3) 25 mcg (1000 units) capsule Take 1 capsule by mouth daily      ferrous sulfate (FEROSUL) 325 (65 Fe) MG tablet Take  "325 mg by mouth daily (with breakfast)      latanoprost (XALATAN) 0.005 % ophthalmic solution Inject 1 drop into the eye daily      leflunomide (ARAVA) 20 MG tablet Take 20 mg by mouth daily      levothyroxine (SYNTHROID/LEVOTHROID) 75 MCG tablet Take 1 tablet (75 mcg) by mouth daily 90 tablet 3    losartan (COZAAR) 100 MG tablet Take 1 tablet (100 mg) by mouth daily 90 tablet 3    metFORMIN (GLUCOPHAGE) 500 MG tablet Take 1 tablet (500 mg) by mouth 2 times daily (with meals) 1 in the  AM and 2 at night.      omeprazole (PRILOSEC) 20 MG DR capsule Take 1 capsule (20 mg) by mouth 2 times daily 180 capsule 3    thin (NO BRAND SPECIFIED) lancets Use to test blood sugar 1 times daily or as directed. To accompany: Blood Glucose Monitor Brands: per insurance. 1 each 3    timolol hemihydrate (BETIMOL) 0.25 % ophthalmic solution Inject 1 drop into the eye daily      zinc 50 MG TABS Take 50 mg by mouth daily      nitroGLYcerin (NITROSTAT) 0.4 MG sublingual tablet Place 0.4 mg under the tongue as needed (Patient not taking: Reported on 7/24/2024)         Medications and history reviewed    Physical exam:  Vitals: /80   Pulse 58   Temp 97.2  F (36.2  C) (Temporal)   Ht 1.595 m (5' 2.8\")   Wt 56.8 kg (125 lb 4.8 oz)   SpO2 97%   BMI 22.34 kg/m    BMI= Body mass index is 22.34 kg/m .    Constitutional: alert, non-distressed   Head: normo-cephalic, atraumatic   Cardiovascular: RRR  Respiratory: equal chest rise, good respiratory effort, no audible wheeze  Gastrointestinal: Soft, non-tender, non distended, no masses or hernias palpated   : deferred  Musculoskeletal: moves all extremities   Skin: no suspicious lesions or rashes   Psychiatric: mentation appears normal, affect appropriate   Patient able to get up on table without difficulty.    Labs show:  No results found for this or any previous visit (from the past 24 hour(s)).    Imaging shows:  Recent Results (from the past 744 hour(s))   US Abdomen Complete    " Narrative    ULTRASOUND ABDOMEN COMPLETE August 12, 2024 8:36 AM     HISTORY: Epigastric pain.    COMPARISON: None.    FINDINGS:    Gallbladder: Negative sonographic Real's sign. Several gallbladder  polyps suggested. An example appears to measures 7 mm. See image 9.  There are several other polyps suggested. No visible gallstones.    Bile ducts: CHD is normal diameter. No intrahepatic biliary  dilatation.    Liver: Normal.     Pancreas: Partially obscured by overlying bowel gas,  but grossly  unremarkable.     Spleen: Normal.     Right kidney: Normal.     Left kidney: Normal.    Aorta and IVC: Not specifically assessed.        Impression    IMPRESSION:    1. No acute abnormality.  2. Multiple gallbladder polyps suggested. Consider surgical  consultation for management.    BETSEY MOODY MD         SYSTEM ID:  [id number]   CT Abdomen Pelvis w Contrast    Narrative    CT ABDOMEN/PELVIS WITH CONTRAST August 22, 2024 2:48 PM    CLINICAL HISTORY: Epigastric pain. Gallbladder polyp.    TECHNIQUE: CT scan of the abdomen and pelvis was performed following  injection of IV contrast. Multiplanar reformats were obtained. Dose  reduction techniques were used.  CONTRAST: Isovue-370, 63mL.    COMPARISON: 8/12/2024, 2/6/2022.    FINDINGS:   LOWER CHEST: Mild scarring and/or atelectasis in the lung bases, right  greater than left.    HEPATOBILIARY: Normal contour with no significant mass. No bile duct  dilatation. Calcified gallstones.    PANCREAS: An 8 x 7 mm calcification is seen in the head of the  pancreas within a round low density/cystic lesion measuring 18 x 13 x  12 mm (4-32 and 3-75), which may represent a calcification within the  pancreatic duct and surrounding ductal dilation. However, no dilation  of the main pancreatic duct is seen elsewhere. Sidebranch ductal  dilation with internal calcification as well as a partially calcified  cystic lesion of the pancreas are also considerations. No enhancing  pancreatic mass. No  inflammatory changes of the pancreas.    SPLEEN: Normal.    ADRENAL GLANDS: Normal.    KIDNEYS/BLADDER: Normal.    BOWEL: No obstruction or inflammatory change.    PELVIC ORGANS: A round low density cystlike structure is seen in the  lower vagina measuring 2.4 x 2.1 cm, suggestive of a Bartholin's gland  cyst. No follow-up is necessary. No cystic or solid adnexal mass. No  free pelvic fluid.    ADDITIONAL FINDINGS: Moderate atherosclerosis of the abdominal aorta  and branch vessels. No aneurysmal dilation.    MUSCULOSKELETAL: Multilevel degenerative changes in the spine. No  acute osseous abnormality.      Impression    IMPRESSION:   1.  No acute intra-abdominal or intrapelvic process.  2.  Low density/cystic lesion measuring 18 mm in the pancreatic head  with internal calcification measuring 8 mm. Consider further  evaluation with nonemergent follow-up pancreatic MRI.  3.  Additional nonacute findings as above.    LUIS RIZVI MD         SYSTEM ID:  [id number]        Assessment:     ICD-10-CM    1. Epigastric pain  R10.13 Adult Gen Surg  Referral      2. Gallbladder polyp  K82.4 Adult Gen Surg  Referral        Plan: We discussed gallbladder polyps and how they can occasionally cause symptoms that mimic gallstones.  She has no postprandial nature to her pain and has no food intolerance.  Her polyps are small.  There are some studies that recommend cholecystectomy with polyps in the 6 to 9 mm range in patients over the age of 70.  We could definitely consider this but I think MRI of the pancreas to further characterize this lesion is the priority at this time.  We could also perform a HIDA scan if we wanted more information about her gallbladder function and symptoms related to her gallbladder.  I will await MRI results and follow-up with further recommendations after that.    30 minutes spent by me on the date of the encounter doing chart review, history and exam, documentation and further  activities per the note    Andres Trujillo, DO

## 2024-08-28 NOTE — LETTER
8/28/2024      Zaria Gaona  [address] [facility] [address]      Dear Colleague,    Thank you for referring your patient, Zaria Gaona, to the [facility]. Please see a copy of my visit note below.    Patient seen in consultation for abdominal pain and gallbladder polyps by Wang Vargas    HPI:  Patient is a 79 year old female with several month history of nonspecific generalized abdominal pain.  She reports the pain is moderate in intensity and cannot associate it with any particular other activity such as exercise, eating, time of day or other.  She first had an ultrasound which showed multiple gallbladder polyps.  Subsequent CT scan showed cholelithiasis but unclear whether this is calcified polyps.  This scan also showed a cystic lesion in the pancreatic head with internal calcifications.  Recommending further MRI which she has yet to do.  She has had a prior hysterectomy.  She is current on colonoscopy and had an upper endoscopy last year.    Review Of Systems    Skin: negative  Ears/Nose/Throat: negative  Respiratory: No shortness of breath, dyspnea on exertion, cough, or hemoptysis  Cardiovascular: negative  Gastrointestinal: negative  Genitourinary: negative  Musculoskeletal: negative  Neurologic: negative  Hematologic/Lymphatic/Immunologic: negative  Endocrine: negative      Past Medical History:   Diagnosis Date     Essential hypertension      NSTEMI (non-ST elevated myocardial infarction) (H) 12/2020    2 stents     Type 2 diabetes mellitus without complication (H) 2005       Past Surgical History:   Procedure Laterality Date     COLONOSCOPY N/A 12/15/2022    Procedure: COLONOSCOPY, WITH POLYPECTOMY;  Surgeon: Andres Trujillo DO;  Location:  GI     ESOPHAGOSCOPY, GASTROSCOPY, DUODENOSCOPY (EGD), COMBINED N/A 6/27/2023    Procedure: ESOPHAGOGASTRODUODENOSCOPY, WITH BIOPSY;  Surgeon: Med Asif MD;  Location:  GI     INJECT BLOCK MEDIAL BRANCH  CERVICAL/THORACIC/LUMBAR Left 8/11/2022    Procedure: Left Lumbar 3, Left Lumbar 4, and Left Lumbar 5 medial branch nerve blocks using fluoroscopic guidance and contrast control;  Surgeon: Malick Gomez MD;  Location: [facility] OR     INJECT EPIDURAL LUMBAR Left 5/20/2022    Procedure: Left lumbar 4-5 interlaminar epidural injection ;  Surgeon: Malick Gomez MD;  Location: [facility] OR     INJECT JOINT SACROILIAC Left 7/1/2022    Procedure: Fluoroscopically-guided injection of the Left sacroiliac joint with Left dimitrios Sacroiliac joint ligaments infiltration over the sacrum.;  Surgeon: Malick Gomez MD;  Location: [facility]     RELEASE CARPAL TUNNEL Left 4/26/2024    Procedure: RELEASE, CARPAL TUNNEL left;  Surgeon: Case Swenson MD;  Location: [facility] OR     VAGINAL PROLAPSE REPAIR      done in [place]       No family history on file.    Social History     Socioeconomic History     Marital status:      Spouse name: Not on file     Number of children: Not on file     Years of education: Not on file     Highest education level: Not on file   Occupational History     Not on file   Tobacco Use     Smoking status: Never     Smokeless tobacco: Never   Vaping Use     Vaping status: Never Used   Substance and Sexual Activity     Alcohol use: Yes     Comment: occ     Drug use: Never     Sexual activity: Yes     Partners: Male     Birth control/protection: Female Surgical   Other Topics Concern     Not on file   Social History Narrative     Not on file     Social Determinants of Health     Financial Resource Strain: Low Risk  (12/19/2023)    Financial Resource Strain      Within the past 12 months, have you or your family members you live with been unable to get utilities (heat, electricity) when it was really needed?: No   Food Insecurity: Low Risk  (12/19/2023)    Food Insecurity      Within the past 12 months, did you worry that your food would run out before you got money to buy more?: No      Within the past 12 months, did  the food you bought just not last and you didn t have money to get more?: No   Transportation Needs: Low Risk  (12/19/2023)    Transportation Needs      Within the past 12 months, has lack of transportation kept you from medical appointments, getting your medicines, non-medical meetings or appointments, work, or from getting things that you need?: No   Physical Activity: Not on file   Stress: Not on file   Social Connections: Not on file   Interpersonal Safety: Low Risk  (7/24/2024)    Interpersonal Safety      Do you feel physically and emotionally safe where you currently live?: Yes      Within the past 12 months, have you been hit, slapped, kicked or otherwise physically hurt by someone?: No      Within the past 12 months, have you been humiliated or emotionally abused in other ways by your partner or ex-partner?: No   Housing Stability: Low Risk  (12/19/2023)    Housing Stability      Do you have housing? : Yes      Are you worried about losing your housing?: No   Recent Concern: Housing Stability - High Risk (10/11/2023)    Housing Stability      Do you have housing? : No      Are you worried about losing your housing?: No       Current Outpatient Medications   Medication Sig Dispense Refill     acetaminophen (TYLENOL) 325 MG tablet Take 650 mg by mouth       alcohol swab prep pads Use to swab area of injection/shana as directed 100 each 3     Ascorbic Acid (VITAMIN C) 500 MG CAPS Take 500 mg by mouth daily       aspirin (ASA) 81 MG chewable tablet Take 1 tablet (81 mg) by mouth daily 90 tablet 3     atorvastatin (LIPITOR) 80 MG tablet Take 1 tablet (80 mg) by mouth daily 90 tablet 3     blood glucose (NO BRAND SPECIFIED) test strip Use to test blood sugar 1 times daily or as directed. To accompany: Blood Glucose Monitor Brands: per insurance. 100 strip 6     blood glucose calibration (NO BRAND SPECIFIED) solution Use to calibrate blood glucose monitor as needed as directed. To accompany: Blood Glucose Monitor  "Brands: per insurance. 1 Bottle 3     carvedilol (COREG) 6.25 MG tablet Take 1 tablet (6.25 mg) by mouth 2 times daily 180 tablet 3     cholecalciferol (VITAMIN D3) 25 mcg (1000 units) capsule Take 1 capsule by mouth daily       ferrous sulfate (FEROSUL) 325 (65 Fe) MG tablet Take 325 mg by mouth daily (with breakfast)       latanoprost (XALATAN) 0.005 % ophthalmic solution Inject 1 drop into the eye daily       leflunomide (ARAVA) 20 MG tablet Take 20 mg by mouth daily       levothyroxine (SYNTHROID/LEVOTHROID) 75 MCG tablet Take 1 tablet (75 mcg) by mouth daily 90 tablet 3     losartan (COZAAR) 100 MG tablet Take 1 tablet (100 mg) by mouth daily 90 tablet 3     metFORMIN (GLUCOPHAGE) 500 MG tablet Take 1 tablet (500 mg) by mouth 2 times daily (with meals) 1 in the  AM and 2 at night.       omeprazole (PRILOSEC) 20 MG DR capsule Take 1 capsule (20 mg) by mouth 2 times daily 180 capsule 3     thin (NO BRAND SPECIFIED) lancets Use to test blood sugar 1 times daily or as directed. To accompany: Blood Glucose Monitor Brands: per insurance. 1 each 3     timolol hemihydrate (BETIMOL) 0.25 % ophthalmic solution Inject 1 drop into the eye daily       zinc 50 MG TABS Take 50 mg by mouth daily       nitroGLYcerin (NITROSTAT) 0.4 MG sublingual tablet Place 0.4 mg under the tongue as needed (Patient not taking: Reported on 7/24/2024)         Medications and history reviewed    Physical exam:  Vitals: /80   Pulse 58   Temp 97.2  F (36.2  C) (Temporal)   Ht 1.595 m (5' 2.8\")   Wt 56.8 kg (125 lb 4.8 oz)   SpO2 97%   BMI 22.34 kg/m    BMI= Body mass index is 22.34 kg/m .    Constitutional: alert, non-distressed   Head: normo-cephalic, atraumatic   Cardiovascular: RRR  Respiratory: equal chest rise, good respiratory effort, no audible wheeze  Gastrointestinal: Soft, non-tender, non distended, no masses or hernias palpated   : deferred  Musculoskeletal: moves all extremities   Skin: no suspicious lesions or rashes "   Psychiatric: mentation appears normal, affect appropriate   Patient able to get up on table without difficulty.    Labs show:  No results found for this or any previous visit (from the past 24 hour(s)).    Imaging shows:  Recent Results (from the past 744 hour(s))   US Abdomen Complete    Narrative    ULTRASOUND ABDOMEN COMPLETE August 12, 2024 8:36 AM     HISTORY: Epigastric pain.    COMPARISON: None.    FINDINGS:    Gallbladder: Negative sonographic Real's sign. Several gallbladder  polyps suggested. An example appears to measures 7 mm. See image 9.  There are several other polyps suggested. No visible gallstones.    Bile ducts: CHD is normal diameter. No intrahepatic biliary  dilatation.    Liver: Normal.     Pancreas: Partially obscured by overlying bowel gas,  but grossly  unremarkable.     Spleen: Normal.     Right kidney: Normal.     Left kidney: Normal.    Aorta and IVC: Not specifically assessed.        Impression    IMPRESSION:    1. No acute abnormality.  2. Multiple gallbladder polyps suggested. Consider surgical  consultation for management.    BETSEY MOODY MD         SYSTEM ID:  [id number]   CT Abdomen Pelvis w Contrast    Narrative    CT ABDOMEN/PELVIS WITH CONTRAST August 22, 2024 2:48 PM    CLINICAL HISTORY: Epigastric pain. Gallbladder polyp.    TECHNIQUE: CT scan of the abdomen and pelvis was performed following  injection of IV contrast. Multiplanar reformats were obtained. Dose  reduction techniques were used.  CONTRAST: Isovue-370, 63mL.    COMPARISON: 8/12/2024, 2/6/2022.    FINDINGS:   LOWER CHEST: Mild scarring and/or atelectasis in the lung bases, right  greater than left.    HEPATOBILIARY: Normal contour with no significant mass. No bile duct  dilatation. Calcified gallstones.    PANCREAS: An 8 x 7 mm calcification is seen in the head of the  pancreas within a round low density/cystic lesion measuring 18 x 13 x  12 mm (4-32 and 3-75), which may represent a calcification within  the  pancreatic duct and surrounding ductal dilation. However, no dilation  of the main pancreatic duct is seen elsewhere. Sidebranch ductal  dilation with internal calcification as well as a partially calcified  cystic lesion of the pancreas are also considerations. No enhancing  pancreatic mass. No inflammatory changes of the pancreas.    SPLEEN: Normal.    ADRENAL GLANDS: Normal.    KIDNEYS/BLADDER: Normal.    BOWEL: No obstruction or inflammatory change.    PELVIC ORGANS: A round low density cystlike structure is seen in the  lower vagina measuring 2.4 x 2.1 cm, suggestive of a Bartholin's gland  cyst. No follow-up is necessary. No cystic or solid adnexal mass. No  free pelvic fluid.    ADDITIONAL FINDINGS: Moderate atherosclerosis of the abdominal aorta  and branch vessels. No aneurysmal dilation.    MUSCULOSKELETAL: Multilevel degenerative changes in the spine. No  acute osseous abnormality.      Impression    IMPRESSION:   1.  No acute intra-abdominal or intrapelvic process.  2.  Low density/cystic lesion measuring 18 mm in the pancreatic head  with internal calcification measuring 8 mm. Consider further  evaluation with nonemergent follow-up pancreatic MRI.  3.  Additional nonacute findings as above.    LUIS RIZVI MD         SYSTEM ID:  [id number]        Assessment:     ICD-10-CM    1. Epigastric pain  R10.13 Adult Gen Surg  Referral      2. Gallbladder polyp  K82.4 Adult Gen Surg  Referral        Plan: We discussed gallbladder polyps and how they can occasionally cause symptoms that mimic gallstones.  She has no postprandial nature to her pain and has no food intolerance.  Her polyps are small.  There are some studies that recommend cholecystectomy with polyps in the 6 to 9 mm range in patients over the age of 70.  We could definitely consider this but I think MRI of the pancreas to further characterize this lesion is the priority at this time.  We could also perform a HIDA scan if we  wanted more information about her gallbladder function and symptoms related to her gallbladder.  I will await MRI results and follow-up with further recommendations after that.    30 minutes spent by me on the date of the encounter doing chart review, history and exam, documentation and further activities per the note    Andres Trujillo, DO      Again, thank you for allowing me to participate in the care of your patient.        Sincerely,        Andres Trujillo, DO

## 2024-09-12 ENCOUNTER — HOSPITAL ENCOUNTER (OUTPATIENT)
Dept: MRI IMAGING | Facility: CLINIC | Age: 79
Discharge: HOME | End: 2024-09-12
Attending: INTERNAL MEDICINE | Admitting: INTERNAL MEDICINE
Payer: COMMERCIAL

## 2024-09-12 DIAGNOSIS — R93.5 ABNORMAL CT OF THE ABDOMEN: ICD-10-CM

## 2024-09-12 PROCEDURE — 74183 MRI ABD W/O CNTR FLWD CNTR: CPT

## 2024-09-12 PROCEDURE — 255N000002 HC RX 255 OP 636: Performed by: INTERNAL MEDICINE

## 2024-09-12 PROCEDURE — A9585 GADOBUTROL INJECTION: HCPCS | Performed by: INTERNAL MEDICINE

## 2024-09-12 RX ORDER — GADOBUTROL 604.72 MG/ML
7.5 INJECTION INTRAVENOUS ONCE
Status: COMPLETED | OUTPATIENT
Start: 2024-09-12 | End: 2024-09-12

## 2024-09-12 RX ADMIN — GADOBUTROL 5.5 ML: 604.72 INJECTION INTRAVENOUS at 08:52

## 2024-09-16 ENCOUNTER — NURSE TRIAGE (OUTPATIENT)
Dept: INTERNAL MEDICINE | Facility: CLINIC | Age: 79
End: 2024-09-16
Payer: COMMERCIAL

## 2024-09-16 NOTE — TELEPHONE ENCOUNTER
Phoned patient and informed her of documentation per Dr. Sam MD as stated below.  Patient stated understanding.  Patient stated she would not be able to schedule an appointment until 9/23/2024.  Scheduled patient for 9/23/2024 with same day provider, Marylin RICHARD.  Informed patient to arrive to the clinic 20 minutes prior to scheduled visit to check in at the .  Patient stated understanding.    Anna Aparicio RN

## 2024-09-16 NOTE — TELEPHONE ENCOUNTER
Nurse Triage SBAR    Is this a 2nd Level Triage? YES, LICENSED PRACTITIONER REVIEW IS REQUIRED    Situation: Patient called in reporting a hoarse voice for the last 1-2 months.     Background: Patient states PCP increased her omeprazole from 1 cap to 2 caps per day about 2 months ago. She states this increase was for her stomach soreness, and states these symptoms have improved some but have not gone away.     Assessment: Patient states she gets a hoarse voice when talking. She states this has been happening for about 1-2 months. She denies any other symptoms with this but states she wants to be seen.     Protocol Recommended Disposition:   See in Office Within 3 Days    Recommendation: Per protocol, patient should be seen within 3 days. Patient is requesting to make an appointment with PCP for next week instead, states this week her schedule is too busy with her 's appointments. She is requesting to see Dr. Vargas instead of another provider. Notified patient will route to provider for review and return call. Advised her to return call or seek urgent/emergency care for any new or worsening symptoms. She verbalized understanding and had no further questions or concerns.     Can you see patient in your [facility] spot on Monday 9/23/24 at 11:00 AM?      JANKI EscobarN, RN      Reason for Disposition   Patient wants to be seen    Additional Information   Negative: SEVERE difficulty breathing (e.g., struggling for each breath, speaks in single words)   Negative: Bluish (or gray) lips or face now   Negative: Stridor with difficulty breathing   Negative: Started suddenly after sting from bee, wasp, or yellow jacket   Negative: Started suddenly after taking a medicine or allergic food (e.g., nuts, seafood)   Negative: Started suddenly along with widespread hives   Negative: Tongue or facial swelling   Negative: Can't swallow normal secretions (e.g., drooling or spitting)   Negative: Sounds like a life-threatening  emergency to the triager   Negative: Nasal allergies also present and they are acting up   Negative: Cold symptoms are main concern   Negative: Sore throat is main symptom   Negative: Recovered from choking episode and hoarseness lasts > 30 minutes   Negative: Direct blow to front of neck   Negative: Hoarseness starting in past 24 hours AND taking an ACE Inhibitor medicine (e.g., benazepril/LOTENSIN, captopril/CAPOTEN, enalapril/VASOTEC, lisinopril/ZESTRIL)   Negative: Difficulty breathing   Negative: Patient sounds very sick or weak to the triager   Negative: Fever > 103 F (39.4 C)   Negative: SEVERE sore throat pain   Negative: Fever present > 3 days (72 hours)   Negative: Hoarseness starting > 24 hours ago AND taking an ACE Inhibitor medicine (e.g., benazepril/LOTENSIN, captopril/CAPOTEN, enalapril/VASOTEC, lisinopril/ZESTRIL)   Negative: MODERATE to SEVERE hoarseness (e.g., interferes with work) and is professional voice user (e.g., call center worker, kaufman, teacher) (Exception: Current common cold or mild URI symptoms.)   Negative: Hoarseness and risk factor (e.g., recent neck surgery or intubation, smoker, unexpected weight loss) (Exception: Current common cold or mild URI symptoms.)   Negative: Hoarseness and swollen lymph node or lump in neck (Exception: Current common cold or mild URI symptoms.)   Negative: Sore throat lasts > 5 days    Protocols used: [id number]

## 2024-09-16 NOTE — TELEPHONE ENCOUNTER
I am full next week, may have to wait until other opening or see same day provider.  Ok to use virtual time

## 2024-09-23 ENCOUNTER — OFFICE VISIT (OUTPATIENT)
Dept: FAMILY MEDICINE | Facility: CLINIC | Age: 79
End: 2024-09-23
Payer: COMMERCIAL

## 2024-09-23 VITALS
OXYGEN SATURATION: 97 % | DIASTOLIC BLOOD PRESSURE: 80 MMHG | RESPIRATION RATE: 16 BRPM | TEMPERATURE: 97.4 F | HEART RATE: 55 BPM | BODY MASS INDEX: 23.04 KG/M2 | HEIGHT: 63 IN | WEIGHT: 130 LBS | SYSTOLIC BLOOD PRESSURE: 148 MMHG

## 2024-09-23 DIAGNOSIS — R49.0 HOARSE VOICE QUALITY: ICD-10-CM

## 2024-09-23 DIAGNOSIS — R35.0 URINARY FREQUENCY: Primary | ICD-10-CM

## 2024-09-23 DIAGNOSIS — N39.0 URINARY TRACT INFECTION WITHOUT HEMATURIA, SITE UNSPECIFIED: ICD-10-CM

## 2024-09-23 DIAGNOSIS — M35.3 POLYMYALGIA RHEUMATICA (H): ICD-10-CM

## 2024-09-23 DIAGNOSIS — Z79.899 ENCOUNTER FOR LONG-TERM (CURRENT) USE OF MEDICATIONS: ICD-10-CM

## 2024-09-23 DIAGNOSIS — R05.1 ACUTE COUGH: ICD-10-CM

## 2024-09-23 DIAGNOSIS — R09.82 POST-NASAL DRIP: ICD-10-CM

## 2024-09-23 LAB
ALBUMIN SERPL BCG-MCNC: 4.2 G/DL (ref 3.5–5.2)
ALBUMIN UR-MCNC: 30 MG/DL
ALT SERPL W P-5'-P-CCNC: 18 U/L (ref 0–50)
APPEARANCE UR: ABNORMAL
AST SERPL W P-5'-P-CCNC: 24 U/L (ref 0–45)
BACTERIA #/AREA URNS HPF: ABNORMAL /HPF
BASOPHILS # BLD AUTO: 0 10E3/UL (ref 0–0.2)
BASOPHILS NFR BLD AUTO: 1 %
BILIRUB UR QL STRIP: NEGATIVE
COLOR UR AUTO: YELLOW
CREAT SERPL-MCNC: 1.17 MG/DL (ref 0.51–0.95)
EGFRCR SERPLBLD CKD-EPI 2021: 48 ML/MIN/1.73M2
EOSINOPHIL # BLD AUTO: 0.1 10E3/UL (ref 0–0.7)
EOSINOPHIL NFR BLD AUTO: 2 %
ERYTHROCYTE [DISTWIDTH] IN BLOOD BY AUTOMATED COUNT: 14.1 % (ref 10–15)
GLUCOSE UR STRIP-MCNC: NEGATIVE MG/DL
HCT VFR BLD AUTO: 32.5 % (ref 35–47)
HGB BLD-MCNC: 10.4 G/DL (ref 11.7–15.7)
HGB UR QL STRIP: NEGATIVE
IMM GRANULOCYTES # BLD: 0 10E3/UL
IMM GRANULOCYTES NFR BLD: 0 %
KETONES UR STRIP-MCNC: NEGATIVE MG/DL
LEUKOCYTE ESTERASE UR QL STRIP: ABNORMAL
LYMPHOCYTES # BLD AUTO: 0.7 10E3/UL (ref 0.8–5.3)
LYMPHOCYTES NFR BLD AUTO: 22 %
MCH RBC QN AUTO: 28.5 PG (ref 26.5–33)
MCHC RBC AUTO-ENTMCNC: 32 G/DL (ref 31.5–36.5)
MCV RBC AUTO: 89 FL (ref 78–100)
MONOCYTES # BLD AUTO: 0.8 10E3/UL (ref 0–1.3)
MONOCYTES NFR BLD AUTO: 25 %
MUCOUS THREADS #/AREA URNS LPF: PRESENT /LPF
NEUTROPHILS # BLD AUTO: 1.5 10E3/UL (ref 1.6–8.3)
NEUTROPHILS NFR BLD AUTO: 49 %
NITRATE UR QL: NEGATIVE
NRBC # BLD AUTO: 0 10E3/UL
NRBC BLD AUTO-RTO: 0 /100
PH UR STRIP: 5 [PH] (ref 5–7)
PLATELET # BLD AUTO: 130 10E3/UL (ref 150–450)
RBC # BLD AUTO: 3.65 10E6/UL (ref 3.8–5.2)
RBC URINE: <1 /HPF
SP GR UR STRIP: 1.02 (ref 1–1.03)
SQUAMOUS EPITHELIAL: 25 /HPF
UROBILINOGEN UR STRIP-MCNC: NORMAL MG/DL
WBC # BLD AUTO: 3.1 10E3/UL (ref 4–11)
WBC URINE: 1 /HPF

## 2024-09-23 PROCEDURE — 82565 ASSAY OF CREATININE: CPT

## 2024-09-23 PROCEDURE — 81001 URINALYSIS AUTO W/SCOPE: CPT

## 2024-09-23 PROCEDURE — 84450 TRANSFERASE (AST) (SGOT): CPT

## 2024-09-23 PROCEDURE — 99213 OFFICE O/P EST LOW 20 MIN: CPT

## 2024-09-23 PROCEDURE — 36415 COLL VENOUS BLD VENIPUNCTURE: CPT

## 2024-09-23 PROCEDURE — 82040 ASSAY OF SERUM ALBUMIN: CPT

## 2024-09-23 PROCEDURE — 84460 ALANINE AMINO (ALT) (SGPT): CPT

## 2024-09-23 PROCEDURE — 85025 COMPLETE CBC W/AUTO DIFF WBC: CPT

## 2024-09-23 RX ORDER — FLUTICASONE PROPIONATE 50 MCG
1 SPRAY, SUSPENSION (ML) NASAL DAILY
Qty: 11.1 ML | Refills: 1 | Status: SHIPPED | OUTPATIENT
Start: 2024-09-23

## 2024-09-23 RX ORDER — NITROFURANTOIN 25; 75 MG/1; MG/1
100 CAPSULE ORAL 2 TIMES DAILY
Qty: 10 CAPSULE | Refills: 0 | Status: SHIPPED | OUTPATIENT
Start: 2024-09-23 | End: 2024-09-28

## 2024-09-23 RX ORDER — BENZONATATE 200 MG/1
200 CAPSULE ORAL 3 TIMES DAILY PRN
Qty: 30 CAPSULE | Refills: 0 | Status: SHIPPED | OUTPATIENT
Start: 2024-09-23

## 2024-09-23 ASSESSMENT — ENCOUNTER SYMPTOMS: COUGH: 1

## 2024-09-23 ASSESSMENT — PAIN SCALES - GENERAL: PAINLEVEL: NO PAIN (0)

## 2024-09-23 NOTE — PROGRESS NOTES
Assessment & Plan     Urinary frequency  - UA Macroscopic with reflex to Microscopic and Culture - Lab Collect; Future    Acute cough  - benzonatate (TESSALON) 200 MG capsule; Take 1 capsule (200 mg) by mouth 3 times daily as needed for cough.  - Adult ENT  Referral; Future    Hoarse voice quality  - Adult ENT  Referral; Future    Post-nasal drip  - fluticasone (FLONASE) 50 MCG/ACT nasal spray; Spray 1 spray into both nostrils daily.  - Adult ENT  Referral; Future    I spent a total of 20 minutes on the day of the visit.   Time spent by me doing chart review, history and exam, documentation and further activities per the note      I spent a total of 20 minutes on the day of the visit.   Time spent by me doing chart review, history and exam, documentation and further activities per the note      See Patient Instructions  Patient Instructions     Assessment  - Complaint of hoarse voice for a month or two, no pain associated  - Reports coughing  - No recent sickness, no history of smoking  - Reports increased urination  - No new medication, but reports doubling up on omeprazole, which was later reduced back to original dosage  - No heartburn, no trouble breathing or wheezing  - Hoarseness increases with more talking  - Previous medication for coughing was Tessalon Perles  - Reports morning nasal congestion  - Allergic to Bactrim, Ampicillin, Lisinopril, and Sophosporins    Plan  - Tessalon as needed for cough  - UA today to check for infection, if positive, will treat with antibiotics  - Flonase nasal spray each morning for two weeks to help with nasal drip and throat irritation  - Recommendation of Zyrtec over the counter to help dry things up  - Voice rest, avoid yelling or screaming  - Increase water intake, use of cough drops, honey, and salt water gargles  - Referral to ENT specialist if symptoms worsen or do not improve  - Follow up with ENT, provided phone number for  scheduling      Tessalon as needed for cough    UA today to look for UTI    Flonase nasal spray each morning for 2 weeks    Zyrtec 10 mg OTC    Voice rest    Lots of water    Cough drops, honey can sooth the throat    Salt water gargles    Follow up with ENT, call to schedule    Kwendnote    Patient understood and verbally consented to the treatment plan. Discussed symptoms that would warrant an urgent or emergent visit. All of the patients' questions were answered. Patient was instructed to contact the clinic if questions or concerns arise. Recommend follow up appointments if symptoms worsen or fail to improve. Recommend follow up as needed. Recommend ER in the case of an emergency.    Marylin Bruno PA-C    Please note: Voice recognition software may have been used in preparing this note, unintended word substitutions may be present.          Boris Esparza is a 79 year old, presenting for the following health issues:  Cough and UTI        9/23/2024     7:01 AM   Additional Questions   Roomed by Elizabeth VALDES     History of Present Illness       Reason for visit:  Hoarse cough,  Symptom onset:  More than a month   She is taking medications regularly.     Taking omeprazole 20 mg. Little bit of GERD. No toruble breathing or wheezing. No excessive vocie use. Haorse vocie.       Genitourinary - Female  Onset/Duration: off and on  Description:   Painful urination (Dysuria): No           Frequency: YES  Blood in urine (Hematuria): No  Delay in urine (Hesitency): No  Intensity: mild  Progression of Symptoms:  some days are better than other  Accompanying Signs & Symptoms:  Fever/chills: No  Flank pain: No  Nausea and vomiting: No  Vaginal symptoms: discharge and itching  Abdominal/Pelvic Pain: No  History:   History of frequent UTI s: YES- in the last year and a half  History of kidney stones: No  Sexually Active: No  Possibility of pregnancy: Yes  Precipitating or alleviating factors: None  Therapies tried and outcome:  tried OTC medications      Subjective  Hoarse Voice:  The patient reports having a hoarse voice for the past one to two months. The hoarseness does not cause pain but is bothersome. The patient notes that the hoarseness tends to increase with more talking. The patient denies any recent illness, sore throat, or extended periods of yelling, cheering, or singing. The patient also denies any history of smoking. The patient has not sought medical attention for this issue before. The patient recalls taking Tessalon Perles about a year ago for a persistent cough, which seemed to help.    Frequent Urination:  The patient reports an increase in urination frequency. The patient consents to a urine test to rule out infection.    Medication:  The patient reports taking omeprazole, the reason for which is unknown to them. The patient mentions that the dosage was recently doubled, but they did not feel comfortable with the increased dose and reverted back to the original dosage. The patient denies experiencing heartburn, except for a slight occurrence.    Allergies:  The patient reports allergies to Bactrim, Ampicillin, Lisinopril, and an unidentified medication.    Respiratory:  The patient denies any trouble breathing or wheezing. However, the patient does report a cough. The patient also reports a stuffy nose and congestion, particularly in the morning after eating breakfast.    Objective  Physical exam: Checked urine for possible infection, examined lungs, looked into mouth    Assessment  - Complaint of hoarse voice for a month or two, no pain associated  - Reports coughing  - No recent sickness, no history of smoking  - Reports increased urination  - No new medication, but reports doubling up on omeprazole, which was later reduced back to original dosage  - No heartburn, no trouble breathing or wheezing  - Hoarseness increases with more talking  - Previous medication for coughing was Tessalon Perles  - Reports morning nasal  "congestion  - Allergic to Bactrim, Ampicillin, Lisinopril, and Sophosporins    Plan  - Tessalon as needed for cough  - UA today to check for infection, if positive, will treat with antibiotics  - Flonase nasal spray each morning for two weeks to help with nasal drip and throat irritation  - Recommendation of Zyrtec over the counter to help dry things up  - Voice rest, avoid yelling or screaming  - Increase water intake, use of cough drops, honey, and salt water gargles  - Referral to ENT specialist if symptoms worsen or do not improve  - Follow up with ENT, provided phone number for scheduling          Objective    BP (!) 148/80   Pulse 55   Temp 97.4  F (36.3  C) (Temporal)   Resp 16   Ht 1.595 m (5' 2.8\")   Wt 59 kg (130 lb)   SpO2 97%   BMI 23.18 kg/m    Body mass index is 23.18 kg/m .  Physical Exam     Lungs clear to auscultation  Throat with no erythema, uvula is midline, no exudates in the throat  Hoarse voice noted on exam, no coughing during visit          Signed Electronically by: Marylin Bruno PA-C    "

## 2024-09-23 NOTE — RESULT ENCOUNTER NOTE
Hello -    Here are my comments about the recent results.  Your urinalysis is positive for UTI.  I sent medication to your pharmacy for treatment.  Take the entire course of antibiotics and take it with food and water.  Side effects may include upset stomach and diarrhea.    Please let us know if you have any questions or concerns.    Regards,  Marylin Bruno PA-C

## 2024-09-23 NOTE — PATIENT INSTRUCTIONS
Assessment  - Complaint of hoarse voice for a month or two, no pain associated  - Reports coughing  - No recent sickness, no history of smoking  - Reports increased urination  - No new medication, but reports doubling up on omeprazole, which was later reduced back to original dosage  - No heartburn, no trouble breathing or wheezing  - Hoarseness increases with more talking  - Previous medication for coughing was Tessalon Perles  - Reports morning nasal congestion  - Allergic to Bactrim, Ampicillin, Lisinopril, and Sophosporins    Plan  - Tessalon as needed for cough  - UA today to check for infection, if positive, will treat with antibiotics  - Flonase nasal spray each morning for two weeks to help with nasal drip and throat irritation  - Recommendation of Zyrtec over the counter to help dry things up  - Voice rest, avoid yelling or screaming  - Increase water intake, use of cough drops, honey, and salt water gargles  - Referral to ENT specialist if symptoms worsen or do not improve  - Follow up with ENT, provided phone number for scheduling      Tessalon as needed for cough    UA today to look for UTI    Flonase nasal spray each morning for 2 weeks    Zyrtec 10 mg OTC    Voice rest    Lots of water    Cough drops, honey can sooth the throat    Salt water gargles    Follow up with ENT, call to schedule    [place]    Patient understood and verbally consented to the treatment plan. Discussed symptoms that would warrant an urgent or emergent visit. All of the patients' questions were answered. Patient was instructed to contact the clinic if questions or concerns arise. Recommend follow up appointments if symptoms worsen or fail to improve. Recommend follow up as needed. Recommend ER in the case of an emergency.    Marylin Bruno PA-C    Please note: Voice recognition software may have been used in preparing this note, unintended word substitutions may be present.

## 2024-09-24 ENCOUNTER — TELEPHONE (OUTPATIENT)
Dept: INTERNAL MEDICINE | Facility: CLINIC | Age: 79
End: 2024-09-24
Payer: COMMERCIAL

## 2024-09-24 NOTE — TELEPHONE ENCOUNTER
Patient Returning Call    Reason for call:  I have questions on the imaging the Dr wants me to have could I get a call     Information relayed to patient:  n/a    Patient has additional questions:  Yes  What are your questions/concerns:  Imaging    Who does the patient want to speak with:   Care team or nurse    Is an  needed?:  No      Could we send this information to you in [facility] or would you prefer to receive a phone call?:   No preference   Okay to leave a detailed message?: Yes at Cell number on file:    Telephone Information:   Mobile [phone]

## 2024-10-09 ENCOUNTER — OFFICE VISIT (OUTPATIENT)
Dept: INTERNAL MEDICINE | Facility: CLINIC | Age: 79
End: 2024-10-09
Payer: COMMERCIAL

## 2024-10-09 VITALS
SYSTOLIC BLOOD PRESSURE: 180 MMHG | BODY MASS INDEX: 22.84 KG/M2 | RESPIRATION RATE: 16 BRPM | TEMPERATURE: 97.7 F | DIASTOLIC BLOOD PRESSURE: 80 MMHG | WEIGHT: 128.9 LBS | HEART RATE: 56 BPM | HEIGHT: 63 IN | OXYGEN SATURATION: 97 %

## 2024-10-09 DIAGNOSIS — I10 HYPERTENSION, UNSPECIFIED TYPE: ICD-10-CM

## 2024-10-09 DIAGNOSIS — I25.118 CORONARY ARTERY DISEASE INVOLVING NATIVE CORONARY ARTERY OF NATIVE HEART WITH OTHER FORM OF ANGINA PECTORIS (H): ICD-10-CM

## 2024-10-09 DIAGNOSIS — Z01.818 PRE-OP EXAM: Primary | ICD-10-CM

## 2024-10-09 DIAGNOSIS — H25.013 CORTICAL AGE-RELATED CATARACT OF BOTH EYES: ICD-10-CM

## 2024-10-09 PROCEDURE — G0008 ADMIN INFLUENZA VIRUS VAC: HCPCS | Performed by: INTERNAL MEDICINE

## 2024-10-09 PROCEDURE — 99214 OFFICE O/P EST MOD 30 MIN: CPT | Mod: 25 | Performed by: INTERNAL MEDICINE

## 2024-10-09 PROCEDURE — 90662 IIV NO PRSV INCREASED AG IM: CPT | Performed by: INTERNAL MEDICINE

## 2024-10-09 RX ORDER — TRIAMTERENE AND HYDROCHLOROTHIAZIDE 37.5; 25 MG/1; MG/1
1 CAPSULE ORAL EVERY MORNING
Qty: 90 CAPSULE | Refills: 3 | Status: SHIPPED | OUTPATIENT
Start: 2024-10-09

## 2024-10-09 RX ORDER — TRIAMTERENE AND HYDROCHLOROTHIAZIDE 37.5; 25 MG/1; MG/1
1 CAPSULE ORAL EVERY MORNING
Qty: 14 CAPSULE | Refills: 0 | Status: SHIPPED | OUTPATIENT
Start: 2024-10-09

## 2024-10-09 ASSESSMENT — PAIN SCALES - GENERAL: PAINLEVEL: MILD PAIN (3)

## 2024-10-09 NOTE — PROGRESS NOTES
Preoperative Evaluation  [address] [address]  Phone: [phone]  Primary Provider: Wang Vargas MD  Pre-op Performing Provider: Wang Vargas MD  Oct 9, 2024       10/8/2024   Surgical Information   What procedure is being done? Cateract   Facility or Hospital where procedure/surgery will be performed: [facility]   Who is doing the procedure / surgery? Yo Borjadominickhoang   Date of surgery / procedure: 10/17/2024   Time of surgery / procedure: Tentative 7:30 AM a.m.   Where do you plan to recover after surgery? at home with family        Fax number for surgical facility: Note does not need to be faxed, will be available electronically in Epic.    Assessment & Plan     The proposed surgical procedure is considered LOW risk.    Problem List Items Addressed This Visit       Coronary artery disease involving native coronary artery of native heart with other form of angina pectoris (H)     Other Visit Diagnoses       Pre-op exam    -  Primary    Hypertension, unspecified type        Relevant Medications    triamterene-HCTZ (DYAZIDE) 37.5-25 MG capsule    triamterene-HCTZ (DYAZIDE) 37.5-25 MG capsule    Other Relevant Orders    Basic metabolic panel  (Ca, Cl, CO2, Creat, Gluc, K, Na, BUN)    Cortical age-related cataract of both eyes              Approved for low risk cataract surgery    HTN is running too high on coreg and losartan, will add dyazide for bp control. Recheck basic panel in one week. Follow up BP at cataract surgery next week.           - No identified additional risk factors other than previously addressed    The longitudinal plan of care for the diagnosis(es)/condition(s) as documented were addressed during this visit. Due to the added complexity in care, I will continue to support Vilma in the subsequent management and with ongoing continuity of care.    Recommendation  Approval given to proceed with proposed procedure, without  further diagnostic evaluation.    Boris Esparza is a 79 year old, presenting for the following:  Pre-Op Exam          10/9/2024     9:26 AM   Additional Questions   Roomed by Nelli HESS     HPI related to upcoming procedure: 10/17/2024  Phacoemulsification with standard intraocular lens implant with Istent Right MAC           10/31/2024  Phacoemulsification with standard intraocular lens implant with Istent Left         10/8/2024   Pre-Op Questionnaire   Have you ever had a heart attack or stroke? (!) YES heart attack   Have you ever had surgery on your heart or blood vessels, such as a stent placement, a coronary artery bypass, or surgery on an artery in your head, neck, heart, or legs? (!) YES stents 2020 ok since   Do you have chest pain with activity? No   Do you have a history of heart failure? No   Do you currently have a cold, bronchitis or symptoms of other infection? No   Do you have a cough, shortness of breath, or wheezing? No   Do you or anyone in your family have previous history of blood clots? No   Do you or does anyone in your family have a serious bleeding problem such as prolonged bleeding following surgeries or cuts? No   Have you ever had problems with anemia or been told to take iron pills? No   Have you had any abnormal blood loss such as black, tarry or bloody stools, or abnormal vaginal bleeding? No   Have you ever had a blood transfusion? No   Are you willing to have a blood transfusion if it is medically needed before, during, or after your surgery? Yes   Have you or any of your relatives ever had problems with anesthesia? No   Do you have sleep apnea, excessive snoring or daytime drowsiness? No   Do you have any artifical heart valves or other implanted medical devices like a pacemaker, defibrillator, or continuous glucose monitor? No   Do you have artificial joints? No   Are you allergic to latex? No        Health Care Directive  Patient does not have a Health Care Directive or Living  Will: Discussed advance care planning with patient; information given to patient to review.    Preoperative Review of    reviewed - no record of controlled substances prescribed.      Status of Chronic Conditions:  CAD - Patient has a longstanding history of moderate-severe CAD. Patient denies recent chest pain or NTG use, denies exercise induced dyspnea or PND. Last Stress test , EKG .     DIABETES - Patient has a longstanding history of DiabetesType Type II . Patient is being treated with oral agents and denies significant side effects. Control has been good. Complicating factors include but are not limited to: hypertension and hyperlipidemia.     HYPERLIPIDEMIA - Patient has a long history of significant Hyperlipidemia requiring medication for treatment with recent poor control. Patient reports no problems or side effects with the medication.     HYPERTENSION - Patient has longstanding history of HTN , currently denies any symptoms referable to elevated blood pressure. Specifically denies chest pain, palpitations, dyspnea, orthopnea, PND or peripheral edema. Blood pressure readings have been in normal range. Current medication regimen is as listed below. Patient denies any side effects of medication.     Patient Active Problem List    Diagnosis Date Noted    S/P carpal tunnel release 05/09/2024     Priority: Medium    Carpal tunnel syndrome of left wrist 03/14/2024     Priority: Medium    Chronic kidney disease, stage 3a (H) 02/27/2024     Priority: Medium    PMR (polymyalgia rheumatica) (H) 01/16/2024     Priority: Medium    Sacroiliac joint pain 01/15/2024     Priority: Medium    Dyslipidemia, goal LDL below 70 05/09/2023     Priority: Medium    Coronary artery disease involving native coronary artery of native heart with other form of angina pectoris (H) 05/09/2023     Priority: Medium    History of ST elevation myocardial infarction (STEMI) 05/09/2023     Priority: Medium    Cystocele, midline  09/13/2021     Priority: Medium    Diabetes mellitus, type 2 (H) 04/21/2021     Priority: Medium      Past Medical History:   Diagnosis Date    Essential hypertension     NSTEMI (non-ST elevated myocardial infarction) (H) 12/2020    2 stents    Type 2 diabetes mellitus without complication (H) 2005     Past Surgical History:   Procedure Laterality Date    COLONOSCOPY N/A 12/15/2022    Procedure: COLONOSCOPY, WITH POLYPECTOMY;  Surgeon: Andres Trujillo DO;  Location: [facility]    ESOPHAGOSCOPY, GASTROSCOPY, DUODENOSCOPY (EGD), COMBINED N/A 6/27/2023    Procedure: ESOPHAGOGASTRODUODENOSCOPY, WITH BIOPSY;  Surgeon: Med Asif MD;  Location: [facility]    INJECT BLOCK MEDIAL BRANCH CERVICAL/THORACIC/LUMBAR Left 8/11/2022    Procedure: Left Lumbar 3, Left Lumbar 4, and Left Lumbar 5 medial branch nerve blocks using fluoroscopic guidance and contrast control;  Surgeon: Malick Gomez MD;  Location: [facility]    INJECT EPIDURAL LUMBAR Left 5/20/2022    Procedure: Left lumbar 4-5 interlaminar epidural injection ;  Surgeon: Malick Gomez MD;  Location: [facility]    INJECT JOINT SACROILIAC Left 7/1/2022    Procedure: Fluoroscopically-guided injection of the Left sacroiliac joint with Left dimitrios Sacroiliac joint ligaments infiltration over the sacrum.;  Surgeon: Malick Gomez MD;  Location: [facility]    RELEASE CARPAL TUNNEL Left 4/26/2024    Procedure: RELEASE, CARPAL TUNNEL left;  Surgeon: Case Swenson MD;  Location: [facility]    VAGINAL PROLAPSE REPAIR      done in [place]     Current Outpatient Medications   Medication Sig Dispense Refill    acetaminophen (TYLENOL) 325 MG tablet Take 650 mg by mouth      alcohol swab prep pads Use to swab area of injection/shana as directed 100 each 3    Ascorbic Acid (VITAMIN C) 500 MG CAPS Take 500 mg by mouth daily      aspirin (ASA) 81 MG chewable tablet Take 1 tablet (81 mg) by mouth daily 90 tablet 3    atorvastatin (LIPITOR) 80 MG tablet Take 1 tablet (80 mg) by mouth daily 90  tablet 3    blood glucose (NO BRAND SPECIFIED) test strip Use to test blood sugar 1 times daily or as directed. To accompany: Blood Glucose Monitor Brands: per insurance. 100 strip 6    blood glucose calibration (NO BRAND SPECIFIED) solution Use to calibrate blood glucose monitor as needed as directed. To accompany: Blood Glucose Monitor Brands: per insurance. 1 Bottle 3    carvedilol (COREG) 6.25 MG tablet Take 1 tablet (6.25 mg) by mouth 2 times daily 180 tablet 3    cholecalciferol (VITAMIN D3) 25 mcg (1000 units) capsule Take 1 capsule by mouth daily      fluticasone (FLONASE) 50 MCG/ACT nasal spray Spray 1 spray into both nostrils daily. 11.1 mL 1    latanoprost (XALATAN) 0.005 % ophthalmic solution Inject 1 drop into the eye daily      leflunomide (ARAVA) 20 MG tablet Take 20 mg by mouth daily      levothyroxine (SYNTHROID/LEVOTHROID) 75 MCG tablet Take 1 tablet (75 mcg) by mouth daily 90 tablet 3    losartan (COZAAR) 100 MG tablet Take 1 tablet (100 mg) by mouth daily 90 tablet 3    metFORMIN (GLUCOPHAGE) 500 MG tablet Take 1 tablet (500 mg) by mouth 2 times daily (with meals) 1 in the  AM and 2 at night.      omeprazole (PRILOSEC) 20 MG DR capsule Take 1 capsule (20 mg) by mouth 2 times daily (Patient taking differently: Take 20 mg by mouth daily.) 180 capsule 3    thin (NO BRAND SPECIFIED) lancets Use to test blood sugar 1 times daily or as directed. To accompany: Blood Glucose Monitor Brands: per insurance. 1 each 3    timolol hemihydrate (BETIMOL) 0.25 % ophthalmic solution Inject 1 drop into the eye daily      zinc 50 MG TABS Take 50 mg by mouth daily      benzonatate (TESSALON) 200 MG capsule Take 1 capsule (200 mg) by mouth 3 times daily as needed for cough. (Patient not taking: Reported on 10/9/2024) 30 capsule 0    ferrous sulfate (FEROSUL) 325 (65 Fe) MG tablet Take 325 mg by mouth daily (with breakfast) (Patient not taking: Reported on 10/9/2024)      nitroGLYcerin (NITROSTAT) 0.4 MG sublingual  "tablet Place 0.4 mg under the tongue as needed (Patient not taking: Reported on 7/24/2024)         Allergies   Allergen Reactions    Ampicillin Other (See Comments), Hives and Itching     \"not that bad\"    Lisinopril Cough    Sulfamethoxazole-Trimethoprim Other (See Comments), Hives and Itching     \"not that bad\"        Social History     Tobacco Use    Smoking status: Never    Smokeless tobacco: Never   Substance Use Topics    Alcohol use: Yes     Comment: occ       History   Drug Use Unknown             Review of Systems  CONSTITUTIONAL: NEGATIVE for fever, chills, change in weight  ENT/MOUTH: NEGATIVE for ear, mouth and throat problems  RESP: NEGATIVE for significant cough or SOB  CV: NEGATIVE for chest pain, palpitations or peripheral edema    Objective    BP (!) 180/80 (BP Location: Left arm, Patient Position: Chair)   Pulse 56   Temp 97.7  F (36.5  C) (Temporal)   Resp 16   Ht 1.6 m (5' 3\")   Wt 58.5 kg (128 lb 14.4 oz)   SpO2 97%   BMI 22.83 kg/m     Estimated body mass index is 22.83 kg/m  as calculated from the following:    Height as of this encounter: 1.6 m (5' 3\").    Weight as of this encounter: 58.5 kg (128 lb 14.4 oz).  Physical Exam  GENERAL: alert and no distress  NECK: no adenopathy, no asymmetry, masses, or scars  RESP: lungs clear to auscultation - no rales, rhonchi or wheezes  CV: regular rate and rhythm, normal S1 S2, no S3 or S4, no murmur, click or rub, no peripheral edema  MS: no gross musculoskeletal defects noted, no edema    Recent Labs   Lab Test 09/23/24  0754 07/24/24  0746 04/12/24  0959 01/16/24  1321 12/19/23  1418   HGB 10.4* 9.9* 11.0*   < > 10.7*   * 132* 157   < > 129*   NA  --  138 140  --   --    POTASSIUM  --  5.0 5.0  --   --    CR 1.17* 1.06* 1.06*   < >  --    A1C  --  6.7*  --   --  6.7*    < > = values in this interval not displayed.        Diagnostics  No labs were ordered during this visit.   No EKG required for low risk surgery (cataract, skin procedure, " breast biopsy, etc).    Revised Cardiac Risk Index (RCRI)  The patient has the following serious cardiovascular risks for perioperative complications:   - No serious cardiac risks = 0 points     RCRI Interpretation: 1 point: Class II (low risk - 0.9% complication rate)         Signed Electronically by: Wang Vargas MD  A copy of this evaluation report is provided to the requesting physician.

## 2024-10-16 ENCOUNTER — TRANSFERRED RECORDS (OUTPATIENT)
Dept: HEALTH INFORMATION MANAGEMENT | Facility: CLINIC | Age: 79
End: 2024-10-16
Payer: COMMERCIAL

## 2024-10-16 ENCOUNTER — ANESTHESIA EVENT (OUTPATIENT)
Dept: SURGERY | Facility: CLINIC | Age: 79
End: 2024-10-16
Payer: COMMERCIAL

## 2024-10-16 LAB — RETINOPATHY: POSITIVE

## 2024-10-16 ASSESSMENT — LIFESTYLE VARIABLES: TOBACCO_USE: 0

## 2024-10-16 NOTE — ANESTHESIA PREPROCEDURE EVALUATION
"Anesthesia Pre-Procedure Evaluation    Patient: Zaria Gaona   MRN: [id number] : 1945        Procedure : Procedure(s):  Phacoemulsification with standard intraocular lens implant with Istent          Past Medical History:   Diagnosis Date    Essential hypertension     NSTEMI (non-ST elevated myocardial infarction) (H) 2020    2 stents    Type 2 diabetes mellitus without complication (H)       Past Surgical History:   Procedure Laterality Date    COLONOSCOPY N/A 12/15/2022    Procedure: COLONOSCOPY, WITH POLYPECTOMY;  Surgeon: Andres Trujillo DO;  Location: [facility]    ESOPHAGOSCOPY, GASTROSCOPY, DUODENOSCOPY (EGD), COMBINED N/A 2023    Procedure: ESOPHAGOGASTRODUODENOSCOPY, WITH BIOPSY;  Surgeon: Med Aisf MD;  Location: [facility]    INJECT BLOCK MEDIAL BRANCH CERVICAL/THORACIC/LUMBAR Left 2022    Procedure: Left Lumbar 3, Left Lumbar 4, and Left Lumbar 5 medial branch nerve blocks using fluoroscopic guidance and contrast control;  Surgeon: Malick Gomez MD;  Location: [facility]    INJECT EPIDURAL LUMBAR Left 2022    Procedure: Left lumbar 4-5 interlaminar epidural injection ;  Surgeon: Malick Gomez MD;  Location[facility]    INJECT JOINT SACROILIAC Left 2022    Procedure: Fluoroscopically-guided injection of the Left sacroiliac joint with Left dimitrios Sacroiliac joint ligaments infiltration over the sacrum.;  Surgeon: Malick Gomez MD;  Location: [facility]    RELEASE CARPAL TUNNEL Left 2024    Procedure: RELEASE, CARPAL TUNNEL left;  Surgeon: Case Swenson MD;  Location: [facility]    VAGINAL PROLAPSE REPAIR      done in [place]      Allergies   Allergen Reactions    Ampicillin Other (See Comments), Hives and Itching     \"not that bad\"    Lisinopril Cough    Sulfamethoxazole-Trimethoprim Other (See Comments), Hives and Itching     \"not that bad\"      Social History     Tobacco Use    Smoking status: Never    Smokeless tobacco: Never   Substance Use Topics    " Alcohol use: Yes     Comment: occ      Wt Readings from Last 1 Encounters:   10/09/24 58.5 kg (128 lb 14.4 oz)        Anesthesia Evaluation   Pt has had prior anesthetic. Type: MAC and General.    No history of anesthetic complications       ROS/MED HX  ENT/Pulmonary:  - neg pulmonary ROS  (-) tobacco use   Neurologic:  - neg neurologic ROS     Cardiovascular: Comment: NSTEMI (non-ST elevated myocardial infarction    (+)  hypertension- -  CAD - past MI (2 1/2 yrs ago.  2 stents.  No issues since then) - stent-2020. 2 Drug Eluting Stent.                               Previous cardiac testing   Echo: Date: 2021 Results:  [facility]  Echocardiography Laboratory  [address] [place] [address]     Name: CHANG WILKERSON  MRN: [id number]  : 1945  Study Date: 2021 09:58 AM  Age: 76 yrs  Gender: Female  Patient Location: [facility]  Reason For Study: ST elevation myocardial infarction involving left anterior  desce  History: CAD, STENT, Diabetes  Ordering Physician: CANDIDA CLAIRE  Referring Physician: CANDIDA CLAIRE  Performed By: Rahel Staley     BSA: 1.7 m2  Height: 64 in  Weight: 138 lb  HR: 45  BP: 140/70 mmHg  ______________________________________________________________________________  Procedure  Complete Echo Adult.  ______________________________________________________________________________  Interpretation Summary     Left ventricular size, global systolic function are normal, estimated LVEF 55-  60%. Hypokinesis of the mid anteroseptal, apical septal wall segments.  Right ventricular global function is normal.  Trace to mild mitral regurgitation.  Trivial pericardial effusion  The inferior vena cava was normal in size with preserved respiratory  variability.     There are no prior studies available for comparison.  ______________________________________________________________________________  Left Ventricle  The left ventricle is normal in size.  There is normal left ventricular wall  thickness. Left ventricular systolic function is normal. The visual ejection  fraction is 55-60%. Left ventricular diastolic function is normal. Hypokinesis  of the mid anteroseptal, apical septal wall segments.     Right Ventricle  The right ventricle is normal in structure, function and size.     Atria  Normal left atrial size. Right atrial size is normal.     Mitral Valve  There is trace to mild mitral regurgitation.     Tricuspid Valve  The tricuspid valve is not well visualized, but is grossly normal. There is  trace tricuspid regurgitation. Right ventricular systolic pressure could not  be approximated due to inadequate tricuspid regurgitation.     Aortic Valve  The aortic valve is normal in structure and function.     Pulmonic Valve  The pulmonic valve is not well seen, but is grossly normal.     Vessels  The aortic root is normal size. Normal size ascending aorta. The inferior vena  cava was normal in size with preserved respiratory variability.     Pericardium  Trivial pericardial effusion. There are no echocardiographic indications of  cardiac tamponade.     ______________________________________________________________________________  MMode/2D Measurements & Calculations  IVSd: 1.1 cm  LVIDd: 4.6 cm  LVIDs: 2.7 cm  LVPWd: 1.0 cm  FS: 41.3 %  LV mass(C)d: 169.9 grams  LV mass(C)dI: 101.7 grams/m2     Ao root diam: 3.1 cm  LA dimension: 4.0 cm  asc Aorta Diam: 3.2 cm  LA/Ao: 1.3  RWT: 0.43     Doppler Measurements & Calculations  MV E max isidro: 72.2 cm/sec  MV A max isidro: 91.2 cm/sec  MV E/A: 0.79  MV dec slope: 256.0 cm/sec2  MV dec time: 0.28 sec  E/E' av.2  Lateral E/e': 12.3  Medial E/e': 12.1     ______________________________________________________________________________  Report approved by: Haleigh Doyle 2021 02:16 PM  Stress Test:  Date: 23 Results:       The nuclear stress test is abnormal.    There is a small area of a moderate degree of  infarction in the apical segment(s) of the left ventricle. No ischemia.    The left ventricular ejection fraction at stress is greater than 70%.    The patient's exercise capacity is average.  No chest pain, no ischemic EKG changes.    There is no prior study for comparison.       ECG Summary    ECG Baseline electrocardiogram demonstrates sinus rhythm. Septal Q waves.  The stress electrocardiogram is negative for inducible ischemic EKG changes.  There were no arrhythmias during stress.  Arrhythmias during recovery: Occasional PACs.      Technical Comments    Cardiac Protocol An exercise stress test was performed following a Kang protocol with the patient exercising for 6 minutes and 0 seconds under the supervision of Dr. Kusum Carter.  Blood pressure and heart rate demonstrated a normal response to exercise.  The patient's exercise capacity is average.  The test was stopped due to fatigue, dyspnea and target heart rate achieved.  The patient reported dyspnea during the stress test.    Isotope Administration Nuclear imaging was accomplished using a one day protocol with 31.7 mCi of technetium tetrofosmin injected at the peak of exercise on 5/18/2023 and 10.7 mCi of technetium tetrofosmin at rest on 5/18/2023.    Nuclear Study Quality Final image quality is satisfactory.      Stress Measurements    Baseline Vitals  Baseline HR   68 bpm      Baseline Systolic BP   114       Baseline Diastolic BP   62       Peak Stress Vitals  Max HR    137       Last Stress Systolic BP   154       Last Stress Diastolic BP   62       Exercise Data  Target HR   144       Max Predicted HR    95 %      Exercise duration (min)   6 min      Exercise duration (sec)   0 sec      Estimated workload   7 METS          Nuclear Perfusion    Stress Summed Score: 2 Percent Abnormal: 2.94%      Moderate count reduction in the following segments: apex.  All other segments are normal.      Resting Summed Score: 2 Percent Abnormal:  2.94%      Moderate count reduction in the following segments: apex.  All other segments are normal.            Wall Score      Wall Motion    Score Index: 1.00       The left ventricular wall motion is normal.              All Measurements            Exam Ended: 05/18/23 10:45 Last Resulted: 05/18/23 12:59      Order Details       View Encounter       Lab and Collection Details       Routing       Result History      View All Conversations on this Encounter          Scans on Order [id number]    Scan on 5/18/2023 10:44 AM by Outside, Provider              ECG Reviewed:  Date: 5-9-23 Results:  Sinus  Rhythm   -Anteroseptal infarct -age undetermined.     Cath:  Date: Results:      METS/Exercise Tolerance:     Hematologic:     (+)      anemia,          Musculoskeletal:  - neg musculoskeletal ROS     GI/Hepatic:  - neg GI/hepatic ROS  (-) GERD   Renal/Genitourinary:  - neg Renal ROS     Endo:     (+)  type II DM, Last HgA1c: 6.7, date: 12-19-23, Not using insulin, - not using insulin pump. Normal glucose range: was 151 pre-op.  Normal 110-120,  Diabetic complications: nephropathy cardiac problems.   Chronic steroid usage for Arthritis. Date most recently used: today.        Psychiatric/Substance Use:  - neg psychiatric ROS     Infectious Disease:  - neg infectious disease ROS     Malignancy:  - neg malignancy ROS     Other:  - neg other ROS          Physical Exam    Airway        Mallampati: II   TM distance: > 3 FB   Neck ROM: full   Mouth opening: > 3 cm    Respiratory Devices and Support         Dental     Comment: Lower partial        Cardiovascular   cardiovascular exam normal       Rhythm and rate: regular and normal     Pulmonary   pulmonary exam normal        breath sounds clear to auscultation           OUTSIDE LABS:  CBC:   Lab Results   Component Value Date    WBC 3.1 (L) 09/23/2024    WBC 3.5 (L) 07/24/2024    HGB 10.4 (L) 09/23/2024    HGB 9.9 (L) 07/24/2024    HCT 32.5 (L) 09/23/2024    HCT 31.5 (L)  "07/24/2024     (L) 09/23/2024     (L) 07/24/2024     BMP:   Lab Results   Component Value Date     07/24/2024     04/12/2024    POTASSIUM 5.0 07/24/2024    POTASSIUM 5.0 04/12/2024    CHLORIDE 106 07/24/2024    CHLORIDE 104 04/12/2024    CO2 22 07/24/2024    CO2 26 04/12/2024    BUN 22.7 07/24/2024    BUN 23.5 (H) 04/12/2024    CR 1.17 (H) 09/23/2024    CR 1.06 (H) 07/24/2024     (H) 07/24/2024     (H) 04/26/2024     COAGS: No results found for: \"PTT\", \"INR\", \"FIBR\"  POC: No results found for: \"BGM\", \"HCG\", \"HCGS\"  HEPATIC:   Lab Results   Component Value Date    ALBUMIN 4.2 09/23/2024    PROTTOTAL 6.9 07/24/2024    ALT 18 09/23/2024    AST 24 09/23/2024    ALKPHOS 91 07/24/2024    BILITOTAL 0.2 07/24/2024     OTHER:   Lab Results   Component Value Date    A1C 6.7 (H) 07/24/2024    JOHANN 9.4 07/24/2024    MAG 1.8 09/25/2022    TSH 1.27 12/19/2023    CRP 6.3 10/05/2022    SED 23 09/25/2022       Anesthesia Plan    ASA Status:  2    NPO Status:  NPO Appropriate    Anesthesia Type: IV Regional Anesthesia.   Induction: Intravenous, Propofol.   Maintenance: TIVA.        Consents    Anesthesia Plan(s) and associated risks, benefits, and realistic alternatives discussed. Questions answered and patient/representative(s) expressed understanding.     - Discussed:     - Discussed with:  Patient      - Extended Intubation/Ventilatory Support Discussed: No.      - Patient is DNR/DNI Status: No     Use of blood products discussed: No .     Postoperative Care    Pain management: IV analgesics, Oral pain medications.   PONV prophylaxis: Background Propofol Infusion     Comments:    Other Comments: The risks and benefits of anesthesia, and the alternatives where applicable, have been discussed with the patient, and they wish to proceed.           Manuel Pope APRN CRNA    I have reviewed the pertinent notes and labs in the chart from the past 30 days and (re)examined the patient.  Any updates or " changes from those notes are reflected in this note.                      # DMII: A1C = 6.7 % (Ref range: <5.7 %) within past 6 months

## 2024-10-17 ENCOUNTER — ANESTHESIA (OUTPATIENT)
Dept: SURGERY | Facility: CLINIC | Age: 79
End: 2024-10-17
Payer: COMMERCIAL

## 2024-10-17 ENCOUNTER — HOSPITAL ENCOUNTER (OUTPATIENT)
Facility: CLINIC | Age: 79
Discharge: HOME | End: 2024-10-17
Attending: INTERNAL MEDICINE | Admitting: INTERNAL MEDICINE
Payer: COMMERCIAL

## 2024-10-17 VITALS
TEMPERATURE: 97.5 F | BODY MASS INDEX: 22.67 KG/M2 | SYSTOLIC BLOOD PRESSURE: 146 MMHG | HEART RATE: 51 BPM | RESPIRATION RATE: 18 BRPM | WEIGHT: 128 LBS | DIASTOLIC BLOOD PRESSURE: 69 MMHG | OXYGEN SATURATION: 95 %

## 2024-10-17 LAB — GLUCOSE BLDC GLUCOMTR-MCNC: 117 MG/DL (ref 70–99)

## 2024-10-17 PROCEDURE — 250N000011 HC RX IP 250 OP 636: Performed by: INTERNAL MEDICINE

## 2024-10-17 PROCEDURE — 370N000004 HC ANESTHESIA CATARACT PACKAGE: Performed by: INTERNAL MEDICINE

## 2024-10-17 PROCEDURE — 82962 GLUCOSE BLOOD TEST: CPT

## 2024-10-17 PROCEDURE — 250N000009 HC RX 250: Performed by: INTERNAL MEDICINE

## 2024-10-17 PROCEDURE — 360N000007 HC CATARACT SURGICAL PACKAGE: Performed by: INTERNAL MEDICINE

## 2024-10-17 PROCEDURE — V2632 POST CHMBR INTRAOCULAR LENS: HCPCS | Performed by: INTERNAL MEDICINE

## 2024-10-17 PROCEDURE — 250N000011 HC RX IP 250 OP 636: Performed by: NURSE ANESTHETIST, CERTIFIED REGISTERED

## 2024-10-17 PROCEDURE — 761N000008 HC RECOVERY CATRACT PACKAGE: Performed by: INTERNAL MEDICINE

## 2024-10-17 PROCEDURE — C1783 OCULAR IMP, AQUEOUS DRAIN DE: HCPCS | Performed by: INTERNAL MEDICINE

## 2024-10-17 DEVICE — IMPLANTABLE DEVICE: Type: IMPLANTABLE DEVICE | Site: EYE | Status: FUNCTIONAL

## 2024-10-17 DEVICE — STENT 50UM SYM STEARALKONIUM 360UM 230UM 80UM G2W-US: Type: IMPLANTABLE DEVICE | Site: EYE | Status: FUNCTIONAL

## 2024-10-17 RX ORDER — PROPARACAINE HYDROCHLORIDE 5 MG/ML
1 SOLUTION/ DROPS OPHTHALMIC ONCE
Status: DISCONTINUED | OUTPATIENT
Start: 2024-10-17 | End: 2024-10-17 | Stop reason: HOSPADM

## 2024-10-17 RX ORDER — DEXAMETHASONE SODIUM PHOSPHATE 10 MG/ML
4 INJECTION, SOLUTION INTRAMUSCULAR; INTRAVENOUS
Status: DISCONTINUED | OUTPATIENT
Start: 2024-10-17 | End: 2024-10-17 | Stop reason: HOSPADM

## 2024-10-17 RX ORDER — MOXIFLOXACIN 5 MG/ML
1 SOLUTION/ DROPS OPHTHALMIC
Status: COMPLETED | OUTPATIENT
Start: 2024-10-17 | End: 2024-10-17

## 2024-10-17 RX ORDER — NALOXONE HYDROCHLORIDE 0.4 MG/ML
0.1 INJECTION, SOLUTION INTRAMUSCULAR; INTRAVENOUS; SUBCUTANEOUS
Status: DISCONTINUED | OUTPATIENT
Start: 2024-10-17 | End: 2024-10-17 | Stop reason: HOSPADM

## 2024-10-17 RX ORDER — FENTANYL CITRATE 50 UG/ML
25 INJECTION, SOLUTION INTRAMUSCULAR; INTRAVENOUS
Status: DISCONTINUED | OUTPATIENT
Start: 2024-10-17 | End: 2024-10-17 | Stop reason: HOSPADM

## 2024-10-17 RX ORDER — DICLOFENAC SODIUM 1 MG/ML
1 SOLUTION/ DROPS OPHTHALMIC
Status: COMPLETED | OUTPATIENT
Start: 2024-10-17 | End: 2024-10-17

## 2024-10-17 RX ORDER — TROPICAMIDE 10 MG/ML
1 SOLUTION/ DROPS OPHTHALMIC
Status: COMPLETED | OUTPATIENT
Start: 2024-10-17 | End: 2024-10-17

## 2024-10-17 RX ORDER — POVIDONE-IODINE 5 %
1 SOLUTION, NON-ORAL OPHTHALMIC (EYE) ONCE
Status: DISCONTINUED | OUTPATIENT
Start: 2024-10-17 | End: 2024-10-17 | Stop reason: HOSPADM

## 2024-10-17 RX ORDER — ONDANSETRON 4 MG/1
4 TABLET, ORALLY DISINTEGRATING ORAL EVERY 30 MIN PRN
Status: DISCONTINUED | OUTPATIENT
Start: 2024-10-17 | End: 2024-10-17 | Stop reason: HOSPADM

## 2024-10-17 RX ORDER — ONDANSETRON 2 MG/ML
4 INJECTION INTRAMUSCULAR; INTRAVENOUS EVERY 30 MIN PRN
Status: DISCONTINUED | OUTPATIENT
Start: 2024-10-17 | End: 2024-10-17 | Stop reason: HOSPADM

## 2024-10-17 RX ORDER — PROPARACAINE HYDROCHLORIDE 5 MG/ML
1 SOLUTION/ DROPS OPHTHALMIC ONCE
Status: COMPLETED | OUTPATIENT
Start: 2024-10-17 | End: 2024-10-17

## 2024-10-17 RX ORDER — FENTANYL CITRATE 50 UG/ML
INJECTION, SOLUTION INTRAMUSCULAR; INTRAVENOUS PRN
Status: DISCONTINUED | OUTPATIENT
Start: 2024-10-17 | End: 2024-10-17

## 2024-10-17 RX ORDER — PHENYLEPHRINE HYDROCHLORIDE 25 MG/ML
1 SOLUTION/ DROPS OPHTHALMIC
Status: COMPLETED | OUTPATIENT
Start: 2024-10-17 | End: 2024-10-17

## 2024-10-17 RX ADMIN — MOXIFLOXACIN 1 DROP: 5 SOLUTION/ DROPS OPHTHALMIC at 06:53

## 2024-10-17 RX ADMIN — FENTANYL CITRATE 50 MCG: 50 INJECTION INTRAMUSCULAR; INTRAVENOUS at 07:20

## 2024-10-17 RX ADMIN — TROPICAMIDE 1 DROP: 10 SOLUTION/ DROPS OPHTHALMIC at 07:09

## 2024-10-17 RX ADMIN — PHENYLEPHRINE HYDROCHLORIDE 1 DROP: 25 SOLUTION/ DROPS OPHTHALMIC at 06:53

## 2024-10-17 RX ADMIN — TROPICAMIDE 1 DROP: 10 SOLUTION/ DROPS OPHTHALMIC at 06:53

## 2024-10-17 RX ADMIN — PHENYLEPHRINE HYDROCHLORIDE 1 DROP: 25 SOLUTION/ DROPS OPHTHALMIC at 07:18

## 2024-10-17 RX ADMIN — DICLOFENAC SODIUM 1 DROP: 1 SOLUTION OPHTHALMIC at 07:18

## 2024-10-17 RX ADMIN — MOXIFLOXACIN 1 DROP: 5 SOLUTION/ DROPS OPHTHALMIC at 07:18

## 2024-10-17 RX ADMIN — MOXIFLOXACIN 1 DROP: 5 SOLUTION/ DROPS OPHTHALMIC at 07:09

## 2024-10-17 RX ADMIN — MIDAZOLAM 1 MG: 1 INJECTION INTRAMUSCULAR; INTRAVENOUS at 07:20

## 2024-10-17 RX ADMIN — DICLOFENAC SODIUM 1 DROP: 1 SOLUTION OPHTHALMIC at 07:09

## 2024-10-17 RX ADMIN — DICLOFENAC SODIUM 1 DROP: 1 SOLUTION OPHTHALMIC at 06:53

## 2024-10-17 RX ADMIN — PHENYLEPHRINE HYDROCHLORIDE 1 DROP: 25 SOLUTION/ DROPS OPHTHALMIC at 07:09

## 2024-10-17 RX ADMIN — TROPICAMIDE 1 DROP: 10 SOLUTION/ DROPS OPHTHALMIC at 07:17

## 2024-10-17 RX ADMIN — PROPARACAINE HYDROCHLORIDE 1 DROP: 5 SOLUTION/ DROPS OPHTHALMIC at 06:53

## 2024-10-17 ASSESSMENT — ACTIVITIES OF DAILY LIVING (ADL)
ADLS_ACUITY_SCORE: 35
ADLS_ACUITY_SCORE: 35

## 2024-10-17 NOTE — OP NOTE
[facility]  Ophthalmology Operative Note    PREOPERATIVE DIAGNOSIS: Cataract, Right eye.   Glaucoma, Right eye    POSTOPERATIVE DIAGNOSIS: Cataract, Right eye.   Glaucoma, Right eye    OPERATION: Cataract extraction with placement of posterior chamber intraocular lens and iStent placement in the Right eye.     ANESTHESIA: MAC combined with topical     INDICATIONS FOR PROCEDURE: Zaria Gaona was seen in the [facility] for decreased visual acuity in the Right eye. The patient was found to have a visually significant cataract and glaucoma in the Right eye. The risks, benefits, alternatives and goals of cataract extraction were discussed with the patient, and after adequate discussion the patient understood and agreed to these, and a signed informed consent was obtained prior to the procedure.     DESCRIPTION OF PROCEDURE: After proper patient identification, topical anesthesia was applied to the Right eye. The patient was brought to the operating room and the Right eye was prepped and draped in the usual sterile fashion for intraocular surgery. A lid speculum was placed in the Right eye. A paracentesis was then created and the anterior chamber was filled with 1% non-preserved lidocaine followed by Endocoat. A clear corneal incision was then created temporally using a 2.4mm keratome. A continuous curvilinear capsulorrhexis was then created using a cystotome and Utrata forceps. Hydrodissection was carried out with BSS on a  cannula and the lens rotated freely within the capsular bag. Phacoemulsification was then carried out using the divide and conquer technique. Residual cortical material was removed using the I&A handpiece. The capsular bag was then filled with Healon and a DCB00 +21.5 diopter intraocular lens was then injected into the capsular bag. The lens showed good centration and stability. The head and microscope were then rotated 45 degrees and a gonioprism  was placed on the cornea to visualize the nasal angle. Two iStents were deployed into the nasal trabecular meshwork. The head and the microscope were returned to the neutral position. Residual viscoelastic was removed using the I&A handpiece. The wound was then hydrated and the anterior chamber reformed. Intracameral Moxifloxacin was then injected into the anterior chamber. The wounds were then checked and found to be sealed. Topical Prednisolone drops were placed in the patient's Right eye followed by a Benavides shield over the top of this. The patient tolerated the procedure well without complications and was told to follow up in the clinic in the next postoperative day.     Implant Name Type Inv. Item Serial No.  Lot No. LRB No. Used Action   1 piece IOL with [employer]ecnis Simplicity Delivery System Lens/Eye Implant  [id number]  REF#: [id number] Right 1 Implanted   STENT 50UM SYM STEARALKONIUM 360UM 230UM 80UM G2W-US - [id number] Lens/Eye Implant STENT 50UM SYM STEARALKONIUM 360UM 230UM 80UM G2W-US   [id number] Right 1 Implanted        Abhijit Martinez MD

## 2024-10-17 NOTE — ANESTHESIA POSTPROCEDURE EVALUATION
Patient: Zaria Gaona    Procedure: Procedure(s):  Phacoemulsification with standard intraocular lens implant with Istent       Anesthesia Type:  MAC    Note:  Disposition: Outpatient   Postop Pain Control: Uneventful            Sign Out: Well controlled pain   PONV: No   Neuro/Psych: Uneventful            Sign Out: Acceptable/Baseline neuro status   Airway/Respiratory: Uneventful            Sign Out: Acceptable/Baseline resp. status   CV/Hemodynamics: Uneventful            Sign Out: Acceptable CV status   Other NRE: NONE   DID A NON-ROUTINE EVENT OCCUR? No    Event details/Postop Comments:  Pt was happy with anesthesia care.  No complications.  I will follow up with the pt if needed.           Last vitals:  Vitals Value Taken Time   BP     Temp     Pulse     Resp     SpO2 94 % 10/17/24 0802   Vitals shown include unfiled device data.    Electronically Signed By: BEVERLY Macario CRNA  October 17, 2024  8:03 AM

## 2024-10-17 NOTE — ANESTHESIA CARE TRANSFER NOTE
Patient: Zaria Gaona    Procedure: Procedure(s):  Phacoemulsification with standard intraocular lens implant with Istent       Diagnosis: Cataract [H26.9]  Diagnosis Additional Information: No value filed.    Anesthesia Type:   MAC     Note:    Oropharynx: oropharynx clear of all foreign objects and spontaneously breathing  Level of Consciousness: awake  Oxygen Supplementation: room air    Independent Airway: airway patency satisfactory and stable  Dentition: dentition unchanged  Vital Signs Stable: post-procedure vital signs reviewed and stable  Report to RN Given: handoff report given  Patient transferred to: Phase II    Handoff Report: Identifed the Patient, Identified the Reponsible Provider, Reviewed the pertinent medical history, Discussed the surgical course, Reviewed Intra-OP anesthesia mangement and issues during anesthesia, Set expectations for post-procedure period and Allowed opportunity for questions and acknowledgement of understanding    Vitals:  Vitals Value Taken Time   BP     Temp     Pulse     Resp     SpO2 94 % 10/17/24 0802   Vitals shown include unfiled device data.    Electronically Signed By: BEVERLY Macario CRNA  October 17, 2024  8:03 AM

## 2024-10-18 ENCOUNTER — LAB (OUTPATIENT)
Dept: LAB | Facility: CLINIC | Age: 79
End: 2024-10-18
Payer: COMMERCIAL

## 2024-10-18 DIAGNOSIS — I10 HYPERTENSION, UNSPECIFIED TYPE: ICD-10-CM

## 2024-10-18 LAB
ANION GAP SERPL CALCULATED.3IONS-SCNC: 14 MMOL/L (ref 7–15)
BUN SERPL-MCNC: 34.3 MG/DL (ref 8–23)
CALCIUM SERPL-MCNC: 9.8 MG/DL (ref 8.8–10.4)
CHLORIDE SERPL-SCNC: 104 MMOL/L (ref 98–107)
CREAT SERPL-MCNC: 1.36 MG/DL (ref 0.51–0.95)
EGFRCR SERPLBLD CKD-EPI 2021: 40 ML/MIN/1.73M2
GLUCOSE SERPL-MCNC: 104 MG/DL (ref 70–99)
HCO3 SERPL-SCNC: 22 MMOL/L (ref 22–29)
POTASSIUM SERPL-SCNC: 5 MMOL/L (ref 3.4–5.3)
SODIUM SERPL-SCNC: 140 MMOL/L (ref 135–145)

## 2024-10-18 PROCEDURE — 80048 BASIC METABOLIC PNL TOTAL CA: CPT

## 2024-10-18 PROCEDURE — 36415 COLL VENOUS BLD VENIPUNCTURE: CPT

## 2024-10-21 RX ORDER — FLUCONAZOLE 150 MG/1
1 TABLET ORAL
COMMUNITY
Start: 2024-01-16

## 2024-10-21 RX ORDER — CEPHALEXIN 500 MG/1
CAPSULE ORAL
Status: ON HOLD | COMMUNITY
Start: 2024-01-04 | End: 2024-10-31

## 2024-10-24 DIAGNOSIS — E11.00 TYPE 2 DIABETES MELLITUS WITH HYPEROSMOLARITY WITHOUT COMA, WITHOUT LONG-TERM CURRENT USE OF INSULIN (H): ICD-10-CM

## 2024-10-24 RX ORDER — LANCETS
EACH MISCELLANEOUS
Qty: 100 EACH | Refills: 2 | Status: SHIPPED | OUTPATIENT
Start: 2024-10-24

## 2024-10-30 ENCOUNTER — ANESTHESIA EVENT (OUTPATIENT)
Dept: SURGERY | Facility: CLINIC | Age: 79
End: 2024-10-30
Payer: COMMERCIAL

## 2024-10-30 ASSESSMENT — LIFESTYLE VARIABLES: TOBACCO_USE: 0

## 2024-10-30 NOTE — ANESTHESIA PREPROCEDURE EVALUATION
"Anesthesia Pre-Procedure Evaluation    Patient: Zaria Gaona   MRN: [id number] : 1945        Procedure : Procedure(s):  Phacoemulsification with standard intraocular lens implant with Istent          Past Medical History:   Diagnosis Date     Essential hypertension      NSTEMI (non-ST elevated myocardial infarction) (H) 2020    2 stents     Type 2 diabetes mellitus without complication (H)       Past Surgical History:   Procedure Laterality Date     COLONOSCOPY N/A 12/15/2022    Procedure: COLONOSCOPY, WITH POLYPECTOMY;  Surgeon: Andres Trujillo DO;  Location: [facility]     ESOPHAGOSCOPY, GASTROSCOPY, DUODENOSCOPY (EGD), COMBINED N/A 2023    Procedure: ESOPHAGOGASTRODUODENOSCOPY, WITH BIOPSY;  Surgeon: Med Asif MD;  Location: [facility]     INJECT BLOCK MEDIAL BRANCH CERVICAL/THORACIC/LUMBAR Left 2022    Procedure: Left Lumbar 3, Left Lumbar 4, and Left Lumbar 5 medial branch nerve blocks using fluoroscopic guidance and contrast control;  Surgeon: Malick Gomez MD;  Location[facility]     INJECT EPIDURAL LUMBAR Left 2022    Procedure: Left lumbar 4-5 interlaminar epidural injection ;  Surgeon: Malick Gomez MD;  [facility]     INJECT JOINT SACROILIAC Left 2022    Procedure: Fluoroscopically-guided injection of the Left sacroiliac joint with Left dimitrios Sacroiliac joint ligaments infiltration over the sacrum.;  Surgeon: Malick Gomez MD;  [facility]     PHACOEMULSIFICATION WITH STANDARD INTRAOCULAR LENS IMPLANT Right 10/17/2024    Procedure: Phacoemulsification with standard intraocular lens implant with Istent right;  Surgeon: Yo Martinez MD;  [facility]     RELEASE CARPAL TUNNEL Left 2024    Procedure: RELEASE, CARPAL TUNNEL left;  Surgeon: Case Swenson MD;  Location: [facility]     VAGINAL PROLAPSE REPAIR      done in [place]      Allergies   Allergen Reactions     Ampicillin Other (See Comments), Hives and Itching     \"not " "that bad\"     Lisinopril Cough     Sulfamethoxazole-Trimethoprim Other (See Comments), Hives and Itching     \"not that bad\"      Social History     Tobacco Use     Smoking status: Never     Smokeless tobacco: Never   Substance Use Topics     Alcohol use: Yes     Comment: occ      Wt Readings from Last 1 Encounters:   10/17/24 58.1 kg (128 lb)        Anesthesia Evaluation   Pt has had prior anesthetic. Type: MAC and General.    No history of anesthetic complications       ROS/MED HX  ENT/Pulmonary:  - neg pulmonary ROS  (-) tobacco use   Neurologic:  - neg neurologic ROS     Cardiovascular: Comment: NSTEMI (non-ST elevated myocardial infarction    (+)  hypertension- -  CAD - past MI (2 1/2 yrs ago.  2 stents.  No issues since then) - stent-2020. 2 Drug Eluting Stent.                               Previous cardiac testing   Echo: Date: 2021 Results:  [facility]  Echocardiography Laboratory  [address] [place] [address]     Name: CHANG WILKERSON  MRN: [id number]  : 1945  Study Date: 2021 09:58 AM  Age: 76 yrs  Gender: Female  Patient Location: [facility]  Reason For Study: ST elevation myocardial infarction involving left anterior  desce  History: CAD, STENT, Diabetes  Ordering Physician: CANDIDA CLAIRE  Referring Physician: CANDIDA CLAIRE  Performed By: Rahel Staley     BSA: 1.7 m2  Height: 64 in  Weight: 138 lb  HR: 45  BP: 140/70 mmHg  ______________________________________________________________________________  Procedure  Complete Echo Adult.  ______________________________________________________________________________  Interpretation Summary     Left ventricular size, global systolic function are normal, estimated LVEF 55-  60%. Hypokinesis of the mid anteroseptal, apical septal wall segments.  Right ventricular global function is normal.  Trace to mild mitral regurgitation.  Trivial pericardial effusion  The inferior vena cava was normal in size " with preserved respiratory  variability.     There are no prior studies available for comparison.  ______________________________________________________________________________  Left Ventricle  The left ventricle is normal in size. There is normal left ventricular wall  thickness. Left ventricular systolic function is normal. The visual ejection  fraction is 55-60%. Left ventricular diastolic function is normal. Hypokinesis  of the mid anteroseptal, apical septal wall segments.     Right Ventricle  The right ventricle is normal in structure, function and size.     Atria  Normal left atrial size. Right atrial size is normal.     Mitral Valve  There is trace to mild mitral regurgitation.     Tricuspid Valve  The tricuspid valve is not well visualized, but is grossly normal. There is  trace tricuspid regurgitation. Right ventricular systolic pressure could not  be approximated due to inadequate tricuspid regurgitation.     Aortic Valve  The aortic valve is normal in structure and function.     Pulmonic Valve  The pulmonic valve is not well seen, but is grossly normal.     Vessels  The aortic root is normal size. Normal size ascending aorta. The inferior vena  cava was normal in size with preserved respiratory variability.     Pericardium  Trivial pericardial effusion. There are no echocardiographic indications of  cardiac tamponade.     ______________________________________________________________________________  MMode/2D Measurements & Calculations  IVSd: 1.1 cm  LVIDd: 4.6 cm  LVIDs: 2.7 cm  LVPWd: 1.0 cm  FS: 41.3 %  LV mass(C)d: 169.9 grams  LV mass(C)dI: 101.7 grams/m2     Ao root diam: 3.1 cm  LA dimension: 4.0 cm  asc Aorta Diam: 3.2 cm  LA/Ao: 1.3  RWT: 0.43     Doppler Measurements & Calculations  MV E max isidro: 72.2 cm/sec  MV A max isidro: 91.2 cm/sec  MV E/A: 0.79  MV dec slope: 256.0 cm/sec2  MV dec time: 0.28 sec  E/E' av.2  Lateral E/e': 12.3  Medial E/e': 12.1      ______________________________________________________________________________  Report approved by: Haleigh Doyle 09/14/2021 02:16 PM  Stress Test:  Date: 5-18-23 Results:        The nuclear stress test is abnormal.     There is a small area of a moderate degree of infarction in the apical segment(s) of the left ventricle. No ischemia.     The left ventricular ejection fraction at stress is greater than 70%.     The patient's exercise capacity is average.  No chest pain, no ischemic EKG changes.     There is no prior study for comparison.       ECG Summary    ECG Baseline electrocardiogram demonstrates sinus rhythm. Septal Q waves.  The stress electrocardiogram is negative for inducible ischemic EKG changes.  There were no arrhythmias during stress.  Arrhythmias during recovery: Occasional PACs.      Technical Comments    Cardiac Protocol An exercise stress test was performed following a Kang protocol with the patient exercising for 6 minutes and 0 seconds under the supervision of Dr. Kusum Carter.  Blood pressure and heart rate demonstrated a normal response to exercise.  The patient's exercise capacity is average.  The test was stopped due to fatigue, dyspnea and target heart rate achieved.  The patient reported dyspnea during the stress test.    Isotope Administration Nuclear imaging was accomplished using a one day protocol with 31.7 mCi of technetium tetrofosmin injected at the peak of exercise on 5/18/2023 and 10.7 mCi of technetium tetrofosmin at rest on 5/18/2023.    Nuclear Study Quality Final image quality is satisfactory.      Stress Measurements    Baseline Vitals  Baseline HR   68 bpm      Baseline Systolic BP   114       Baseline Diastolic BP   62       Peak Stress Vitals  Max HR    137       Last Stress Systolic BP   154       Last Stress Diastolic BP   62       Exercise Data  Target HR   144       Max Predicted HR    95 %      Exercise duration (min)   6 min      Exercise duration (sec)   0  sec      Estimated workload   7 METS          Nuclear Perfusion    Stress Summed Score: 2 Percent Abnormal: 2.94%      Moderate count reduction in the following segments: apex.  All other segments are normal.      Resting Summed Score: 2 Percent Abnormal: 2.94%      Moderate count reduction in the following segments: apex.  All other segments are normal.            Wall Score      Wall Motion    Score Index: 1.00       The left ventricular wall motion is normal.              All Measurements            Exam Ended: 05/18/23 10:45 Last Resulted: 05/18/23 12:59      Order Details       View Encounter       Lab and Collection Details       Routing       Result History      View All Conversations on this Encounter          Scans on Order [id number]    Scan on 5/18/2023 10:44 AM by Outside, Provider              ECG Reviewed:  Date: 5-9-23 Results:  Sinus  Rhythm   -Anteroseptal infarct -age undetermined.     Cath:  Date: Results:      METS/Exercise Tolerance:     Hematologic:     (+)      anemia,          Musculoskeletal:  - neg musculoskeletal ROS     GI/Hepatic:  - neg GI/hepatic ROS  (-) GERD   Renal/Genitourinary:  - neg Renal ROS     Endo:     (+)  type II DM, Last HgA1c: 6.7, date: 12-19-23, Not using insulin, - not using insulin pump. Normal glucose range: was 151 pre-op.  Normal 110-120,  Diabetic complications: nephropathy cardiac problems.   Chronic steroid usage for Arthritis. Date most recently used: today.        Psychiatric/Substance Use:  - neg psychiatric ROS     Infectious Disease:  - neg infectious disease ROS     Malignancy:  - neg malignancy ROS     Other:  - neg other ROS        Physical Exam    Airway  airway exam normal      Mallampati: II   TM distance: > 3 FB   Neck ROM: full   Mouth opening: > 3 cm    Respiratory Devices and Support         Dental       (+) Minor Abnormalities - some fillings, tiny chips      Cardiovascular   cardiovascular exam normal       Rhythm and rate: regular and normal  "    Pulmonary   pulmonary exam normal        breath sounds clear to auscultation       OUTSIDE LABS:  CBC:   Lab Results   Component Value Date    WBC 3.1 (L) 09/23/2024    WBC 3.5 (L) 07/24/2024    HGB 10.4 (L) 09/23/2024    HGB 9.9 (L) 07/24/2024    HCT 32.5 (L) 09/23/2024    HCT 31.5 (L) 07/24/2024     (L) 09/23/2024     (L) 07/24/2024     BMP:   Lab Results   Component Value Date     10/18/2024     07/24/2024    POTASSIUM 5.0 10/18/2024    POTASSIUM 5.0 07/24/2024    CHLORIDE 104 10/18/2024    CHLORIDE 106 07/24/2024    CO2 22 10/18/2024    CO2 22 07/24/2024    BUN 34.3 (H) 10/18/2024    BUN 22.7 07/24/2024    CR 1.36 (H) 10/18/2024    CR 1.17 (H) 09/23/2024     (H) 10/18/2024     (H) 10/17/2024     COAGS: No results found for: \"PTT\", \"INR\", \"FIBR\"  POC: No results found for: \"BGM\", \"HCG\", \"HCGS\"  HEPATIC:   Lab Results   Component Value Date    ALBUMIN 4.2 09/23/2024    PROTTOTAL 6.9 07/24/2024    ALT 18 09/23/2024    AST 24 09/23/2024    ALKPHOS 91 07/24/2024    BILITOTAL 0.2 07/24/2024     OTHER:   Lab Results   Component Value Date    A1C 6.7 (H) 07/24/2024    JOHANN 9.8 10/18/2024    MAG 1.8 09/25/2022    TSH 1.27 12/19/2023    CRP 6.3 10/05/2022    SED 23 09/25/2022       Anesthesia Plan    ASA Status:  2    NPO Status:  NPO Appropriate    Anesthesia Type: MAC.     - Reason for MAC: straight local not clinically adequate              Consents    Anesthesia Plan(s) and associated risks, benefits, and realistic alternatives discussed. Questions answered and patient/representative(s) expressed understanding.     - Discussed:     - Discussed with:  Patient       Use of blood products discussed: No .     Postoperative Care    Pain management: IV analgesics.        Comments:    Other Comments: The risks and benefits of anesthesia, and the alternatives where applicable, have been discussed with the patient, and they wish to proceed.         BEVERLY Leija CRNA    I have " reviewed the pertinent notes and labs in the chart from the past 30 days and (re)examined the patient.  Any updates or changes from those notes are reflected in this note.                      # DMII: A1C = N/A within past 6 months

## 2024-10-31 ENCOUNTER — HOSPITAL ENCOUNTER (OUTPATIENT)
Facility: CLINIC | Age: 79
Discharge: HOME | End: 2024-10-31
Attending: INTERNAL MEDICINE | Admitting: INTERNAL MEDICINE
Payer: COMMERCIAL

## 2024-10-31 ENCOUNTER — ANESTHESIA (OUTPATIENT)
Dept: SURGERY | Facility: CLINIC | Age: 79
End: 2024-10-31
Payer: COMMERCIAL

## 2024-10-31 VITALS
RESPIRATION RATE: 16 BRPM | DIASTOLIC BLOOD PRESSURE: 70 MMHG | OXYGEN SATURATION: 98 % | TEMPERATURE: 97.2 F | HEART RATE: 54 BPM | SYSTOLIC BLOOD PRESSURE: 155 MMHG

## 2024-10-31 PROCEDURE — 370N000004 HC ANESTHESIA CATARACT PACKAGE: Performed by: INTERNAL MEDICINE

## 2024-10-31 PROCEDURE — 250N000009 HC RX 250: Performed by: INTERNAL MEDICINE

## 2024-10-31 PROCEDURE — 250N000011 HC RX IP 250 OP 636: Performed by: INTERNAL MEDICINE

## 2024-10-31 PROCEDURE — V2632 POST CHMBR INTRAOCULAR LENS: HCPCS | Performed by: INTERNAL MEDICINE

## 2024-10-31 PROCEDURE — 360N000007 HC CATARACT SURGICAL PACKAGE: Performed by: INTERNAL MEDICINE

## 2024-10-31 PROCEDURE — 761N000008 HC RECOVERY CATRACT PACKAGE: Performed by: INTERNAL MEDICINE

## 2024-10-31 PROCEDURE — C1783 OCULAR IMP, AQUEOUS DRAIN DE: HCPCS | Performed by: INTERNAL MEDICINE

## 2024-10-31 PROCEDURE — 250N000011 HC RX IP 250 OP 636: Performed by: NURSE ANESTHETIST, CERTIFIED REGISTERED

## 2024-10-31 DEVICE — EYE IMP IOL AMO PCL TECNIS ZCB00 22.5: Type: IMPLANTABLE DEVICE | Site: EYE | Status: FUNCTIONAL

## 2024-10-31 DEVICE — STENT 50UM SYM STEARALKONIUM 360UM 230UM 80UM G2W-US: Type: IMPLANTABLE DEVICE | Site: EYE | Status: FUNCTIONAL

## 2024-10-31 RX ORDER — POVIDONE-IODINE 5 %
1 SOLUTION, NON-ORAL OPHTHALMIC (EYE) ONCE
Status: DISCONTINUED | OUTPATIENT
Start: 2024-10-31 | End: 2024-10-31 | Stop reason: HOSPADM

## 2024-10-31 RX ORDER — NALOXONE HYDROCHLORIDE 0.4 MG/ML
0.1 INJECTION, SOLUTION INTRAMUSCULAR; INTRAVENOUS; SUBCUTANEOUS
Status: DISCONTINUED | OUTPATIENT
Start: 2024-10-31 | End: 2024-10-31 | Stop reason: HOSPADM

## 2024-10-31 RX ORDER — PHENYLEPHRINE HYDROCHLORIDE 25 MG/ML
1 SOLUTION/ DROPS OPHTHALMIC
Status: COMPLETED | OUTPATIENT
Start: 2024-10-31 | End: 2024-10-31

## 2024-10-31 RX ORDER — DICLOFENAC SODIUM 1 MG/ML
1 SOLUTION/ DROPS OPHTHALMIC
Status: COMPLETED | OUTPATIENT
Start: 2024-10-31 | End: 2024-10-31

## 2024-10-31 RX ORDER — MOXIFLOXACIN 5 MG/ML
1 SOLUTION/ DROPS OPHTHALMIC
Status: COMPLETED | OUTPATIENT
Start: 2024-10-31 | End: 2024-10-31

## 2024-10-31 RX ORDER — PROPARACAINE HYDROCHLORIDE 5 MG/ML
1 SOLUTION/ DROPS OPHTHALMIC ONCE
Status: COMPLETED | OUTPATIENT
Start: 2024-10-31 | End: 2024-10-31

## 2024-10-31 RX ORDER — TROPICAMIDE 10 MG/ML
1 SOLUTION/ DROPS OPHTHALMIC
Status: COMPLETED | OUTPATIENT
Start: 2024-10-31 | End: 2024-10-31

## 2024-10-31 RX ORDER — PROPARACAINE HYDROCHLORIDE 5 MG/ML
1 SOLUTION/ DROPS OPHTHALMIC ONCE
Status: DISCONTINUED | OUTPATIENT
Start: 2024-10-31 | End: 2024-10-31 | Stop reason: HOSPADM

## 2024-10-31 RX ORDER — LIDOCAINE 40 MG/G
CREAM TOPICAL
Status: DISCONTINUED | OUTPATIENT
Start: 2024-10-31 | End: 2024-10-31 | Stop reason: HOSPADM

## 2024-10-31 RX ADMIN — PHENYLEPHRINE HYDROCHLORIDE 1 DROP: 25 SOLUTION/ DROPS OPHTHALMIC at 07:15

## 2024-10-31 RX ADMIN — TROPICAMIDE 1 DROP: 10 SOLUTION/ DROPS OPHTHALMIC at 07:22

## 2024-10-31 RX ADMIN — DICLOFENAC SODIUM 1 DROP: 1 SOLUTION OPHTHALMIC at 07:23

## 2024-10-31 RX ADMIN — MOXIFLOXACIN 1 DROP: 5 SOLUTION/ DROPS OPHTHALMIC at 07:23

## 2024-10-31 RX ADMIN — DICLOFENAC SODIUM 1 DROP: 1 SOLUTION OPHTHALMIC at 07:15

## 2024-10-31 RX ADMIN — MOXIFLOXACIN 1 DROP: 5 SOLUTION/ DROPS OPHTHALMIC at 07:34

## 2024-10-31 RX ADMIN — MOXIFLOXACIN 1 DROP: 5 SOLUTION/ DROPS OPHTHALMIC at 07:15

## 2024-10-31 RX ADMIN — TROPICAMIDE 1 DROP: 10 SOLUTION/ DROPS OPHTHALMIC at 07:33

## 2024-10-31 RX ADMIN — TROPICAMIDE 1 DROP: 10 SOLUTION/ DROPS OPHTHALMIC at 07:15

## 2024-10-31 RX ADMIN — DICLOFENAC SODIUM 1 DROP: 1 SOLUTION OPHTHALMIC at 07:34

## 2024-10-31 RX ADMIN — PHENYLEPHRINE HYDROCHLORIDE 1 DROP: 25 SOLUTION/ DROPS OPHTHALMIC at 07:34

## 2024-10-31 RX ADMIN — PHENYLEPHRINE HYDROCHLORIDE 1 DROP: 25 SOLUTION/ DROPS OPHTHALMIC at 07:23

## 2024-10-31 RX ADMIN — MIDAZOLAM 1 MG: 1 INJECTION INTRAMUSCULAR; INTRAVENOUS at 08:03

## 2024-10-31 RX ADMIN — MIDAZOLAM 1 MG: 1 INJECTION INTRAMUSCULAR; INTRAVENOUS at 08:08

## 2024-10-31 RX ADMIN — PROPARACAINE HYDROCHLORIDE 1 DROP: 5 SOLUTION/ DROPS OPHTHALMIC at 07:14

## 2024-10-31 ASSESSMENT — ACTIVITIES OF DAILY LIVING (ADL)
ADLS_ACUITY_SCORE: 0

## 2024-10-31 NOTE — ANESTHESIA CARE TRANSFER NOTE
Patient: Zaria Gaona    Procedure: Procedure(s):  Phacoemulsification with standard intraocular lens implant with Istent       Diagnosis: Cataract [H26.9]  Diagnosis Additional Information: No value filed.    Anesthesia Type:   MAC     Note:    Oropharynx: oropharynx clear of all foreign objects and spontaneously breathing  Level of Consciousness: drowsy  Oxygen Supplementation: room air    Independent Airway: airway patency satisfactory and stable  Dentition: dentition unchanged  Vital Signs Stable: post-procedure vital signs reviewed and stable  Report to RN Given: handoff report given  Patient transferred to: PACU    Handoff Report: Identifed the Patient, Identified the Reponsible Provider, Reviewed the pertinent medical history, Discussed the surgical course, Reviewed Intra-OP anesthesia mangement and issues during anesthesia, Set expectations for post-procedure period and Allowed opportunity for questions and acknowledgement of understanding  Vitals:  Vitals Value Taken Time   BP     Temp     Pulse     Resp     SpO2         Electronically Signed By: BEVERLY Leija CRNA  October 31, 2024  8:32 AM

## 2024-10-31 NOTE — ANESTHESIA POSTPROCEDURE EVALUATION
Patient: Zaria Gaona    Procedure: Procedure(s):  Phacoemulsification with standard intraocular lens implant with Istent       Anesthesia Type:  MAC    Note:  Disposition: Outpatient   Postop Pain Control: Uneventful            Sign Out: Well controlled pain   PONV: No   Neuro/Psych: Uneventful            Sign Out: Acceptable/Baseline neuro status   Airway/Respiratory: Uneventful            Sign Out: Acceptable/Baseline resp. status   CV/Hemodynamics: Uneventful            Sign Out: Acceptable CV status   Other NRE: NONE   DID A NON-ROUTINE EVENT OCCUR? No    Event details/Postop Comments:  Pt was happy with anesthesia care.  No complications.  I will follow up with the pt if needed.       Last vitals:  Vitals Value Taken Time   /70 10/31/24 0900   Temp     Pulse 54 10/31/24 0900   Resp 16 10/31/24 0900   SpO2 96 % 10/31/24 0905   Vitals shown include unfiled device data.    Electronically Signed By: BEVERLY Leija CRNA  October 31, 2024  9:21 AM

## 2024-10-31 NOTE — OP NOTE
[facility]  Ophthalmology Operative Note    PREOPERATIVE DIAGNOSIS: Cataract, Left eye.   Glaucoma, Left eye    POSTOPERATIVE DIAGNOSIS: Cataract, Left eye.   Glaucoma, Left eye  OPERATION: Cataract extraction with placement of posterior chamber intraocular lens and iStent in the Left eye.     ANESTHESIA: MAC combined with topical     INDICATIONS FOR PROCEDURE: Zaria Gaona was seen in the [facility] for decreased visual acuity in the Left eye. The patient was found to have a visually significant cataract and glaucoma in the Left eye. The risks, benefits, alternatives and goals of cataract extraction were discussed with the patient, and after adequate discussion the patient understood and agreed to these, and a signed informed consent was obtained prior to the procedure.     DESCRIPTION OF PROCEDURE: After proper patient identification, topical anesthesia was applied to the Left eye. The patient was brought to the operating room and the Left eye was prepped and draped in the usual sterile fashion for intraocular surgery. A lid speculum was placed in the Left eye. A paracentesis was then created and the anterior chamber was filled with 1% non-preserved lidocaine followed by Endocoat. A clear corneal incision was then created temporally using a 2.4mm keratome. A continuous curvilinear capsulorrhexis was then created using a cystotome and Utrata forceps. Hydrodissection was carried out with BSS on a cannula and the lens rotated freely within the capsular bag. Phacoemulsification was then carried out using the divide and conquer technique. Residual cortical material was removed using the I&A handpiece. The capsular bag was then filled with Healon and a ZCB00 +22.5 diopter intraocular lens was then injected into the capsular bag. The lens showed good centration and stability. The head and microscope were then rotated 45 degrees and the nasal angle was visualized  using a gonioscopy prism. Two iStents were then placed into the nasal angle approximately 3-4 clock hours apart. The head and microscope were then returned to the neutral position. Residual viscoelastic was removed using the I&A handpiece. The wound was then hydrated and the anterior chamber reformed. Intracameral Moxifloxacin was then injected into the anterior chamber. The wounds were then checked and found to be sealed. Topical Prednisolone drops were placed in the patient's Left eye followed by a Benavides shield over the top of this. The patient tolerated the procedure well without complications and was told to follow up in the clinic in the next postoperative day.     Implant Name Type Inv. Item Serial No.  Lot No. LRB No. Used Action   EYE IMP IOL KELLEN PCL TECNIS ZCB00 22.5 - [id number] Lens/Eye Implant EYE IMP IOL KELLEN PCL TECNIS ZCB00 22.5 [id number] [employer]  Left 1 Implanted   STENT 50UM SYM STEARALKONIUM 360UM 230UM 80UM G2W-US - [id number] Lens/Eye Implant STENT 50UM SYM STEARALKONIUM 360UM 230UM 80UM G2W-US [id number]  [id number] Left 1 Implanted        Abhijit Martinez MD

## 2024-11-07 DIAGNOSIS — E11.00 TYPE 2 DIABETES MELLITUS WITH HYPEROSMOLARITY WITHOUT COMA, WITHOUT LONG-TERM CURRENT USE OF INSULIN (H): Primary | ICD-10-CM

## 2024-11-14 ENCOUNTER — TRANSFERRED RECORDS (OUTPATIENT)
Dept: HEALTH INFORMATION MANAGEMENT | Facility: CLINIC | Age: 79
End: 2024-11-14
Payer: COMMERCIAL

## 2024-11-18 DIAGNOSIS — I25.118 CORONARY ARTERY DISEASE INVOLVING NATIVE CORONARY ARTERY OF NATIVE HEART WITH OTHER FORM OF ANGINA PECTORIS (H): ICD-10-CM

## 2024-11-18 DIAGNOSIS — E11.9 DIABETES MELLITUS, TYPE 2 (H): ICD-10-CM

## 2024-11-18 DIAGNOSIS — Z13.220 SCREENING FOR HYPERLIPIDEMIA: Primary | ICD-10-CM

## 2024-11-18 DIAGNOSIS — M35.3 POLYMYALGIA RHEUMATICA (H): ICD-10-CM

## 2024-11-30 DIAGNOSIS — E03.9 ACQUIRED HYPOTHYROIDISM: ICD-10-CM

## 2024-11-30 NOTE — TELEPHONE ENCOUNTER
Medication Question or Refill    Contacts       Contact Date/Time Type Contact Phone/Fax    11/30/2024 10:10 AM CST Phone (Incoming) Lev Gaonacynthia JOSEPH (Self) [phone] (H)            What medication are you calling about (include dose and sig)?: Levothyroxine 75MG , hydrochlorothiazide 25/triamterene 37.5 MG    Preferred Pharmacy:   [facility] [address] [facility]  [address] [address]  Phone: [phone] Fax: [phone]        Controlled Substance Agreement on file:   CSA -- Patient Level:    CSA: None found at the patient level.       Who prescribed the medication?: PCP    Do you need a refill? Yes    When did you use the medication last? 11/30/24    Patient offered an appointment? No    Do you have any questions or concerns?  No      Could we send this information to you in [employer] or would you prefer to receive a phone call?:   Patient would prefer a phone call   Okay to leave a detailed message?: Yes at Cell number on file:    Telephone Information:   Mobile [phone]

## 2024-12-01 ENCOUNTER — HEALTH MAINTENANCE LETTER (OUTPATIENT)
Age: 79
End: 2024-12-01

## 2024-12-03 ENCOUNTER — LAB (OUTPATIENT)
Dept: LAB | Facility: CLINIC | Age: 79
End: 2024-12-03
Payer: COMMERCIAL

## 2024-12-03 ENCOUNTER — OFFICE VISIT (OUTPATIENT)
Dept: CARDIOLOGY | Facility: CLINIC | Age: 79
End: 2024-12-03
Payer: COMMERCIAL

## 2024-12-03 VITALS
DIASTOLIC BLOOD PRESSURE: 72 MMHG | OXYGEN SATURATION: 99 % | HEART RATE: 61 BPM | WEIGHT: 127 LBS | SYSTOLIC BLOOD PRESSURE: 112 MMHG | RESPIRATION RATE: 16 BRPM | HEIGHT: 63 IN | BODY MASS INDEX: 22.5 KG/M2

## 2024-12-03 DIAGNOSIS — E78.5 DYSLIPIDEMIA, GOAL LDL BELOW 70: ICD-10-CM

## 2024-12-03 DIAGNOSIS — I10 BENIGN ESSENTIAL HYPERTENSION: ICD-10-CM

## 2024-12-03 DIAGNOSIS — R07.9 CHEST PAIN, UNSPECIFIED TYPE: ICD-10-CM

## 2024-12-03 DIAGNOSIS — I10 BENIGN ESSENTIAL HYPERTENSION: Primary | ICD-10-CM

## 2024-12-03 DIAGNOSIS — N18.31 CHRONIC KIDNEY DISEASE, STAGE 3A (H): ICD-10-CM

## 2024-12-03 DIAGNOSIS — E11.9 TYPE 2 DIABETES MELLITUS WITHOUT COMPLICATION, WITHOUT LONG-TERM CURRENT USE OF INSULIN (H): ICD-10-CM

## 2024-12-03 DIAGNOSIS — I25.118 CORONARY ARTERY DISEASE INVOLVING NATIVE CORONARY ARTERY OF NATIVE HEART WITH OTHER FORM OF ANGINA PECTORIS (H): ICD-10-CM

## 2024-12-03 LAB
ALT SERPL W P-5'-P-CCNC: 23 U/L (ref 0–50)
ANION GAP SERPL CALCULATED.3IONS-SCNC: 12 MMOL/L (ref 7–15)
BUN SERPL-MCNC: 34.1 MG/DL (ref 8–23)
CALCIUM SERPL-MCNC: 9.4 MG/DL (ref 8.8–10.4)
CHLORIDE SERPL-SCNC: 108 MMOL/L (ref 98–107)
CHOLEST SERPL-MCNC: 148 MG/DL
CREAT SERPL-MCNC: 1.38 MG/DL (ref 0.51–0.95)
EGFRCR SERPLBLD CKD-EPI 2021: 39 ML/MIN/1.73M2
FASTING STATUS PATIENT QL REPORTED: NO
FASTING STATUS PATIENT QL REPORTED: NO
GLUCOSE SERPL-MCNC: 131 MG/DL (ref 70–99)
HCO3 SERPL-SCNC: 18 MMOL/L (ref 22–29)
HDLC SERPL-MCNC: 40 MG/DL
LDLC SERPL CALC-MCNC: 57 MG/DL
NONHDLC SERPL-MCNC: 108 MG/DL
POTASSIUM SERPL-SCNC: 4.7 MMOL/L (ref 3.4–5.3)
SODIUM SERPL-SCNC: 138 MMOL/L (ref 135–145)
TRIGL SERPL-MCNC: 255 MG/DL

## 2024-12-03 PROCEDURE — 80061 LIPID PANEL: CPT

## 2024-12-03 PROCEDURE — 36415 COLL VENOUS BLD VENIPUNCTURE: CPT

## 2024-12-03 PROCEDURE — 80048 BASIC METABOLIC PNL TOTAL CA: CPT

## 2024-12-03 PROCEDURE — 99214 OFFICE O/P EST MOD 30 MIN: CPT | Performed by: INTERNAL MEDICINE

## 2024-12-03 PROCEDURE — 84460 ALANINE AMINO (ALT) (SGPT): CPT

## 2024-12-03 RX ORDER — LEVOTHYROXINE SODIUM 75 UG/1
75 TABLET ORAL DAILY
Qty: 90 TABLET | Refills: 0 | Status: SHIPPED | OUTPATIENT
Start: 2024-12-03

## 2024-12-03 RX ORDER — LEVOTHYROXINE SODIUM 75 UG/1
75 TABLET ORAL DAILY
Qty: 90 TABLET | Refills: 3 | Status: CANCELLED | OUTPATIENT
Start: 2024-12-03

## 2024-12-03 RX ORDER — CARVEDILOL 6.25 MG/1
6.25 TABLET ORAL 2 TIMES DAILY
Qty: 180 TABLET | Refills: 3 | Status: SHIPPED | OUTPATIENT
Start: 2024-12-03

## 2024-12-03 ASSESSMENT — PAIN SCALES - GENERAL: PAINLEVEL_OUTOF10: NO PAIN (0)

## 2024-12-03 NOTE — PROGRESS NOTES
General Cardiology Clinic Progress Note  Zaria Gaona MRN# [id number]   YOB: 1945 Age: 79 year old       Reason for visit: Coronary artery disease    History of presenting illness:    I had the opportunity to see Ms. Zaria Gaona in Cardiology Clinic today at [facility] in [place] for reevaluation of coronary artery disease and cardiac risk factors including hypertension, type 2 diabetes and dyslipidemia.     She was living in [place], [place] when she suffered an acute anterior wall myocardial infarction on 12/26/2020.  She was treated in [place] with emergency stenting of her LAD and right coronary arteries at [facility].  She had some residual moderate disease within a small diagonal artery, mid left circumflex and 30% distal left main stenosis.  Fortunately, her left ventricular function normalized after that procedure and she has not had any recurrent anginal symptoms.  She had a very small area of distal anterior and apical hypokinesis by echocardiogram with an ejection fraction of 55%-60% in 09/2021.     More recently, she was experiencing chest discomfort last spring and visited with us in clinic.  She was referred for an exercise nuclear perfusion stress test on 5/18/2023 which showed a small to moderate-sized area of infarction involving the anteroapical wall but no ischemia and normal overall left ventricular function with an ejection fraction of 70%.     We continued to treat her medically but she has had further chest pain symptoms recently which have concerned her.  She describes a central retrosternal chest discomfort symptom that seems to occur at various times, without clear predictability.  She cannot recall whether this is similar to the chest pain she had with her myocardial infarction.  She thinks they might be associated with with indigestion, but I see that she has been started on omeprazole back in July and her chest discomfort  symptoms seem to be more frequent since then.    Her blood pressure was running high and Dyazide was added to her program which has improved her blood pressure significantly.  Her cholesterol numbers remain excellent with an LDL of 57.  Her creatinine is mildly elevated at 1.38, probably after the addition of Dyazide.  It is unchanged since October.    On examination today her blood pressure is 112/72, heart rate 61, and weight 127 pounds.  Her lungs are clear.  Heart rhythm is regular.  She has no cardiac murmur.            Assessment and Plan:     ASSESSMENT:    Ms. Vilma Gaona is a 79-year-old woman with a history of coronary artery disease, anterior infarction, and stenting of her LAD in 2020.  She also had a 90% proximal right coronary artery stenosis which was stented.  She had 60% residual mid left circumflex stenosis and 30% stenosis within the distal portion of the left main.  She is now having some chest discomfort symptoms again which she believes may be related to indigestion, but are not improved after starting a proton pump inhibitor several months ago.  In review of the records, she complained of indigestion type symptoms with her MI in 2020, making this difficult to sort out.    I suggested doing some stress testing again with Lexiscan and nuclear perfusion imaging.  She is agreeable.  We may need to consider coronary angiography if her symptoms persist and her stress test is not definitive.    Artem Covarrubias MD           Orders this Visit:  Orders Placed This Encounter   Procedures    Basic metabolic panel    Hemoglobin A1c    Lipid Profile    ALT    Follow-Up with Cardiology     Orders Placed This Encounter   Medications    carvedilol (COREG) 6.25 MG tablet     Sig: Take 1 tablet (6.25 mg) by mouth 2 times daily.     Dispense:  180 tablet     Refill:  3     Medications Discontinued During This Encounter   Medication Reason    triamterene-HCTZ (DYAZIDE) 37.5-25 MG capsule     carvedilol (COREG) 6.25  "MG tablet Reorder (No AVS)       Today's clinic visit entailed:    35 minutes spent by me on the date of the encounter doing chart review, history and exam, documentation and further activities per the note  Provider  Link to [facility] Help Grid     The level of medical decision making during this visit was of high complexity.           Review of Systems:     Review of Systems:  Skin:        Eyes:  Positive for glasses  ENT:       Respiratory:  Positive for shortness of breath  Cardiovascular:  Negative, palpitations, chest pain, syncope or near-syncope, lightheadedness Positive for, fatigue, edema, dizziness  Gastroenterology:      Genitourinary:       Musculoskeletal:       Neurologic:  Positive for numbness or tingling of hands  Psychiatric:       Heme/Lymph/Imm:  Positive for allergies  Endocrine:                 Physical Exam:     Vitals: /72 (BP Location: Right arm, Patient Position: Sitting, Cuff Size: Adult Regular)   Pulse 61   Resp 16   Ht 1.6 m (5' 3\")   Wt 57.6 kg (127 lb)   SpO2 99%   BMI 22.50 kg/m    Constitutional: Well nourished and in no apparent distress.  Eyes: Pupils equal, round. Sclerae anicteric.   HEENT: Normocephalic, atraumatic.   Neck: Supple. JVD   Respiratory: Breathing non-labored. Lungs clear to auscultation bilaterally. No crackles, wheezes, rhonchi, or rales.  Cardiovascular:  Regular rate and rhythm, normal S1 and S2. No murmur, rub, or gallop.  Skin: Warm, dry. No rashes, cyanosis, or xanthelasma.  Extremities: No edema.Neurologic: No gross motor deficits. Alert, awake, and oriented to person, place and time.  Psychiatric: Affect appropriate.             Medications:     Current Outpatient Medications   Medication Sig Dispense Refill    acetaminophen (TYLENOL) 325 MG tablet Take 650 mg by mouth      alcohol swab prep pads Use to swab area of injection/shana as directed 100 each 3    Ascorbic Acid (VITAMIN C) 500 MG CAPS Take 500 mg by mouth daily      aspirin (ASA) 81 MG " chewable tablet Take 1 tablet (81 mg) by mouth daily 90 tablet 3    atorvastatin (LIPITOR) 80 MG tablet Take 1 tablet (80 mg) by mouth daily 90 tablet 3    blood glucose (ACCU-CHEK GUIDE) test strip USE 1 STRIP TO TEST EVERY DAY OR AS DIRECTED - (KEEP UNUSED STRIPS IN  ORIGINAL SEALED CONTAINER BETWEEN USES) 100 strip 2    blood glucose calibration (NO BRAND SPECIFIED) solution Use to calibrate blood glucose monitor as needed as directed. To accompany: Blood Glucose Monitor Brands: per insurance. 1 Bottle 3    blood glucose monitoring (SOFTCLIX) lancets USE 1 LANCET TO TEST EVERY DAY OR AS DIRECTED - (DISCARD LANCET IN  APPROPRIATE CONTAINER IMMEDIATELY AFTER USE. USE A NEW LANCET FOR EACH  TESTING) 100 each 2    carvedilol (COREG) 6.25 MG tablet Take 1 tablet (6.25 mg) by mouth 2 times daily. 180 tablet 3    cholecalciferol (VITAMIN D3) 25 mcg (1000 units) capsule Take 1 capsule by mouth daily      fluconazole (DIFLUCAN) 150 MG tablet Take 1 tablet by mouth daily at 2 pm.      fluticasone (FLONASE) 50 MCG/ACT nasal spray Spray 1 spray into both nostrils daily. 11.1 mL 1    latanoprost (XALATAN) 0.005 % ophthalmic solution Inject 1 drop into the eye daily      leflunomide (ARAVA) 20 MG tablet Take 20 mg by mouth daily      losartan (COZAAR) 100 MG tablet Take 1 tablet (100 mg) by mouth daily 90 tablet 3    metFORMIN (GLUCOPHAGE) 500 MG tablet Take by mouth 2 times daily, 1 tablet in the  AM and 2 tablets at night. 270 tablet 3    omeprazole (PRILOSEC) 20 MG DR capsule Take 1 capsule (20 mg) by mouth 2 times daily (Patient taking differently: Take 20 mg by mouth daily.) 180 capsule 3    timolol hemihydrate (BETIMOL) 0.25 % ophthalmic solution Inject 1 drop into the eye daily      triamterene-HCTZ (DYAZIDE) 37.5-25 MG capsule Take 1 capsule by mouth every morning. 90 capsule 3    zinc 50 MG TABS Take 50 mg by mouth daily      levothyroxine (SYNTHROID/LEVOTHROID) 75 MCG tablet Take 1 tablet (75 mcg) by mouth daily. 90  tablet 0    nitroGLYcerin (NITROSTAT) 0.4 MG sublingual tablet Place 0.4 mg under the tongue as needed (Patient not taking: Reported on 12/3/2024)         No family history on file.    Social History     Socioeconomic History    Marital status:      Spouse name: Not on file    Number of children: Not on file    Years of education: Not on file    Highest education level: Not on file   Occupational History    Not on file   Tobacco Use    Smoking status: Never    Smokeless tobacco: Never   Vaping Use    Vaping status: Never Used   Substance and Sexual Activity    Alcohol use: Yes     Comment: occ    Drug use: Never    Sexual activity: Yes     Partners: Male     Birth control/protection: Female Surgical   Other Topics Concern    Not on file   Social History Narrative    Not on file     Social Drivers of Health     Financial Resource Strain: Low Risk  (12/19/2023)    Financial Resource Strain     Within the past 12 months, have you or your family members you live with been unable to get utilities (heat, electricity) when it was really needed?: No   Food Insecurity: Low Risk  (12/19/2023)    Food Insecurity     Within the past 12 months, did you worry that your food would run out before you got money to buy more?: No     Within the past 12 months, did the food you bought just not last and you didn t have money to get more?: No   Transportation Needs: Low Risk  (12/19/2023)    Transportation Needs     Within the past 12 months, has lack of transportation kept you from medical appointments, getting your medicines, non-medical meetings or appointments, work, or from getting things that you need?: No   Physical Activity: Not on file   Stress: Not on file   Social Connections: Not on file   Interpersonal Safety: Low Risk  (10/31/2024)    Interpersonal Safety     Do you feel physically and emotionally safe where you currently live?: Yes     Within the past 12 months, have you been hit, slapped, kicked or otherwise  physically hurt by someone?: No     Within the past 12 months, have you been humiliated or emotionally abused in other ways by your partner or ex-partner?: No   Housing Stability: Low Risk  (12/19/2023)    Housing Stability     Do you have housing? : Yes     Are you worried about losing your housing?: No   Recent Concern: Housing Stability - High Risk (10/11/2023)    Housing Stability     Do you have housing? : No     Are you worried about losing your housing?: No            Past Medical History:     Past Medical History:   Diagnosis Date    Essential hypertension     NSTEMI (non-ST elevated myocardial infarction) (H) 12/2020    2 stents    Type 2 diabetes mellitus without complication (H) 2005              Past Surgical History:     Past Surgical History:   Procedure Laterality Date    COLONOSCOPY N/A 12/15/2022    Procedure: COLONOSCOPY, WITH POLYPECTOMY;  Surgeon: Andres Trujillo DO;  Location: [facility]    ESOPHAGOSCOPY, GASTROSCOPY, DUODENOSCOPY (EGD), COMBINED N/A 6/27/2023    Procedure: ESOPHAGOGASTRODUODENOSCOPY, WITH BIOPSY;  Surgeon: Med Asif MD;  Location: [facility]    INJECT BLOCK MEDIAL BRANCH CERVICAL/THORACIC/LUMBAR Left 8/11/2022    Procedure: Left Lumbar 3, Left Lumbar 4, and Left Lumbar 5 medial branch nerve blocks using fluoroscopic guidance and contrast control;  Surgeon: Malick Gomez MD;  Location: [facility]    INJECT EPIDURAL LUMBAR Left 5/20/2022    Procedure: Left lumbar 4-5 interlaminar epidural injection ;  Surgeon: Malick Gomez MD;  Location: [facility]    INJECT JOINT SACROILIAC Left 7/1/2022    Procedure: Fluoroscopically-guided injection of the Left sacroiliac joint with Left dimitrios Sacroiliac joint ligaments infiltration over the sacrum.;  Surgeon: Malick Gomez MD;  [facility]    PHACOEMULSIFICATION WITH STANDARD INTRAOCULAR LENS IMPLANT Right 10/17/2024    Procedure: Phacoemulsification with standard intraocular lens implant with Istent right;  Surgeon: Juan  MD Yo;  [facility]: [facility]    PHACOEMULSIFICATION WITH STANDARD INTRAOCULAR LENS IMPLANT Left 10/31/2024    Procedure: Phacoemulsification with standard intraocular lens implant with Istent left;  Surgeon: Yo Martinez MD;  [facility]: [facility]    RELEASE CARPAL TUNNEL Left 4/26/2024    Procedure: RELEASE, CARPAL TUNNEL left;  Surgeon: Case Swenson MD;  Location: [facility]    VAGINAL PROLAPSE REPAIR      done in [place]              Allergies:   Ampicillin, Lisinopril, and Sulfamethoxazole-trimethoprim       Data:   All laboratory data reviewed:    Recent Labs   Lab Test 12/03/24  0831 04/12/24  0959 01/16/24  1321 12/19/23  1418 10/03/23  0752 07/24/23  0801 10/05/22  0940 09/25/22  1005 09/12/22  1339 09/08/22  0719 02/25/22  1700   LDL 57  --   --   --  19 32  --   --   --    < >  --    HDL 40*  --   --   --  44* 46*  --   --   --    < >  --    NHDL 108  --   --   --  63 65  --   --   --    < >  --    CHOL 148  --   --   --  107 111  --   --   --    < >  --    TRIG 255*  --   --   --  220* 165*  --   --   --    < >  --    TSH  --   --   --  1.27  --   --   --  1.32  --   --  1.35   IRON  --  54   < > 41  --   --    < >  --  65  --   --    FEB  --  274   < > 332  --   --    < >  --  325  --   --    IRONSAT  --  20   < > 12*  --   --    < >  --  20  --   --    CHRISTOPHER  --   --   --   --   --   --   --   --  68  --   --     < > = values in this interval not displayed.       Lab Results   Component Value Date    WBC 3.1 (L) 09/23/2024    WBC 4.9 05/27/2021    RBC 3.65 (L) 09/23/2024    RBC 4.18 05/27/2021    HGB 10.4 (L) 09/23/2024    HGB 11.9 05/27/2021    HCT 32.5 (L) 09/23/2024    HCT 37.4 05/27/2021    MCV 89 09/23/2024    MCV 90 05/27/2021    MCH 28.5 09/23/2024    MCH 28.5 05/27/2021    MCHC 32.0 09/23/2024    MCHC 31.8 05/27/2021    RDW 14.1 09/23/2024    RDW 13.6 05/27/2021     (L) 09/23/2024     05/27/2021       Lab Results   Component Value Date     12/03/2024      "04/09/2021    POTASSIUM 4.7 12/03/2024    POTASSIUM 4.2 09/25/2022    POTASSIUM 4.6 04/09/2021    CHLORIDE 108 (H) 12/03/2024    CHLORIDE 104 09/25/2022    CHLORIDE 106 04/09/2021    CO2 18 (L) 12/03/2024    CO2 28 09/25/2022    CO2 26 04/09/2021    ANIONGAP 12 12/03/2024    ANIONGAP 6 09/25/2022    ANIONGAP 7 04/09/2021     (H) 12/03/2024     (H) 10/17/2024     (H) 09/25/2022     (H) 04/09/2021    BUN 34.1 (H) 12/03/2024    BUN 21 09/25/2022    BUN 19 04/09/2021    CR 1.38 (H) 12/03/2024    CR 0.85 04/09/2021    GFRESTIMATED 39 (L) 12/03/2024    GFRESTIMATED 67 04/09/2021    GFRESTBLACK 78 04/09/2021    JOHANN 9.4 12/03/2024    JOHANN 8.9 04/09/2021      Lab Results   Component Value Date    AST 24 09/23/2024    AST 15 04/09/2021    ALT 23 12/03/2024    ALT 22 04/09/2021       Lab Results   Component Value Date    A1C 6.7 (H) 07/24/2024    A1C 7.0 (H) 05/17/2021       No results found for: \"INR\"      CANDIDA CLAIRE MD  [facility]  "

## 2024-12-03 NOTE — LETTER
12/3/2024    Wang Vargas MD  [address] [facility] [address]    RE: Zaria Gaona       Dear Colleague,     I had the pleasure of seeing Zaria Gaona in the [facility].    General Cardiology Clinic Progress Note  Zaria Gaona MRN# [id number]   YOB: 1945 Age: 79 year old       Reason for visit: Coronary artery disease    History of presenting illness:    I had the opportunity to see Ms. Zaria Gaona in Cardiology Clinic today at [facility] in [place] for reevaluation of coronary artery disease and cardiac risk factors including hypertension, type 2 diabetes and dyslipidemia.     She was living in [place], [place] when she suffered an acute anterior wall myocardial infarction on 12/26/2020.  She was treated in [place] with emergency stenting of her LAD and right coronary arteries at [facility].  She had some residual moderate disease within a small diagonal artery, mid left circumflex and 30% distal left main stenosis.  Fortunately, her left ventricular function normalized after that procedure and she has not had any recurrent anginal symptoms.  She had a very small area of distal anterior and apical hypokinesis by echocardiogram with an ejection fraction of 55%-60% in 09/2021.     More recently, she was experiencing chest discomfort last spring and visited with us in clinic.  She was referred for an exercise nuclear perfusion stress test on 5/18/2023 which showed a small to moderate-sized area of infarction involving the anteroapical wall but no ischemia and normal overall left ventricular function with an ejection fraction of 70%.     We continued to treat her medically but she has had further chest pain symptoms recently which have concerned her.  She describes a central retrosternal chest discomfort symptom that seems to occur at various times, without clear predictability.  She cannot recall whether this is  similar to the chest pain she had with her myocardial infarction.  She thinks they might be associated with with indigestion, but I see that she has been started on omeprazole back in July and her chest discomfort symptoms seem to be more frequent since then.    Her blood pressure was running high and Dyazide was added to her program which has improved her blood pressure significantly.  Her cholesterol numbers remain excellent with an LDL of 57.  Her creatinine is mildly elevated at 1.38, probably after the addition of Dyazide.  It is unchanged since October.    On examination today her blood pressure is 112/72, heart rate 61, and weight 127 pounds.  Her lungs are clear.  Heart rhythm is regular.  She has no cardiac murmur.            Assessment and Plan:     ASSESSMENT:    Ms. Vilma Gaona is a 79-year-old woman with a history of coronary artery disease, anterior infarction, and stenting of her LAD in 2020.  She also had a 90% proximal right coronary artery stenosis which was stented.  She had 60% residual mid left circumflex stenosis and 30% stenosis within the distal portion of the left main.  She is now having some chest discomfort symptoms again which she believes may be related to indigestion, but are not improved after starting a proton pump inhibitor several months ago.  In review of the records, she complained of indigestion type symptoms with her MI in 2020, making this difficult to sort out.    I suggested doing some stress testing again with Lexiscan and nuclear perfusion imaging.  She is agreeable.  We may need to consider coronary angiography if her symptoms persist and her stress test is not definitive.    Artem Covarrubias MD           Orders this Visit:  Orders Placed This Encounter   Procedures     Basic metabolic panel     Hemoglobin A1c     Lipid Profile     ALT     Follow-Up with Cardiology     Orders Placed This Encounter   Medications     carvedilol (COREG) 6.25 MG tablet     Sig: Take 1 tablet  "(6.25 mg) by mouth 2 times daily.     Dispense:  180 tablet     Refill:  3     Medications Discontinued During This Encounter   Medication Reason     triamterene-HCTZ (DYAZIDE) 37.5-25 MG capsule      carvedilol (COREG) 6.25 MG tablet Reorder (No AVS)       Today's clinic visit entailed:    35 minutes spent by me on the date of the encounter doing chart review, history and exam, documentation and further activities per the note  Provider  Link to [facility] Help Grid     The level of medical decision making during this visit was of high complexity.           Review of Systems:     Review of Systems:  Skin:        Eyes:  Positive for glasses  ENT:       Respiratory:  Positive for shortness of breath  Cardiovascular:  Negative, palpitations, chest pain, syncope or near-syncope, lightheadedness Positive for, fatigue, edema, dizziness  Gastroenterology:      Genitourinary:       Musculoskeletal:       Neurologic:  Positive for numbness or tingling of hands  Psychiatric:       Heme/Lymph/Imm:  Positive for allergies  Endocrine:                 Physical Exam:     Vitals: /72 (BP Location: Right arm, Patient Position: Sitting, Cuff Size: Adult Regular)   Pulse 61   Resp 16   Ht 1.6 m (5' 3\")   Wt 57.6 kg (127 lb)   SpO2 99%   BMI 22.50 kg/m    Constitutional: Well nourished and in no apparent distress.  Eyes: Pupils equal, round. Sclerae anicteric.   HEENT: Normocephalic, atraumatic.   Neck: Supple. JVD   Respiratory: Breathing non-labored. Lungs clear to auscultation bilaterally. No crackles, wheezes, rhonchi, or rales.  Cardiovascular:  Regular rate and rhythm, normal S1 and S2. No murmur, rub, or gallop.  Skin: Warm, dry. No rashes, cyanosis, or xanthelasma.  Extremities: No edema.Neurologic: No gross motor deficits. Alert, awake, and oriented to person, place and time.  Psychiatric: Affect appropriate.             Medications:     Current Outpatient Medications   Medication Sig Dispense Refill     acetaminophen " (TYLENOL) 325 MG tablet Take 650 mg by mouth       alcohol swab prep pads Use to swab area of injection/shana as directed 100 each 3     Ascorbic Acid (VITAMIN C) 500 MG CAPS Take 500 mg by mouth daily       aspirin (ASA) 81 MG chewable tablet Take 1 tablet (81 mg) by mouth daily 90 tablet 3     atorvastatin (LIPITOR) 80 MG tablet Take 1 tablet (80 mg) by mouth daily 90 tablet 3     blood glucose (ACCU-CHEK GUIDE) test strip USE 1 STRIP TO TEST EVERY DAY OR AS DIRECTED - (KEEP UNUSED STRIPS IN  ORIGINAL SEALED CONTAINER BETWEEN USES) 100 strip 2     blood glucose calibration (NO BRAND SPECIFIED) solution Use to calibrate blood glucose monitor as needed as directed. To accompany: Blood Glucose Monitor Brands: per insurance. 1 Bottle 3     blood glucose monitoring (SOFTCLIX) lancets USE 1 LANCET TO TEST EVERY DAY OR AS DIRECTED - (DISCARD LANCET IN  APPROPRIATE CONTAINER IMMEDIATELY AFTER USE. USE A NEW LANCET FOR EACH  TESTING) 100 each 2     carvedilol (COREG) 6.25 MG tablet Take 1 tablet (6.25 mg) by mouth 2 times daily. 180 tablet 3     cholecalciferol (VITAMIN D3) 25 mcg (1000 units) capsule Take 1 capsule by mouth daily       fluconazole (DIFLUCAN) 150 MG tablet Take 1 tablet by mouth daily at 2 pm.       fluticasone (FLONASE) 50 MCG/ACT nasal spray Spray 1 spray into both nostrils daily. 11.1 mL 1     latanoprost (XALATAN) 0.005 % ophthalmic solution Inject 1 drop into the eye daily       leflunomide (ARAVA) 20 MG tablet Take 20 mg by mouth daily       losartan (COZAAR) 100 MG tablet Take 1 tablet (100 mg) by mouth daily 90 tablet 3     metFORMIN (GLUCOPHAGE) 500 MG tablet Take by mouth 2 times daily, 1 tablet in the  AM and 2 tablets at night. 270 tablet 3     omeprazole (PRILOSEC) 20 MG DR capsule Take 1 capsule (20 mg) by mouth 2 times daily (Patient taking differently: Take 20 mg by mouth daily.) 180 capsule 3     timolol hemihydrate (BETIMOL) 0.25 % ophthalmic solution Inject 1 drop into the eye daily        triamterene-HCTZ (DYAZIDE) 37.5-25 MG capsule Take 1 capsule by mouth every morning. 90 capsule 3     zinc 50 MG TABS Take 50 mg by mouth daily       levothyroxine (SYNTHROID/LEVOTHROID) 75 MCG tablet Take 1 tablet (75 mcg) by mouth daily. 90 tablet 0     nitroGLYcerin (NITROSTAT) 0.4 MG sublingual tablet Place 0.4 mg under the tongue as needed (Patient not taking: Reported on 12/3/2024)         No family history on file.    Social History     Socioeconomic History     Marital status:      Spouse name: Not on file     Number of children: Not on file     Years of education: Not on file     Highest education level: Not on file   Occupational History     Not on file   Tobacco Use     Smoking status: Never     Smokeless tobacco: Never   Vaping Use     Vaping status: Never Used   Substance and Sexual Activity     Alcohol use: Yes     Comment: occ     Drug use: Never     Sexual activity: Yes     Partners: Male     Birth control/protection: Female Surgical   Other Topics Concern     Not on file   Social History Narrative     Not on file     Social Drivers of Health     Financial Resource Strain: Low Risk  (12/19/2023)    Financial Resource Strain      Within the past 12 months, have you or your family members you live with been unable to get utilities (heat, electricity) when it was really needed?: No   Food Insecurity: Low Risk  (12/19/2023)    Food Insecurity      Within the past 12 months, did you worry that your food would run out before you got money to buy more?: No      Within the past 12 months, did the food you bought just not last and you didn t have money to get more?: No   Transportation Needs: Low Risk  (12/19/2023)    Transportation Needs      Within the past 12 months, has lack of transportation kept you from medical appointments, getting your medicines, non-medical meetings or appointments, work, or from getting things that you need?: No   Physical Activity: Not on file   Stress: Not on file    Social Connections: Not on file   Interpersonal Safety: Low Risk  (10/31/2024)    Interpersonal Safety      Do you feel physically and emotionally safe where you currently live?: Yes      Within the past 12 months, have you been hit, slapped, kicked or otherwise physically hurt by someone?: No      Within the past 12 months, have you been humiliated or emotionally abused in other ways by your partner or ex-partner?: No   Housing Stability: Low Risk  (12/19/2023)    Housing Stability      Do you have housing? : Yes      Are you worried about losing your housing?: No   Recent Concern: Housing Stability - High Risk (10/11/2023)    Housing Stability      Do you have housing? : No      Are you worried about losing your housing?: No            Past Medical History:     Past Medical History:   Diagnosis Date     Essential hypertension      NSTEMI (non-ST elevated myocardial infarction) (H) 12/2020    2 stents     Type 2 diabetes mellitus without complication (H) 2005              Past Surgical History:     Past Surgical History:   Procedure Laterality Date     COLONOSCOPY N/A 12/15/2022    Procedure: COLONOSCOPY, WITH POLYPECTOMY;  Surgeon: Andres Trujillo DO;  Location: [facility]     ESOPHAGOSCOPY, GASTROSCOPY, DUODENOSCOPY (EGD), COMBINED N/A 6/27/2023    Procedure: ESOPHAGOGASTRODUODENOSCOPY, WITH BIOPSY;  Surgeon: Med Asif MD;  Location: [facility]     INJECT BLOCK MEDIAL BRANCH CERVICAL/THORACIC/LUMBAR Left 8/11/2022    Procedure: Left Lumbar 3, Left Lumbar 4, and Left Lumbar 5 medial branch nerve blocks using fluoroscopic guidance and contrast control;  Surgeon: Malick Gomez MD;  Location: [facility]     INJECT EPIDURAL LUMBAR Left 5/20/2022    Procedure: Left lumbar 4-5 interlaminar epidural injection ;  Surgeon: Malick Gomez MD;  Location: [facility]     INJECT JOINT SACROILIAC Left 7/1/2022    Procedure: Fluoroscopically-guided injection of the Left sacroiliac joint with Left dimitrios Sacroiliac joint ligaments  infiltration over the sacrum.;  Surgeon: Malick Gomez MD;  [facility]     PHACOEMULSIFICATION WITH STANDARD INTRAOCULAR LENS IMPLANT Right 10/17/2024    Procedure: Phacoemulsification with standard intraocular lens implant with Istent right;  Surgeon: Yo Martinez MD;  [facility]     PHACOEMULSIFICATION WITH STANDARD INTRAOCULAR LENS IMPLANT Left 10/31/2024    Procedure: Phacoemulsification with standard intraocular lens implant with Istent left;  Surgeon: Yo Martinez MD;  [facility]     RELEASE CARPAL TUNNEL Left 4/26/2024    Procedure: RELEASE, CARPAL TUNNEL left;  Surgeon: Case Swenson MD;  Location: [facility]     VAGINAL PROLAPSE REPAIR      done in [place]              Allergies:   Ampicillin, Lisinopril, and Sulfamethoxazole-trimethoprim       Data:   All laboratory data reviewed:    Recent Labs   Lab Test 12/03/24  0831 04/12/24  0959 01/16/24  1321 12/19/23  1418 10/03/23  0752 07/24/23  0801 10/05/22  0940 09/25/22  1005 09/12/22  1339 09/08/22  0719 02/25/22  1700   LDL 57  --   --   --  19 32  --   --   --    < >  --    HDL 40*  --   --   --  44* 46*  --   --   --    < >  --    NHDL 108  --   --   --  63 65  --   --   --    < >  --    CHOL 148  --   --   --  107 111  --   --   --    < >  --    TRIG 255*  --   --   --  220* 165*  --   --   --    < >  --    TSH  --   --   --  1.27  --   --   --  1.32  --   --  1.35   IRON  --  54   < > 41  --   --    < >  --  65  --   --    FEB  --  274   < > 332  --   --    < >  --  325  --   --    IRONSAT  --  20   < > 12*  --   --    < >  --  20  --   --    CHRISTOPHER  --   --   --   --   --   --   --   --  68  --   --     < > = values in this interval not displayed.       Lab Results   Component Value Date    WBC 3.1 (L) 09/23/2024    WBC 4.9 05/27/2021    RBC 3.65 (L) 09/23/2024    RBC 4.18 05/27/2021    HGB 10.4 (L) 09/23/2024    HGB 11.9 05/27/2021    HCT 32.5 (L) 09/23/2024    HCT 37.4 05/27/2021    MCV 89 09/23/2024    MCV  "90 05/27/2021    MCH 28.5 09/23/2024    MCH 28.5 05/27/2021    MCHC 32.0 09/23/2024    MCHC 31.8 05/27/2021    RDW 14.1 09/23/2024    RDW 13.6 05/27/2021     (L) 09/23/2024     05/27/2021       Lab Results   Component Value Date     12/03/2024     04/09/2021    POTASSIUM 4.7 12/03/2024    POTASSIUM 4.2 09/25/2022    POTASSIUM 4.6 04/09/2021    CHLORIDE 108 (H) 12/03/2024    CHLORIDE 104 09/25/2022    CHLORIDE 106 04/09/2021    CO2 18 (L) 12/03/2024    CO2 28 09/25/2022    CO2 26 04/09/2021    ANIONGAP 12 12/03/2024    ANIONGAP 6 09/25/2022    ANIONGAP 7 04/09/2021     (H) 12/03/2024     (H) 10/17/2024     (H) 09/25/2022     (H) 04/09/2021    BUN 34.1 (H) 12/03/2024    BUN 21 09/25/2022    BUN 19 04/09/2021    CR 1.38 (H) 12/03/2024    CR 0.85 04/09/2021    GFRESTIMATED 39 (L) 12/03/2024    GFRESTIMATED 67 04/09/2021    GFRESTBLACK 78 04/09/2021    JOHANN 9.4 12/03/2024    JOHANN 8.9 04/09/2021      Lab Results   Component Value Date    AST 24 09/23/2024    AST 15 04/09/2021    ALT 23 12/03/2024    ALT 22 04/09/2021       Lab Results   Component Value Date    A1C 6.7 (H) 07/24/2024    A1C 7.0 (H) 05/17/2021       No results found for: \"INR\"      CANDIDA COVARRUBIAS MD  [facility]      Thank you for allowing me to participate in the care of your patient.      Sincerely,     CANDIDA COVARRUBIAS MD     [facility]  cc:   Candida Covarrubias MD  [address]  [place] [address]      "

## 2024-12-03 NOTE — TELEPHONE ENCOUNTER
Triamterene-HTCZ sent on 10/09/2024, should have refills on file.    Pended Levothyroxine to route as prescription auth to initiate protocol.

## 2024-12-13 PROBLEM — E11.22 TYPE 2 DIABETES MELLITUS WITH CHRONIC KIDNEY DISEASE, WITHOUT LONG-TERM CURRENT USE OF INSULIN (H): Status: ACTIVE | Noted: 2021-04-21

## 2024-12-13 PROBLEM — N18.32 CKD STAGE 3B, GFR 30-44 ML/MIN (H): Status: ACTIVE | Noted: 2024-02-27

## 2024-12-17 ENCOUNTER — PATIENT OUTREACH (OUTPATIENT)
Dept: CARE COORDINATION | Facility: CLINIC | Age: 79
End: 2024-12-17
Payer: COMMERCIAL

## 2024-12-31 ENCOUNTER — PATIENT OUTREACH (OUTPATIENT)
Dept: CARE COORDINATION | Facility: CLINIC | Age: 79
End: 2024-12-31
Payer: COMMERCIAL

## 2025-01-02 ENCOUNTER — TELEPHONE (OUTPATIENT)
Dept: OTOLARYNGOLOGY | Facility: CLINIC | Age: 80
End: 2025-01-02
Payer: COMMERCIAL

## 2025-01-07 ENCOUNTER — HOSPITAL ENCOUNTER (OUTPATIENT)
Dept: NUCLEAR MEDICINE | Facility: CLINIC | Age: 80
Setting detail: NUCLEAR MEDICINE
Discharge: HOME | End: 2025-01-07
Attending: INTERNAL MEDICINE
Payer: COMMERCIAL

## 2025-01-07 ENCOUNTER — HOSPITAL ENCOUNTER (OUTPATIENT)
Dept: CARDIOLOGY | Facility: CLINIC | Age: 80
Setting detail: NUCLEAR MEDICINE
Discharge: HOME | End: 2025-01-07
Attending: INTERNAL MEDICINE
Payer: COMMERCIAL

## 2025-01-07 ENCOUNTER — CARE COORDINATION (OUTPATIENT)
Dept: CARDIOLOGY | Facility: CLINIC | Age: 80
End: 2025-01-07

## 2025-01-07 DIAGNOSIS — I25.118 CORONARY ARTERY DISEASE INVOLVING NATIVE CORONARY ARTERY OF NATIVE HEART WITH OTHER FORM OF ANGINA PECTORIS: ICD-10-CM

## 2025-01-07 DIAGNOSIS — R07.9 CHEST PAIN, UNSPECIFIED TYPE: ICD-10-CM

## 2025-01-07 LAB
CV BLOOD PRESSURE: 72 MMHG
CV STRESS MAX HR HE: 81
NUC STRESS EJECTION FRACTION: 68 %
RATE PRESSURE PRODUCT: NORMAL
STRESS ECHO BASELINE DIASTOLIC HE: 80
STRESS ECHO BASELINE HR: 70 BPM
STRESS ECHO BASELINE SYSTOLIC BP: 84
STRESS ECHO CALCULATED PERCENT HR: 57 %
STRESS ECHO LAST STRESS DIASTOLIC BP: 72
STRESS ECHO LAST STRESS SYSTOLIC BP: 154
STRESS ECHO POST EXERCISE DUR MIN: 5 MIN
STRESS ECHO POST EXERCISE DUR SEC: 0 SEC
STRESS ECHO TARGET HR: 142

## 2025-01-07 PROCEDURE — 78452 HT MUSCLE IMAGE SPECT MULT: CPT

## 2025-01-07 PROCEDURE — 343N000001 HC RX 343 MED OP 636: Performed by: INTERNAL MEDICINE

## 2025-01-07 PROCEDURE — A9502 TC99M TETROFOSMIN: HCPCS | Performed by: INTERNAL MEDICINE

## 2025-01-07 PROCEDURE — 250N000011 HC RX IP 250 OP 636: Performed by: INTERNAL MEDICINE

## 2025-01-07 PROCEDURE — 93017 CV STRESS TEST TRACING ONLY: CPT

## 2025-01-07 RX ORDER — REGADENOSON 0.08 MG/ML
0.4 INJECTION, SOLUTION INTRAVENOUS ONCE
Status: COMPLETED | OUTPATIENT
Start: 2025-01-07 | End: 2025-01-07

## 2025-01-07 RX ADMIN — REGADENOSON 0.4 MG: 0.08 INJECTION, SOLUTION INTRAVENOUS at 10:52

## 2025-01-07 RX ADMIN — TETROFOSMIN 10.3 MILLICURIE: 1.38 INJECTION, POWDER, LYOPHILIZED, FOR SOLUTION INTRAVENOUS at 09:40

## 2025-01-07 RX ADMIN — TETROFOSMIN 30.8 MILLICURIE: 1.38 INJECTION, POWDER, LYOPHILIZED, FOR SOLUTION INTRAVENOUS at 12:05

## 2025-01-07 NOTE — PROGRESS NOTES
Lexiscan stress test completed 1/7/25 d/t history of CAD/stenting and some recent indigestion symptoms that have not resolved w/PPI. Pt had similar indigestion symptoms prior to her MI. Will route results to Dr. Covarrubias for further recommendations. Stephenie Barlow RN on 1/7/2025 at 5:10 PM      Details    Reading Physician Reading Date Result Priority   Chevy Ellsworth MD  [phone] 1/7/2025 Routine     Result Text       The nuclear stress test is abnormal.    There is a small perfusion defect of mild severity involving the left ventricular apex which is essentially fixed and consistent with a previous infarction in the LAD distribution with no significant dimitrios-infarct ischemia.    TID is absent.    Left ventricular function is normal.    The left ventricular ejection fraction at rest is 72%.  The left ventricular ejection fraction at stress is 68%.    A prior study was conducted on 5/18/2023.  In direct comparison to the previous study, the perfusion defect involving the apex is less prominent on the stress images.

## 2025-01-08 NOTE — TELEPHONE ENCOUNTER
Stress test looks okay.  There is no evidence of any new blockages.  I would recommend that she follow-up with her primary care provider to treat her indigestion symptoms and contact us if those get worse despite treatment in which case we might need to look further into her cardiac issues.  Artem Covarrubias MD

## 2025-01-09 ENCOUNTER — MYC MEDICAL ADVICE (OUTPATIENT)
Dept: CARDIOLOGY | Facility: CLINIC | Age: 80
End: 2025-01-09
Payer: COMMERCIAL

## 2025-01-09 NOTE — PROGRESS NOTES
Sent patient [employer] message with Dr. Covarrubias result note/recommendations regarding stress test.

## 2025-01-14 ENCOUNTER — OFFICE VISIT (OUTPATIENT)
Dept: INTERNAL MEDICINE | Facility: CLINIC | Age: 80
End: 2025-01-14
Payer: COMMERCIAL

## 2025-01-14 VITALS
BODY MASS INDEX: 22.96 KG/M2 | SYSTOLIC BLOOD PRESSURE: 152 MMHG | RESPIRATION RATE: 18 BRPM | TEMPERATURE: 98.4 F | HEIGHT: 63 IN | WEIGHT: 129.6 LBS | HEART RATE: 56 BPM | DIASTOLIC BLOOD PRESSURE: 60 MMHG | OXYGEN SATURATION: 97 %

## 2025-01-14 DIAGNOSIS — I25.118 CORONARY ARTERY DISEASE INVOLVING NATIVE CORONARY ARTERY OF NATIVE HEART WITH OTHER FORM OF ANGINA PECTORIS: ICD-10-CM

## 2025-01-14 DIAGNOSIS — D64.9 ANEMIA, UNSPECIFIED TYPE: Primary | ICD-10-CM

## 2025-01-14 DIAGNOSIS — G89.29 CHRONIC LEFT SACROILIAC PAIN: ICD-10-CM

## 2025-01-14 DIAGNOSIS — E11.9 TYPE 2 DIABETES MELLITUS WITHOUT COMPLICATION, WITHOUT LONG-TERM CURRENT USE OF INSULIN (H): ICD-10-CM

## 2025-01-14 DIAGNOSIS — E03.9 ACQUIRED HYPOTHYROIDISM: ICD-10-CM

## 2025-01-14 DIAGNOSIS — E11.00 TYPE 2 DIABETES MELLITUS WITH HYPEROSMOLARITY WITHOUT COMA, WITHOUT LONG-TERM CURRENT USE OF INSULIN (H): ICD-10-CM

## 2025-01-14 DIAGNOSIS — N18.32 CKD STAGE 3B, GFR 30-44 ML/MIN (H): ICD-10-CM

## 2025-01-14 DIAGNOSIS — M53.3 CHRONIC LEFT SACROILIAC PAIN: ICD-10-CM

## 2025-01-14 LAB
ERYTHROCYTE [DISTWIDTH] IN BLOOD BY AUTOMATED COUNT: 14 % (ref 10–15)
HCT VFR BLD AUTO: 29.4 % (ref 35–47)
HGB BLD-MCNC: 9.1 G/DL (ref 11.7–15.7)
IRON BINDING CAPACITY (ROCHE): 269 UG/DL (ref 240–430)
IRON SATN MFR SERPL: 22 % (ref 15–46)
IRON SERPL-MCNC: 58 UG/DL (ref 37–145)
MCH RBC QN AUTO: 28.3 PG (ref 26.5–33)
MCHC RBC AUTO-ENTMCNC: 31 G/DL (ref 31.5–36.5)
MCV RBC AUTO: 92 FL (ref 78–100)
PLATELET # BLD AUTO: 180 10E3/UL (ref 150–450)
RBC # BLD AUTO: 3.21 10E6/UL (ref 3.8–5.2)
RETICS # AUTO: 0.04 10E6/UL (ref 0.03–0.1)
RETICS/RBC NFR AUTO: 1.3 % (ref 0.5–2)
VIT B12 SERPL-MCNC: 3996 PG/ML (ref 232–1245)
WBC # BLD AUTO: 3.9 10E3/UL (ref 4–11)

## 2025-01-14 PROCEDURE — 83540 ASSAY OF IRON: CPT | Performed by: INTERNAL MEDICINE

## 2025-01-14 PROCEDURE — 99214 OFFICE O/P EST MOD 30 MIN: CPT | Performed by: INTERNAL MEDICINE

## 2025-01-14 PROCEDURE — G2211 COMPLEX E/M VISIT ADD ON: HCPCS | Performed by: INTERNAL MEDICINE

## 2025-01-14 PROCEDURE — 85045 AUTOMATED RETICULOCYTE COUNT: CPT | Performed by: INTERNAL MEDICINE

## 2025-01-14 PROCEDURE — 85027 COMPLETE CBC AUTOMATED: CPT | Performed by: INTERNAL MEDICINE

## 2025-01-14 PROCEDURE — 83550 IRON BINDING TEST: CPT | Performed by: INTERNAL MEDICINE

## 2025-01-14 PROCEDURE — 82607 VITAMIN B-12: CPT | Performed by: INTERNAL MEDICINE

## 2025-01-14 PROCEDURE — 82668 ASSAY OF ERYTHROPOIETIN: CPT | Mod: 90 | Performed by: INTERNAL MEDICINE

## 2025-01-14 PROCEDURE — 99000 SPECIMEN HANDLING OFFICE-LAB: CPT | Performed by: INTERNAL MEDICINE

## 2025-01-14 PROCEDURE — 36415 COLL VENOUS BLD VENIPUNCTURE: CPT | Performed by: INTERNAL MEDICINE

## 2025-01-14 RX ORDER — LEVOTHYROXINE SODIUM 75 UG/1
75 TABLET ORAL DAILY
Qty: 90 TABLET | Refills: 0 | Status: CANCELLED | OUTPATIENT
Start: 2025-01-14

## 2025-01-14 RX ORDER — LEVOTHYROXINE SODIUM 75 UG/1
75 TABLET ORAL DAILY
Qty: 90 TABLET | Refills: 3 | Status: SHIPPED | OUTPATIENT
Start: 2025-01-14

## 2025-01-14 RX ORDER — LANCETS
EACH MISCELLANEOUS
Qty: 100 EACH | Refills: 2 | Status: SHIPPED | OUTPATIENT
Start: 2025-01-14

## 2025-01-14 RX ORDER — LANCETS
EACH MISCELLANEOUS
Qty: 100 EACH | Refills: 2 | Status: CANCELLED | OUTPATIENT
Start: 2025-01-14

## 2025-01-14 ASSESSMENT — PAIN SCALES - GENERAL: PAINLEVEL_OUTOF10: MODERATE PAIN (5)

## 2025-01-14 NOTE — PROGRESS NOTES
Assessment & Plan   Problem List Items Addressed This Visit       Type 2 diabetes mellitus without complication, without long-term current use of insulin (H)    Relevant Medications    levothyroxine (SYNTHROID/LEVOTHROID) 75 MCG tablet    blood glucose monitoring (SOFTCLIX) lancets    Coronary artery disease involving native coronary artery of native heart with other form of angina pectoris    CKD stage 3b, GFR 30-44 ml/min (H)     Other Visit Diagnoses       Anemia, unspecified type    -  Primary    Relevant Orders    CBC with platelets    Reticulocyte count    Iron and iron binding capacity    Vitamin B12    Acquired hypothyroidism        Relevant Medications    levothyroxine (SYNTHROID/LEVOTHROID) 75 MCG tablet    Chronic left sacroiliac pain        Relevant Medications    diclofenac (VOLTAREN) 1 % topical gel    Type 2 diabetes mellitus with hyperosmolarity without coma, without long-term current use of insulin (H)        Relevant Medications    levothyroxine (SYNTHROID/LEVOTHROID) 75 MCG tablet    blood glucose monitoring (SOFTCLIX) lancets           Patient who had some atypical symptoms of fatigue may be chest pain has stress test because she has had a stent in the past which shows an area of infarction but good EF no new ischemia.  She mostly complains about fatigue not reflux or chest pain.  I think her fatigue is from her chronic anemia has been there a couple years she had a normal EGD and colonoscopy in 20-23.  Will repeat her iron studies, B12, reticulocyte and CBC today.  Continue on oral iron and oral B12.  Eventually may need a small intestine capsule endoscopy.    Hypothyroidism will refill her levothyroxine since her TSH was normal in mid December    Diabetes is doing okay will refill her lancets.             The longitudinal plan of care for the diagnosis(es)/condition(s) as documented were addressed during this visit. Due to the added complexity in care, I will continue to support Vilma in the  "subsequent management and with ongoing continuity of care.        Subjective   Vilma is a 79 year old, presenting for the following health issues:  Results (Discuss stress test) and Pain (From her buttocks down left leg, ongoing)      1/14/2025     3:09 PM   Additional Questions   Roomed by Nelli HESS         1/14/2025     3:09 PM   Patient Reported Additional Medications   Patient reports taking the following new medications coq10, iron     Pain    History of Present Illness       Back Pain:  She presents for follow up of back pain. Patient's back pain is a recurring problem.  Location of back pain:  Left buttock and other  Description of back pain: sharp  Back pain spreads: left thigh    Since patient first noticed back pain, pain is: always present, but gets better and worse  Does back pain interfere with her job:  No       Reason for visit:  Discuss stress test and pain in left buttocks that goes to left leg    She eats 2-3 servings of fruits and vegetables daily.She consumes 0 sweetened beverage(s) daily.She exercises with enough effort to increase her heart rate 20 to 29 minutes per day.  She exercises with enough effort to increase her heart rate 5 days per week.   She is taking medications regularly.     She had seen cardiology and got a stress test done which showed a fixed infarct and normal EF.     She is still tired a lot, no chest pains, some sob, goes to bed early at 8 PM and wakes up at 6 AM .     Diabetes is good at 6.5     Anemic at hgb of 9.5, taking iron 65 mg for a year. No obvious blood loss, taking iron and b12,     Egd done in 2023 was ok, , colonoscopy in 2022.    Thyroid was ok on treatment.    Left lateral hip/buttock pain, tylenol helped some uses Blue emu on it as well.         Objective    BP (!) 152/60   Pulse 56   Temp 98.4  F (36.9  C) (Temporal)   Resp 18   Ht 1.6 m (5' 3\")   Wt 58.8 kg (129 lb 9.6 oz)   SpO2 97%   BMI 22.96 kg/m    Body mass index is 22.96 kg/m .  Physical Exam "   No acute distress  Thin  Heart is regular  Lungs are clear  Abdomen is soft benign  Left sacroiliac area has slight tenderness but normal range of motion of the hip            Signed Electronically by: Wang Vargas MD

## 2025-01-16 ENCOUNTER — PATIENT OUTREACH (OUTPATIENT)
Dept: ONCOLOGY | Facility: CLINIC | Age: 80
End: 2025-01-16
Payer: COMMERCIAL

## 2025-01-16 NOTE — PROGRESS NOTES
Hematology referral reviewed for [facility], see below.    Referral reason: referral received from primary care for chronic anemia with normal B12 and iron panel, no ferritin done    Clinical question entered by referring provider or through order transcription: chronic anemia, negative scopes, iron ok b12 ok     Referral received via: Internal referral by [facility]    Current abnormal labs: Available in Chart Review    Outreach: MyChart sent to patient    Plan: Triage instructions updated and sent to NPS for completion.

## 2025-01-18 LAB — EPO SERPL-ACNC: 22 MU/ML

## 2025-01-30 NOTE — TELEPHONE ENCOUNTER
RECORDS STATUS - ALL OTHER DIAGNOSIS      RECORDS RECEIVED FROM: [facility]   NOTES STATUS DETAILS   OFFICE NOTE from referring provider Epic 1/14/25: Dr. Wang puga   MEDICATION LIST [facility]    LABS     ANYTHING RELATED TO DIAGNOSIS Epic Most recent 1/14/25

## 2025-02-13 ENCOUNTER — TRANSFERRED RECORDS (OUTPATIENT)
Dept: HEALTH INFORMATION MANAGEMENT | Facility: CLINIC | Age: 80
End: 2025-02-13
Payer: COMMERCIAL

## 2025-03-15 ENCOUNTER — HEALTH MAINTENANCE LETTER (OUTPATIENT)
Age: 80
End: 2025-03-15

## 2025-04-09 ENCOUNTER — OFFICE VISIT (OUTPATIENT)
Dept: INTERNAL MEDICINE | Facility: CLINIC | Age: 80
End: 2025-04-09
Attending: INTERNAL MEDICINE
Payer: COMMERCIAL

## 2025-04-09 VITALS
BODY MASS INDEX: 22.07 KG/M2 | HEART RATE: 50 BPM | WEIGHT: 129.3 LBS | OXYGEN SATURATION: 98 % | RESPIRATION RATE: 18 BRPM | DIASTOLIC BLOOD PRESSURE: 60 MMHG | HEIGHT: 64 IN | TEMPERATURE: 97.5 F | SYSTOLIC BLOOD PRESSURE: 146 MMHG

## 2025-04-09 DIAGNOSIS — D64.9 ANEMIA, UNSPECIFIED TYPE: ICD-10-CM

## 2025-04-09 DIAGNOSIS — N18.32 CKD STAGE 3B, GFR 30-44 ML/MIN (H): ICD-10-CM

## 2025-04-09 DIAGNOSIS — E03.9 ACQUIRED HYPOTHYROIDISM: ICD-10-CM

## 2025-04-09 DIAGNOSIS — K86.2 PANCREAS CYST: ICD-10-CM

## 2025-04-09 DIAGNOSIS — E11.9 TYPE 2 DIABETES MELLITUS WITHOUT COMPLICATION, WITHOUT LONG-TERM CURRENT USE OF INSULIN (H): ICD-10-CM

## 2025-04-09 DIAGNOSIS — I10 HYPERTENSION, UNSPECIFIED TYPE: ICD-10-CM

## 2025-04-09 DIAGNOSIS — R10.13 EPIGASTRIC PAIN: ICD-10-CM

## 2025-04-09 DIAGNOSIS — Z00.00 ENCOUNTER FOR MEDICARE ANNUAL WELLNESS EXAM: Primary | ICD-10-CM

## 2025-04-09 DIAGNOSIS — I25.118 CORONARY ARTERY DISEASE INVOLVING NATIVE CORONARY ARTERY OF NATIVE HEART WITH OTHER FORM OF ANGINA PECTORIS: ICD-10-CM

## 2025-04-09 LAB
ALBUMIN SERPL BCG-MCNC: 4.7 G/DL (ref 3.5–5.2)
ALP SERPL-CCNC: 79 U/L (ref 40–150)
ALT SERPL W P-5'-P-CCNC: 20 U/L (ref 0–50)
ANION GAP SERPL CALCULATED.3IONS-SCNC: 8 MMOL/L (ref 7–15)
AST SERPL W P-5'-P-CCNC: 22 U/L (ref 0–45)
BILIRUB SERPL-MCNC: 0.2 MG/DL
BUN SERPL-MCNC: 35 MG/DL (ref 8–23)
CALCIUM SERPL-MCNC: 10.1 MG/DL (ref 8.8–10.4)
CHLORIDE SERPL-SCNC: 103 MMOL/L (ref 98–107)
CREAT SERPL-MCNC: 1.35 MG/DL (ref 0.51–0.95)
EGFRCR SERPLBLD CKD-EPI 2021: 40 ML/MIN/1.73M2
ERYTHROCYTE [DISTWIDTH] IN BLOOD BY AUTOMATED COUNT: 13.6 % (ref 10–15)
EST. AVERAGE GLUCOSE BLD GHB EST-MCNC: 154 MG/DL
GLUCOSE SERPL-MCNC: 94 MG/DL (ref 70–99)
HBA1C MFR BLD: 7 %
HCO3 SERPL-SCNC: 26 MMOL/L (ref 22–29)
HCT VFR BLD AUTO: 32.6 % (ref 35–47)
HGB BLD-MCNC: 10.4 G/DL (ref 11.7–15.7)
MCH RBC QN AUTO: 29.3 PG (ref 26.5–33)
MCHC RBC AUTO-ENTMCNC: 31.9 G/DL (ref 31.5–36.5)
MCV RBC AUTO: 92 FL (ref 78–100)
PLATELET # BLD AUTO: 138 10E3/UL (ref 150–450)
POTASSIUM SERPL-SCNC: 5.3 MMOL/L (ref 3.4–5.3)
PROT SERPL-MCNC: 7.6 G/DL (ref 6.4–8.3)
RBC # BLD AUTO: 3.55 10E6/UL (ref 3.8–5.2)
SODIUM SERPL-SCNC: 137 MMOL/L (ref 135–145)
WBC # BLD AUTO: 5.5 10E3/UL (ref 4–11)

## 2025-04-09 PROCEDURE — 36415 COLL VENOUS BLD VENIPUNCTURE: CPT | Performed by: INTERNAL MEDICINE

## 2025-04-09 RX ORDER — OMEPRAZOLE 20 MG/1
20 CAPSULE, DELAYED RELEASE ORAL DAILY
Qty: 90 CAPSULE | Refills: 3 | Status: SHIPPED | OUTPATIENT
Start: 2025-04-09

## 2025-04-09 SDOH — HEALTH STABILITY: PHYSICAL HEALTH: ON AVERAGE, HOW MANY DAYS PER WEEK DO YOU ENGAGE IN MODERATE TO STRENUOUS EXERCISE (LIKE A BRISK WALK)?: 3 DAYS

## 2025-04-09 ASSESSMENT — SOCIAL DETERMINANTS OF HEALTH (SDOH): HOW OFTEN DO YOU GET TOGETHER WITH FRIENDS OR RELATIVES?: ONCE A WEEK

## 2025-04-09 ASSESSMENT — PAIN SCALES - GENERAL: PAINLEVEL_OUTOF10: NO PAIN (0)

## 2025-04-09 NOTE — PROGRESS NOTES
Preventive Care Visit  [facility]  Wang Vargas MD, Internal Medicine  Apr 9, 2025      Assessment & Plan   Problem List Items Addressed This Visit       Type 2 diabetes mellitus without complication, without long-term current use of insulin (H)    Relevant Orders    Hemoglobin A1c    Coronary artery disease involving native coronary artery of native heart with other form of angina pectoris    CKD stage 3b, GFR 30-44 ml/min (H)    Relevant Orders    Comprehensive metabolic panel (BMP + Alb, Alk Phos, ALT, AST, Total. Bili, TP)     Other Visit Diagnoses       Encounter for Medicare annual wellness exam    -  Primary    Hypertension, unspecified type        Acquired hypothyroidism        Epigastric pain        Relevant Medications    omeprazole (PRILOSEC) 20 MG DR capsule    Anemia, unspecified type        Relevant Orders    CBC with platelets           Patient is here for Medicare wellness exam.  She is living in a townhouse with her .  Provides care for her , she continues to drive.  Immunizations are good she get a flu shot in the fall  No longer doing mammograms or colonoscopies.    Other issues addressed chronic kidney disease stage IIIb will recheck her kidney function today.  She continues on losartan.  Stay hydrated.    Coronary disease she is on aspirin atorvastatin carvedilol losartan overall doing well    Diabetes she is on metformin we will check her A1c again and has been good.    Acquired hypothyroidism continue levothyroxine and has had her TSH checked.    She does have his chronic anemia he had normal iron and B12 but her reticulocyte count continues to be low she has had an EGD and colonoscopy back in 2023 we will continue her proton pump inhibitor 1 pill daily.  But she is seeing hematology for follow-up of her anemia.    Pancreatic lesion cysts that have been seen previously will do an ultrasound and MRI later in April for 6-month checkup.    Blood  pressure continues to run a little bit high today 146/60 even on recheck.  He is already on carvedilol with some bradycardia so I do not want to increase that she is on Dyazide and losartan we will follow this over the next few appointments and if high then treat.  She is having more stress caring for her .          Patient has been advised of split billing requirements and indicates understanding: Yes       Counseling  Appropriate preventive services were addressed with this patient via screening, questionnaire, or discussion as appropriate for fall prevention, nutrition, physical activity, Tobacco-use cessation, social engagement, weight loss and cognition.  Checklist reviewing preventive services available has been given to the patient.  Reviewed patient's diet, addressing concerns and/or questions.   She is at risk for lack of exercise and has been provided with information to increase physical activity for the benefit of her well-being.   Information on urinary incontinence and treatment options given to patient.     FUTURE APPOINTMENTS:       - Follow-up for annual visit or as needed    The longitudinal plan of care for the diagnosis(es)/condition(s) as documented were addressed during this visit. Due to the added complexity in care, I will continue to support Vilma in the subsequent management and with ongoing continuity of care.  Subjective   Vilma is a 79 year old, presenting for the following:  Wellness Visit        4/9/2025     9:53 AM   Additional Questions   Roomed by Anny         4/9/2025     9:53 AM   Patient Reported Additional Medications   Patient reports taking the following new medications Folic acid 1 mg, Methotrexate 2.5 mg       HPI     Lives in [facility].  , lives in [facility], she does the driving.  has alzheimers and bladder cancer.     She had anemia and normal b12 and iron.  Will see hematology April 23rd.       Has methotrexate for rheumatology.     Pancreas cyst has  follow up MRI and ultrasound at the end of month,     Bladder leaking, no diarrhea or constipation.no urinary pain, no hematuria. Will schedule bathroom breaks.           Advance Care Planning  Patient does not have a Health Care Directive: Discussed advance care planning with patient; however, patient declined at this time.      4/9/2025   General Health   How would you rate your overall physical health? Good   Feel stress (tense, anxious, or unable to sleep) Only a little   (!) STRESS CONCERN      4/9/2025   Nutrition   Diet: Regular (no restrictions)         4/9/2025   Exercise   Days per week of moderate/strenous exercise 3 days         4/9/2025   Social Factors   Frequency of gathering with friends or relatives Once a week   Worry food won't last until get money to buy more No   Food not last or not have enough money for food? No   Do you have housing? (Housing is defined as stable permanent housing and does not include staying ouside in a car, in a tent, in an abandoned building, in an overnight shelter, or couch-surfing.) Yes   Are you worried about losing your housing? No   Lack of transportation? No   Unable to get utilities (heat,electricity)? No         4/9/2025   Fall Risk   Fallen 2 or more times in the past year? No   Trouble with walking or balance? No          4/9/2025   Activities of Daily Living- Home Safety   Needs help with the following daily activites None of the above   Safety concerns in the home None of the above         4/9/2025   Dental   Dentist two times every year? Yes         4/9/2025   Hearing Screening   Hearing concerns? None of the above         4/9/2025   Driving Risk Screening   Patient/family members have concerns about driving No         4/9/2025   General Alertness/Fatigue Screening   Have you been more tired than usual lately? No         4/9/2025   Urinary Incontinence Screening   Bothered by leaking urine in past 6 months Yes             Today's PHQ-2 Score:       4/9/2025     10:03 AM   PHQ-2 ( 1999 [employer])   Q1: Little interest or pleasure in doing things 0   Q2: Feeling down, depressed or hopeless 0   PHQ-2 Score 0         4/9/2025   Substance Use   Alcohol more than 3/day or more than 7/wk No   Do you have a current opioid prescription? No   How severe/bad is pain from 1 to 10? 2/10   Do you use any other substances recreationally? No    (!) PRESCRIPTION DRUGS       Multiple values from one day are sorted in reverse-chronological order     Social History     Tobacco Use    Smoking status: Never    Smokeless tobacco: Never   Vaping Use    Vaping status: Never Used   Substance Use Topics    Alcohol use: Yes     Comment: occ    Drug use: Never             11/15/2023   LAST FHS-7 RESULTS   1st degree relative breast or ovarian cancer No   Any relative bilateral breast cancer No   Any male have breast cancer No   Any ONE woman have BOTH breast AND ovarian cancer No   Any woman with breast cancer before 50yrs No   2 or more relatives with breast AND/OR ovarian cancer No   2 or more relatives with breast AND/OR bowel cancer No       Mammogram Screening - After age 74- determine frequency with patient based on health status, life expectancy and patient goals    ASCVD Risk   The ASCVD Risk score (Radha PANDYA, et al., 2019) failed to calculate for the following reasons:    Risk score cannot be calculated because patient has a medical history suggesting prior/existing ASCVD    Reviewed and updated as needed this visit by Provider                    Lab work is in process  Current providers sharing in care for this patient include:  Patient Care Team:  Wang Vargas MD as PCP - General (Internal Medicine)  Wang Vargas MD as Assigned PCP  Piedad Bourgeois APRN CNP as Nurse Practitioner  Artem Covarrubias MD as Assigned Heart and Vascular Provider  Case Swenson MD as Assigned Musculoskeletal Provider  Andres Trujillo DO as Assigned Surgical Provider  Elena Duran MD  as MD (Otolaryngology)  Cassandra Pillai MD as MD (Hematology & Oncology)    The following health maintenance items are reviewed in Epic and correct as of today:  Health Maintenance   Topic Date Due    PARATHYROID  Never done    PHOSPHORUS  Never done    RSV VACCINE (1 - 1-dose 75+ series) Never done    DIABETIC FOOT EXAM  04/05/2022    DTAP/TDAP/TD IMMUNIZATION (2 - Td or Tdap) 08/15/2023    COVID-19 Vaccine (6 - 2024-25 season) 09/01/2024    MEDICARE ANNUAL WELLNESS VISIT  01/16/2025    A1C  03/13/2025    ANNUAL REVIEW OF HM ORDERS  10/09/2025    MICROALBUMIN  06/13/2025    EYE EXAM  10/16/2025    BMP  12/03/2025    LIPID  12/03/2025    TSH W/FREE T4 REFLEX  12/13/2025    COLORECTAL CANCER SCREENING  12/15/2025    HEMOGLOBIN  01/14/2026    FALL RISK ASSESSMENT  04/09/2026    ADVANCE CARE PLANNING  10/09/2029    DEXA  10/04/2038    HEPATITIS C SCREENING  Completed    PHQ-2 (once per calendar year)  Completed    INFLUENZA VACCINE  Completed    Pneumococcal Vaccine: 50+ Years  Completed    URINALYSIS  Completed    ALK PHOS  Completed    ZOSTER IMMUNIZATION  Completed    HPV IMMUNIZATION  Aged Out    MENINGITIS IMMUNIZATION  Aged Out    MAMMO SCREENING  Discontinued         Review of Systems  CONSTITUTIONAL: NEGATIVE for fever, chills, change in weight  INTEGUMENTARY/SKIN: NEGATIVE for worrisome rashes, moles or lesions  EYES: NEGATIVE for vision changes or irritation  ENT/MOUTH: NEGATIVE for ear, mouth and throat problems  RESP: NEGATIVE for significant cough or SOB  BREAST: NEGATIVE for masses, tenderness or discharge  CV: NEGATIVE for chest pain, palpitations or peripheral edema  GI: NEGATIVE for nausea, abdominal pain, heartburn, or change in bowel habits  : NEGATIVE for frequency, dysuria, or hematuria  MUSCULOSKELETAL: NEGATIVE for significant arthralgias or myalgia  NEURO: NEGATIVE for weakness, dizziness or paresthesias  ENDOCRINE: NEGATIVE for temperature intolerance, skin/hair changes  HEME: NEGATIVE for  "bleeding problems  PSYCHIATRIC: NEGATIVE for changes in mood or affect     Objective    Exam  BP (!) 142/68 (BP Location: Left arm, Patient Position: Sitting, Cuff Size: Adult Regular)   Pulse 50   Temp 97.5  F (36.4  C) (Temporal)   Resp 18   Ht 1.62 m (5' 3.78\")   Wt 58.7 kg (129 lb 4.8 oz)   SpO2 98%   BMI 22.35 kg/m     Estimated body mass index is 22.35 kg/m  as calculated from the following:    Height as of this encounter: 1.62 m (5' 3.78\").    Weight as of this encounter: 58.7 kg (129 lb 4.8 oz).    Physical Exam  GENERAL: alert and no distress  EYES: Eyes grossly normal to inspection, PERRL and conjunctivae and sclerae normal  HENT: ear canals and TM's normal, nose and mouth without ulcers or lesions  NECK: no adenopathy, no asymmetry, masses, or scars  RESP: lungs clear to auscultation - no rales, rhonchi or wheezes  CV: regular rate and rhythm, normal S1 S2, no S3 or S4, no murmur, click or rub, no peripheral edema  ABDOMEN: soft, nontender, no hepatosplenomegaly, no masses and bowel sounds normal  MS: no gross musculoskeletal defects noted, no edema  SKIN: no suspicious lesions or rashes  NEURO: Normal strength and tone, mentation intact and speech normal  PSYCH: mentation appears normal, affect normal/bright        4/9/2025   Mini Cog   Clock Draw Score 0 Abnormal   3 Item Recall 2 objects recalled   Mini Cog Total Score 2              Signed Electronically by: Wang Vargas MD    "

## 2025-04-23 ENCOUNTER — PRE VISIT (OUTPATIENT)
Dept: ONCOLOGY | Facility: CLINIC | Age: 80
End: 2025-04-23
Payer: COMMERCIAL

## 2025-04-23 ENCOUNTER — ANCILLARY PROCEDURE (OUTPATIENT)
Dept: GENERAL RADIOLOGY | Facility: CLINIC | Age: 80
End: 2025-04-23
Attending: INTERNAL MEDICINE
Payer: COMMERCIAL

## 2025-04-23 ENCOUNTER — ONCOLOGY VISIT (OUTPATIENT)
Dept: ONCOLOGY | Facility: CLINIC | Age: 80
End: 2025-04-23
Attending: INTERNAL MEDICINE
Payer: COMMERCIAL

## 2025-04-23 VITALS
DIASTOLIC BLOOD PRESSURE: 61 MMHG | RESPIRATION RATE: 16 BRPM | WEIGHT: 129.5 LBS | OXYGEN SATURATION: 97 % | HEART RATE: 54 BPM | BODY MASS INDEX: 22.95 KG/M2 | SYSTOLIC BLOOD PRESSURE: 111 MMHG | HEIGHT: 63 IN

## 2025-04-23 DIAGNOSIS — R10.2 PELVIC PAIN IN FEMALE: ICD-10-CM

## 2025-04-23 DIAGNOSIS — M25.552 HIP PAIN, LEFT: ICD-10-CM

## 2025-04-23 DIAGNOSIS — D64.9 ANEMIA, UNSPECIFIED TYPE: Primary | ICD-10-CM

## 2025-04-23 DIAGNOSIS — D69.6 THROMBOCYTOPENIA: ICD-10-CM

## 2025-04-23 DIAGNOSIS — Z11.59 ENCOUNTER FOR SCREENING FOR OTHER VIRAL DISEASES: ICD-10-CM

## 2025-04-23 LAB
BASOPHILS # BLD AUTO: 0 10E3/UL (ref 0–0.2)
BASOPHILS NFR BLD AUTO: 1 %
EOSINOPHIL # BLD AUTO: 0.1 10E3/UL (ref 0–0.7)
EOSINOPHIL NFR BLD AUTO: 2 %
ERYTHROCYTE [DISTWIDTH] IN BLOOD BY AUTOMATED COUNT: 14.1 % (ref 10–15)
FERRITIN SERPL-MCNC: 165 NG/ML (ref 11–328)
FOLATE SERPL-MCNC: >40 NG/ML (ref 4.6–34.8)
HCT VFR BLD AUTO: 31.7 % (ref 35–47)
HGB BLD-MCNC: 9.9 G/DL (ref 11.7–15.7)
IMM GRANULOCYTES # BLD: 0 10E3/UL
IMM GRANULOCYTES NFR BLD: 1 %
IRON BINDING CAPACITY (ROCHE): 286 UG/DL (ref 240–430)
IRON SATN MFR SERPL: 18 % (ref 15–46)
IRON SERPL-MCNC: 51 UG/DL (ref 37–145)
LYMPHOCYTES # BLD AUTO: 0.9 10E3/UL (ref 0.8–5.3)
LYMPHOCYTES NFR BLD AUTO: 22 %
MCH RBC QN AUTO: 29.2 PG (ref 26.5–33)
MCHC RBC AUTO-ENTMCNC: 31.2 G/DL (ref 31.5–36.5)
MCV RBC AUTO: 94 FL (ref 78–100)
MONOCYTES # BLD AUTO: 0.5 10E3/UL (ref 0–1.3)
MONOCYTES NFR BLD AUTO: 13 %
NEUTROPHILS # BLD AUTO: 2.5 10E3/UL (ref 1.6–8.3)
NEUTROPHILS NFR BLD AUTO: 62 %
NRBC # BLD AUTO: 0 10E3/UL
NRBC BLD AUTO-RTO: 0 /100
PLATELET # BLD AUTO: 134 10E3/UL (ref 150–450)
RBC # BLD AUTO: 3.39 10E6/UL (ref 3.8–5.2)
RETICS # AUTO: 0.03 10E6/UL
RETICS/RBC NFR AUTO: 1 %
TOTAL PROTEIN SERUM FOR ELP: 7 G/DL (ref 6.4–8.3)
WBC # BLD AUTO: 4 10E3/UL (ref 4–11)

## 2025-04-23 PROCEDURE — 83540 ASSAY OF IRON: CPT

## 2025-04-23 PROCEDURE — 84165 PROTEIN E-PHORESIS SERUM: CPT | Mod: 26 | Performed by: PATHOLOGY

## 2025-04-23 PROCEDURE — 73522 X-RAY EXAM HIPS BI 3-4 VIEWS: CPT | Performed by: RADIOLOGY

## 2025-04-23 PROCEDURE — 87389 HIV-1 AG W/HIV-1&-2 AB AG IA: CPT

## 2025-04-23 PROCEDURE — 84155 ASSAY OF PROTEIN SERUM: CPT

## 2025-04-23 PROCEDURE — 86706 HEP B SURFACE ANTIBODY: CPT

## 2025-04-23 PROCEDURE — 82728 ASSAY OF FERRITIN: CPT

## 2025-04-23 PROCEDURE — G0463 HOSPITAL OUTPT CLINIC VISIT: HCPCS | Performed by: INTERNAL MEDICINE

## 2025-04-23 PROCEDURE — 82746 ASSAY OF FOLIC ACID SERUM: CPT

## 2025-04-23 PROCEDURE — 82607 VITAMIN B-12: CPT

## 2025-04-23 PROCEDURE — 85025 COMPLETE CBC W/AUTO DIFF WBC: CPT

## 2025-04-23 PROCEDURE — 87340 HEPATITIS B SURFACE AG IA: CPT

## 2025-04-23 PROCEDURE — 86803 HEPATITIS C AB TEST: CPT

## 2025-04-23 PROCEDURE — 86704 HEP B CORE ANTIBODY TOTAL: CPT

## 2025-04-23 PROCEDURE — 85045 AUTOMATED RETICULOCYTE COUNT: CPT

## 2025-04-23 PROCEDURE — 83550 IRON BINDING TEST: CPT

## 2025-04-23 PROCEDURE — 99207 BLOOD MORPHOLOGY PATHOLOGIST REVIEW: CPT | Performed by: STUDENT IN AN ORGANIZED HEALTH CARE EDUCATION/TRAINING PROGRAM

## 2025-04-23 RX ORDER — FERROUS SULFATE 324(65)MG
TABLET, DELAYED RELEASE (ENTERIC COATED) ORAL
COMMUNITY

## 2025-04-23 RX ORDER — UBIDECARENONE 75 MG
100 CAPSULE ORAL DAILY
COMMUNITY

## 2025-04-23 RX ORDER — METHOTREXATE SODIUM 2.5 MG/1
TABLET ORAL WEEKLY
COMMUNITY

## 2025-04-23 ASSESSMENT — PAIN SCALES - GENERAL: PAINLEVEL_OUTOF10: MILD PAIN (3)

## 2025-04-23 NOTE — PROGRESS NOTES
Hematology initial visit:  Date on this visit: 4/23/2025    Zaria Gaona  is referred by Dr.Paul MICHA Vargas for a hematology consultation. She requires evaluation for anemia and thrombocytopenia    Primary Physician: Wang Vargas     History Of Present Illness:  Ms. Gaona is a 79 year old female who presents with anemia and thrombocytopenia.    She has had anemia at least since 2022 and off-and-on thrombocytopenia at least since 2023.  She has a history of CAD and type 2 diabetes, hypertension.  She has had CKD at least since 2023.    She tells me that she has been feeling somewhat fatigued.  For the last 1 months she has also been noticing some pain in the left lower back/hip region.  It has not significantly changed over the last 1 month.  No neurological problems apart from mild diabetic neuropathy in the fingers and feet.  No B symptoms.  No bleeding.  No shortness of breath or fevers.  No GI problems.      ROS:  A comprehensive ROS was otherwise neg    Past Medical/Surgical History:  Past Medical History:   Diagnosis Date    Essential hypertension     NSTEMI (non-ST elevated myocardial infarction) (H) 12/2020    2 stents    Type 2 diabetes mellitus without complication (H) 2005   Hypothyroidism.  Past Surgical History:   Procedure Laterality Date    COLONOSCOPY N/A 12/15/2022    Procedure: COLONOSCOPY, WITH POLYPECTOMY;  Surgeon: Andres Trujillo DO;  Location: [facility]    ESOPHAGOSCOPY, GASTROSCOPY, DUODENOSCOPY (EGD), COMBINED N/A 6/27/2023    Procedure: ESOPHAGOGASTRODUODENOSCOPY, WITH BIOPSY;  Surgeon: Med Asif MD;  Location: [facility]    INJECT BLOCK MEDIAL BRANCH CERVICAL/THORACIC/LUMBAR Left 8/11/2022    Procedure: Left Lumbar 3, Left Lumbar 4, and Left Lumbar 5 medial branch nerve blocks using fluoroscopic guidance and contrast control;  Surgeon: Malick Gomez MD;  [facility]: [facility]    INJECT EPIDURAL LUMBAR Left 5/20/2022    Procedure: Left lumbar 4-5 interlaminar epidural injection  ;  Surgeon: Malick Gomez MD;  [facility]: [facility]    INJECT JOINT SACROILIAC Left 7/1/2022    Procedure: Fluoroscopically-guided injection of the Left sacroiliac joint with Left dimitrios Sacroiliac joint ligaments infiltration over the sacrum.;  Surgeon: Malick Gomez MD;  [facility]    PHACOEMULSIFICATION WITH STANDARD INTRAOCULAR LENS IMPLANT Right 10/17/2024    Procedure: Phacoemulsification with standard intraocular lens implant with Istent right;  Surgeon: Yo Martinez MD;  [facility]    PHACOEMULSIFICATION WITH STANDARD INTRAOCULAR LENS IMPLANT Left 10/31/2024    Procedure: Phacoemulsification with standard intraocular lens implant with Istent left;  Surgeon: Yo Martinez MD;  [facility]    RELEASE CARPAL TUNNEL Left 4/26/2024    Procedure: RELEASE, CARPAL TUNNEL left;  Surgeon: Case Swenson MD;  Location: [facility]    VAGINAL PROLAPSE REPAIR      done in [place]     Allergies:  Allergies as of 04/23/2025 - Reviewed 04/23/2025   Allergen Reaction Noted    Ampicillin Other (See Comments), Hives, and Itching 11/24/2009    Lisinopril Cough 02/23/2021    Sulfamethoxazole-trimethoprim Other (See Comments), Hives, and Itching 11/24/2009     Current Medications:  Current Outpatient Medications   Medication Sig Dispense Refill    acetaminophen (TYLENOL) 325 MG tablet Take 650 mg by mouth      alcohol swab prep pads Use to swab area of injection/shana as directed 100 each 3    Ascorbic Acid (VITAMIN C) 500 MG CAPS Take 500 mg by mouth daily      aspirin (ASA) 81 MG chewable tablet Take 1 tablet (81 mg) by mouth daily 90 tablet 3    atorvastatin (LIPITOR) 80 MG tablet Take 1 tablet (80 mg) by mouth daily 90 tablet 3    blood glucose (ACCU-CHEK GUIDE) test strip USE 1 STRIP TO TEST EVERY DAY OR AS DIRECTED - (KEEP UNUSED STRIPS IN  ORIGINAL SEALED CONTAINER BETWEEN USES) 100 strip 2    blood glucose calibration (NO BRAND SPECIFIED) solution Use to calibrate blood glucose monitor  as needed as directed. To accompany: Blood Glucose Monitor Brands: per insurance. 1 Bottle 3    blood glucose monitoring (SOFTCLIX) lancets USE 1 LANCET TO TEST EVERY DAY OR AS DIRECTED - (DISCARD LANCET IN  APPROPRIATE CONTAINER IMMEDIATELY AFTER USE. USE A NEW LANCET FOR EACH  TESTING) 100 each 2    carvedilol (COREG) 6.25 MG tablet Take 1 tablet (6.25 mg) by mouth 2 times daily. 180 tablet 3    cholecalciferol (VITAMIN D3) 25 mcg (1000 units) capsule Take 1 capsule by mouth daily      cyanocobalamin (VITAMIN B-12) 100 MCG tablet Take 100 mcg by mouth daily.      Ferrous Sulfate 324 (65 Fe) MG TBEC Take by mouth.      fluticasone (FLONASE) 50 MCG/ACT nasal spray Spray 1 spray into both nostrils daily. 11.1 mL 1    folic acid 5 MG CAPS Take by mouth.      levothyroxine (SYNTHROID/LEVOTHROID) 75 MCG tablet Take 1 tablet (75 mcg) by mouth daily. 90 tablet 3    losartan (COZAAR) 100 MG tablet Take 1 tablet (100 mg) by mouth daily 90 tablet 3    metFORMIN (GLUCOPHAGE) 500 MG tablet Take by mouth 2 times daily, 1 tablet in the  AM and 2 tablets at night. 270 tablet 3    methotrexate 2.5 MG tablet Take by mouth once a week. 6 pills once weekly      nitroGLYcerin (NITROSTAT) 0.4 MG sublingual tablet Place 0.4 mg under the tongue as needed.      omeprazole (PRILOSEC) 20 MG DR capsule Take 1 capsule (20 mg) by mouth daily. 90 capsule 3    Povidone, PF, (IVIZIA DRY EYES) 0.5 % SOLN Apply to eye.      triamterene-HCTZ (DYAZIDE) 37.5-25 MG capsule Take 1 capsule by mouth every morning. 90 capsule 3    zinc 50 MG TABS Take 50 mg by mouth daily        Family History:  No family history on file.  No family history of bleeding or clotting disorder.  Maternal grand father had some sort of cancer  2 chlidren- both healthy    Social History:  Social History     Socioeconomic History    Marital status:      Spouse name: Not on file    Number of children: Not on file    Years of education: Not on file    Highest education level:  Not on file   Occupational History    Not on file   Tobacco Use    Smoking status: Never    Smokeless tobacco: Never   Vaping Use    Vaping status: Never Used   Substance and Sexual Activity    Alcohol use: Yes     Comment: occ    Drug use: Never    Sexual activity: Yes     Partners: Male     Birth control/protection: Female Surgical   Other Topics Concern    Not on file   Social History Narrative    Not on file     Social Drivers of Health     Financial Resource Strain: Low Risk  (4/9/2025)    Financial Resource Strain     Within the past 12 months, have you or your family members you live with been unable to get utilities (heat, electricity) when it was really needed?: No   Food Insecurity: Low Risk  (4/9/2025)    Food Insecurity     Within the past 12 months, did you worry that your food would run out before you got money to buy more?: No     Within the past 12 months, did the food you bought just not last and you didn t have money to get more?: No   Transportation Needs: Low Risk  (4/9/2025)    Transportation Needs     Within the past 12 months, has lack of transportation kept you from medical appointments, getting your medicines, non-medical meetings or appointments, work, or from getting things that you need?: No   Physical Activity: Unknown (4/9/2025)    Exercise Vital Sign     Days of Exercise per Week: 3 days     Minutes of Exercise per Session: Not on file   Stress: No Stress Concern Present (4/9/2025)    [employer] - Occupational Stress Questionnaire     Feeling of Stress : Only a little   Social Connections: Unknown (4/9/2025)    Social Connection and Isolation Panel [NHANES]     Frequency of Communication with Friends and Family: Not on file     Frequency of Social Gatherings with Friends and Family: Once a week     Attends Hinduism Services: Not on file     Active Member of Clubs or Organizations: Not on file     Attends Club or Organization Meetings: Not on file      "Marital Status: Not on file   Interpersonal Safety: Low Risk  (4/9/2025)    Interpersonal Safety     Do you feel physically and emotionally safe where you currently live?: Yes     Within the past 12 months, have you been hit, slapped, kicked or otherwise physically hurt by someone?: No     Within the past 12 months, have you been humiliated or emotionally abused in other ways by your partner or ex-partner?: No   Housing Stability: Low Risk  (4/9/2025)    Housing Stability     Do you have housing? : Yes     Are you worried about losing your housing?: No   No smoking. Rare etoh. Lives with .  [facility]    Physical Exam:  /61   Pulse 54   Resp 16   Ht 1.6 m (5' 2.99\")   Wt 58.7 kg (129 lb 8 oz)   SpO2 97%   BMI 22.95 kg/m    Wt Readings from Last 4 Encounters:   04/23/25 58.7 kg (129 lb 8 oz)   04/09/25 58.7 kg (129 lb 4.8 oz)   01/14/25 58.8 kg (129 lb 9.6 oz)   12/13/24 56.7 kg (125 lb)       CONSTITUTIONAL: no acute distress  EYES: PERRLA, she has mild anemia but no icterus.  ENT/MOUTH: no mouth lesions. Ears normal  CVS: s1s2 no m r g .   RESPIRATORY: clear to auscultation b/l  GI: soft non tender no hepatosplenomegaly  NEURO: AAOX3  Grossly non focal neuro exam  INTEGUMENT: no obvious rashes  LYMPHATIC: no palpable cervical, supraclavicular, axillary or inguinal LAD  MUSCULOSKELETAL: Unremarkable. No bony tenderness.   EXTREMITIES: no edema  PSYCH: Mentation, mood and affect are normal. Decision making capacity is intact    Laboratory/Imaging Studies    Reviewed  4/9/2025    CBC shows WBC 5.5.  Hemoglobin 10.4.  Platelets 138.  Chemistry showed BUN 35.  Creatinine 1.35.  Calcium 10.1.    Hemoglobin A1c 7    1/14/2025  Erythropoietin was 22.  Iron 58.  TIBC 269.  Iron saturation index 22%.    Vitamin B12 was 3996    CT abdomen/pelvis 8/22/2024      FINDINGS:   LOWER CHEST: Mild scarring and/or atelectasis in the lung bases, right  greater than left.   "   HEPATOBILIARY: Normal contour with no significant mass. No bile duct  dilatation. Calcified gallstones.     PANCREAS: An 8 x 7 mm calcification is seen in the head of the  pancreas within a round low density/cystic lesion measuring 18 x 13 x  12 mm (4-32 and 3-75), which may represent a calcification within the  pancreatic duct and surrounding ductal dilation. However, no dilation  of the main pancreatic duct is seen elsewhere. Sidebranch ductal  dilation with internal calcification as well as a partially calcified  cystic lesion of the pancreas are also considerations. No enhancing  pancreatic mass. No inflammatory changes of the pancreas.     SPLEEN: Normal.     ADRENAL GLANDS: Normal.     KIDNEYS/BLADDER: Normal.     BOWEL: No obstruction or inflammatory change.     PELVIC ORGANS: A round low density cystlike structure is seen in the  lower vagina measuring 2.4 x 2.1 cm, suggestive of a Bartholin's gland  cyst. No follow-up is necessary. No cystic or solid adnexal mass. No  free pelvic fluid.     ADDITIONAL FINDINGS: Moderate atherosclerosis of the abdominal aorta  and branch vessels. No aneurysmal dilation.     MUSCULOSKELETAL: Multilevel degenerative changes in the spine. No  acute osseous abnormality.                                                                      IMPRESSION:   1.  No acute intra-abdominal or intrapelvic process.  2.  Low density/cystic lesion measuring 18 mm in the pancreatic head  with internal calcification measuring 8 mm. Consider further  evaluation with nonemergent follow-up pancreatic MRI.  3.  Additional nonacute findings as above.    MRI pancreas 9/12/2024  FINDINGS: 1.7 cm cyst in craniocaudal dimension with a calcification  present noted in the head of the pancreas. No aggressive features. No  concerning enhancement. No pancreatic ductal dilatation. No  cholelithiasis or choledocholithiasis. Biliary dilatation noted with  the common hepatic duct measuring up to 1 cm. Common  bile duct less  dilated at 7 mm. Mild-moderate intrahepatic biliary dilatation  evident.     Liver, adrenal glands, kidneys, and spleen demonstrate no worrisome  focal lesion.                                                                      IMPRESSION: 1.7 cm cystic lesion in the head of the pancreas without  aggressive features.        ASSESSMENT/PLAN:      Anemia and thrombocytopenia.  At this time cause is unknown.    I would recommend checking a peripheral blood smear and also iron studies.  Previously erythropoietin was inappropriately low for the degree of anemia and she does have CKD.  We will repeat erythropoietin levels.  We will check folate levels.  Previous B12 was about 4000 so we will repeat that.  Because of renal dysfunction and anemia, I would recommend checking SPEP.    Because of thrombocytopenia I have also checked hepatitis B, Hep C and HIV screen.    CT scan in August 2024 showed normal liver and spleen.    Left lower back/hip pain.  She does not have any tenderness to palpation on exam.  I would like to do pelvis/hip x-ray.    See me back in 3-4 weeks    All questions answered to her satisfaction.  She is agreeable and comfortable with the plan.    Cassandra Pillai MD

## 2025-04-23 NOTE — LETTER
4/23/2025      Zaria Gaona  [address] [facility] [address]      Dear Colleague,    Thank you for referring your patient, Zaria Gaona, to the [facility]. Please see a copy of my visit note below.    Hematology initial visit:  Date on this visit: 4/23/2025    Zaria Gaona  is referred by Dr.Paul MICHA Vargas for a hematology consultation. She requires evaluation for anemia and thrombocytopenia    Primary Physician: Wang Vargas     History Of Present Illness:  Ms. Gaona is a 79 year old female who presents with anemia and thrombocytopenia.    She has had anemia at least since 2022 and off-and-on thrombocytopenia at least since 2023.  She has a history of CAD and type 2 diabetes, hypertension.  She has had CKD at least since 2023.    She tells me that she has been feeling somewhat fatigued.  For the last 1 months she has also been noticing some pain in the left lower back/hip region.  It has not significantly changed over the last 1 month.  No neurological problems apart from mild diabetic neuropathy in the fingers and feet.  No B symptoms.  No bleeding.  No shortness of breath or fevers.  No GI problems.      ROS:  A comprehensive ROS was otherwise neg    Past Medical/Surgical History:  Past Medical History:   Diagnosis Date     Essential hypertension      NSTEMI (non-ST elevated myocardial infarction) (H) 12/2020    2 stents     Type 2 diabetes mellitus without complication (H) 2005   Hypothyroidism.  Past Surgical History:   Procedure Laterality Date     COLONOSCOPY N/A 12/15/2022    Procedure: COLONOSCOPY, WITH POLYPECTOMY;  Surgeon: Andres Trujillo DO;  Location:  GI     ESOPHAGOSCOPY, GASTROSCOPY, DUODENOSCOPY (EGD), COMBINED N/A 6/27/2023    Procedure: ESOPHAGOGASTRODUODENOSCOPY, WITH BIOPSY;  Surgeon: Med Asif MD;  Location:  GI     INJECT BLOCK MEDIAL BRANCH CERVICAL/THORACIC/LUMBAR Left 8/11/2022    Procedure: Left Lumbar 3,  Left Lumbar 4, and Left Lumbar 5 medial branch nerve blocks using fluoroscopic guidance and contrast control;  Surgeon: Malick Gomez MD;  Location: [facility]     INJECT EPIDURAL LUMBAR Left 5/20/2022    Procedure: Left lumbar 4-5 interlaminar epidural injection ;  Surgeon: Malick Gomez MD;  Location: [facility]     INJECT JOINT SACROILIAC Left 7/1/2022    Procedure: Fluoroscopically-guided injection of the Left sacroiliac joint with Left dimitrios Sacroiliac joint ligaments infiltration over the sacrum.;  Surgeon: Malick Gomez MD;  Location: [facility]     PHACOEMULSIFICATION WITH STANDARD INTRAOCULAR LENS IMPLANT Right 10/17/2024    Procedure: Phacoemulsification with standard intraocular lens implant with Istent right;  Surgeon: Yo Martinez MD;  Location: [facility]     PHACOEMULSIFICATION WITH STANDARD INTRAOCULAR LENS IMPLANT Left 10/31/2024    Procedure: Phacoemulsification with standard intraocular lens implant with Istent left;  Surgeon: Yo Martinez MD;  Location: [facility]     RELEASE CARPAL TUNNEL Left 4/26/2024    Procedure: RELEASE, CARPAL TUNNEL left;  Surgeon: Case Swenson MD;  Location: [facility]     VAGINAL PROLAPSE REPAIR      done in [place]     Allergies:  Allergies as of 04/23/2025 - Reviewed 04/23/2025   Allergen Reaction Noted     Ampicillin Other (See Comments), Hives, and Itching 11/24/2009     Lisinopril Cough 02/23/2021     Sulfamethoxazole-trimethoprim Other (See Comments), Hives, and Itching 11/24/2009     Current Medications:  Current Outpatient Medications   Medication Sig Dispense Refill     acetaminophen (TYLENOL) 325 MG tablet Take 650 mg by mouth       alcohol swab prep pads Use to swab area of injection/shana as directed 100 each 3     Ascorbic Acid (VITAMIN C) 500 MG CAPS Take 500 mg by mouth daily       aspirin (ASA) 81 MG chewable tablet Take 1 tablet (81 mg) by mouth daily 90 tablet 3     atorvastatin (LIPITOR) 80 MG tablet Take 1 tablet (80 mg) by mouth daily 90  tablet 3     blood glucose (ACCU-CHEK GUIDE) test strip USE 1 STRIP TO TEST EVERY DAY OR AS DIRECTED - (KEEP UNUSED STRIPS IN  ORIGINAL SEALED CONTAINER BETWEEN USES) 100 strip 2     blood glucose calibration (NO BRAND SPECIFIED) solution Use to calibrate blood glucose monitor as needed as directed. To accompany: Blood Glucose Monitor Brands: per insurance. 1 Bottle 3     blood glucose monitoring (SOFTCLIX) lancets USE 1 LANCET TO TEST EVERY DAY OR AS DIRECTED - (DISCARD LANCET IN  APPROPRIATE CONTAINER IMMEDIATELY AFTER USE. USE A NEW LANCET FOR EACH  TESTING) 100 each 2     carvedilol (COREG) 6.25 MG tablet Take 1 tablet (6.25 mg) by mouth 2 times daily. 180 tablet 3     cholecalciferol (VITAMIN D3) 25 mcg (1000 units) capsule Take 1 capsule by mouth daily       cyanocobalamin (VITAMIN B-12) 100 MCG tablet Take 100 mcg by mouth daily.       Ferrous Sulfate 324 (65 Fe) MG TBEC Take by mouth.       fluticasone (FLONASE) 50 MCG/ACT nasal spray Spray 1 spray into both nostrils daily. 11.1 mL 1     folic acid 5 MG CAPS Take by mouth.       levothyroxine (SYNTHROID/LEVOTHROID) 75 MCG tablet Take 1 tablet (75 mcg) by mouth daily. 90 tablet 3     losartan (COZAAR) 100 MG tablet Take 1 tablet (100 mg) by mouth daily 90 tablet 3     metFORMIN (GLUCOPHAGE) 500 MG tablet Take by mouth 2 times daily, 1 tablet in the  AM and 2 tablets at night. 270 tablet 3     methotrexate 2.5 MG tablet Take by mouth once a week. 6 pills once weekly       nitroGLYcerin (NITROSTAT) 0.4 MG sublingual tablet Place 0.4 mg under the tongue as needed.       omeprazole (PRILOSEC) 20 MG DR capsule Take 1 capsule (20 mg) by mouth daily. 90 capsule 3     Povidone, PF, (IVIZIA DRY EYES) 0.5 % SOLN Apply to eye.       triamterene-HCTZ (DYAZIDE) 37.5-25 MG capsule Take 1 capsule by mouth every morning. 90 capsule 3     zinc 50 MG TABS Take 50 mg by mouth daily        Family History:  No family history on file.  No family history of bleeding or clotting  disorder.  Maternal grand father had some sort of cancer  2 chlidren- both healthy    Social History:  Social History     Socioeconomic History     Marital status:      Spouse name: Not on file     Number of children: Not on file     Years of education: Not on file     Highest education level: Not on file   Occupational History     Not on file   Tobacco Use     Smoking status: Never     Smokeless tobacco: Never   Vaping Use     Vaping status: Never Used   Substance and Sexual Activity     Alcohol use: Yes     Comment: occ     Drug use: Never     Sexual activity: Yes     Partners: Male     Birth control/protection: Female Surgical   Other Topics Concern     Not on file   Social History Narrative     Not on file     Social Drivers of Health     Financial Resource Strain: Low Risk  (4/9/2025)    Financial Resource Strain      Within the past 12 months, have you or your family members you live with been unable to get utilities (heat, electricity) when it was really needed?: No   Food Insecurity: Low Risk  (4/9/2025)    Food Insecurity      Within the past 12 months, did you worry that your food would run out before you got money to buy more?: No      Within the past 12 months, did the food you bought just not last and you didn t have money to get more?: No   Transportation Needs: Low Risk  (4/9/2025)    Transportation Needs      Within the past 12 months, has lack of transportation kept you from medical appointments, getting your medicines, non-medical meetings or appointments, work, or from getting things that you need?: No   Physical Activity: Unknown (4/9/2025)    Exercise Vital Sign      Days of Exercise per Week: 3 days      Minutes of Exercise per Session: Not on file   Stress: No Stress Concern Present (4/9/2025)    [employer] - Occupational Stress Questionnaire      Feeling of Stress : Only a little   Social Connections: Unknown (4/9/2025)    Social Connection and Isolation  "Panel [NHANES]      Frequency of Communication with Friends and Family: Not on file      Frequency of Social Gatherings with Friends and Family: Once a week      Attends [employer] Services: Not on file      Active Member of Clubs or Organizations: Not on file      Attends Club or Organization Meetings: Not on file      Marital Status: Not on file   Interpersonal Safety: Low Risk  (4/9/2025)    Interpersonal Safety      Do you feel physically and emotionally safe where you currently live?: Yes      Within the past 12 months, have you been hit, slapped, kicked or otherwise physically hurt by someone?: No      Within the past 12 months, have you been humiliated or emotionally abused in other ways by your partner or ex-partner?: No   Housing Stability: Low Risk  (4/9/2025)    Housing Stability      Do you have housing? : Yes      Are you worried about losing your housing?: No   No smoking. Rare etoh. Lives with .  [facility]    Physical Exam:  /61   Pulse 54   Resp 16   Ht 1.6 m (5' 2.99\")   Wt 58.7 kg (129 lb 8 oz)   SpO2 97%   BMI 22.95 kg/m    Wt Readings from Last 4 Encounters:   04/23/25 58.7 kg (129 lb 8 oz)   04/09/25 58.7 kg (129 lb 4.8 oz)   01/14/25 58.8 kg (129 lb 9.6 oz)   12/13/24 56.7 kg (125 lb)       CONSTITUTIONAL: no acute distress  EYES: PERRLA, she has mild anemia but no icterus.  ENT/MOUTH: no mouth lesions. Ears normal  CVS: s1s2 no m r g .   RESPIRATORY: clear to auscultation b/l  GI: soft non tender no hepatosplenomegaly  NEURO: AAOX3  Grossly non focal neuro exam  INTEGUMENT: no obvious rashes  LYMPHATIC: no palpable cervical, supraclavicular, axillary or inguinal LAD  MUSCULOSKELETAL: Unremarkable. No bony tenderness.   EXTREMITIES: no edema  PSYCH: Mentation, mood and affect are normal. Decision making capacity is intact    Laboratory/Imaging Studies    Reviewed  4/9/2025    CBC shows WBC 5.5.  Hemoglobin 10.4.  Platelets 138.  Chemistry " showed BUN 35.  Creatinine 1.35.  Calcium 10.1.    Hemoglobin A1c 7    1/14/2025  Erythropoietin was 22.  Iron 58.  TIBC 269.  Iron saturation index 22%.    Vitamin B12 was 3996    CT abdomen/pelvis 8/22/2024      FINDINGS:   LOWER CHEST: Mild scarring and/or atelectasis in the lung bases, right  greater than left.     HEPATOBILIARY: Normal contour with no significant mass. No bile duct  dilatation. Calcified gallstones.     PANCREAS: An 8 x 7 mm calcification is seen in the head of the  pancreas within a round low density/cystic lesion measuring 18 x 13 x  12 mm (4-32 and 3-75), which may represent a calcification within the  pancreatic duct and surrounding ductal dilation. However, no dilation  of the main pancreatic duct is seen elsewhere. Sidebranch ductal  dilation with internal calcification as well as a partially calcified  cystic lesion of the pancreas are also considerations. No enhancing  pancreatic mass. No inflammatory changes of the pancreas.     SPLEEN: Normal.     ADRENAL GLANDS: Normal.     KIDNEYS/BLADDER: Normal.     BOWEL: No obstruction or inflammatory change.     PELVIC ORGANS: A round low density cystlike structure is seen in the  lower vagina measuring 2.4 x 2.1 cm, suggestive of a Bartholin's gland  cyst. No follow-up is necessary. No cystic or solid adnexal mass. No  free pelvic fluid.     ADDITIONAL FINDINGS: Moderate atherosclerosis of the abdominal aorta  and branch vessels. No aneurysmal dilation.     MUSCULOSKELETAL: Multilevel degenerative changes in the spine. No  acute osseous abnormality.                                                                      IMPRESSION:   1.  No acute intra-abdominal or intrapelvic process.  2.  Low density/cystic lesion measuring 18 mm in the pancreatic head  with internal calcification measuring 8 mm. Consider further  evaluation with nonemergent follow-up pancreatic MRI.  3.  Additional nonacute findings as above.    MRI pancreas  9/12/2024  FINDINGS: 1.7 cm cyst in craniocaudal dimension with a calcification  present noted in the head of the pancreas. No aggressive features. No  concerning enhancement. No pancreatic ductal dilatation. No  cholelithiasis or choledocholithiasis. Biliary dilatation noted with  the common hepatic duct measuring up to 1 cm. Common bile duct less  dilated at 7 mm. Mild-moderate intrahepatic biliary dilatation  evident.     Liver, adrenal glands, kidneys, and spleen demonstrate no worrisome  focal lesion.                                                                      IMPRESSION: 1.7 cm cystic lesion in the head of the pancreas without  aggressive features.        ASSESSMENT/PLAN:      Anemia and thrombocytopenia.  At this time cause is unknown.    I would recommend checking a peripheral blood smear and also iron studies.  Previously erythropoietin was inappropriately low for the degree of anemia and she does have CKD.  We will repeat erythropoietin levels.  We will check folate levels.  Previous B12 was about 4000 so we will repeat that.  Because of renal dysfunction and anemia, I would recommend checking SPEP.    Because of thrombocytopenia I have also checked hepatitis B, Hep C and HIV screen.    CT scan in August 2024 showed normal liver and spleen.    Left lower back/hip pain.  She does not have any tenderness to palpation on exam.  I would like to do pelvis/hip x-ray.    See me back in 3-4 weeks    All questions answered to her satisfaction.  She is agreeable and comfortable with the plan.    Cassandra Pillai MD                 Again, thank you for allowing me to participate in the care of your patient.        Sincerely,        Cassandra Pillai MD    Electronically signed

## 2025-04-23 NOTE — NURSING NOTE
"Oncology Rooming Note    April 23, 2025 1:57 PM   Zaria Gaona is a 79 year old female who presents for:    Chief Complaint   Patient presents with    Oncology Clinic Visit     New patient- anemia     Initial Vitals: /61   Pulse 54   Resp 16   Ht 1.6 m (5' 2.99\")   Wt 58.7 kg (129 lb 8 oz)   SpO2 97%   BMI 22.95 kg/m   Estimated body mass index is 22.95 kg/m  as calculated from the following:    Height as of this encounter: 1.6 m (5' 2.99\").    Weight as of this encounter: 58.7 kg (129 lb 8 oz). Body surface area is 1.62 meters squared.  Mild Pain (3) Comment: Data Unavailable   No LMP recorded. Patient has had a hysterectomy.  Allergies reviewed: Yes  Medications reviewed: Yes    Medications: Medication refills not needed today.  Pharmacy name entered into EPIC:     [facility] [place], [place] - [address] [facility] [address] [place] [address] - [facility] [address]    Frailty Screening:   Is the patient here for a new oncology consult visit in cancer care? 2. No    PHQ9:  Did this patient require a PHQ9?: No      Clinical concerns: anemia       Grace Meade LPN              "

## 2025-04-24 LAB
ALBUMIN SERPL ELPH-MCNC: 4.5 G/DL (ref 3.7–5.1)
ALPHA1 GLOB SERPL ELPH-MCNC: 0.3 G/DL (ref 0.2–0.4)
ALPHA2 GLOB SERPL ELPH-MCNC: 0.7 G/DL (ref 0.5–0.9)
B-GLOBULIN SERPL ELPH-MCNC: 0.7 G/DL (ref 0.6–1)
GAMMA GLOB SERPL ELPH-MCNC: 0.9 G/DL (ref 0.7–1.6)
HBV CORE AB SERPL QL IA: NONREACTIVE
HBV SURFACE AB SERPL IA-ACNC: 158 M[IU]/ML
HBV SURFACE AB SERPL IA-ACNC: REACTIVE M[IU]/ML
HBV SURFACE AG SERPL QL IA: NONREACTIVE
HCV AB SERPL QL IA: NONREACTIVE
HIV 1+2 AB+HIV1 P24 AG SERPL QL IA: NONREACTIVE
LOCATION OF TASK: NORMAL
M PROTEIN SERPL ELPH-MCNC: 0 G/DL
PATH REPORT.COMMENTS IMP SPEC: NORMAL
PATH REPORT.COMMENTS IMP SPEC: NORMAL
PATH REPORT.FINAL DX SPEC: NORMAL
PATH REPORT.MICROSCOPIC SPEC OTHER STN: NORMAL
PATH REPORT.MICROSCOPIC SPEC OTHER STN: NORMAL
PATH REPORT.RELEVANT HX SPEC: NORMAL
PROT PATTERN SERPL ELPH-IMP: NORMAL
VIT B12 SERPL-MCNC: 3364 PG/ML (ref 232–1245)

## 2025-04-25 LAB — EPO SERPL-ACNC: 11 MU/ML

## 2025-04-28 ENCOUNTER — TRANSFERRED RECORDS (OUTPATIENT)
Dept: HEALTH INFORMATION MANAGEMENT | Facility: CLINIC | Age: 80
End: 2025-04-28
Payer: COMMERCIAL

## 2025-04-30 ENCOUNTER — HOSPITAL ENCOUNTER (OUTPATIENT)
Dept: ULTRASOUND IMAGING | Facility: CLINIC | Age: 80
Discharge: HOME | End: 2025-04-30
Attending: INTERNAL MEDICINE
Payer: COMMERCIAL

## 2025-04-30 ENCOUNTER — HOSPITAL ENCOUNTER (OUTPATIENT)
Dept: MRI IMAGING | Facility: CLINIC | Age: 80
Discharge: HOME | End: 2025-04-30
Attending: INTERNAL MEDICINE
Payer: COMMERCIAL

## 2025-04-30 DIAGNOSIS — D69.6 THROMBOCYTOPENIA: ICD-10-CM

## 2025-04-30 DIAGNOSIS — Z11.59 ENCOUNTER FOR SCREENING FOR OTHER VIRAL DISEASES: ICD-10-CM

## 2025-04-30 DIAGNOSIS — R93.5 ABNORMAL CT OF THE ABDOMEN: ICD-10-CM

## 2025-04-30 DIAGNOSIS — K82.4 GALLBLADDER POLYP: ICD-10-CM

## 2025-04-30 DIAGNOSIS — D64.9 ANEMIA, UNSPECIFIED TYPE: ICD-10-CM

## 2025-04-30 DIAGNOSIS — K86.2 PANCREATIC CYST: ICD-10-CM

## 2025-04-30 PROCEDURE — A9585 GADOBUTROL INJECTION: HCPCS | Performed by: INTERNAL MEDICINE

## 2025-04-30 PROCEDURE — 76705 ECHO EXAM OF ABDOMEN: CPT

## 2025-04-30 PROCEDURE — 74183 MRI ABD W/O CNTR FLWD CNTR: CPT

## 2025-04-30 PROCEDURE — 255N000002 HC RX 255 OP 636: Performed by: INTERNAL MEDICINE

## 2025-04-30 RX ORDER — GADOBUTROL 604.72 MG/ML
5.5 INJECTION INTRAVENOUS ONCE
Status: COMPLETED | OUTPATIENT
Start: 2025-04-30 | End: 2025-04-30

## 2025-04-30 RX ADMIN — GADOBUTROL 5.5 ML: 604.72 INJECTION INTRAVENOUS at 09:59

## 2025-05-07 ENCOUNTER — TRANSFERRED RECORDS (OUTPATIENT)
Dept: HEALTH INFORMATION MANAGEMENT | Facility: CLINIC | Age: 80
End: 2025-05-07
Payer: COMMERCIAL

## 2025-05-13 ENCOUNTER — ONCOLOGY VISIT (OUTPATIENT)
Dept: ONCOLOGY | Facility: CLINIC | Age: 80
End: 2025-05-13
Attending: INTERNAL MEDICINE
Payer: COMMERCIAL

## 2025-05-13 VITALS
RESPIRATION RATE: 16 BRPM | DIASTOLIC BLOOD PRESSURE: 77 MMHG | HEART RATE: 46 BPM | HEIGHT: 63 IN | OXYGEN SATURATION: 100 % | BODY MASS INDEX: 22.98 KG/M2 | SYSTOLIC BLOOD PRESSURE: 164 MMHG | WEIGHT: 129.7 LBS

## 2025-05-13 DIAGNOSIS — D69.6 THROMBOCYTOPENIA: ICD-10-CM

## 2025-05-13 DIAGNOSIS — D64.9 ANEMIA, UNSPECIFIED TYPE: Primary | ICD-10-CM

## 2025-05-13 PROCEDURE — G0463 HOSPITAL OUTPT CLINIC VISIT: HCPCS | Performed by: INTERNAL MEDICINE

## 2025-05-13 PROCEDURE — 99214 OFFICE O/P EST MOD 30 MIN: CPT | Performed by: INTERNAL MEDICINE

## 2025-05-13 ASSESSMENT — PAIN SCALES - GENERAL: PAINLEVEL_OUTOF10: MODERATE PAIN (5)

## 2025-05-13 NOTE — NURSING NOTE
"Oncology Rooming Note    May 13, 2025 12:44 PM   Zaria Gaona is a 79 year old female who presents for:    Chief Complaint   Patient presents with    Oncology Clinic Visit     Follow up     Initial Vitals: BP (!) 164/77   Pulse (!) 46   Resp 16   Ht 1.6 m (5' 2.99\")   Wt 58.8 kg (129 lb 11.2 oz)   SpO2 100%   BMI 22.98 kg/m   Estimated body mass index is 22.98 kg/m  as calculated from the following:    Height as of this encounter: 1.6 m (5' 2.99\").    Weight as of this encounter: 58.8 kg (129 lb 11.2 oz). Body surface area is 1.62 meters squared.  Moderate Pain (5) Comment: worsens throughout the day   No LMP recorded. Patient has had a hysterectomy.  Allergies reviewed: Yes  Medications reviewed: Yes    Medications: Medication refills not needed today.  Pharmacy name entered into EPIC:     [facility] [place], [place] - [address] [facility] - [place], [place] - [address]    Frailty Screening:   Is the patient here for a new oncology consult visit in cancer care? 2. No    PHQ9:  Did this patient require a PHQ9?: No      Clinical concerns: results       Grace Meade LPN              "

## 2025-05-13 NOTE — LETTER
5/13/2025      Zaria Gaona  [address] [facility] [address]      Dear Colleague,    Thank you for referring your patient, Zaria Gaona, to the [facility]. Please see a copy of my visit note below.    Hematology follow-up visit:  Date on this visit: 5/13/25     Zaria Gaona  is referred by Dr.Paul MICHA Vargas for a hematology consultation. She requires evaluation for anemia and thrombocytopenia    Primary Physician: Wang Vargas     History Of Present Illness:  Ms. Gaona is a 79 year old female who presents with anemia and thrombocytopenia.  I initially saw her on 4/23/2025.  Please see my previous note for details.  I have copied and updated from prior notes.    She has had anemia at least since 2022 and off-and-on thrombocytopenia at least since 2023.  She has a history of CAD and type 2 diabetes, hypertension.  She has had CKD at least since 2023.    She tells me that she has been feeling somewhat fatigued.  For the last 1 months she has also been noticing some pain in the left lower back/hip region.  It has not significantly changed over the last 1 month.  No neurological problems apart from mild diabetic neuropathy in the fingers and feet.  No B symptoms.  No bleeding.  No shortness of breath or fevers.  No GI problems.        Interval history  She comes in today accompanied by her sister-in-law.  She continues to have lower back/hip region pain.  A couple of weeks ago, she fell down after she had pupils dilated for her eye exam.  Fortunately she did not break anything.  Otherwise she feels about the same as before.          ROS:  A ROS was otherwise neg     I reviewed other history in epic as below.          Past Medical/Surgical History:  Past Medical History:   Diagnosis Date     Essential hypertension      NSTEMI (non-ST elevated myocardial infarction) (H) 12/2020    2 stents     Type 2 diabetes mellitus without complication (H) 2005    Hypothyroidism.  Past Surgical History:   Procedure Laterality Date     COLONOSCOPY N/A 12/15/2022    Procedure: COLONOSCOPY, WITH POLYPECTOMY;  Surgeon: Andres Trujillo DO;  Location: [facility]     ESOPHAGOSCOPY, GASTROSCOPY, DUODENOSCOPY (EGD), COMBINED N/A 6/27/2023    Procedure: ESOPHAGOGASTRODUODENOSCOPY, WITH BIOPSY;  Surgeon: Med Asif MD;  Location: [facility]     INJECT BLOCK MEDIAL BRANCH CERVICAL/THORACIC/LUMBAR Left 8/11/2022    Procedure: Left Lumbar 3, Left Lumbar 4, and Left Lumbar 5 medial branch nerve blocks using fluoroscopic guidance and contrast control;  Surgeon: Malick Gomez MD;  Location: [facility]     INJECT EPIDURAL LUMBAR Left 5/20/2022    Procedure: Left lumbar 4-5 interlaminar epidural injection ;  Surgeon: Malick Gomez MD;  Location: [facility]     INJECT JOINT SACROILIAC Left 7/1/2022    Procedure: Fluoroscopically-guided injection of the Left sacroiliac joint with Left dimitrios Sacroiliac joint ligaments infiltration over the sacrum.;  Surgeon: Malick Gomez MD;  [facility]     PHACOEMULSIFICATION WITH STANDARD INTRAOCULAR LENS IMPLANT Right 10/17/2024    Procedure: Phacoemulsification with standard intraocular lens implant with Istent right;  Surgeon: Yo Martinez MD;  [facility]     PHACOEMULSIFICATION WITH STANDARD INTRAOCULAR LENS IMPLANT Left 10/31/2024    Procedure: Phacoemulsification with standard intraocular lens implant with Istent left;  Surgeon: Yo Martinez MD;  [facility]     RELEASE CARPAL TUNNEL Left 4/26/2024    Procedure: RELEASE, CARPAL TUNNEL left;  Surgeon: Case Swenson MD;  Location: [facility]     VAGINAL PROLAPSE REPAIR      done in [place]     Allergies:  Allergies as of 05/13/2025 - Reviewed 04/23/2025   Allergen Reaction Noted     Ampicillin Other (See Comments), Hives, and Itching 11/24/2009     Lisinopril Cough 02/23/2021     Sulfamethoxazole-trimethoprim Other (See Comments), Hives, and Itching 11/24/2009      Current Medications:  Current Outpatient Medications   Medication Sig Dispense Refill     acetaminophen (TYLENOL) 325 MG tablet Take 650 mg by mouth       alcohol swab prep pads Use to swab area of injection/shana as directed 100 each 3     Ascorbic Acid (VITAMIN C) 500 MG CAPS Take 500 mg by mouth daily       aspirin (ASA) 81 MG chewable tablet Take 1 tablet (81 mg) by mouth daily 90 tablet 3     atorvastatin (LIPITOR) 80 MG tablet Take 1 tablet (80 mg) by mouth daily 90 tablet 3     blood glucose (ACCU-CHEK GUIDE) test strip USE 1 STRIP TO TEST EVERY DAY OR AS DIRECTED - (KEEP UNUSED STRIPS IN  ORIGINAL SEALED CONTAINER BETWEEN USES) 100 strip 2     blood glucose calibration (NO BRAND SPECIFIED) solution Use to calibrate blood glucose monitor as needed as directed. To accompany: Blood Glucose Monitor Brands: per insurance. 1 Bottle 3     blood glucose monitoring (SOFTCLIX) lancets USE 1 LANCET TO TEST EVERY DAY OR AS DIRECTED - (DISCARD LANCET IN  APPROPRIATE CONTAINER IMMEDIATELY AFTER USE. USE A NEW LANCET FOR EACH  TESTING) 100 each 2     carvedilol (COREG) 6.25 MG tablet Take 1 tablet (6.25 mg) by mouth 2 times daily. 180 tablet 3     cholecalciferol (VITAMIN D3) 25 mcg (1000 units) capsule Take 1 capsule by mouth daily       cyanocobalamin (VITAMIN B-12) 100 MCG tablet Take 100 mcg by mouth daily.       Ferrous Sulfate 324 (65 Fe) MG TBEC Take by mouth.       fluticasone (FLONASE) 50 MCG/ACT nasal spray Spray 1 spray into both nostrils daily. 11.1 mL 1     folic acid 5 MG CAPS Take by mouth.       levothyroxine (SYNTHROID/LEVOTHROID) 75 MCG tablet Take 1 tablet (75 mcg) by mouth daily. 90 tablet 3     losartan (COZAAR) 100 MG tablet Take 1 tablet (100 mg) by mouth daily 90 tablet 3     metFORMIN (GLUCOPHAGE) 500 MG tablet Take by mouth 2 times daily, 1 tablet in the  AM and 2 tablets at night. 270 tablet 3     methotrexate 2.5 MG tablet Take by mouth once a week. 6 pills once weekly       nitroGLYcerin  (NITROSTAT) 0.4 MG sublingual tablet Place 0.4 mg under the tongue as needed.       omeprazole (PRILOSEC) 20 MG DR capsule Take 1 capsule (20 mg) by mouth daily. 90 capsule 3     Povidone, PF, (IVIZIA DRY EYES) 0.5 % SOLN Apply to eye.       triamterene-HCTZ (DYAZIDE) 37.5-25 MG capsule Take 1 capsule by mouth every morning. 90 capsule 3     zinc 50 MG TABS Take 50 mg by mouth daily        Family History:  No family history on file.  No family history of bleeding or clotting disorder.  Maternal grand father had some sort of cancer  2 chlidren- both healthy    Social History:  Social History     Socioeconomic History     Marital status:      Spouse name: Not on file     Number of children: Not on file     Years of education: Not on file     Highest education level: Not on file   Occupational History     Not on file   Tobacco Use     Smoking status: Never     Smokeless tobacco: Never   Vaping Use     Vaping status: Never Used   Substance and Sexual Activity     Alcohol use: Yes     Comment: occ     Drug use: Never     Sexual activity: Yes     Partners: Male     Birth control/protection: Female Surgical   Other Topics Concern     Not on file   Social History Narrative     Not on file     Social Drivers of Health     Financial Resource Strain: Low Risk  (4/9/2025)    Financial Resource Strain      Within the past 12 months, have you or your family members you live with been unable to get utilities (heat, electricity) when it was really needed?: No   Food Insecurity: Low Risk  (4/9/2025)    Food Insecurity      Within the past 12 months, did you worry that your food would run out before you got money to buy more?: No      Within the past 12 months, did the food you bought just not last and you didn t have money to get more?: No   Transportation Needs: Low Risk  (4/9/2025)    Transportation Needs      Within the past 12 months, has lack of transportation kept you from medical appointments, getting your medicines,  non-medical meetings or appointments, work, or from getting things that you need?: No   Physical Activity: Unknown (4/9/2025)    Exercise Vital Sign      Days of Exercise per Week: 3 days      Minutes of Exercise per Session: Not on file   Stress: No Stress Concern Present (4/9/2025)    [employer] [place] - Occupational Stress Questionnaire      Feeling of Stress : Only a little   Social Connections: Unknown (4/9/2025)    Social Connection and Isolation Panel [NHANES]      Frequency of Communication with Friends and Family: Not on file      Frequency of Social Gatherings with Friends and Family: Once a week      Attends [employer] Services: Not on file      Active Member of Clubs or Organizations: Not on file      Attends Club or Organization Meetings: Not on file      Marital Status: Not on file   Interpersonal Safety: Low Risk  (4/9/2025)    Interpersonal Safety      Do you feel physically and emotionally safe where you currently live?: Yes      Within the past 12 months, have you been hit, slapped, kicked or otherwise physically hurt by someone?: No      Within the past 12 months, have you been humiliated or emotionally abused in other ways by your partner or ex-partner?: No   Housing Stability: Low Risk  (4/9/2025)    Housing Stability      Do you have housing? : Yes      Are you worried about losing your housing?: No   No smoking. Rare etoh. Lives with .  [facility]    Physical Exam:  There were no vitals taken for this visit.  Wt Readings from Last 4 Encounters:   04/23/25 58.7 kg (129 lb 8 oz)   04/09/25 58.7 kg (129 lb 4.8 oz)   01/14/25 58.8 kg (129 lb 9.6 oz)   12/13/24 56.7 kg (125 lb)       Constitutional.  Looks well and in no apparent distress.   Eyes.  Without eye redness or apparent jaundice.   Respiratory.  Non labored breathing. Speaking in full sentences.    Skin.  No concerning skin rashes on the skin visualized.   Neurological.  Is  alert and oriented.  Psychiatric.  Mood and affect seem appropriate.      Laboratory/Imaging Studies    Reviewed    4/23/2025.    Peripheral blood smear showed moderate hypochromic normocytic anemia with slight thrombocytopenia and normal platelet morphology.  No evidence of hemolysis.    Hemoglobin was 9.9.  MCV 94.  Platelets 134.    Erythropoietin was 11.  Ferritin was 165.  Iron 51.  TIBC 286.  Iron saturation index 18%.  Vitamin B12 was 3364.  Folate was greater than 40.    SPEP was unremarkable.    She is immune to hepatitis B.  Hepatitis C and HIV were negative.        4/9/2025    CBC shows WBC 5.5.  Hemoglobin 10.4.  Platelets 138.  Chemistry showed BUN 35.  Creatinine 1.35.  Calcium 10.1.    Hemoglobin A1c 7    1/14/2025  Erythropoietin was 22.  Iron 58.  TIBC 269.  Iron saturation index 22%.    Vitamin B12 was 3996    X-ray of the pelvis and bilateral hips 4/23/2025.      IMPRESSION: Both hips negative for fracture. Degenerative change both hip joints. Pelvis negative for fracture. Degenerative change at the SI joints bilaterally. Vascular calcifications. Chronic enthesitis at the iliac crests and spines bilaterally.     Right upper quadrant ultrasound 4/30/2025.  IMPRESSION:  1.  Several gallbladder polyps again identified. Largest size is approximately 7 mm, stable compared to the prior ultrasound. These are difficult to visualize on the comparison MRI exams.  2.  Negative sonographic Real's sign. There is mild biliary ductal dilatation.  3.  Pancreatic head cyst is seen. Refer to MRI for further evaluation.    MRI pancreas 4/30/2025.                                          IMPRESSION:  1.  A 1.7 cm cystic lesion in the head of the pancreas is unchanged, and is suspicious for a sidebranch IPMN. Please refer to follow-up guidelines below.  2.  Mild dilatation of the common hepatic duct is unchanged. No convincing biliary filling defects to suggest  choledocholithiasis.        ASSESSMENT/PLAN:      Anemia and thrombocytopenia.      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  Iron saturation index is 18% and although it is in the normal range, patients who have CKD, they tend to benefit if iron saturation index is higher around the range of 30%.  I would recommend starting oral iron once daily.  Potentially in the future, erythropoietin supplementation could be used but currently hemoglobin is 9.9 so it is reasonable to take oral iron as the goal hemoglobin is around 10.  She tells me that she has been taking oral iron once a day and she should continue that.      Thrombocytopenia.  She also has mild thrombocytopenia.  Workup has been negative.  I am not exactly sure what is the reason for that.    We discussed that potentially we can do a bone marrow biopsy because of anemia and thrombocytopenia or we can continue to observe and do a bone marrow biopsy if there is evidence of worsening of blood counts.    We decided on checking blood counts every 3 months for now.  We will do bone marrow biopsy if blood counts significantly change.    Elevated vitamin B12 levels.  She has been taking vitamin B12 supplements and I told her to stop taking it.    Left lower back/hip pain.  She has arthritis changes.  Advised to follow-up with PCP and consider physical therapy.     I will see her back in 6 months.    All questions answered to her satisfaction.  She is agreeable and comfortable with the plan.    Cassandra Pillai MD                 Again, thank you for allowing me to participate in the care of your patient.        Sincerely,        Cassandra Pillai MD    Electronically signed

## 2025-05-13 NOTE — PROGRESS NOTES
Hematology follow-up visit:  Date on this visit: 5/13/25     Zaria Gaona  is referred by Dr.Paul MICHA Vargas for a hematology consultation. She requires evaluation for anemia and thrombocytopenia    Primary Physician: Wang Vargas     History Of Present Illness:  Ms. Gaona is a 79 year old female who presents with anemia and thrombocytopenia.  I initially saw her on 4/23/2025.  Please see my previous note for details.  I have copied and updated from prior notes.    She has had anemia at least since 2022 and off-and-on thrombocytopenia at least since 2023.  She has a history of CAD and type 2 diabetes, hypertension.  She has had CKD at least since 2023.    She tells me that she has been feeling somewhat fatigued.  For the last 1 months she has also been noticing some pain in the left lower back/hip region.  It has not significantly changed over the last 1 month.  No neurological problems apart from mild diabetic neuropathy in the fingers and feet.  No B symptoms.  No bleeding.  No shortness of breath or fevers.  No GI problems.        Interval history  She comes in today accompanied by her sister-in-law.  She continues to have lower back/hip region pain.  A couple of weeks ago, she fell down after she had pupils dilated for her eye exam.  Fortunately she did not break anything.  Otherwise she feels about the same as before.          ROS:  A ROS was otherwise neg     I reviewed other history in epic as below.          Past Medical/Surgical History:  Past Medical History:   Diagnosis Date    Essential hypertension     NSTEMI (non-ST elevated myocardial infarction) (H) 12/2020    2 stents    Type 2 diabetes mellitus without complication (H) 2005   Hypothyroidism.  Past Surgical History:   Procedure Laterality Date    COLONOSCOPY N/A 12/15/2022    Procedure: COLONOSCOPY, WITH POLYPECTOMY;  Surgeon: Andres Trujillo DO;  Location:  GI    ESOPHAGOSCOPY, GASTROSCOPY, DUODENOSCOPY (EGD), COMBINED N/A  6/27/2023    Procedure: ESOPHAGOGASTRODUODENOSCOPY, WITH BIOPSY;  Surgeon: Med Asif MD;  Location: [facility]    INJECT BLOCK MEDIAL BRANCH CERVICAL/THORACIC/LUMBAR Left 8/11/2022    Procedure: Left Lumbar 3, Left Lumbar 4, and Left Lumbar 5 medial branch nerve blocks using fluoroscopic guidance and contrast control;  Surgeon: Malick Gomez MD;  Location: [facility]    INJECT EPIDURAL LUMBAR Left 5/20/2022    Procedure: Left lumbar 4-5 interlaminar epidural injection ;  Surgeon: Malick Gomez MD;  Location: [facility]    INJECT JOINT SACROILIAC Left 7/1/2022    Procedure: Fluoroscopically-guided injection of the Left sacroiliac joint with Left dimitrios Sacroiliac joint ligaments infiltration over the sacrum.;  Surgeon: Malick Gomez MD;  Location: [facility]    PHACOEMULSIFICATION WITH STANDARD INTRAOCULAR LENS IMPLANT Right 10/17/2024    Procedure: Phacoemulsification with standard intraocular lens implant with Istent right;  Surgeon: Yo Martinez MD;  Location[facility]    PHACOEMULSIFICATION WITH STANDARD INTRAOCULAR LENS IMPLANT Left 10/31/2024    Procedure: Phacoemulsification with standard intraocular lens implant with Istent left;  Surgeon: Yo Martinez MD;  Location: [facility]    RELEASE CARPAL TUNNEL Left 4/26/2024    Procedure: RELEASE, CARPAL TUNNEL left;  Surgeon: Case Swenson MD;  Location: [facility]    VAGINAL PROLAPSE REPAIR      done in [place]     Allergies:  Allergies as of 05/13/2025 - Reviewed 04/23/2025   Allergen Reaction Noted    Ampicillin Other (See Comments), Hives, and Itching 11/24/2009    Lisinopril Cough 02/23/2021    Sulfamethoxazole-trimethoprim Other (See Comments), Hives, and Itching 11/24/2009     Current Medications:  Current Outpatient Medications   Medication Sig Dispense Refill    acetaminophen (TYLENOL) 325 MG tablet Take 650 mg by mouth      alcohol swab prep pads Use to swab area of injection/shana as directed 100 each 3    Ascorbic Acid (VITAMIN C) 500 MG CAPS  Take 500 mg by mouth daily      aspirin (ASA) 81 MG chewable tablet Take 1 tablet (81 mg) by mouth daily 90 tablet 3    atorvastatin (LIPITOR) 80 MG tablet Take 1 tablet (80 mg) by mouth daily 90 tablet 3    blood glucose (ACCU-CHEK GUIDE) test strip USE 1 STRIP TO TEST EVERY DAY OR AS DIRECTED - (KEEP UNUSED STRIPS IN  ORIGINAL SEALED CONTAINER BETWEEN USES) 100 strip 2    blood glucose calibration (NO BRAND SPECIFIED) solution Use to calibrate blood glucose monitor as needed as directed. To accompany: Blood Glucose Monitor Brands: per insurance. 1 Bottle 3    blood glucose monitoring (SOFTCLIX) lancets USE 1 LANCET TO TEST EVERY DAY OR AS DIRECTED - (DISCARD LANCET IN  APPROPRIATE CONTAINER IMMEDIATELY AFTER USE. USE A NEW LANCET FOR EACH  TESTING) 100 each 2    carvedilol (COREG) 6.25 MG tablet Take 1 tablet (6.25 mg) by mouth 2 times daily. 180 tablet 3    cholecalciferol (VITAMIN D3) 25 mcg (1000 units) capsule Take 1 capsule by mouth daily      cyanocobalamin (VITAMIN B-12) 100 MCG tablet Take 100 mcg by mouth daily.      Ferrous Sulfate 324 (65 Fe) MG TBEC Take by mouth.      fluticasone (FLONASE) 50 MCG/ACT nasal spray Spray 1 spray into both nostrils daily. 11.1 mL 1    folic acid 5 MG CAPS Take by mouth.      levothyroxine (SYNTHROID/LEVOTHROID) 75 MCG tablet Take 1 tablet (75 mcg) by mouth daily. 90 tablet 3    losartan (COZAAR) 100 MG tablet Take 1 tablet (100 mg) by mouth daily 90 tablet 3    metFORMIN (GLUCOPHAGE) 500 MG tablet Take by mouth 2 times daily, 1 tablet in the  AM and 2 tablets at night. 270 tablet 3    methotrexate 2.5 MG tablet Take by mouth once a week. 6 pills once weekly      nitroGLYcerin (NITROSTAT) 0.4 MG sublingual tablet Place 0.4 mg under the tongue as needed.      omeprazole (PRILOSEC) 20 MG DR capsule Take 1 capsule (20 mg) by mouth daily. 90 capsule 3    Povidone, PF, (IVIZIA DRY EYES) 0.5 % SOLN Apply to eye.      triamterene-HCTZ (DYAZIDE) 37.5-25 MG capsule Take 1 capsule  by mouth every morning. 90 capsule 3    zinc 50 MG TABS Take 50 mg by mouth daily        Family History:  No family history on file.  No family history of bleeding or clotting disorder.  Maternal grand father had some sort of cancer  2 chlidren- both healthy    Social History:  Social History     Socioeconomic History    Marital status:      Spouse name: Not on file    Number of children: Not on file    Years of education: Not on file    Highest education level: Not on file   Occupational History    Not on file   Tobacco Use    Smoking status: Never    Smokeless tobacco: Never   Vaping Use    Vaping status: Never Used   Substance and Sexual Activity    Alcohol use: Yes     Comment: occ    Drug use: Never    Sexual activity: Yes     Partners: Male     Birth control/protection: Female Surgical   Other Topics Concern    Not on file   Social History Narrative    Not on file     Social Drivers of Health     Financial Resource Strain: Low Risk  (4/9/2025)    Financial Resource Strain     Within the past 12 months, have you or your family members you live with been unable to get utilities (heat, electricity) when it was really needed?: No   Food Insecurity: Low Risk  (4/9/2025)    Food Insecurity     Within the past 12 months, did you worry that your food would run out before you got money to buy more?: No     Within the past 12 months, did the food you bought just not last and you didn t have money to get more?: No   Transportation Needs: Low Risk  (4/9/2025)    Transportation Needs     Within the past 12 months, has lack of transportation kept you from medical appointments, getting your medicines, non-medical meetings or appointments, work, or from getting things that you need?: No   Physical Activity: Unknown (4/9/2025)    Exercise Vital Sign     Days of Exercise per Week: 3 days     Minutes of Exercise per Session: Not on file   Stress: No Stress Concern Present (4/9/2025)    [employer] - Occupational Stress Questionnaire     Feeling of Stress : Only a little   Social Connections: Unknown (4/9/2025)    Social Connection and Isolation Panel [NHANES]     Frequency of Communication with Friends and Family: Not on file     Frequency of Social Gatherings with Friends and Family: Once a week     Attends Mosque Services: Not on file     Active Member of Clubs or Organizations: Not on file     Attends Club or Organization Meetings: Not on file     Marital Status: Not on file   Interpersonal Safety: Low Risk  (4/9/2025)    Interpersonal Safety     Do you feel physically and emotionally safe where you currently live?: Yes     Within the past 12 months, have you been hit, slapped, kicked or otherwise physically hurt by someone?: No     Within the past 12 months, have you been humiliated or emotionally abused in other ways by your partner or ex-partner?: No   Housing Stability: Low Risk  (4/9/2025)    Housing Stability     Do you have housing? : Yes     Are you worried about losing your housing?: No   No smoking. Rare etoh. Lives with .  [facility].    Physical Exam:  There were no vitals taken for this visit.  Wt Readings from Last 4 Encounters:   04/23/25 58.7 kg (129 lb 8 oz)   04/09/25 58.7 kg (129 lb 4.8 oz)   01/14/25 58.8 kg (129 lb 9.6 oz)   12/13/24 56.7 kg (125 lb)       Constitutional.  Looks well and in no apparent distress.   Eyes.  Without eye redness or apparent jaundice.   Respiratory.  Non labored breathing. Speaking in full sentences.    Skin.  No concerning skin rashes on the skin visualized.   Neurological.  Is alert and oriented.  Psychiatric.  Mood and affect seem appropriate.      Laboratory/Imaging Studies    Reviewed    4/23/2025.    Peripheral blood smear showed moderate hypochromic normocytic anemia with slight thrombocytopenia and normal platelet morphology.  No evidence of hemolysis.    Hemoglobin was 9.9.  MCV 94.  Platelets  134.    Erythropoietin was 11.  Ferritin was 165.  Iron 51.  TIBC 286.  Iron saturation index 18%.  Vitamin B12 was 3364.  Folate was greater than 40.    SPEP was unremarkable.    She is immune to hepatitis B.  Hepatitis C and HIV were negative.        4/9/2025    CBC shows WBC 5.5.  Hemoglobin 10.4.  Platelets 138.  Chemistry showed BUN 35.  Creatinine 1.35.  Calcium 10.1.    Hemoglobin A1c 7    1/14/2025  Erythropoietin was 22.  Iron 58.  TIBC 269.  Iron saturation index 22%.    Vitamin B12 was 3996    X-ray of the pelvis and bilateral hips 4/23/2025.      IMPRESSION: Both hips negative for fracture. Degenerative change both hip joints. Pelvis negative for fracture. Degenerative change at the SI joints bilaterally. Vascular calcifications. Chronic enthesitis at the iliac crests and spines bilaterally.     Right upper quadrant ultrasound 4/30/2025.  IMPRESSION:  1.  Several gallbladder polyps again identified. Largest size is approximately 7 mm, stable compared to the prior ultrasound. These are difficult to visualize on the comparison MRI exams.  2.  Negative sonographic Real's sign. There is mild biliary ductal dilatation.  3.  Pancreatic head cyst is seen. Refer to MRI for further evaluation.    MRI pancreas 4/30/2025.                                          IMPRESSION:  1.  A 1.7 cm cystic lesion in the head of the pancreas is unchanged, and is suspicious for a sidebranch IPMN. Please refer to follow-up guidelines below.  2.  Mild dilatation of the common hepatic duct is unchanged. No convincing biliary filling defects to suggest choledocholithiasis.        ASSESSMENT/PLAN:      Anemia and thrombocytopenia.      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  Iron saturation index is 18% and although it is in the normal range, patients who have CKD, they tend to benefit if iron saturation index is higher around the range of 30%.  I would recommend starting oral iron once  daily.  Potentially in the future, erythropoietin supplementation could be used but currently hemoglobin is 9.9 so it is reasonable to take oral iron as the goal hemoglobin is around 10.  She tells me that she has been taking oral iron once a day and she should continue that.      Thrombocytopenia.  She also has mild thrombocytopenia.  Workup has been negative.  I am not exactly sure what is the reason for that.    We discussed that potentially we can do a bone marrow biopsy because of anemia and thrombocytopenia or we can continue to observe and do a bone marrow biopsy if there is evidence of worsening of blood counts.    We decided on checking blood counts every 3 months for now.  We will do bone marrow biopsy if blood counts significantly change.    Elevated vitamin B12 levels.  She has been taking vitamin B12 supplements and I told her to stop taking it.    Left lower back/hip pain.  She has arthritis changes.  Advised to follow-up with PCP and consider physical therapy.     I will see her back in 6 months.    All questions answered to her satisfaction.  She is agreeable and comfortable with the plan.    Cassandra Pillai MD

## 2025-05-28 NOTE — PROGRESS NOTES
[facility]    OUTPATIENT PHYSICAL THERAPY ORTHOPEDIC EVALUATION  PLAN OF TREATMENT FOR OUTPATIENT REHABILITATION  (COMPLETE FOR INITIAL CLAIMS ONLY)  Patient's Last Name, First Name, M.I.  YOB: 1945  Zaria Gaona    Provider s Name:  [facility]   Medical Record No.  [id number]   Start of Care Date:  03/02/22   Onset Date:  01/02/22   Type:     _X__PT   ___OT   ___SLP Medical Diagnosis:  Left lumbar radiculopathy M54.16      PT Diagnosis:  left low back pain and left leg pain    Visits from SOC:  1      _________________________________________________________________________________  Plan of Treatment/Functional Goals:  ADL retraining, balance training, ROM, strengthening, stretching        hot pack and IFC , modalities as needed   Goals  Goal Identifier: 1  Goal Description: instruction in HEP and compliant with it 5 of 7 days to improve quality of life   Target Date: 06/02/22    Goal Identifier: 2  Goal Description: patient to have reduction in pain level currently 0-6/10 goal is 0-2/10   Target Date: 06/02/22    Goal Identifier: 3  Goal Description: patient to have improved tolerance to activity currently DAKOTAH 26.67 goal is less than 20   Target Date: 06/02/22                                                           Therapy Frequency:     Predicted Duration of Therapy Intervention:  1x week x 1-3 months     Kiara Zarate, PT                 I CERTIFY THE NEED FOR THESE SERVICES FURNISHED UNDER        THIS PLAN OF TREATMENT AND WHILE UNDER MY CARE     (Physician co-signature of this document indicates review and certification of the therapy plan).                       Certification Date From:  03/02/22   Certification Date To:  06/02/22    Referring Provider:  jose manuel puga MD    Initial Assessment        See Epic Evaluation Start of Care Date: 03/02/22     Pt comes to the ED with a CC abnormal CT scan.  Pt was seen admitted to [facility] because of the CT.  Pt reports going to [facility] left side weakness facial drop and loss of balance.  Pt was not pleased with her care over there and left AMA some come here.  Pt reports some weakness and loss of balance but nothing new or worse sense the past two days.

## 2025-07-03 DIAGNOSIS — Z79.899 POLYPHARMACY: Primary | ICD-10-CM

## 2025-07-03 DIAGNOSIS — M35.3 POLYMYALGIA RHEUMATICA: ICD-10-CM

## 2025-07-15 DIAGNOSIS — I10 HYPERTENSION, UNSPECIFIED TYPE: ICD-10-CM

## 2025-07-15 DIAGNOSIS — E78.5 HYPERLIPIDEMIA LDL GOAL <100: ICD-10-CM

## 2025-07-15 RX ORDER — ATORVASTATIN CALCIUM 80 MG/1
80 TABLET, FILM COATED ORAL DAILY
Qty: 90 TABLET | Refills: 1 | Status: SHIPPED | OUTPATIENT
Start: 2025-07-15

## 2025-07-15 RX ORDER — LOSARTAN POTASSIUM 100 MG/1
100 TABLET ORAL DAILY
Qty: 90 TABLET | Refills: 3 | Status: SHIPPED | OUTPATIENT
Start: 2025-07-15

## 2025-07-19 ENCOUNTER — HEALTH MAINTENANCE LETTER (OUTPATIENT)
Age: 80
End: 2025-07-19

## 2025-08-04 ENCOUNTER — TELEPHONE (OUTPATIENT)
Dept: INTERNAL MEDICINE | Facility: CLINIC | Age: 80
End: 2025-08-04

## 2025-08-04 ENCOUNTER — LAB (OUTPATIENT)
Dept: LAB | Facility: CLINIC | Age: 80
End: 2025-08-04
Payer: COMMERCIAL

## 2025-08-04 DIAGNOSIS — N18.32 CKD STAGE 3B, GFR 30-44 ML/MIN (H): Primary | ICD-10-CM

## 2025-08-04 DIAGNOSIS — D69.6 THROMBOCYTOPENIA: ICD-10-CM

## 2025-08-04 DIAGNOSIS — M35.3 POLYMYALGIA RHEUMATICA: ICD-10-CM

## 2025-08-04 DIAGNOSIS — Z79.899 POLYPHARMACY: ICD-10-CM

## 2025-08-04 DIAGNOSIS — I25.118 CORONARY ARTERY DISEASE INVOLVING NATIVE CORONARY ARTERY OF NATIVE HEART WITH OTHER FORM OF ANGINA PECTORIS: ICD-10-CM

## 2025-08-04 DIAGNOSIS — D64.9 ANEMIA, UNSPECIFIED TYPE: ICD-10-CM

## 2025-08-04 DIAGNOSIS — D64.9 ANEMIA, UNSPECIFIED TYPE: Primary | ICD-10-CM

## 2025-08-04 LAB
ALBUMIN SERPL BCG-MCNC: 4.4 G/DL (ref 3.5–5.2)
ALP SERPL-CCNC: 61 U/L (ref 40–150)
ALT SERPL W P-5'-P-CCNC: 27 U/L (ref 0–50)
ANION GAP SERPL CALCULATED.3IONS-SCNC: 13 MMOL/L (ref 7–15)
AST SERPL W P-5'-P-CCNC: 19 U/L (ref 0–45)
BASOPHILS # BLD AUTO: 0 10E3/UL (ref 0–0.2)
BASOPHILS NFR BLD AUTO: 0 %
BILIRUB SERPL-MCNC: 0.5 MG/DL
BUN SERPL-MCNC: 51.8 MG/DL (ref 8–23)
CALCIUM SERPL-MCNC: 9.6 MG/DL (ref 8.8–10.4)
CHLORIDE SERPL-SCNC: 107 MMOL/L (ref 98–107)
CHOLEST SERPL-MCNC: 117 MG/DL
CREAT SERPL-MCNC: 1.73 MG/DL (ref 0.51–0.95)
EGFRCR SERPLBLD CKD-EPI 2021: 30 ML/MIN/1.73M2
EOSINOPHIL # BLD AUTO: 0.1 10E3/UL (ref 0–0.7)
EOSINOPHIL NFR BLD AUTO: 2 %
ERYTHROCYTE [DISTWIDTH] IN BLOOD BY AUTOMATED COUNT: 16.4 % (ref 10–15)
FASTING STATUS PATIENT QL REPORTED: NO
FASTING STATUS PATIENT QL REPORTED: NO
FERRITIN SERPL-MCNC: 274 NG/ML (ref 11–328)
GLUCOSE SERPL-MCNC: 144 MG/DL (ref 70–99)
HCO3 SERPL-SCNC: 19 MMOL/L (ref 22–29)
HCT VFR BLD AUTO: 25.8 % (ref 35–47)
HDLC SERPL-MCNC: 48 MG/DL
HGB BLD-MCNC: 8.2 G/DL (ref 11.7–15.7)
IMM GRANULOCYTES # BLD: 0 10E3/UL
IMM GRANULOCYTES NFR BLD: 1 %
IRON BINDING CAPACITY (ROCHE): 278 UG/DL (ref 240–430)
IRON SATN MFR SERPL: 27 % (ref 15–46)
IRON SERPL-MCNC: 74 UG/DL (ref 37–145)
LDLC SERPL CALC-MCNC: 39 MG/DL
LYMPHOCYTES # BLD AUTO: 1.2 10E3/UL (ref 0.8–5.3)
LYMPHOCYTES NFR BLD AUTO: 24 %
MCH RBC QN AUTO: 32.3 PG (ref 26.5–33)
MCHC RBC AUTO-ENTMCNC: 31.8 G/DL (ref 31.5–36.5)
MCV RBC AUTO: 102 FL (ref 78–100)
MONOCYTES # BLD AUTO: 0.5 10E3/UL (ref 0–1.3)
MONOCYTES NFR BLD AUTO: 11 %
NEUTROPHILS # BLD AUTO: 3 10E3/UL (ref 1.6–8.3)
NEUTROPHILS NFR BLD AUTO: 62 %
NONHDLC SERPL-MCNC: 69 MG/DL
NRBC # BLD AUTO: 0 10E3/UL
NRBC BLD AUTO-RTO: 0 /100
PHOSPHATE SERPL-MCNC: 3.5 MG/DL (ref 2.5–4.5)
PLATELET # BLD AUTO: 87 10E3/UL (ref 150–450)
POTASSIUM SERPL-SCNC: 5.2 MMOL/L (ref 3.4–5.3)
PROT SERPL-MCNC: 6.9 G/DL (ref 6.4–8.3)
PTH-INTACT SERPL-MCNC: 51 PG/ML (ref 15–65)
RBC # BLD AUTO: 2.54 10E6/UL (ref 3.8–5.2)
SODIUM SERPL-SCNC: 139 MMOL/L (ref 135–145)
TRIGL SERPL-MCNC: 149 MG/DL
VIT B12 SERPL-MCNC: 879 PG/ML (ref 232–1245)
WBC # BLD AUTO: 4.8 10E3/UL (ref 4–11)

## 2025-08-04 PROCEDURE — 82728 ASSAY OF FERRITIN: CPT

## 2025-08-04 PROCEDURE — 80053 COMPREHEN METABOLIC PANEL: CPT

## 2025-08-04 PROCEDURE — 85025 COMPLETE CBC W/AUTO DIFF WBC: CPT

## 2025-08-04 PROCEDURE — 36415 COLL VENOUS BLD VENIPUNCTURE: CPT

## 2025-08-04 PROCEDURE — 84100 ASSAY OF PHOSPHORUS: CPT

## 2025-08-04 PROCEDURE — 82607 VITAMIN B-12: CPT

## 2025-08-04 PROCEDURE — 99000 SPECIMEN HANDLING OFFICE-LAB: CPT | Performed by: PHYSICIAN ASSISTANT

## 2025-08-04 PROCEDURE — 83550 IRON BINDING TEST: CPT

## 2025-08-04 PROCEDURE — 83540 ASSAY OF IRON: CPT

## 2025-08-04 PROCEDURE — 80061 LIPID PANEL: CPT

## 2025-08-04 PROCEDURE — 83970 ASSAY OF PARATHORMONE: CPT

## 2025-08-04 PROCEDURE — 82668 ASSAY OF ERYTHROPOIETIN: CPT | Mod: 90 | Performed by: PHYSICIAN ASSISTANT

## 2025-08-06 LAB — EPO SERPL-ACNC: 16 MU/ML

## 2025-08-07 LAB
CREAT UR-MCNC: 27.2 MG/DL
MICROALBUMIN UR-MCNC: <12 MG/L
MICROALBUMIN/CREAT UR: NORMAL MG/G{CREAT}

## 2025-08-07 PROCEDURE — 82570 ASSAY OF URINE CREATININE: CPT

## 2025-08-07 PROCEDURE — 82043 UR ALBUMIN QUANTITATIVE: CPT

## 2025-08-12 DIAGNOSIS — E78.5 HYPERLIPIDEMIA LDL GOAL <100: ICD-10-CM

## 2025-08-12 DIAGNOSIS — I10 HYPERTENSION, UNSPECIFIED TYPE: ICD-10-CM

## 2025-08-12 RX ORDER — TRIAMTERENE AND HYDROCHLOROTHIAZIDE 37.5; 25 MG/1; MG/1
1 CAPSULE ORAL EVERY MORNING
Qty: 90 CAPSULE | Refills: 3 | Status: SHIPPED | OUTPATIENT
Start: 2025-08-12

## 2025-08-12 RX ORDER — LOSARTAN POTASSIUM 100 MG/1
100 TABLET ORAL DAILY
Qty: 90 TABLET | Refills: 3 | Status: SHIPPED | OUTPATIENT
Start: 2025-08-12

## 2025-08-12 RX ORDER — ATORVASTATIN CALCIUM 80 MG/1
80 TABLET, FILM COATED ORAL DAILY
Qty: 90 TABLET | Refills: 1 | Status: SHIPPED | OUTPATIENT
Start: 2025-08-12

## (undated) DEVICE — LUBRICATING JELLY 4.25OZ

## (undated) DEVICE — ESU PENCIL SMOKE EVAC W/ROCKER SWITCH 0703-047-000

## (undated) DEVICE — GLOVE EXAM NITRILE LG

## (undated) DEVICE — NDL SPINAL 22GA 3.5" QUINCKE 405181

## (undated) DEVICE — SOL WATER IRRIG 1000ML BOTTLE 07139-09

## (undated) DEVICE — SYR 05ML LL W/O NDL

## (undated) DEVICE — KIT ENDO TURNOVER/PROCEDURE CARRY-ON 101822

## (undated) DEVICE — SYR 10ML LL W/O NDL

## (undated) DEVICE — TRAY PROCEDURE SUPPORT PAIN MANAGEMENT 332114

## (undated) DEVICE — GLOVE BIOGEL PI SZ 7.5 40875

## (undated) DEVICE — PREP CHLORAPREP 26ML TINTED ORANGE  260815

## (undated) DEVICE — TAPE TRANSPORE 1/2"

## (undated) DEVICE — SOL NACL 0.9% IRRIG 1000ML BOTTLE 07138-09

## (undated) DEVICE — CAST STOCKINETTE 3" COTTON 30-7003

## (undated) DEVICE — GLOVE BIOGEL PI INDICATOR 8.0 LF 41680

## (undated) DEVICE — ESU GROUND PAD UNIVERSAL W/O CORD

## (undated) DEVICE — DRSG GAUZE 4X4" 2187

## (undated) DEVICE — SU ETHILON 5-0 PS-2 18" 1666H

## (undated) DEVICE — GLOVE PROTEXIS W/NEU-THERA 7.5  2D73TE75

## (undated) DEVICE — TUBING SUCTION 12"X1/4" N612

## (undated) DEVICE — NDL SPINAL 25GA 3.5" QUINCKE 405180

## (undated) DEVICE — BNDG ELASTIC 4"X5YDS UNSTERILE 6611-40

## (undated) DEVICE — CAST PLASTER SPLINT 4X15" EXTRA FAST

## (undated) DEVICE — PACK HAND WRIST FOREARM CUSTOM

## (undated) DEVICE — TUBING IV EXTENSION SET 34"

## (undated) DEVICE — NDL SPINAL 25GA 4.69" QUINCKE 405234

## (undated) DEVICE — BASIN SET MINOR DISP

## (undated) RX ORDER — ONDANSETRON 2 MG/ML
INJECTION INTRAMUSCULAR; INTRAVENOUS
Status: DISPENSED
Start: 2024-04-26

## (undated) RX ORDER — PROPOFOL 10 MG/ML
INJECTION, EMULSION INTRAVENOUS
Status: DISPENSED
Start: 2024-04-26

## (undated) RX ORDER — PROPOFOL 10 MG/ML
INJECTION, EMULSION INTRAVENOUS
Status: DISPENSED
Start: 2023-06-27

## (undated) RX ORDER — LIDOCAINE HYDROCHLORIDE 5 MG/ML
INJECTION, SOLUTION INFILTRATION; INTRAVENOUS
Status: DISPENSED
Start: 2024-04-26

## (undated) RX ORDER — BUPIVACAINE HYDROCHLORIDE 2.5 MG/ML
INJECTION, SOLUTION EPIDURAL; INFILTRATION; INTRACAUDAL
Status: DISPENSED
Start: 2024-04-26

## (undated) RX ORDER — FENTANYL CITRATE 50 UG/ML
INJECTION, SOLUTION INTRAMUSCULAR; INTRAVENOUS
Status: DISPENSED
Start: 2024-10-17

## (undated) RX ORDER — LIDOCAINE HYDROCHLORIDE 10 MG/ML
INJECTION, SOLUTION EPIDURAL; INFILTRATION; INTRACAUDAL; PERINEURAL
Status: DISPENSED
Start: 2023-06-27

## (undated) RX ORDER — FENTANYL CITRATE 50 UG/ML
INJECTION, SOLUTION INTRAMUSCULAR; INTRAVENOUS
Status: DISPENSED
Start: 2024-04-26